# Patient Record
Sex: FEMALE | Race: WHITE | Employment: FULL TIME | URBAN - METROPOLITAN AREA
[De-identification: names, ages, dates, MRNs, and addresses within clinical notes are randomized per-mention and may not be internally consistent; named-entity substitution may affect disease eponyms.]

---

## 2017-04-18 ENCOUNTER — OFFICE VISIT (OUTPATIENT)
Dept: SURGERY | Facility: CLINIC | Age: 62
End: 2017-04-18

## 2017-04-18 VITALS — BODY MASS INDEX: 26.03 KG/M2 | WEIGHT: 162 LBS | HEIGHT: 66 IN

## 2017-04-18 DIAGNOSIS — K43.2 INCISIONAL HERNIA, WITHOUT OBSTRUCTION OR GANGRENE: Primary | ICD-10-CM

## 2017-04-18 PROCEDURE — 99214 OFFICE O/P EST MOD 30 MIN: CPT | Performed by: SURGERY

## 2017-04-18 PROCEDURE — 99212 OFFICE O/P EST SF 10 MIN: CPT | Performed by: SURGERY

## 2017-04-18 RX ORDER — BETAMETHASONE DIPROPIONATE 0.05 %
OINTMENT (GRAM) TOPICAL
Refills: 1 | COMMUNITY
Start: 2017-04-06

## 2017-04-18 RX ORDER — VALSARTAN 160 MG/1
TABLET ORAL
Refills: 2 | COMMUNITY
Start: 2017-04-06 | End: 2019-04-06

## 2017-04-18 NOTE — H&P
History and Physical      Lannis Krabbe is a 64year old female. HPI   Patient presents with: Follow - Up: Patient here for follow up hernia, last seen 10/24/2016 for incisional hernia at the umbilicus.  Patient states area has begun to bother her Prescriptions:  Betamethasone Dipropionate 0.05 % External Ointment APPLY AS DIRECTED Disp:  Rfl: 1   valsartan 160 MG Oral Tab TK 1 T PO QD Disp:  Rfl: 2   RESTASIS 0.05 % Ophthalmic Emulsion daily.  Disp:  Rfl: 4   ipratropium-albuterol 0.5-2.5 (3) MG/3ML completed. Pertinent positives and negatives noted in the the HPI. PHYSICAL EXAM   Body mass index is 26.16 kg/(m^2). No LMP recorded. Patient is not currently having periods (Reason: Menopause).   Constitutional: appears well hydrated alert and re

## 2017-06-15 ENCOUNTER — TELEPHONE (OUTPATIENT)
Dept: GASTROENTEROLOGY | Facility: CLINIC | Age: 62
End: 2017-06-15

## 2017-06-15 NOTE — TELEPHONE ENCOUNTER
Caresse Hodgkin requesting RN to fax office notes for CMN re: 10/6/2016 EGD with biopsy. Pls fax to [phone]. For add'l questions pls call. Thank you.

## 2017-06-19 NOTE — TELEPHONE ENCOUNTER
Samson Everett called me back. [facility] Is not paying for procedures because no authorization was on file. Pt was scheduled @ [address] [place] [address] and no approval was obtained. They needs records to establish medical necessity.  Faxed records today

## 2017-07-20 ENCOUNTER — TELEPHONE (OUTPATIENT)
Dept: GASTROENTEROLOGY | Facility: CLINIC | Age: 62
End: 2017-07-20

## 2017-07-20 RX ORDER — AMOXICILLIN AND CLAVULANATE POTASSIUM 875; 125 MG/1; MG/1
1 TABLET, FILM COATED ORAL 2 TIMES DAILY
Qty: 20 TABLET | Refills: 0 | Status: SHIPPED | OUTPATIENT
Start: 2017-07-20 | End: 2018-05-30 | Stop reason: ALTCHOICE

## 2017-07-20 NOTE — TELEPHONE ENCOUNTER
Pt is requesting an appt sooner than first available with PL due to IBS and diverticulitis.  Please Call Thank You

## 2017-07-20 NOTE — TELEPHONE ENCOUNTER
The patient was contacted and symptoms discussed. She has been experiencing 1 week of lower abdominal discomfort. Seems to be on both sides of her abdomen. She reports her appendix has been removed surgically.   She does have a history of diverticulitis

## 2017-07-20 NOTE — TELEPHONE ENCOUNTER
I spoke to pt and she accepted appt with Dr Samreen Cleaning tomorrow at 3pm and given detailed directions to wmob. He has cases before that at [facility].

## 2017-07-20 NOTE — TELEPHONE ENCOUNTER
Dr Anderson--pt contacted, this had not been sent as high priority from phone room. Pt states Tues 7/11 began having bilateral lower abdominal pain that has worsened, currently at level 7. Unsure if fever, but \"feels slightly warm\".  History diverticulitis,

## 2017-07-21 ENCOUNTER — LAB ENCOUNTER (OUTPATIENT)
Dept: LAB | Facility: HOSPITAL | Age: 62
End: 2017-07-21
Attending: INTERNAL MEDICINE
Payer: COMMERCIAL

## 2017-07-21 ENCOUNTER — TELEPHONE (OUTPATIENT)
Dept: GASTROENTEROLOGY | Facility: CLINIC | Age: 62
End: 2017-07-21

## 2017-07-21 ENCOUNTER — PRIOR ORIGINAL RECORDS (OUTPATIENT)
Dept: OTHER | Age: 62
End: 2017-07-21

## 2017-07-21 ENCOUNTER — OFFICE VISIT (OUTPATIENT)
Dept: GASTROENTEROLOGY | Facility: CLINIC | Age: 62
End: 2017-07-21

## 2017-07-21 VITALS — SYSTOLIC BLOOD PRESSURE: 113 MMHG | HEART RATE: 79 BPM | DIASTOLIC BLOOD PRESSURE: 71 MMHG

## 2017-07-21 DIAGNOSIS — R10.9 ABDOMINAL PAIN, UNSPECIFIED LOCATION: ICD-10-CM

## 2017-07-21 DIAGNOSIS — R10.9 ABDOMINAL PAIN, UNSPECIFIED LOCATION: Primary | ICD-10-CM

## 2017-07-21 LAB
ALBUMIN SERPL BCP-MCNC: 4.3 G/DL (ref 3.5–4.8)
ALBUMIN/GLOB SERPL: 1.8 {RATIO} (ref 1–2)
ALP SERPL-CCNC: 50 U/L (ref 32–100)
ALT SERPL-CCNC: 37 U/L (ref 14–54)
ANION GAP SERPL CALC-SCNC: 8 MMOL/L (ref 0–18)
AST SERPL-CCNC: 27 U/L (ref 15–41)
BASOPHILS # BLD: 0.1 K/UL (ref 0–0.2)
BASOPHILS NFR BLD: 1 %
BILIRUB SERPL-MCNC: 0.7 MG/DL (ref 0.3–1.2)
BILIRUB UR QL: NEGATIVE
BUN SERPL-MCNC: 18 MG/DL (ref 8–20)
BUN/CREAT SERPL: 25 (ref 10–20)
CALCIUM SERPL-MCNC: 9.6 MG/DL (ref 8.5–10.5)
CHLORIDE SERPL-SCNC: 105 MMOL/L (ref 95–110)
CO2 SERPL-SCNC: 26 MMOL/L (ref 22–32)
COLOR UR: YELLOW
CREAT SERPL-MCNC: 0.72 MG/DL (ref 0.5–1.5)
EOSINOPHIL # BLD: 0.1 K/UL (ref 0–0.7)
EOSINOPHIL NFR BLD: 1 %
ERYTHROCYTE [DISTWIDTH] IN BLOOD BY AUTOMATED COUNT: 12.6 % (ref 11–15)
GLOBULIN PLAS-MCNC: 2.4 G/DL (ref 2.5–3.7)
GLUCOSE SERPL-MCNC: 102 MG/DL (ref 70–99)
GLUCOSE UR-MCNC: NEGATIVE MG/DL
HCT VFR BLD AUTO: 40.9 % (ref 35–48)
HGB BLD-MCNC: 14.1 G/DL (ref 12–16)
KETONES UR-MCNC: NEGATIVE MG/DL
LIPASE SERPL-CCNC: 30 U/L (ref 22–51)
LYMPHOCYTES # BLD: 2.4 K/UL (ref 1–4)
LYMPHOCYTES NFR BLD: 34 %
MCH RBC QN AUTO: 30.4 PG (ref 27–32)
MCHC RBC AUTO-ENTMCNC: 34.4 G/DL (ref 32–37)
MCV RBC AUTO: 88.4 FL (ref 80–100)
MONOCYTES # BLD: 0.5 K/UL (ref 0–1)
MONOCYTES NFR BLD: 7 %
NEUTROPHILS # BLD AUTO: 4 K/UL (ref 1.8–7.7)
NEUTROPHILS NFR BLD: 57 %
NITRITE UR QL STRIP.AUTO: NEGATIVE
OSMOLALITY UR CALC.SUM OF ELEC: 290 MOSM/KG (ref 275–295)
PH UR: 5 [PH] (ref 5–8)
PLATELET # BLD AUTO: 231 K/UL (ref 140–400)
PMV BLD AUTO: 8.4 FL (ref 7.4–10.3)
POTASSIUM SERPL-SCNC: 3.8 MMOL/L (ref 3.3–5.1)
PROT SERPL-MCNC: 6.7 G/DL (ref 5.9–8.4)
PROT UR-MCNC: 30 MG/DL
RBC # BLD AUTO: 4.63 M/UL (ref 3.7–5.4)
RBC #/AREA URNS AUTO: 3 /HPF
SODIUM SERPL-SCNC: 139 MMOL/L (ref 136–144)
SP GR UR STRIP: 1.03 (ref 1–1.03)
UROBILINOGEN UR STRIP-ACNC: 2
VIT C UR-MCNC: NEGATIVE MG/DL
WBC # BLD AUTO: 7 K/UL (ref 4–11)
WBC #/AREA URNS AUTO: 1 /HPF

## 2017-07-21 PROCEDURE — 83690 ASSAY OF LIPASE: CPT

## 2017-07-21 PROCEDURE — 99212 OFFICE O/P EST SF 10 MIN: CPT | Performed by: INTERNAL MEDICINE

## 2017-07-21 PROCEDURE — 80053 COMPREHEN METABOLIC PANEL: CPT

## 2017-07-21 PROCEDURE — 85025 COMPLETE CBC W/AUTO DIFF WBC: CPT

## 2017-07-21 PROCEDURE — 99213 OFFICE O/P EST LOW 20 MIN: CPT | Performed by: INTERNAL MEDICINE

## 2017-07-21 PROCEDURE — 36415 COLL VENOUS BLD VENIPUNCTURE: CPT

## 2017-07-21 PROCEDURE — 81001 URINALYSIS AUTO W/SCOPE: CPT

## 2017-07-21 NOTE — PATIENT INSTRUCTIONS
Abdominal pain- possible diverticulitis  - CT scan abdomen/pelvis  - Augmentin   - low fiber diet  - lab work   - Florastor twice a day ( probiotic)

## 2017-07-21 NOTE — TELEPHONE ENCOUNTER
THIS PATIENT WILL CALL BACK TO SCHEDULE    Scheduled for:  Colonoscopy 14280  Provider Name: Dr. Monie Dawson  Date:    Location:  [facility]  Sedation:  MAC  Time:    Prep:  Suprep, given to patient at the time of office visit  Meds/Allergies Reconciled?:  Physician re

## 2017-07-21 NOTE — PROGRESS NOTES
Claudell Cedars is a 64year old female.     HPI:   Patient presents with:  Abdominal Pain: Pt goes by \"Rhea\"      The patient is a 66-year-old female with history of irritable bowel syndrome/diarrhea predominance, prior history of diverticulitis who la Comment: adverse reaction  2013: REPAIR ROTATOR CUFF,CHRONIC Left  1965: TONSILLECTOMY   Family History   Problem Relation Age of Onset   • Hypertension Mother    • Cancer Father      Tongue   • Breast Cancer Other      family h/o   • Heart Disorder Brot with no masses or lesions  Chest- Clear bilaterally, no wheezing,  Heart- regular rate, no murmur or gallop  Abdomen- Soft, no rebound or guarding, bowel sounds normal, she does report tenderness in both left and right lower quadrants  Ext- no clubbing or

## 2017-07-26 ENCOUNTER — TELEPHONE (OUTPATIENT)
Dept: GASTROENTEROLOGY | Facility: CLINIC | Age: 62
End: 2017-07-26

## 2017-07-26 NOTE — TELEPHONE ENCOUNTER
----- Message from Yeny Morel MD sent at 7/25/2017  7:14 PM CDT -----  RN to contact pt - lab work looks ok, urine may suggest UTI ( but should be covered with abx she is on)   Please see how she is doing and if ct set up

## 2017-07-27 NOTE — TELEPHONE ENCOUNTER
Dr Anderson--Pt contacted and given below results. She is feeling better, abdominal pain improving, currently at level 4. She is waiting for auth for CT from [facility]. I contacted [employer] and they will call pt as soon as they have auth.   Scheduled her colon

## 2017-07-27 NOTE — TELEPHONE ENCOUNTER
[facility] eugenia SKAGGS wishes to schedule below. She was at her listed home phone when I spoke to her, Thanks.

## 2017-08-09 ENCOUNTER — TELEPHONE (OUTPATIENT)
Dept: GASTROENTEROLOGY | Facility: CLINIC | Age: 62
End: 2017-08-09

## 2017-08-09 NOTE — TELEPHONE ENCOUNTER
Pt called to inform PL that she needs PA for CT SCAN of abdomen. Pt states insurance company is requesting a call on the provider line at [employer]). Pt is aware PL not in office this week. Pt states abdominal pain is not better. For ad

## 2017-08-09 NOTE — TELEPHONE ENCOUNTER
I contacted Joaquín Quiroz in [facility]. This was authorized with #[id number]. I instructed pt to call insurance back and give them this number.

## 2017-08-21 ENCOUNTER — HOSPITAL ENCOUNTER (OUTPATIENT)
Dept: CT IMAGING | Facility: HOSPITAL | Age: 62
Discharge: HOME | End: 2017-08-21
Attending: INTERNAL MEDICINE
Payer: COMMERCIAL

## 2017-08-21 DIAGNOSIS — R10.9 ABDOMINAL PAIN, UNSPECIFIED LOCATION: ICD-10-CM

## 2017-08-21 LAB — CREAT BLD-MCNC: 0.9 MG/DL (ref 0.5–1.5)

## 2017-08-21 PROCEDURE — 74177 CT ABD & PELVIS W/CONTRAST: CPT | Performed by: INTERNAL MEDICINE

## 2017-08-21 PROCEDURE — 82565 ASSAY OF CREATININE: CPT

## 2017-08-23 ENCOUNTER — TELEPHONE (OUTPATIENT)
Dept: GASTROENTEROLOGY | Facility: CLINIC | Age: 62
End: 2017-08-23

## 2017-08-23 NOTE — TELEPHONE ENCOUNTER
----- Message from Adi Norton MD sent at 8/21/2017  6:01 PM CDT -----  RN to contact pt with the CT scan - negative for diverticulitis/inflammation.    There is some thickening of the rectum     Advise- colonosocopy with MAC sedation and Colyte prep (

## 2017-08-23 NOTE — TELEPHONE ENCOUNTER
Dr. Russel Mishra - Pt (goes by name Michelle Hough) was given info below and vrb understanding. Pt currently has colon set for 9.26.17. States she is still having that lower pelvic pain and wanted it moved up. She has [facility] and therefore would need done at [facility].  Is th

## 2017-08-24 NOTE — TELEPHONE ENCOUNTER
Dr. Delon Durán--    I called Javad Peoples at the [facility] and she stated that you can schedule on 8/30/17 at 1230. Please advise if this is good for you and I will reschedule. Thank you.

## 2017-08-25 NOTE — TELEPHONE ENCOUNTER
This patient was scheduled by Cam Bee.  Closing this TE please see TE 7/26/17 for scheduling information

## 2017-08-28 ENCOUNTER — TELEPHONE (OUTPATIENT)
Dept: GASTROENTEROLOGY | Facility: CLINIC | Age: 62
End: 2017-08-28

## 2017-08-31 NOTE — TELEPHONE ENCOUNTER
Dr. Lovenia Cogan. Cordell Kumar I have been trying to contact this patient but I am unable to contact her. Since I can not contact her I am assuming that she will going to keep her original date of 9/26.   I will continue to contact her but I am unsure if I can adder

## 2017-12-25 ENCOUNTER — APPOINTMENT (OUTPATIENT)
Dept: GENERAL RADIOLOGY | Facility: HOSPITAL | Age: 62
End: 2017-12-25
Payer: COMMERCIAL

## 2017-12-25 ENCOUNTER — HOSPITAL ENCOUNTER (EMERGENCY)
Facility: HOSPITAL | Age: 62
Discharge: HOME | End: 2017-12-25
Attending: EMERGENCY MEDICINE
Payer: COMMERCIAL

## 2017-12-25 VITALS
OXYGEN SATURATION: 95 % | RESPIRATION RATE: 20 BRPM | HEIGHT: 66 IN | HEART RATE: 98 BPM | BODY MASS INDEX: 26.52 KG/M2 | DIASTOLIC BLOOD PRESSURE: 96 MMHG | TEMPERATURE: 98 F | WEIGHT: 165 LBS | SYSTOLIC BLOOD PRESSURE: 150 MMHG

## 2017-12-25 DIAGNOSIS — J45.31 MILD PERSISTENT ASTHMA WITH EXACERBATION: Primary | ICD-10-CM

## 2017-12-25 PROCEDURE — 71020 XR CHEST PA + LAT CHEST (CPT=71020): CPT

## 2017-12-25 PROCEDURE — 93010 ELECTROCARDIOGRAM REPORT: CPT | Performed by: EMERGENCY MEDICINE

## 2017-12-25 PROCEDURE — 93005 ELECTROCARDIOGRAM TRACING: CPT

## 2017-12-25 PROCEDURE — 99284 EMERGENCY DEPT VISIT MOD MDM: CPT

## 2017-12-25 PROCEDURE — 94640 AIRWAY INHALATION TREATMENT: CPT

## 2017-12-25 RX ORDER — ALBUTEROL SULFATE 2.5 MG/3ML
2.5 SOLUTION RESPIRATORY (INHALATION) EVERY 4 HOURS PRN
Qty: 30 AMPULE | Refills: 0 | Status: SHIPPED | OUTPATIENT
Start: 2017-12-25 | End: 2018-01-24

## 2017-12-25 RX ORDER — IPRATROPIUM BROMIDE AND ALBUTEROL SULFATE 2.5; .5 MG/3ML; MG/3ML
3 SOLUTION RESPIRATORY (INHALATION) ONCE
Status: COMPLETED | OUTPATIENT
Start: 2017-12-25 | End: 2017-12-25

## 2017-12-25 RX ORDER — PREDNISONE 20 MG/1
TABLET ORAL
Qty: 11 TABLET | Refills: 0 | Status: SHIPPED | OUTPATIENT
Start: 2017-12-25 | End: 2018-05-30 | Stop reason: ALTCHOICE

## 2017-12-26 NOTE — ED PROVIDER NOTES
Patient Seen in: [facility] AND [facility] Emergency Department    History   Patient presents with:  Cough/URI    Stated Complaint: URI    HPI    Patient is a 79-year-old female who presents to the emergency department with a chief complaint of cough.   Patient s ago      Review of Systems    Positive for stated complaint: URI  Other systems are as noted in HPI. Constitutional and vital signs reviewed. All other systems reviewed and negative except as noted above.     Physical Exam   ED Triage Vitals [12/25/17 every 4 (four) hours as needed for Wheezing or Shortness of Breath.   Qty: 30 ampule Refills: 0    predniSONE 20 MG Oral Tab  1 tablet twice daily for 3 days then daily for 5 days  Qty: 11 tablet Refills: 0

## 2018-02-05 LAB
ALBUMIN: 4.3 G/DL
BILIRUBIN TOTAL: 0.7 MG/DL
BUN: 18 MG/DL
CALCIUM: 9.6 MG/DL
CHLORIDE: 105 MEQ/L
CREATININE, SERUM: 0.72 MG/DL
GLOBULIN: 2.4 G/DL
GLUCOSE: 102 MG/DL
HEMATOCRIT: 40.9 %
HEMOGLOBIN: 14.1 G/DL
PLATELETS: 231 K/UL
POTASSIUM, SERUM: 3.8 MEQ/L
PROTEIN, TOTAL: 6.7 G/DL
RED BLOOD COUNT: 4.63 X 10-6/U
SGOT (AST): 27 IU/L
SGPT (ALT): 37 IU/L
SODIUM: 139 MEQ/L
WHITE BLOOD COUNT: 7 X 10-3/U

## 2018-02-06 ENCOUNTER — PRIOR ORIGINAL RECORDS (OUTPATIENT)
Dept: OTHER | Age: 63
End: 2018-02-06

## 2018-02-13 ENCOUNTER — MYAURORA ACCOUNT LINK (OUTPATIENT)
Dept: OTHER | Age: 63
End: 2018-02-13

## 2018-02-13 ENCOUNTER — PRIOR ORIGINAL RECORDS (OUTPATIENT)
Dept: OTHER | Age: 63
End: 2018-02-13

## 2018-02-16 ENCOUNTER — PRIOR ORIGINAL RECORDS (OUTPATIENT)
Dept: OTHER | Age: 63
End: 2018-02-16

## 2018-02-20 ENCOUNTER — PRIOR ORIGINAL RECORDS (OUTPATIENT)
Dept: OTHER | Age: 63
End: 2018-02-20

## 2018-04-02 ENCOUNTER — LAB ENCOUNTER (OUTPATIENT)
Dept: LAB | Facility: HOSPITAL | Age: 63
End: 2018-04-02
Attending: ALLERGY & IMMUNOLOGY
Payer: COMMERCIAL

## 2018-04-02 ENCOUNTER — HOSPITAL ENCOUNTER (OUTPATIENT)
Dept: GENERAL RADIOLOGY | Facility: HOSPITAL | Age: 63
Discharge: HOME | End: 2018-04-02
Attending: ALLERGY & IMMUNOLOGY
Payer: COMMERCIAL

## 2018-04-02 DIAGNOSIS — J20.9 ACUTE BRONCHITIS: Primary | ICD-10-CM

## 2018-04-02 DIAGNOSIS — J18.9 PNEUMONIA: ICD-10-CM

## 2018-04-02 DIAGNOSIS — J45.909 ASTHMATIC BRONCHITIS: ICD-10-CM

## 2018-04-02 PROCEDURE — 36415 COLL VENOUS BLD VENIPUNCTURE: CPT

## 2018-04-02 PROCEDURE — 86738 MYCOPLASMA ANTIBODY: CPT

## 2018-04-02 PROCEDURE — 85652 RBC SED RATE AUTOMATED: CPT

## 2018-04-02 PROCEDURE — 85025 COMPLETE CBC W/AUTO DIFF WBC: CPT

## 2018-04-02 PROCEDURE — 71046 X-RAY EXAM CHEST 2 VIEWS: CPT | Performed by: ALLERGY & IMMUNOLOGY

## 2018-04-25 ENCOUNTER — LAB SERVICES (OUTPATIENT)
Dept: OTHER | Age: 63
End: 2018-04-25

## 2018-04-25 ENCOUNTER — CHARTING TRANS (OUTPATIENT)
Dept: OTHER | Age: 63
End: 2018-04-25

## 2018-04-25 LAB
APPEARANCE: CLEAR
BILIRUBIN: NORMAL
COLOR: YELLOW
GLUCOSE U: NORMAL
KETONES: NORMAL
LEUKOCYTES: NORMAL
NITRITE: NORMAL
OCCULT BLOOD: NORMAL
PH: 6
PROTEIN: NORMAL
URINE SPEC GRAVITY: 1.02
UROBILINOGEN: NORMAL

## 2018-05-11 ENCOUNTER — HOSPITAL ENCOUNTER (OUTPATIENT)
Dept: CT IMAGING | Facility: HOSPITAL | Age: 63
Discharge: HOME | End: 2018-05-11
Attending: ALLERGY & IMMUNOLOGY
Payer: COMMERCIAL

## 2018-05-11 DIAGNOSIS — R05.9 COUGH: ICD-10-CM

## 2018-05-11 DIAGNOSIS — R06.00 DYSPNEA, PAROXYSMAL NOCTURNAL: ICD-10-CM

## 2018-05-11 DIAGNOSIS — J44.9 OBSTRUCTIVE CHRONIC BRONCHITIS WITHOUT EXACERBATION (HCC): ICD-10-CM

## 2018-05-11 PROCEDURE — 71250 CT THORAX DX C-: CPT | Performed by: ALLERGY & IMMUNOLOGY

## 2018-05-24 ENCOUNTER — TELEPHONE (OUTPATIENT)
Dept: GASTROENTEROLOGY | Facility: CLINIC | Age: 63
End: 2018-05-24

## 2018-05-24 NOTE — TELEPHONE ENCOUNTER
GEOVANNY to Dr Fly Linder. Pt transferred from phone room. She had a UTI 2 weeks ago and took antibiotic. Continues to have urinary frequency, pelvic /lower abd pain both sides, back pain. Urine is dark bella, but clear, and denies burning upon urination.  Sta

## 2018-05-24 NOTE — TELEPHONE ENCOUNTER
Pt states she is having stomach pain/IBS symptoms and would like to be seen by Dr. Priscilla Verduzco either Weds or Thurs in morning before noon or Friday anytime. No appts available. Please call. OK to leave detailed message.

## 2018-05-25 NOTE — TELEPHONE ENCOUNTER
Please have the pt get UTI recurrence ruled out and then see me or Roxanna Gomez in office next week

## 2018-05-25 NOTE — TELEPHONE ENCOUNTER
Patient called back and message from Dr. Ernestina Waddell given. Patient states she will follow up with PCP for UTI and  appointment made with Delores Arthur on 06/06/18 per Dr. Ernestina Waddell. Patient advised to go to ER if symptoms worsen. Patient agreed.

## 2018-05-29 ENCOUNTER — PRIOR ORIGINAL RECORDS (OUTPATIENT)
Dept: OTHER | Age: 63
End: 2018-05-29

## 2018-05-30 ENCOUNTER — PRIOR ORIGINAL RECORDS (OUTPATIENT)
Dept: OTHER | Age: 63
End: 2018-05-30

## 2018-05-30 ENCOUNTER — HOSPITAL ENCOUNTER (OUTPATIENT)
Age: 63
Discharge: HOME | End: 2018-05-30
Payer: COMMERCIAL

## 2018-05-30 VITALS
RESPIRATION RATE: 18 BRPM | WEIGHT: 160 LBS | TEMPERATURE: 99 F | OXYGEN SATURATION: 98 % | HEART RATE: 85 BPM | DIASTOLIC BLOOD PRESSURE: 76 MMHG | BODY MASS INDEX: 25.71 KG/M2 | SYSTOLIC BLOOD PRESSURE: 151 MMHG | HEIGHT: 66 IN

## 2018-05-30 DIAGNOSIS — N30.00 ACUTE CYSTITIS WITHOUT HEMATURIA: Primary | ICD-10-CM

## 2018-05-30 PROCEDURE — 80047 BASIC METABLC PNL IONIZED CA: CPT

## 2018-05-30 PROCEDURE — 36415 COLL VENOUS BLD VENIPUNCTURE: CPT

## 2018-05-30 PROCEDURE — 99214 OFFICE O/P EST MOD 30 MIN: CPT

## 2018-05-30 PROCEDURE — 81002 URINALYSIS NONAUTO W/O SCOPE: CPT

## 2018-05-30 PROCEDURE — 87086 URINE CULTURE/COLONY COUNT: CPT | Performed by: PHYSICIAN ASSISTANT

## 2018-05-30 PROCEDURE — 85025 COMPLETE CBC W/AUTO DIFF WBC: CPT | Performed by: PHYSICIAN ASSISTANT

## 2018-05-30 RX ORDER — PHENAZOPYRIDINE HYDROCHLORIDE 200 MG/1
200 TABLET, FILM COATED ORAL 3 TIMES DAILY PRN
Qty: 9 TABLET | Refills: 0 | Status: SHIPPED | OUTPATIENT
Start: 2018-05-30 | End: 2018-06-02

## 2018-05-30 RX ORDER — CIPROFLOXACIN 500 MG/1
500 TABLET, FILM COATED ORAL 2 TIMES DAILY
Qty: 14 TABLET | Refills: 0 | Status: SHIPPED | OUTPATIENT
Start: 2018-05-30 | End: 2018-06-06
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
## 2018-06-01 ENCOUNTER — APPOINTMENT (OUTPATIENT)
Dept: LAB | Facility: HOSPITAL | Age: 63
End: 2018-06-01
Attending: INTERNAL MEDICINE
Payer: COMMERCIAL

## 2018-06-01 ENCOUNTER — PRIOR ORIGINAL RECORDS (OUTPATIENT)
Dept: OTHER | Age: 63
End: 2018-06-01

## 2018-06-01 ENCOUNTER — MYAURORA ACCOUNT LINK (OUTPATIENT)
Dept: OTHER | Age: 63
End: 2018-06-01

## 2018-06-01 DIAGNOSIS — E78.00 HYPERCHOLESTEREMIA: ICD-10-CM

## 2018-06-01 LAB
ALT (SGPT): 39 U/L
AST (SGOT): 28 U/L
CHOLESTEROL, TOTAL: 236 MG/DL
HDL CHOLESTEROL: 58 MG/DL
LDL CHOLESTEROL: 155 MG/DL
TRIGLYCERIDES: 116 MG/DL

## 2018-06-01 PROCEDURE — 80061 LIPID PANEL: CPT

## 2018-06-01 PROCEDURE — 80076 HEPATIC FUNCTION PANEL: CPT

## 2018-06-01 PROCEDURE — 36415 COLL VENOUS BLD VENIPUNCTURE: CPT

## 2018-06-04 LAB
ALBUMIN: 4.4 G/DL
ALKALINE PHOSPHATATE(ALK PHOS): 46 IU/L
BILIRUBIN TOTAL: 0.9 MG/DL
PROTEIN, TOTAL: 6.9 G/DL
SGOT (AST): 28 IU/L
SGPT (ALT): 39 IU/L

## 2018-06-05 ENCOUNTER — PRIOR ORIGINAL RECORDS (OUTPATIENT)
Dept: OTHER | Age: 63
End: 2018-06-05

## 2018-06-27 ENCOUNTER — HOSPITAL ENCOUNTER (OUTPATIENT)
Dept: RESPIRATORY THERAPY | Facility: HOSPITAL | Age: 63
Discharge: HOME | End: 2018-06-27
Attending: ALLERGY & IMMUNOLOGY
Payer: COMMERCIAL

## 2018-06-27 DIAGNOSIS — R05.9 COUGH: ICD-10-CM

## 2018-06-27 PROCEDURE — 94060 EVALUATION OF WHEEZING: CPT

## 2018-06-27 PROCEDURE — 94726 PLETHYSMOGRAPHY LUNG VOLUMES: CPT

## 2018-06-27 PROCEDURE — 94729 DIFFUSING CAPACITY: CPT

## 2018-06-27 NOTE — PROCEDURES
Pulmonary Function Test Results     Impression: Possible mild obstructive ventilatory defect based on flow volume loop and evidence of air trapping and hyperinflation (% of predicted, % of predicted).  Spirometry and DLCO are normal.

## 2018-08-28 ENCOUNTER — RT VISIT (OUTPATIENT)
Dept: RESPIRATORY THERAPY | Facility: HOSPITAL | Age: 63
End: 2018-08-28
Attending: INTERNAL MEDICINE
Payer: COMMERCIAL

## 2018-08-28 ENCOUNTER — HOSPITAL ENCOUNTER (OUTPATIENT)
Dept: CT IMAGING | Facility: HOSPITAL | Age: 63
Discharge: HOME | End: 2018-08-28
Attending: INTERNAL MEDICINE
Payer: COMMERCIAL

## 2018-08-28 DIAGNOSIS — R05.9 COUGH: ICD-10-CM

## 2018-08-28 DIAGNOSIS — J44.9 COPD (CHRONIC OBSTRUCTIVE PULMONARY DISEASE) (HCC): ICD-10-CM

## 2018-08-28 DIAGNOSIS — J47.9 BRONCHIECTASIS WITHOUT ACUTE EXACERBATION (HCC): ICD-10-CM

## 2018-08-28 PROCEDURE — 71250 CT THORAX DX C-: CPT | Performed by: INTERNAL MEDICINE

## 2018-08-28 PROCEDURE — 94070 EVALUATION OF WHEEZING: CPT

## 2018-09-02 NOTE — PROCEDURES
Lady Christian  is a 70-year-old  female who stands 5 feet 6 inches tall and weighs 166 pounds. She underwent methacholine challenge testing on 8/28/18. She carries a diagnosis of cough.   She has a 4-pack-year smoking history but stopped smoking

## 2018-09-10 RX ORDER — BETAMETHASONE DIPROPIONATE 0.05 %
OINTMENT (GRAM) TOPICAL
Qty: 45 G | Refills: 0 | OUTPATIENT
Start: 2018-09-10

## 2018-09-27 ENCOUNTER — LAB ENCOUNTER (OUTPATIENT)
Dept: LAB | Age: 63
End: 2018-09-27
Attending: INTERNAL MEDICINE
Payer: COMMERCIAL

## 2018-09-27 DIAGNOSIS — R05.9 COUGH: Primary | ICD-10-CM

## 2018-09-27 LAB
BASOPHILS # BLD AUTO: 0.04 X10(3) UL (ref 0–0.1)
BASOPHILS NFR BLD AUTO: 0.8 %
EOSINOPHIL # BLD AUTO: 0.19 X10(3) UL (ref 0–0.3)
EOSINOPHIL NFR BLD AUTO: 4 %
ERYTHROCYTE [DISTWIDTH] IN BLOOD BY AUTOMATED COUNT: 12.2 % (ref 11.5–16)
HCT VFR BLD AUTO: 41 % (ref 34–50)
HGB BLD-MCNC: 14 G/DL (ref 12–16)
IMMATURE GRANULOCYTE COUNT: 0.02 X10(3) UL (ref 0–1)
IMMATURE GRANULOCYTE RATIO %: 0.4 %
IMMUNOGLOBULIN E: 28.1 IU/ML (ref 3.6–114)
LYMPHOCYTES # BLD AUTO: 1.08 X10(3) UL (ref 0.9–4)
LYMPHOCYTES NFR BLD AUTO: 22.6 %
MCH RBC QN AUTO: 30 PG (ref 27–33.2)
MCHC RBC AUTO-ENTMCNC: 34.1 G/DL (ref 31–37)
MCV RBC AUTO: 87.8 FL (ref 81–100)
MONOCYTES # BLD AUTO: 0.48 X10(3) UL (ref 0.1–1)
MONOCYTES NFR BLD AUTO: 10 %
NEUTROPHIL ABS PRELIM: 2.97 X10 (3) UL (ref 1.3–6.7)
NEUTROPHILS # BLD AUTO: 2.97 X10(3) UL (ref 1.3–6.7)
NEUTROPHILS NFR BLD AUTO: 62.2 %
PLATELET # BLD AUTO: 180 10(3)UL (ref 150–450)
RBC # BLD AUTO: 4.67 X10(6)UL (ref 3.8–5.1)
RED CELL DISTRIBUTION WIDTH-SD: 39.2 FL (ref 35.1–46.3)
SED RATE-ML: 9 MM/HR (ref 0–25)
WBC # BLD AUTO: 4.8 X10(3) UL (ref 4–13)

## 2018-09-27 PROCEDURE — 85652 RBC SED RATE AUTOMATED: CPT

## 2018-09-27 PROCEDURE — 82785 ASSAY OF IGE: CPT

## 2018-09-27 PROCEDURE — 85025 COMPLETE CBC W/AUTO DIFF WBC: CPT

## 2018-09-27 PROCEDURE — 36415 COLL VENOUS BLD VENIPUNCTURE: CPT

## 2018-10-04 ENCOUNTER — APPOINTMENT (OUTPATIENT)
Dept: LAB | Facility: HOSPITAL | Age: 63
End: 2018-10-04
Attending: INTERNAL MEDICINE
Payer: COMMERCIAL

## 2018-10-04 ENCOUNTER — PRIOR ORIGINAL RECORDS (OUTPATIENT)
Dept: OTHER | Age: 63
End: 2018-10-04

## 2018-10-04 ENCOUNTER — HOSPITAL ENCOUNTER (OUTPATIENT)
Dept: CT IMAGING | Facility: HOSPITAL | Age: 63
Discharge: HOME | End: 2018-10-04
Attending: INTERNAL MEDICINE
Payer: COMMERCIAL

## 2018-10-04 DIAGNOSIS — R05.9 COUGH: ICD-10-CM

## 2018-10-04 DIAGNOSIS — E78.00 HYPERCHOLESTEREMIA: ICD-10-CM

## 2018-10-04 PROCEDURE — 36415 COLL VENOUS BLD VENIPUNCTURE: CPT

## 2018-10-04 PROCEDURE — 70486 CT MAXILLOFACIAL W/O DYE: CPT | Performed by: INTERNAL MEDICINE

## 2018-10-04 PROCEDURE — 84460 ALANINE AMINO (ALT) (SGPT): CPT

## 2018-10-04 PROCEDURE — 84450 TRANSFERASE (AST) (SGOT): CPT

## 2018-10-04 PROCEDURE — 80061 LIPID PANEL: CPT

## 2018-10-05 ENCOUNTER — PRIOR ORIGINAL RECORDS (OUTPATIENT)
Dept: OTHER | Age: 63
End: 2018-10-05

## 2018-10-05 LAB
ALT (SGPT): 36 U/L
AST (SGOT): 20 U/L
CHOLESTEROL, TOTAL: 159 MG/DL
HDL CHOLESTEROL: 47 MG/DL
LDL CHOLESTEROL: 92 MG/DL
NON-HDL CHOLESTEROL: 112 MG/DL
TRIGLYCERIDES: 101 MG/DL

## 2018-11-01 VITALS
BODY MASS INDEX: 26.03 KG/M2 | HEIGHT: 66 IN | WEIGHT: 162 LBS | HEART RATE: 60 BPM | TEMPERATURE: 98.8 F | RESPIRATION RATE: 16 BRPM

## 2019-01-01 ENCOUNTER — EXTERNAL RECORD (OUTPATIENT)
Dept: HEALTH INFORMATION MANAGEMENT | Facility: OTHER | Age: 64
End: 2019-01-01

## 2019-02-27 RX ORDER — NITROFURANTOIN MACROCRYSTALS 100 MG/1
CAPSULE ORAL
COMMUNITY
Start: 2018-04-25 | End: 2019-04-25

## 2019-02-28 VITALS
WEIGHT: 179 LBS | SYSTOLIC BLOOD PRESSURE: 120 MMHG | HEART RATE: 80 BPM | OXYGEN SATURATION: 97 % | DIASTOLIC BLOOD PRESSURE: 80 MMHG

## 2019-02-28 RX ORDER — ATORVASTATIN CALCIUM 10 MG/1
1 TABLET, FILM COATED ORAL AT BEDTIME
COMMUNITY
Start: 2018-06-01 | End: 2019-06-13 | Stop reason: SDUPTHER

## 2019-02-28 RX ORDER — IRBESARTAN 150 MG/1
1 TABLET ORAL 2 TIMES DAILY
COMMUNITY
Start: 2018-07-31 | End: 2021-03-31 | Stop reason: SDUPTHER

## 2019-02-28 RX ORDER — ALBUTEROL SULFATE 2.5 MG/3ML
SOLUTION RESPIRATORY (INHALATION)
COMMUNITY
Start: 2018-02-06 | End: 2020-03-06

## 2019-02-28 RX ORDER — ESCITALOPRAM OXALATE 10 MG/1
1 TABLET ORAL DAILY
COMMUNITY
Start: 2018-02-06 | End: 2020-03-06

## 2019-03-05 ENCOUNTER — OFFICE VISIT (OUTPATIENT)
Dept: CARDIOLOGY | Age: 64
End: 2019-03-05

## 2019-03-05 VITALS
OXYGEN SATURATION: 95 % | HEART RATE: 102 BPM | BODY MASS INDEX: 27.64 KG/M2 | DIASTOLIC BLOOD PRESSURE: 88 MMHG | SYSTOLIC BLOOD PRESSURE: 140 MMHG | HEIGHT: 66 IN | WEIGHT: 172 LBS

## 2019-03-05 DIAGNOSIS — E78.00 HIGH CHOLESTEROL: ICD-10-CM

## 2019-03-05 DIAGNOSIS — I10 ESSENTIAL HYPERTENSION: ICD-10-CM

## 2019-03-05 DIAGNOSIS — I49.3 PVCS (PREMATURE VENTRICULAR CONTRACTIONS): Primary | ICD-10-CM

## 2019-03-05 PROCEDURE — 3077F SYST BP >= 140 MM HG: CPT | Performed by: INTERNAL MEDICINE

## 2019-03-05 PROCEDURE — 3079F DIAST BP 80-89 MM HG: CPT | Performed by: INTERNAL MEDICINE

## 2019-03-05 PROCEDURE — 99214 OFFICE O/P EST MOD 30 MIN: CPT | Performed by: INTERNAL MEDICINE

## 2019-03-05 PROCEDURE — 93000 ELECTROCARDIOGRAM COMPLETE: CPT | Performed by: INTERNAL MEDICINE

## 2019-03-05 RX ORDER — KETOCONAZOLE 20 MG/G
CREAM TOPICAL
COMMUNITY
Start: 2014-03-21 | End: 2020-03-06

## 2019-03-05 RX ORDER — ALBUTEROL SULFATE 90 UG/1
2 AEROSOL, METERED RESPIRATORY (INHALATION)
COMMUNITY

## 2019-03-05 RX ORDER — NAPROXEN SODIUM 220 MG
TABLET ORAL
COMMUNITY
End: 2020-03-06

## 2019-03-05 RX ORDER — IPRATROPIUM BROMIDE AND ALBUTEROL SULFATE 2.5; .5 MG/3ML; MG/3ML
3 SOLUTION RESPIRATORY (INHALATION)
COMMUNITY
End: 2020-03-06

## 2019-03-05 RX ORDER — IBUPROFEN 400 MG/1
400 TABLET ORAL
COMMUNITY
End: 2020-03-06

## 2019-03-05 RX ORDER — LORATADINE 10 MG/1
10 TABLET ORAL DAILY
COMMUNITY
End: 2020-03-06

## 2019-03-05 RX ORDER — PREDNISONE 10 MG/1
TABLET ORAL
COMMUNITY
Start: 2014-06-14 | End: 2020-03-06

## 2019-03-05 RX ORDER — BETAMETHASONE DIPROPIONATE 0.05 %
OINTMENT (GRAM) TOPICAL
COMMUNITY
Start: 2017-04-06 | End: 2020-03-06

## 2019-03-05 RX ORDER — OMEGA-3 FATTY ACIDS/FISH OIL 300-1000MG
CAPSULE ORAL
COMMUNITY
End: 2020-03-06

## 2019-03-05 RX ORDER — FLUCONAZOLE 150 MG/1
TABLET ORAL
COMMUNITY
Start: 2014-03-21 | End: 2020-03-06

## 2019-03-05 RX ORDER — DICYCLOMINE HYDROCHLORIDE 10 MG/1
10 CAPSULE ORAL
COMMUNITY
Start: 2016-08-03 | End: 2020-03-06

## 2019-03-05 RX ORDER — VALSARTAN 80 MG/1
TABLET ORAL
COMMUNITY
End: 2020-03-06

## 2019-03-05 RX ORDER — IRBESARTAN 150 MG/1
150 TABLET ORAL NIGHTLY
COMMUNITY
End: 2020-03-05 | Stop reason: SDUPTHER

## 2019-03-05 RX ORDER — BENZONATATE 200 MG/1
200 CAPSULE ORAL 2 TIMES DAILY PRN
COMMUNITY

## 2019-03-05 RX ORDER — HYDROCODONE BITARTRATE AND ACETAMINOPHEN 5; 325 MG/1; MG/1
1-2 TABLET ORAL
COMMUNITY
Start: 2017-06-27 | End: 2020-03-06

## 2019-03-05 RX ORDER — ACETAMINOPHEN 325 MG/1
TABLET ORAL
COMMUNITY
End: 2020-03-06

## 2019-03-05 RX ORDER — SODIUM, POTASSIUM,MAG SULFATES 17.5-3.13G
SOLUTION, RECONSTITUTED, ORAL ORAL
COMMUNITY
Start: 2017-07-21 | End: 2020-03-06

## 2019-03-05 RX ORDER — CYCLOSPORINE 0.5 MG/ML
EMULSION OPHTHALMIC
COMMUNITY
Start: 2016-08-23 | End: 2021-03-02

## 2019-04-06 ENCOUNTER — HOSPITAL ENCOUNTER (OUTPATIENT)
Age: 64
Discharge: ANOTHER HOSPITAL | End: 2019-04-06
Payer: COMMERCIAL

## 2019-04-06 ENCOUNTER — APPOINTMENT (OUTPATIENT)
Dept: GENERAL RADIOLOGY | Facility: HOSPITAL | Age: 64
End: 2019-04-06
Payer: COMMERCIAL

## 2019-04-06 ENCOUNTER — HOSPITAL ENCOUNTER (EMERGENCY)
Facility: HOSPITAL | Age: 64
Discharge: HOME | End: 2019-04-06
Attending: EMERGENCY MEDICINE
Payer: COMMERCIAL

## 2019-04-06 VITALS
DIASTOLIC BLOOD PRESSURE: 84 MMHG | SYSTOLIC BLOOD PRESSURE: 130 MMHG | WEIGHT: 162 LBS | HEIGHT: 66 IN | OXYGEN SATURATION: 95 % | RESPIRATION RATE: 17 BRPM | BODY MASS INDEX: 26.03 KG/M2 | HEART RATE: 97 BPM | TEMPERATURE: 100 F

## 2019-04-06 VITALS
TEMPERATURE: 99 F | RESPIRATION RATE: 20 BRPM | OXYGEN SATURATION: 96 % | DIASTOLIC BLOOD PRESSURE: 74 MMHG | SYSTOLIC BLOOD PRESSURE: 160 MMHG | HEART RATE: 94 BPM | WEIGHT: 164 LBS | HEIGHT: 66 IN | BODY MASS INDEX: 26.36 KG/M2

## 2019-04-06 DIAGNOSIS — J20.9 ACUTE BRONCHITIS, UNSPECIFIED ORGANISM: Primary | ICD-10-CM

## 2019-04-06 DIAGNOSIS — R06.02 SHORTNESS OF BREATH: Primary | ICD-10-CM

## 2019-04-06 PROCEDURE — 94640 AIRWAY INHALATION TREATMENT: CPT

## 2019-04-06 PROCEDURE — 99214 OFFICE O/P EST MOD 30 MIN: CPT

## 2019-04-06 PROCEDURE — 71046 X-RAY EXAM CHEST 2 VIEWS: CPT | Performed by: EMERGENCY MEDICINE

## 2019-04-06 PROCEDURE — 85025 COMPLETE CBC W/AUTO DIFF WBC: CPT | Performed by: EMERGENCY MEDICINE

## 2019-04-06 PROCEDURE — 80048 BASIC METABOLIC PNL TOTAL CA: CPT | Performed by: EMERGENCY MEDICINE

## 2019-04-06 PROCEDURE — 85379 FIBRIN DEGRADATION QUANT: CPT | Performed by: EMERGENCY MEDICINE

## 2019-04-06 PROCEDURE — 99284 EMERGENCY DEPT VISIT MOD MDM: CPT | Performed by: EMERGENCY MEDICINE

## 2019-04-06 PROCEDURE — 96374 THER/PROPH/DIAG INJ IV PUSH: CPT | Performed by: EMERGENCY MEDICINE

## 2019-04-06 RX ORDER — AZITHROMYCIN 250 MG/1
TABLET, FILM COATED ORAL
Qty: 1 PACKAGE | Refills: 0 | Status: SHIPPED | OUTPATIENT
Start: 2019-04-06 | End: 2019-04-11

## 2019-04-06 RX ORDER — BENZONATATE 200 MG/1
200 CAPSULE ORAL DAILY
COMMUNITY

## 2019-04-06 RX ORDER — MOMETASONE FUROATE 220 UG/1
INHALANT RESPIRATORY (INHALATION)
Refills: 10 | Status: ON HOLD | COMMUNITY
Start: 2019-02-04 | End: 2021-06-23

## 2019-04-06 RX ORDER — IPRATROPIUM BROMIDE AND ALBUTEROL SULFATE 2.5; .5 MG/3ML; MG/3ML
3 SOLUTION RESPIRATORY (INHALATION) ONCE
Status: COMPLETED | OUTPATIENT
Start: 2019-04-06 | End: 2019-04-06

## 2019-04-06 RX ORDER — METHYLPREDNISOLONE SODIUM SUCCINATE 125 MG/2ML
125 INJECTION, POWDER, LYOPHILIZED, FOR SOLUTION INTRAMUSCULAR; INTRAVENOUS ONCE
Status: COMPLETED | OUTPATIENT
Start: 2019-04-06 | End: 2019-04-06

## 2019-04-06 RX ORDER — PREDNISONE 20 MG/1
40 TABLET ORAL DAILY
Qty: 10 TABLET | Refills: 0 | Status: SHIPPED | OUTPATIENT
Start: 2019-04-06 | End: 2019-04-11

## 2019-04-06 RX ORDER — ALBUTEROL SULFATE 2.5 MG/3ML
2.5 SOLUTION RESPIRATORY (INHALATION) EVERY 4 HOURS PRN
Qty: 30 AMPULE | Refills: 0 | Status: SHIPPED | OUTPATIENT
Start: 2019-04-06 | End: 2019-05-06

## 2019-04-06 RX ORDER — IRBESARTAN 150 MG/1
150 TABLET ORAL
Status: ON HOLD | COMMUNITY
Start: 2018-07-31 | End: 2021-06-24

## 2019-04-06 NOTE — ED INITIAL ASSESSMENT (HPI)
Pt sick x few weeks with cough/URI symptoms. Pt treated two weeks ago with steroids and z-pack. Pt continues with cough, aches in her legs. Pt sent from  to r/o PE, recent travel to [address] [facility]

## 2019-04-06 NOTE — ED PROVIDER NOTES
Patient Seen in: [facility] AND [facility] Emergency Department    History   Patient presents with:  Cough/URI    Stated Complaint: Sent from [address] [place] [address] to r/o PE/PNA    HPI    Patient is a 26-year-old female with history of asthma who presents with cough and wheezing t Types: Cigarettes        Quit date: 1987        Years since quittin.8      Smokeless tobacco: Never Used    Alcohol use:  Yes      Alcohol/week: 0.0 oz      Comment: daily but not having since 3 weeks ago    Drug use: No      Review of Systems DRAW LAVENDER   RAINBOW DRAW LIGHT GREEN   RAINBOW DRAW GOLD   CBC W/ DIFFERENTIAL       MDM   Pulse Ox: 98%, Normal, RA    Cardiac Monitor: Pulse Readings from Last 1 Encounters:  04/06/19 : 108  , sinus     Radiology findings: Xr Chest Pa + Lat Chest (cp Shortness of Breath.   Qty: 30 ampule Refills: 0    azithromycin (ZITHROMAX Z-AZUL) 250 MG Oral Tab  500 mg once followed by 250 mg daily x 4 days  Qty: 1 Package Refills: 0

## 2019-04-06 NOTE — ED PROVIDER NOTES
Patient presents with:  Cough/URI  Dyspnea PAUL SOB (respiratory)      HPI:     This 61year old female with a history of GERD, HTN, asthma, presents with a chief complaint of cough, wheezing, shortness of breath and chest pressure.  Pt reports she has a hx inspiratory and expiratory effort, wheezing bilaterally and cough noted:  dry and persistent  CARDIO:  regular rate and rhythm without murmur  MDM/Assessment/Plan:   Orders for this encounter:  SPO2 96% RA which is normal.    Pt is wheezing throughout lung

## 2019-04-06 NOTE — ED INITIAL ASSESSMENT (HPI)
Hx of bronchitis. Completed zpak and prednisone last week for her asthma. Traveled on airplane last week. Continues to have wheezing and cough/congestion. Using current meds without relief.

## 2019-04-06 NOTE — ED NOTES
Pt reports cough and wheezing treated with zpack and steroids two weeks ago. Now having pain to lower extremities and continued cough and wheezing. Recent flight to [place]. Denies blood thinners sent from .

## 2019-06-14 RX ORDER — ATORVASTATIN CALCIUM 10 MG/1
TABLET, FILM COATED ORAL
Qty: 30 TABLET | Refills: 6 | Status: SHIPPED | OUTPATIENT
Start: 2019-06-14 | End: 2019-12-31 | Stop reason: SDUPTHER

## 2020-01-02 RX ORDER — ATORVASTATIN CALCIUM 10 MG/1
TABLET, FILM COATED ORAL
Qty: 90 TABLET | Refills: 0 | Status: SHIPPED | OUTPATIENT
Start: 2020-01-02 | End: 2020-03-16

## 2020-01-18 ENCOUNTER — TELEPHONE (OUTPATIENT)
Dept: GASTROENTEROLOGY | Facility: CLINIC | Age: 65
End: 2020-01-18

## 2020-01-18 RX ORDER — CIPROFLOXACIN 500 MG/1
500 TABLET, FILM COATED ORAL 2 TIMES DAILY
Qty: 20 TABLET | Refills: 0 | Status: SHIPPED | OUTPATIENT
Start: 2020-01-18 | End: 2020-01-28

## 2020-01-18 RX ORDER — METRONIDAZOLE 500 MG/1
500 TABLET ORAL EVERY 8 HOURS
Qty: 30 TABLET | Refills: 0 | Status: SHIPPED | OUTPATIENT
Start: 2020-01-18 | End: 2020-01-28

## 2020-01-18 NOTE — TELEPHONE ENCOUNTER
Turner Rocha and GI RNs–  Please call MsJenise Anirudh on Monday to check on her. Please arrange follow-up visit with Dr. Carly Francis or Angelia Morrissey Monday Tuesday Wednesday if possible.

## 2020-01-18 NOTE — TELEPHONE ENCOUNTER
Walt Aguayo. ...    Ms. Gina Flowers had me paged today Saturday as the physician on call regarding suspected diverticulitis. She is complaining of about 24 hours of \"intense\" LLQ pain; using heating pad; very uncomfortable; subjective chills/sweats.   Pain/sy

## 2020-01-20 NOTE — TELEPHONE ENCOUNTER
Case discussed this morning with Neal Powell NP. Significant ongoing pain this morning and as per telephone call this weekend. Agree with the ED referral for immediate evaluation possible CT scan.

## 2020-01-20 NOTE — TELEPHONE ENCOUNTER
Nursing: Unfortunately if the patient is still having significant pain despite starting on antibiotics as per Dr. Young Wilson this weekend, I would very strongly recommend an ER evaluation to rule out significant diverticulitis with complication or other possib

## 2020-01-20 NOTE — TELEPHONE ENCOUNTER
Nursing: I have an opening on my schedule this Thursday morning at 7:30 AM.  Please see if the patient can come in at that time.

## 2020-01-20 NOTE — TELEPHONE ENCOUNTER
I spoke to the pt again and she is upset because she is an established pt and she would really like to be seen this week. She is refusing an expensive ER f/u.     Is there any place on your schedule where we can add her, or Dr Javi Bales would you be willing to

## 2020-01-20 NOTE — TELEPHONE ENCOUNTER
I spoke with the patient earlier this morning on the phone at length. She describes worsening left lower quadrant pain despite antibiotics this weekend, diarrheal stools without overt bleeding, and feeling clammy/cold without fever.   No nausea or vomiting

## 2020-01-21 NOTE — TELEPHONE ENCOUNTER
Patient returned nurse call, please return call, patient will have phone in hand, please call [phone].

## 2020-01-21 NOTE — TELEPHONE ENCOUNTER
I spoke to the pt    Her pain has subsided a lot and her diarrhea is getting better.     Pt asking to f/u w/Dr Lisette Pak    I scheduled a f/u appt for her Date, time and location verified with the pt    Future Appointments   Date Time Provider Yefri Velez

## 2020-01-22 NOTE — TELEPHONE ENCOUNTER
Spoke to the pt. She accepted appt tomorrow.  Date, time and location verified    Future Appointments   Date Time Provider Yefri Velez   1/23/2020  1:00 PM Haley Santos MD [facility] Ramirez VELAZQUEZ

## 2020-01-22 NOTE — TELEPHONE ENCOUNTER
Patient contacted, symptoms reviewed. Overall she is feeling better, the pain is improved but still there. She reports that she did have some diarrhea but this is also been improving.       Nursing staff to see if we can add the patient to Thursday aftern

## 2020-01-23 ENCOUNTER — LAB ENCOUNTER (OUTPATIENT)
Dept: LAB | Facility: HOSPITAL | Age: 65
End: 2020-01-23
Attending: INTERNAL MEDICINE
Payer: COMMERCIAL

## 2020-01-23 ENCOUNTER — OFFICE VISIT (OUTPATIENT)
Dept: GASTROENTEROLOGY | Facility: CLINIC | Age: 65
End: 2020-01-23
Payer: COMMERCIAL

## 2020-01-23 ENCOUNTER — TELEPHONE (OUTPATIENT)
Dept: GASTROENTEROLOGY | Facility: CLINIC | Age: 65
End: 2020-01-23

## 2020-01-23 VITALS
HEIGHT: 64.5 IN | BODY MASS INDEX: 27.83 KG/M2 | HEART RATE: 89 BPM | DIASTOLIC BLOOD PRESSURE: 69 MMHG | SYSTOLIC BLOOD PRESSURE: 123 MMHG | WEIGHT: 165 LBS

## 2020-01-23 DIAGNOSIS — R10.30 LOWER ABDOMINAL PAIN: Primary | ICD-10-CM

## 2020-01-23 DIAGNOSIS — R10.30 LOWER ABDOMINAL PAIN: ICD-10-CM

## 2020-01-23 LAB
ABSOLUTE IMMATURE GRANULOCYTES (OFFPRE24): NORMAL
ALBUMIN SERPL-MCNC: 3.9 G/DL
ALBUMIN SERPL-MCNC: 3.9 G/DL (ref 3.4–5)
ALBUMIN/GLOB SERPL: 1.4 {RATIO}
ALBUMIN/GLOB SERPL: 1.4 {RATIO} (ref 1–2)
ALP LIVER SERPL-CCNC: 76 U/L (ref 50–130)
ALP SERPL-CCNC: 76 U/L
ALT SERPL-CCNC: 68 U/L
ALT SERPL-CCNC: 68 U/L (ref 13–56)
ANION GAP SERPL CALC-SCNC: 6 MMOL/L
ANION GAP SERPL CALC-SCNC: 6 MMOL/L (ref 0–18)
AST SERPL-CCNC: 59 U/L
AST SERPL-CCNC: 59 U/L (ref 15–37)
BASO+EOS+MONOS # BLD: NORMAL 10*3/UL
BASO+EOS+MONOS NFR BLD: NORMAL %
BASOPHILS # BLD AUTO: 0.04 X10(3) UL (ref 0–0.2)
BASOPHILS # BLD: NORMAL 10*3/UL
BASOPHILS NFR BLD AUTO: 0.7 %
BASOPHILS NFR BLD: NORMAL %
BILIRUB SERPL-MCNC: 0.6 MG/DL
BILIRUB SERPL-MCNC: 0.6 MG/DL (ref 0.1–2)
BUN BLD-MCNC: 12 MG/DL (ref 7–18)
BUN SERPL-MCNC: 12 MG/DL
BUN/CREAT SERPL: 12.6
BUN/CREAT SERPL: 12.6 (ref 10–20)
CALCIUM BLD-MCNC: 9.4 MG/DL (ref 8.5–10.1)
CALCIUM SERPL-MCNC: 9.4 MG/DL
CHLORIDE SERPL-SCNC: 104 MMOL/L
CHLORIDE SERPL-SCNC: 104 MMOL/L (ref 98–112)
CO2 SERPL-SCNC: 28 MMOL/L
CO2 SERPL-SCNC: 28 MMOL/L (ref 21–32)
CREAT BLD-MCNC: 0.95 MG/DL (ref 0.55–1.02)
CREAT SERPL-MCNC: 0.95 MG/DL
DEPRECATED RDW RBC AUTO: 39.6 FL (ref 35.1–46.3)
DIFFERENTIAL METHOD BLD: NORMAL
EOSINOPHIL # BLD AUTO: 0.06 X10(3) UL (ref 0–0.7)
EOSINOPHIL # BLD: NORMAL 10*3/UL
EOSINOPHIL NFR BLD AUTO: 1 %
EOSINOPHIL NFR BLD: NORMAL %
ERYTHROCYTE [DISTWIDTH] IN BLOOD BY AUTOMATED COUNT: 12.2 % (ref 11–15)
ERYTHROCYTE [DISTWIDTH] IN BLOOD: NORMAL %
GLOBULIN PLAS-MCNC: 2.8 G/DL (ref 2.8–4.4)
GLOBULIN SER-MCNC: 2.8 G/DL
GLUCOSE BLD-MCNC: 97 MG/DL (ref 70–99)
GLUCOSE SERPL-MCNC: 97 MG/DL
HCT VFR BLD AUTO: 36.1 % (ref 35–48)
HCT VFR BLD CALC: 36.1 %
HGB BLD-MCNC: 12.1 G/DL
HGB BLD-MCNC: 12.1 G/DL (ref 12–16)
IMM GRANULOCYTES # BLD AUTO: 0.02 X10(3) UL (ref 0–1)
IMM GRANULOCYTES NFR BLD: 0.3 %
IMMATURE GRANULOCYTES (OFFPRE25): NORMAL
LENGTH OF FAST TIME PATIENT: NORMAL H
LYMPHOCYTES # BLD AUTO: 1.85 X10(3) UL (ref 1–4)
LYMPHOCYTES # BLD: NORMAL 10*3/UL
LYMPHOCYTES NFR BLD AUTO: 31.7 %
LYMPHOCYTES NFR BLD: NORMAL %
M PROTEIN MFR SERPL ELPH: 6.7 G/DL (ref 6.4–8.2)
MCH RBC QN AUTO: 29.4 PG (ref 26–34)
MCH RBC QN AUTO: NORMAL PG
MCHC RBC AUTO-ENTMCNC: 33.5 G/DL (ref 31–37)
MCHC RBC AUTO-ENTMCNC: NORMAL G/DL
MCV RBC AUTO: 87.8 FL (ref 80–100)
MCV RBC AUTO: NORMAL FL
MONOCYTES # BLD AUTO: 0.43 X10(3) UL (ref 0.1–1)
MONOCYTES # BLD: NORMAL 10*3/UL
MONOCYTES NFR BLD AUTO: 7.4 %
MONOCYTES NFR BLD: NORMAL %
MPV (OFFPRE2): NORMAL
NEUTROPHILS # BLD AUTO: 3.44 X10 (3) UL (ref 1.5–7.7)
NEUTROPHILS # BLD AUTO: 3.44 X10(3) UL (ref 1.5–7.7)
NEUTROPHILS # BLD: NORMAL 10*3/UL
NEUTROPHILS NFR BLD AUTO: 58.9 %
NEUTROPHILS NFR BLD: NORMAL %
NRBC BLD MANUAL-RTO: NORMAL %
OSMOLALITY SERPL CALC.SUM OF ELEC: 286 MOSM/KG (ref 275–295)
PATIENT FASTING Y/N/NP: NO
PLAT MORPH BLD: NORMAL
PLATELET # BLD AUTO: 282 10(3)UL (ref 150–450)
PLATELET # BLD: NORMAL 10*3/UL
POTASSIUM SERPL-SCNC: 3.4 MMOL/L
POTASSIUM SERPL-SCNC: 3.4 MMOL/L (ref 3.5–5.1)
PROT SERPL-MCNC: 6.7 G/DL
RBC # BLD AUTO: 4.11 X10(6)UL (ref 3.8–5.3)
RBC # BLD: 4.11 10*6/UL
RBC MORPH BLD: NORMAL
SODIUM SERPL-SCNC: 138 MMOL/L
SODIUM SERPL-SCNC: 138 MMOL/L (ref 136–145)
WBC # BLD AUTO: 5.8 X10(3) UL (ref 4–11)
WBC # BLD: 5.8 10*3/UL
WBC MORPH BLD: NORMAL

## 2020-01-23 PROCEDURE — 36415 COLL VENOUS BLD VENIPUNCTURE: CPT

## 2020-01-23 PROCEDURE — 85025 COMPLETE CBC W/AUTO DIFF WBC: CPT

## 2020-01-23 PROCEDURE — 80053 COMPREHEN METABOLIC PANEL: CPT

## 2020-01-23 PROCEDURE — 99213 OFFICE O/P EST LOW 20 MIN: CPT | Performed by: INTERNAL MEDICINE

## 2020-01-23 RX ORDER — ATORVASTATIN CALCIUM 10 MG/1
TABLET, FILM COATED ORAL
Status: ON HOLD | COMMUNITY
Start: 2020-01-02 | End: 2021-06-23

## 2020-01-23 RX ORDER — LORATADINE/PSEUDOEPHEDRINE 10MG-240MG
TABLET, EXTENDED RELEASE 24 HR ORAL
Status: ON HOLD | COMMUNITY
Start: 2020-01-15 | End: 2021-06-23

## 2020-01-23 RX ORDER — PANTOPRAZOLE SODIUM 40 MG/1
TABLET, DELAYED RELEASE ORAL
Status: ON HOLD | COMMUNITY
Start: 2019-06-13 | End: 2021-06-23

## 2020-01-23 NOTE — TELEPHONE ENCOUNTER
The pt was contacted, lab work reviewed. CT scheduled but could not get done until next week, RN to see if we can order in the next 48 hours. Ok to change to stat if needed as pt still having pain on abx for presumed diverticulitis.

## 2020-01-23 NOTE — PROGRESS NOTES
Paul Donovan is a 59year old female. HPI:   Patient presents with:   Follow - Up    The patient is a 59year old female with a history of anemia, asthma, IBS, diverticular disease recent surgery for bladder prolapse at [facility] in August wh ZANDER,CHR Left 12/06/2016    adverse reaction   • REPAIR ROTATOR CUFF,CHRONIC Left 2013   • TONSILLECTOMY  1965      Family History   Problem Relation Age of Onset   • Hypertension Mother    • Cancer Father         Tongue   • Breast Cancer Other         f Take as directed (Patient not taking: Reported on 1/23/2020 ) 1 Bottle 0   • Betamethasone Dipropionate 0.05 % External Ointment APPLY AS DIRECTED  1   • Dicyclomine HCl 10 MG Oral Cap Take 1 capsule (10 mg total) by mouth 3 (three) times daily.  (Patient n and CT scan of the abdomen pelvis. Check c dif testing if diarrhea persists. Orders This Visit:  No orders of the defined types were placed in this encounter.       Meds This Visit:  Requested Prescriptions      No prescriptions requested or or

## 2020-01-23 NOTE — PATIENT INSTRUCTIONS
Left lower quadrant abdominal pain  - ER if symptoms worsen  - lab work today  - call to set up CT scan abdomen/pelvis  - low residue/fiber diet  - continue on antibiotics

## 2020-01-24 NOTE — TELEPHONE ENCOUNTER
Left detailed message on patient's voicemail that patient will be  contacted by central scheduling to reschedule CT of the abdomen in the next 48 hours.

## 2020-01-24 NOTE — TELEPHONE ENCOUNTER
Per central scheduling, order will need to be changed to STAT if you want it done in the next 48 hours. Otherwise, it may take up to a week to get a prior authorization done. Order pended. Please sign off . Thank you.

## 2020-01-24 NOTE — TELEPHONE ENCOUNTER
Type Date User Summary Attachment   Prior Authorization Confirmed/Denied/NA 01/24/2020  1:01 PM Sue Search - -   Note    Status: Prior Auth Not Required  Status Check Method: availity   Contact Name, Number: online  Call Reference #:  CPT Codes that

## 2020-01-25 ENCOUNTER — TELEPHONE (OUTPATIENT)
Dept: GASTROENTEROLOGY | Facility: CLINIC | Age: 65
End: 2020-01-25

## 2020-01-25 ENCOUNTER — HOSPITAL ENCOUNTER (OUTPATIENT)
Dept: CT IMAGING | Facility: HOSPITAL | Age: 65
Discharge: HOME | End: 2020-01-25
Attending: INTERNAL MEDICINE
Payer: COMMERCIAL

## 2020-01-25 DIAGNOSIS — R10.30 LOWER ABDOMINAL PAIN: ICD-10-CM

## 2020-01-25 PROCEDURE — 74176 CT ABD & PELVIS W/O CONTRAST: CPT | Performed by: INTERNAL MEDICINE

## 2020-01-27 ENCOUNTER — TELEPHONE (OUTPATIENT)
Dept: GASTROENTEROLOGY | Facility: CLINIC | Age: 65
End: 2020-01-27

## 2020-01-27 DIAGNOSIS — Z87.19 HISTORY OF DIVERTICULITIS: Primary | ICD-10-CM

## 2020-01-27 NOTE — TELEPHONE ENCOUNTER
----- Message from Ami Araujo MD sent at 1/25/2020  9:05 AM CST -----  Mild diverticulitis - no abscess noted.    Continue antibiotics and low residue diet ( low fiber)   msg left on voice mail    Colonoscopy in 8 weeks, RN to set up with the patient

## 2020-01-27 NOTE — TELEPHONE ENCOUNTER
Pt contacted, reviewed below, and informed she would be getting a call from the GI  to set up below colonoscopy for 8 weeks out. Thanks.

## 2020-01-27 NOTE — TELEPHONE ENCOUNTER
Dr. Evelin Lennon - please advise on sedation & dx for pt's CLN procedure. I will call to schedule. Thank you!

## 2020-01-28 NOTE — TELEPHONE ENCOUNTER
Left message to call back and schedule.  If patient calls back please forward call to [address], [address] [facility]

## 2020-01-28 NOTE — TELEPHONE ENCOUNTER
Colonoscopy for diverticulitis history  - colyte prep   - MAC sedation  - anesthesia protocol for [facility]

## 2020-01-30 NOTE — TELEPHONE ENCOUNTER
Rescheduled for:  COLON 87211  Provider Name: Dr Panchito Medrano  Date: From 02/04/2020 To: 03/24/2020  Location:  [facility]  Sedation: MAC  Time:  from 10:15am to 11:00 (pt is aware to arrive at 10:00)  Prep: Colyte  Meds/Allergies Reconciled?:  Physician reviewed

## 2020-01-30 NOTE — TELEPHONE ENCOUNTER
Was unsure if below was FYI. I spoke to Maryann Abdi in office. She had just sent FYI to Dr Hardik Echols. No action needed by GREGORY.

## 2020-01-30 NOTE — TELEPHONE ENCOUNTER
The pt has been diagnosed with diverticulitis last week.    Should push back colonoscopy 6- 8 weeks  Please reschedule

## 2020-01-30 NOTE — TELEPHONE ENCOUNTER
Scheduled for:  COLON 61325  Provider Name: Dr Jimmie Garcia  Date:  02/04/2020  Location:  [facility]  Sedation: MAC  Time:  10:15am (pt is aware to arrive at 9:15am)  Prep:   Meds/Allergies Reconciled?:  Physician reviewed  Diagnosis with codes:  Hx of Diverticul

## 2020-03-05 RX ORDER — CLOBETASOL PROPIONATE 0.46 MG/ML
SOLUTION TOPICAL
COMMUNITY
Start: 2019-12-31 | End: 2020-03-06

## 2020-03-05 RX ORDER — PANTOPRAZOLE SODIUM 40 MG/1
TABLET, DELAYED RELEASE ORAL
COMMUNITY
Start: 2019-06-13 | End: 2020-03-06

## 2020-03-06 ENCOUNTER — OFFICE VISIT (OUTPATIENT)
Dept: CARDIOLOGY | Age: 65
End: 2020-03-06

## 2020-03-06 VITALS
BODY MASS INDEX: 25.83 KG/M2 | SYSTOLIC BLOOD PRESSURE: 138 MMHG | OXYGEN SATURATION: 98 % | HEIGHT: 65 IN | DIASTOLIC BLOOD PRESSURE: 88 MMHG | HEART RATE: 88 BPM | WEIGHT: 155 LBS

## 2020-03-06 DIAGNOSIS — I10 ESSENTIAL HYPERTENSION: Primary | ICD-10-CM

## 2020-03-06 DIAGNOSIS — I49.3 PVCS (PREMATURE VENTRICULAR CONTRACTIONS): ICD-10-CM

## 2020-03-06 DIAGNOSIS — E78.00 HIGH CHOLESTEROL: ICD-10-CM

## 2020-03-06 PROCEDURE — 3075F SYST BP GE 130 - 139MM HG: CPT | Performed by: INTERNAL MEDICINE

## 2020-03-06 PROCEDURE — 93000 ELECTROCARDIOGRAM COMPLETE: CPT | Performed by: INTERNAL MEDICINE

## 2020-03-06 PROCEDURE — 3079F DIAST BP 80-89 MM HG: CPT | Performed by: INTERNAL MEDICINE

## 2020-03-06 PROCEDURE — 99214 OFFICE O/P EST MOD 30 MIN: CPT | Performed by: INTERNAL MEDICINE

## 2020-03-06 RX ORDER — TRIAMCINOLONE ACETONIDE 55 UG/1
2 SPRAY, METERED NASAL DAILY
COMMUNITY

## 2020-03-06 ASSESSMENT — PATIENT HEALTH QUESTIONNAIRE - PHQ9
SUM OF ALL RESPONSES TO PHQ9 QUESTIONS 1 AND 2: 0
SUM OF ALL RESPONSES TO PHQ9 QUESTIONS 1 AND 2: 0
1. LITTLE INTEREST OR PLEASURE IN DOING THINGS: NOT AT ALL
2. FEELING DOWN, DEPRESSED OR HOPELESS: NOT AT ALL

## 2020-03-16 ENCOUNTER — TELEPHONE (OUTPATIENT)
Dept: GASTROENTEROLOGY | Facility: CLINIC | Age: 65
End: 2020-03-16

## 2020-03-16 DIAGNOSIS — Z87.19 HISTORY OF DIVERTICULITIS: Primary | ICD-10-CM

## 2020-03-16 RX ORDER — ATORVASTATIN CALCIUM 10 MG/1
TABLET, FILM COATED ORAL
Qty: 90 TABLET | OUTPATIENT
Start: 2020-03-16

## 2020-03-16 RX ORDER — ATORVASTATIN CALCIUM 10 MG/1
TABLET, FILM COATED ORAL
Qty: 30 TABLET | Refills: 0 | Status: SHIPPED | OUTPATIENT
Start: 2020-03-16 | End: 2020-04-14

## 2020-03-17 NOTE — TELEPHONE ENCOUNTER
Rescheduled for:  Colonoscopy 58121  Provider Name: Dr. Aline Baron  Date:    From-3/24/20  To-7/7/20  Location:  [facility]  Sedation:  MAC  Time:    From-1100  To-0730 (pt is aware to arrive at 0630)   Prep:  Colyte, sent via [employer]/Allergies Reconciled?:  Ph

## 2020-04-14 RX ORDER — ATORVASTATIN CALCIUM 10 MG/1
TABLET, FILM COATED ORAL
Qty: 30 TABLET | Refills: 11 | Status: SHIPPED | OUTPATIENT
Start: 2020-04-14 | End: 2021-03-11 | Stop reason: ALTCHOICE

## 2020-07-01 ENCOUNTER — TELEPHONE (OUTPATIENT)
Dept: GASTROENTEROLOGY | Facility: CLINIC | Age: 65
End: 2020-07-01

## 2020-07-01 DIAGNOSIS — Z87.19 HISTORY OF DIVERTICULITIS: Primary | ICD-10-CM

## 2020-07-01 NOTE — TELEPHONE ENCOUNTER
Rescheduled for:  Colonoscopy 32567  Provider Name: Dr. Amber Decker  Date:                From-7/7/20               To-9/21/20  [facility]  Sedation:  MAC  Time:               From-0730                   To-0830 (pt is aware to arrive at 0730)

## 2020-09-18 ENCOUNTER — APPOINTMENT (OUTPATIENT)
Dept: LAB | Facility: HOSPITAL | Age: 65
End: 2020-09-18
Attending: INTERNAL MEDICINE
Payer: COMMERCIAL

## 2020-09-18 DIAGNOSIS — Z01.818 PRE-OP TESTING: ICD-10-CM

## 2020-09-19 LAB — SARS-COV-2 RNA RESP QL NAA+PROBE: NOT DETECTED

## 2020-09-21 ENCOUNTER — ANESTHESIA EVENT (OUTPATIENT)
Dept: ENDOSCOPY | Facility: HOSPITAL | Age: 65
End: 2020-09-21
Payer: COMMERCIAL

## 2020-09-21 ENCOUNTER — HOSPITAL ENCOUNTER (OUTPATIENT)
Facility: HOSPITAL | Age: 65
Setting detail: HOSPITAL OUTPATIENT SURGERY
Discharge: HOME | End: 2020-09-21
Attending: INTERNAL MEDICINE | Admitting: INTERNAL MEDICINE
Payer: COMMERCIAL

## 2020-09-21 ENCOUNTER — TELEPHONE (OUTPATIENT)
Dept: GASTROENTEROLOGY | Facility: CLINIC | Age: 65
End: 2020-09-21

## 2020-09-21 ENCOUNTER — ANESTHESIA (OUTPATIENT)
Dept: ENDOSCOPY | Facility: HOSPITAL | Age: 65
End: 2020-09-21
Payer: COMMERCIAL

## 2020-09-21 VITALS
SYSTOLIC BLOOD PRESSURE: 143 MMHG | HEART RATE: 91 BPM | DIASTOLIC BLOOD PRESSURE: 97 MMHG | HEIGHT: 66 IN | OXYGEN SATURATION: 96 % | RESPIRATION RATE: 16 BRPM | BODY MASS INDEX: 24.75 KG/M2 | WEIGHT: 154 LBS | TEMPERATURE: 98 F

## 2020-09-21 DIAGNOSIS — Z01.818 PRE-OP TESTING: Primary | ICD-10-CM

## 2020-09-21 DIAGNOSIS — Z87.19 HISTORY OF DIVERTICULITIS: ICD-10-CM

## 2020-09-21 PROCEDURE — 45380 COLONOSCOPY AND BIOPSY: CPT | Performed by: INTERNAL MEDICINE

## 2020-09-21 PROCEDURE — 0DBL8ZX EXCISION OF TRANSVERSE COLON, VIA NATURAL OR ARTIFICIAL OPENING ENDOSCOPIC, DIAGNOSTIC: ICD-10-PCS | Performed by: INTERNAL MEDICINE

## 2020-09-21 RX ORDER — NALOXONE HYDROCHLORIDE 0.4 MG/ML
80 INJECTION, SOLUTION INTRAMUSCULAR; INTRAVENOUS; SUBCUTANEOUS AS NEEDED
Status: CANCELLED | OUTPATIENT
Start: 2020-09-21 | End: 2020-09-21

## 2020-09-21 RX ORDER — SODIUM CHLORIDE, SODIUM LACTATE, POTASSIUM CHLORIDE, CALCIUM CHLORIDE 600; 310; 30; 20 MG/100ML; MG/100ML; MG/100ML; MG/100ML
INJECTION, SOLUTION INTRAVENOUS CONTINUOUS
Status: DISCONTINUED | OUTPATIENT
Start: 2020-09-21 | End: 2020-09-21

## 2020-09-21 RX ORDER — SODIUM CHLORIDE, SODIUM LACTATE, POTASSIUM CHLORIDE, CALCIUM CHLORIDE 600; 310; 30; 20 MG/100ML; MG/100ML; MG/100ML; MG/100ML
INJECTION, SOLUTION INTRAVENOUS CONTINUOUS
Status: CANCELLED | OUTPATIENT
Start: 2020-09-21

## 2020-09-21 NOTE — ANESTHESIA PREPROCEDURE EVALUATION
Anesthesia PreOp Note    HPI:     Chao Padilla is a 59year old female who presents for preoperative consultation requested by: Jackalyn Duane, MD    Date of Surgery: 9/21/2020    Procedure(s):  COLONOSCOPY  Indication: History of diverticulit 1 T PO HS, Disp: , Rfl: , 9/20/2020    •  LORATADINE-D 24HR  MG Oral Tablet 24 Hr, TK 1 T PO QD, Disp: , Rfl: , 9/21/2020 at 0640    •  ALBUTEROL SULFATE HFA IN, Inhale 2 puffs into the lungs. , Disp: , Rfl: , 9/21/2020 at 0800    •  benzonatate 200 M family h/o   • Heart Disorder Brother         Heart attack   • Cancer Paternal Grandmother         breast cancer   • Cancer Paternal Cousin         breast cancer     Social History    Socioeconomic History      Marital status:       Spouse n coffee, 1 cup daily        Occupational Exposure: Not Asked        Hobby Hazards: Not Asked        Sleep Concern: Not Asked        Stress Concern: Not Asked        Weight Concern: Not Asked        Special Diet: Not Asked        Back Care: Not Asked

## 2020-09-21 NOTE — TELEPHONE ENCOUNTER
7 year colonoscopy recall entered into Patient Outreach. Next colonoscopy due 09/21/2027. Health Maintenance Updated. Left message for patient to call back to review below result note.

## 2020-09-21 NOTE — ANESTHESIA POSTPROCEDURE EVALUATION
Patient: Sadie Vogel Ruecking    Procedure Summary     Date: 09/21/20 Room / Location: [facility] ENDOSCOPY 05 / [facility] ENDOSCOPY    Anesthesia Start: 3691 Anesthesia Stop:     Procedure: COLONOSCOPY (N/A ) Diagnosis:       History of diverticulitis      (Polyp,

## 2020-09-21 NOTE — H&P
History & Physical Examination    Patient Name: Mey Muñiz  MRN: [id number]  CSN: [id number]  YOB: 1955    Diagnosis: history of diverticulitis        •  atorvastatin 10 MG Oral Tab, TK 1 T PO HS, Disp: , Rfl:     •  LORATADINE- [facility].    • Cancer [facility])    • COPD (chronic obstructive pulmonary disease) ([facility] [address]    • Diverticulitis    • GERD (gastroesophageal reflux disease)    • Hemorrhoids 2006   • Hyperlipidemia    • Hypertension    • IBS (irritable bowel syndrome)    • Stew Baker They understand and agree to proceed with plan of care. [ x ] I have reviewed the History and Physical done within the last 30 days. Any changes noted above. Harrison Pinto.  Monica  9/21/2020  8:13 AM

## 2020-09-21 NOTE — TELEPHONE ENCOUNTER
----- Message from Barbie Jordan MD sent at 9/21/2020  2:29 PM CDT -----  I wanted to get back to you with your colonoscopy results. You had one colon polyp removed which was benign.   I would advise a repeat colonoscopy in 7 years to make sure no new p

## 2020-09-21 NOTE — OPERATIVE REPORT
[facility] HOSP - [facility] Endoscopy Report      Preoperative Diagnosis:  - history of diverticulitis      Postoperative Diagnosis:  - colon polyp x 1  - diverticular disease in sigmoid colon   - internal hemorrhoids      Procedure:    Colonoscopy      Veronique

## 2020-09-22 NOTE — TELEPHONE ENCOUNTER
Patient returned the call and I reviewed the below result note with the patient and she voiced understanding and had no questions.

## 2020-10-29 ENCOUNTER — WALK IN (OUTPATIENT)
Dept: URGENT CARE | Age: 65
End: 2020-10-29
Attending: FAMILY MEDICINE

## 2020-10-29 VITALS
SYSTOLIC BLOOD PRESSURE: 141 MMHG | RESPIRATION RATE: 16 BRPM | BODY MASS INDEX: 25.13 KG/M2 | WEIGHT: 151 LBS | DIASTOLIC BLOOD PRESSURE: 83 MMHG | HEART RATE: 87 BPM | TEMPERATURE: 97.3 F | OXYGEN SATURATION: 98 %

## 2020-10-29 DIAGNOSIS — J02.9 SORE THROAT: ICD-10-CM

## 2020-10-29 DIAGNOSIS — Z20.822 EXPOSURE TO COVID-19 VIRUS: ICD-10-CM

## 2020-10-29 DIAGNOSIS — R52 BODY ACHES: ICD-10-CM

## 2020-10-29 DIAGNOSIS — R05.9 COUGH: Primary | ICD-10-CM

## 2020-10-29 LAB — SARS-COV-2 AG RESP QL IA.RAPID: NOT DETECTED

## 2020-10-29 PROCEDURE — C9803 HOPD COVID-19 SPEC COLLECT: HCPCS

## 2020-10-29 PROCEDURE — 99202 OFFICE O/P NEW SF 15 MIN: CPT

## 2020-10-29 PROCEDURE — 87426 SARSCOV CORONAVIRUS AG IA: CPT

## 2020-11-01 ASSESSMENT — ENCOUNTER SYMPTOMS
WEAKNESS: 0
AGITATION: 0
FEVER: 0
VOMITING: 0
ABDOMINAL PAIN: 0
SINUS PRESSURE: 0
DIARRHEA: 0
NAUSEA: 1
ADENOPATHY: 0
COUGH: 1
EYE REDNESS: 0
DIZZINESS: 0
SORE THROAT: 1

## 2021-02-17 ENCOUNTER — APPOINTMENT (OUTPATIENT)
Dept: WOUND CARE | Facility: HOSPITAL | Age: 66
End: 2021-02-17
Attending: NURSE PRACTITIONER
Payer: COMMERCIAL

## 2021-02-19 ENCOUNTER — OFFICE VISIT (OUTPATIENT)
Dept: WOUND CARE | Facility: HOSPITAL | Age: 66
End: 2021-02-19
Attending: CLINICAL NURSE SPECIALIST
Payer: COMMERCIAL

## 2021-02-19 DIAGNOSIS — S81.801A LEG WOUND, RIGHT, INITIAL ENCOUNTER: Primary | ICD-10-CM

## 2021-02-19 DIAGNOSIS — T81.33XA DISRUPTION OF TRAUMATIC INJURY WOUND REPAIR, INITIAL ENCOUNTER: ICD-10-CM

## 2021-02-19 DIAGNOSIS — L97.919 NONHEALING ULCER OF RIGHT LOWER LEG, UNSPECIFIED ULCER STAGE (HCC): ICD-10-CM

## 2021-02-19 PROCEDURE — 97597 DBRDMT OPN WND 1ST 20 CM/<: CPT

## 2021-02-19 PROCEDURE — 99214 OFFICE O/P EST MOD 30 MIN: CPT

## 2021-02-19 NOTE — PROGRESS NOTES
Subjective    Chief Complaint  This information was obtained from the patient  The patient is new to the [address] [facility],[address] here for an initial visit for the evaluation and management of non-healing wound(s).  right leg wound    Allergies  erythromycin (Reaction: obtained from the patient  Patient has a surgical history of:  Repair compl rotator cuff left shoulder (2016)  Hemorrhoidectomy (2014)  Lumpectomy left breast (1999)  Knee surgery(Left) (1990)  Appendectomy (1970)  Tonsillectomy (1965)  Hand/finger surgery capsule  ipratropium-albuterol - inhalation 0.5 mg mg base)/3 mL-3 mg(2.5 mg base)/3 mL solution for nebulization  Asmanex Twisthaler - inhalation 220 mcg/ actuation (14) aerosol powdr breath activated  ibuprofen - oral 400 mg tablet every 6 hours as neede Extremity:  Calf Measurement 28 cm from heel  Ankle Measurement 10 cm from heel  Foot Measurement 10 cm from great to  Right Extremity: Edema is present  Compression Device In Use: Yes  Device Used Correctly: Yes  Compression Device Used: Compression [employer] loosen devitalized tissue from reforming. Zinc paste to periwound area to repel moisture off of skin and prevent further skin breakdown. Hydrofera Ready dressing to decrease bioburden to wound and manage drainage. Secured with secondary dressing.   Instr applied during debridement. Plan    Wound Orders:  Wound #1 Right, Lateral Lower Leg    Follow-Up Appointments  Return Appointment in 1 week. - 30 minutes x 4 visits    Wound Cleansing & Dressings  Clean wound with Normal Saline or Wound Cleanser.  - well    Notes  Dermoplast pain relieving spray applied prior to debridement. Topical lidocaine 4% applied during debridement.     Entered By: Bianca Thornton NP on 02/19/2021 4:31:18 PM

## 2021-02-25 ENCOUNTER — OFFICE VISIT (OUTPATIENT)
Dept: WOUND CARE | Facility: HOSPITAL | Age: 66
End: 2021-02-25
Attending: CLINICAL NURSE SPECIALIST
Payer: COMMERCIAL

## 2021-02-25 DIAGNOSIS — T81.33XD DISRUPTION OF TRAUMATIC INJURY WOUND REPAIR, SUBSEQUENT ENCOUNTER: ICD-10-CM

## 2021-02-25 DIAGNOSIS — L97.912 CHRONIC ULCER OF RIGHT LOWER EXTREMITY WITH FAT LAYER EXPOSED (HCC): ICD-10-CM

## 2021-02-25 DIAGNOSIS — S81.801D LEG WOUND, RIGHT, SUBSEQUENT ENCOUNTER: Primary | ICD-10-CM

## 2021-02-25 PROCEDURE — 87205 SMEAR GRAM STAIN: CPT | Performed by: CLINICAL NURSE SPECIALIST

## 2021-02-25 PROCEDURE — 97597 DBRDMT OPN WND 1ST 20 CM/<: CPT

## 2021-02-25 PROCEDURE — 87070 CULTURE OTHR SPECIMN AEROBIC: CPT | Performed by: CLINICAL NURSE SPECIALIST

## 2021-02-25 NOTE — PROGRESS NOTES
Subjective    Chief Complaint  This information was obtained from the patient  The patient is new to the [address] [facility],[address] here for an initial visit for the evaluation and management of non-healing wound(s).   2/25/21 - no additional concerns    Allergies  eryth Integumentary (Hair/Skin/Nails): Dryness  Neurological: Dizziness (denies), Headaches (denies), Abnormal Gait  Psychiatric: Anxiety (denies), Depression (denies)        Objective    Wound Assessment(s)  Wound #1 Right, Lateral Lower Leg is an Eschar cover wound, right lower leg, subsequent encounter  T81. 33XD: Disruption of traumatic injury wound repair, subsequent encounter        Patient's right lateral leg wound with slough/fibrin tissue impairing wound healing.   Performed selective debridement to remove The procedure was tolerated well with a pain level of 0 throughout and a pain level of 0 following the procedure.  Post Debridement Measurements: 0.8cm length x 0.7cm width x 0.2cm depth; with an area of 0.56 sq cm and a volume of 0.112 cubic cm;  General N (%): 97  Wound Assessments  Wound Number: 1    Photos:     Wound Location: Right, Lateral Lower Leg    Wound Type: Dermatologic/Rash    Date Acquired: 1/19/2021    Wound Status: Not Healed    Measurements L x W x D (cm) 0.7x0.5x0    Hypergranulated: No Facility: Outpatient  Debridement Performed for Assessment: Wound #1 Right, Lateral Lower Leg  Performed By: Physician Kaley Palomares,  Debridement: Selective  Time-Out Taken: Yes  Pain Control: Other  Post Debridement Measurements  Length: (cm) 0.8  Width:

## 2021-03-02 ENCOUNTER — OFFICE VISIT (OUTPATIENT)
Dept: CARDIOLOGY | Age: 66
End: 2021-03-02

## 2021-03-02 VITALS
OXYGEN SATURATION: 97 % | BODY MASS INDEX: 27.16 KG/M2 | WEIGHT: 163 LBS | SYSTOLIC BLOOD PRESSURE: 160 MMHG | DIASTOLIC BLOOD PRESSURE: 80 MMHG | HEIGHT: 65 IN | HEART RATE: 80 BPM

## 2021-03-02 DIAGNOSIS — I10 ESSENTIAL HYPERTENSION: Primary | ICD-10-CM

## 2021-03-02 PROCEDURE — 3077F SYST BP >= 140 MM HG: CPT | Performed by: NURSE PRACTITIONER

## 2021-03-02 PROCEDURE — 3079F DIAST BP 80-89 MM HG: CPT | Performed by: NURSE PRACTITIONER

## 2021-03-02 PROCEDURE — 99214 OFFICE O/P EST MOD 30 MIN: CPT | Performed by: NURSE PRACTITIONER

## 2021-03-02 RX ORDER — CLOBETASOL PROPIONATE 0.5 MG/G
OINTMENT TOPICAL
COMMUNITY
Start: 2021-01-22

## 2021-03-02 RX ORDER — FLUTICASONE FUROATE, UMECLIDINIUM BROMIDE AND VILANTEROL TRIFENATATE 200; 62.5; 25 UG/1; UG/1; UG/1
POWDER RESPIRATORY (INHALATION)
COMMUNITY
Start: 2021-01-18

## 2021-03-02 RX ORDER — DOXYCYCLINE HYCLATE 100 MG
100 TABLET ORAL DAILY
COMMUNITY
Start: 2020-12-23 | End: 2021-03-02

## 2021-03-02 ASSESSMENT — PATIENT HEALTH QUESTIONNAIRE - PHQ9
2. FEELING DOWN, DEPRESSED OR HOPELESS: NOT AT ALL
CLINICAL INTERPRETATION OF PHQ9 SCORE: NO FURTHER SCREENING NEEDED
1. LITTLE INTEREST OR PLEASURE IN DOING THINGS: NOT AT ALL
CLINICAL INTERPRETATION OF PHQ2 SCORE: NO FURTHER SCREENING NEEDED
SUM OF ALL RESPONSES TO PHQ9 QUESTIONS 1 AND 2: 0
SUM OF ALL RESPONSES TO PHQ9 QUESTIONS 1 AND 2: 0

## 2021-03-04 ENCOUNTER — HOSPITAL ENCOUNTER (EMERGENCY)
Facility: HOSPITAL | Age: 66
Discharge: HOME | End: 2021-03-04
Attending: EMERGENCY MEDICINE
Payer: COMMERCIAL

## 2021-03-04 ENCOUNTER — OFFICE VISIT (OUTPATIENT)
Dept: WOUND CARE | Facility: HOSPITAL | Age: 66
End: 2021-03-04
Attending: CLINICAL NURSE SPECIALIST
Payer: COMMERCIAL

## 2021-03-04 ENCOUNTER — APPOINTMENT (OUTPATIENT)
Dept: GENERAL RADIOLOGY | Facility: HOSPITAL | Age: 66
End: 2021-03-04
Attending: EMERGENCY MEDICINE
Payer: COMMERCIAL

## 2021-03-04 VITALS
BODY MASS INDEX: 25 KG/M2 | DIASTOLIC BLOOD PRESSURE: 100 MMHG | OXYGEN SATURATION: 94 % | TEMPERATURE: 98 F | SYSTOLIC BLOOD PRESSURE: 176 MMHG | WEIGHT: 154 LBS | HEART RATE: 78 BPM | RESPIRATION RATE: 18 BRPM

## 2021-03-04 DIAGNOSIS — S81.801D LEG WOUND, RIGHT, SUBSEQUENT ENCOUNTER: Primary | ICD-10-CM

## 2021-03-04 DIAGNOSIS — I10 ESSENTIAL HYPERTENSION: Primary | ICD-10-CM

## 2021-03-04 DIAGNOSIS — T81.33XD DISRUPTION OF TRAUMATIC INJURY WOUND REPAIR, SUBSEQUENT ENCOUNTER: ICD-10-CM

## 2021-03-04 DIAGNOSIS — L97.912 CHRONIC ULCER OF RIGHT LOWER EXTREMITY WITH FAT LAYER EXPOSED (HCC): ICD-10-CM

## 2021-03-04 LAB
ANION GAP SERPL CALC-SCNC: 6 MMOL/L (ref 0–18)
BASOPHILS # BLD AUTO: 0.04 X10(3) UL (ref 0–0.2)
BASOPHILS NFR BLD AUTO: 0.6 %
BUN BLD-MCNC: 21 MG/DL (ref 7–18)
BUN/CREAT SERPL: 27.3 (ref 10–20)
CALCIUM BLD-MCNC: 9.4 MG/DL (ref 8.5–10.1)
CHLORIDE SERPL-SCNC: 106 MMOL/L (ref 98–112)
CO2 SERPL-SCNC: 28 MMOL/L (ref 21–32)
CREAT BLD-MCNC: 0.77 MG/DL
DEPRECATED RDW RBC AUTO: 41.2 FL (ref 35.1–46.3)
EOSINOPHIL # BLD AUTO: 0.03 X10(3) UL (ref 0–0.7)
EOSINOPHIL NFR BLD AUTO: 0.4 %
ERYTHROCYTE [DISTWIDTH] IN BLOOD BY AUTOMATED COUNT: 12.6 % (ref 11–15)
GLUCOSE BLD-MCNC: 85 MG/DL (ref 70–99)
HCT VFR BLD AUTO: 38.7 %
HGB BLD-MCNC: 13.1 G/DL
IMM GRANULOCYTES # BLD AUTO: 0.02 X10(3) UL (ref 0–1)
IMM GRANULOCYTES NFR BLD: 0.3 %
LYMPHOCYTES # BLD AUTO: 1.99 X10(3) UL (ref 1–4)
LYMPHOCYTES NFR BLD AUTO: 29.1 %
MCH RBC QN AUTO: 30.4 PG (ref 26–34)
MCHC RBC AUTO-ENTMCNC: 33.9 G/DL (ref 31–37)
MCV RBC AUTO: 89.8 FL
MONOCYTES # BLD AUTO: 0.51 X10(3) UL (ref 0.1–1)
MONOCYTES NFR BLD AUTO: 7.4 %
NEUTROPHILS # BLD AUTO: 4.26 X10 (3) UL (ref 1.5–7.7)
NEUTROPHILS # BLD AUTO: 4.26 X10(3) UL (ref 1.5–7.7)
NEUTROPHILS NFR BLD AUTO: 62.2 %
OSMOLALITY SERPL CALC.SUM OF ELEC: 292 MOSM/KG (ref 275–295)
PLATELET # BLD AUTO: 252 10(3)UL (ref 150–450)
POTASSIUM SERPL-SCNC: 3.7 MMOL/L (ref 3.5–5.1)
RBC # BLD AUTO: 4.31 X10(6)UL
SODIUM SERPL-SCNC: 140 MMOL/L (ref 136–145)
TROPONIN I SERPL-MCNC: <0.045 NG/ML (ref ?–0.04)
WBC # BLD AUTO: 6.9 X10(3) UL (ref 4–11)

## 2021-03-04 PROCEDURE — 80048 BASIC METABOLIC PNL TOTAL CA: CPT | Performed by: EMERGENCY MEDICINE

## 2021-03-04 PROCEDURE — 93010 ELECTROCARDIOGRAM REPORT: CPT | Performed by: EMERGENCY MEDICINE

## 2021-03-04 PROCEDURE — 29581 APPL MULTLAYER CMPRN SYS LEG: CPT

## 2021-03-04 PROCEDURE — 84484 ASSAY OF TROPONIN QUANT: CPT | Performed by: EMERGENCY MEDICINE

## 2021-03-04 PROCEDURE — 96374 THER/PROPH/DIAG INJ IV PUSH: CPT

## 2021-03-04 PROCEDURE — 99285 EMERGENCY DEPT VISIT HI MDM: CPT

## 2021-03-04 PROCEDURE — 85025 COMPLETE CBC W/AUTO DIFF WBC: CPT | Performed by: EMERGENCY MEDICINE

## 2021-03-04 PROCEDURE — 93005 ELECTROCARDIOGRAM TRACING: CPT

## 2021-03-04 PROCEDURE — 71045 X-RAY EXAM CHEST 1 VIEW: CPT | Performed by: EMERGENCY MEDICINE

## 2021-03-04 RX ORDER — SULFAMETHOXAZOLE AND TRIMETHOPRIM 800; 160 MG/1; MG/1
1 TABLET ORAL 2 TIMES DAILY
Qty: 14 TABLET | Refills: 0 | Status: SHIPPED | OUTPATIENT
Start: 2021-03-04 | End: 2021-03-11

## 2021-03-04 RX ORDER — HYDROCODONE BITARTRATE AND ACETAMINOPHEN 5; 325 MG/1; MG/1
1 TABLET ORAL ONCE
Status: COMPLETED | OUTPATIENT
Start: 2021-03-04 | End: 2021-03-04

## 2021-03-04 RX ORDER — HYDRALAZINE HYDROCHLORIDE 100 MG/1
100 TABLET, FILM COATED ORAL ONCE
Status: COMPLETED | OUTPATIENT
Start: 2021-03-04 | End: 2021-03-04

## 2021-03-04 RX ORDER — HYDRALAZINE HYDROCHLORIDE 100 MG/1
100 TABLET, FILM COATED ORAL 2 TIMES DAILY
Qty: 60 TABLET | Refills: 0 | Status: ON HOLD | OUTPATIENT
Start: 2021-03-04 | End: 2021-09-08

## 2021-03-04 RX ORDER — HYDROCODONE BITARTRATE AND ACETAMINOPHEN 5; 325 MG/1; MG/1
1-2 TABLET ORAL EVERY 6 HOURS PRN
Qty: 10 TABLET | Refills: 0 | Status: SHIPPED | OUTPATIENT
Start: 2021-03-04 | End: 2021-03-11

## 2021-03-04 RX ORDER — MORPHINE SULFATE 4 MG/ML
4 INJECTION, SOLUTION INTRAMUSCULAR; INTRAVENOUS ONCE
Status: COMPLETED | OUTPATIENT
Start: 2021-03-04 | End: 2021-03-04

## 2021-03-04 NOTE — PROGRESS NOTES
Subjective    Chief Complaint  This information was obtained from the patient  The patient is new to the [address] [facility],[address] here for an initial visit for the evaluation and management of non-healing right lateral lower leg wound.  'I'm still in a lot of pain\" as Incontinence (denies)  Musculoskeletal: Assistive Devices (denies), Backache (denies), Joint Pain (denies), Muscle Pain (denies), Muscle Weakness (denies)  Integumentary (Hair/Skin/Nails): Dryness  Neurological: Dizziness (denies), Headaches (denies), Abno right lower leg with unspecified severity  (Encounter Diagnosis) S81.801D - Unspecified open wound, right lower leg, subsequent encounter  (Encounter Diagnosis) T81.33XD - Disruption of traumatic injury wound repair, subsequent encounter    Diagnoses    IC Return Appointment in 1 week. - 30 minutes x 4 visits    Wound Cleansing & Dressings  Clean wound with Normal Saline or Wound Cleanser. - apply zinc paste to periwound area  May shower with protection.  - cast cover  Collagen - fibracol  Acticoat  Cover hector

## 2021-03-04 NOTE — ED INITIAL ASSESSMENT (HPI)
Patient presents after visit with wound clinic and had high blood pressure x 1 week. Saw Dr Tati Estes nurse and was told to double up on irbesartan. Denies any shortness of breath or chest pain.     Patient does note intense pain in right leg due to just rece

## 2021-03-05 ENCOUNTER — TELEPHONE (OUTPATIENT)
Dept: CARDIOLOGY | Age: 66
End: 2021-03-05
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
## 2021-03-05 NOTE — ED NOTES
Patient presents with:  Hypertension    BP (!) 174/119   Pulse 94   Temp 97.9 °F (36.6 °C) (Temporal)   Resp 12   Wt 69.9 kg   SpO2 97%   BMI 24.86 kg/m²     Patient aox3 to ed via private vehilce patient co of htn x 2 weeks, patient reported has been ongo

## 2021-03-06 ENCOUNTER — LAB ENCOUNTER (OUTPATIENT)
Dept: LAB | Age: 66
End: 2021-03-06
Attending: INTERNAL MEDICINE
Payer: COMMERCIAL

## 2021-03-06 ENCOUNTER — LAB ENCOUNTER (OUTPATIENT)
Dept: LAB | Facility: HOSPITAL | Age: 66
End: 2021-03-06
Attending: INTERNAL MEDICINE
Payer: COMMERCIAL

## 2021-03-06 DIAGNOSIS — Z00.01 ENCOUNTER FOR GENERAL ADULT MEDICAL EXAMINATION WITH ABNORMAL FINDINGS: Primary | ICD-10-CM

## 2021-03-06 LAB
ALBUMIN SERPL-MCNC: 4.3 G/DL (ref 3.4–5)
ALBUMIN/GLOB SERPL: 1.5 {RATIO} (ref 1–2)
ALP LIVER SERPL-CCNC: 62 U/L
ALT SERPL-CCNC: 30 U/L
ANION GAP SERPL CALC-SCNC: 7 MMOL/L (ref 0–18)
AST SERPL-CCNC: 12 U/L (ref 15–37)
BASOPHILS # BLD AUTO: 0.05 X10(3) UL (ref 0–0.2)
BASOPHILS NFR BLD AUTO: 1.1 %
BILIRUB SERPL-MCNC: 1 MG/DL (ref 0.1–2)
BILIRUB UR QL: NEGATIVE
BUN BLD-MCNC: 15 MG/DL (ref 7–18)
BUN/CREAT SERPL: 18.1 (ref 10–20)
CALCIUM BLD-MCNC: 9.5 MG/DL (ref 8.5–10.1)
CHLORIDE SERPL-SCNC: 106 MMOL/L (ref 98–112)
CHOLEST SMN-MCNC: 178 MG/DL (ref ?–200)
CLARITY UR: CLEAR
CO2 SERPL-SCNC: 27 MMOL/L (ref 21–32)
COLOR UR: YELLOW
CREAT BLD-MCNC: 0.83 MG/DL
DEPRECATED RDW RBC AUTO: 41.4 FL (ref 35.1–46.3)
EOSINOPHIL # BLD AUTO: 0.06 X10(3) UL (ref 0–0.7)
EOSINOPHIL NFR BLD AUTO: 1.3 %
ERYTHROCYTE [DISTWIDTH] IN BLOOD BY AUTOMATED COUNT: 12.4 % (ref 11–15)
GLOBULIN PLAS-MCNC: 2.9 G/DL (ref 2.8–4.4)
GLUCOSE BLD-MCNC: 99 MG/DL (ref 70–99)
GLUCOSE UR-MCNC: NEGATIVE MG/DL
HCT VFR BLD AUTO: 41.4 %
HDLC SERPL-MCNC: 92 MG/DL (ref 40–59)
HGB BLD-MCNC: 13.6 G/DL
IMM GRANULOCYTES # BLD AUTO: 0.01 X10(3) UL (ref 0–1)
IMM GRANULOCYTES NFR BLD: 0.2 %
KETONES UR-MCNC: NEGATIVE MG/DL
LDLC SERPL CALC-MCNC: 63 MG/DL (ref ?–100)
LYMPHOCYTES # BLD AUTO: 1.54 X10(3) UL (ref 1–4)
LYMPHOCYTES NFR BLD AUTO: 32.5 %
M PROTEIN MFR SERPL ELPH: 7.2 G/DL (ref 6.4–8.2)
MCH RBC QN AUTO: 30 PG (ref 26–34)
MCHC RBC AUTO-ENTMCNC: 32.9 G/DL (ref 31–37)
MCV RBC AUTO: 91.2 FL
MONOCYTES # BLD AUTO: 0.33 X10(3) UL (ref 0.1–1)
MONOCYTES NFR BLD AUTO: 7 %
NEUTROPHILS # BLD AUTO: 2.75 X10 (3) UL (ref 1.5–7.7)
NEUTROPHILS # BLD AUTO: 2.75 X10(3) UL (ref 1.5–7.7)
NEUTROPHILS NFR BLD AUTO: 57.9 %
NITRITE UR QL STRIP.AUTO: NEGATIVE
NONHDLC SERPL-MCNC: 86 MG/DL (ref ?–130)
OSMOLALITY SERPL CALC.SUM OF ELEC: 291 MOSM/KG (ref 275–295)
PATIENT FASTING Y/N/NP: YES
PATIENT FASTING Y/N/NP: YES
PH UR: 5 [PH] (ref 5–8)
PLATELET # BLD AUTO: 248 10(3)UL (ref 150–450)
POTASSIUM SERPL-SCNC: 4.2 MMOL/L (ref 3.5–5.1)
PROT UR-MCNC: NEGATIVE MG/DL
RBC # BLD AUTO: 4.54 X10(6)UL
SODIUM SERPL-SCNC: 140 MMOL/L (ref 136–145)
SP GR UR STRIP: 1.03 (ref 1–1.03)
TRIGL SERPL-MCNC: 113 MG/DL (ref 30–149)
TSI SER-ACNC: 4.6 MIU/ML (ref 0.36–3.74)
UROBILINOGEN UR STRIP-ACNC: <2
VLDLC SERPL CALC-MCNC: 23 MG/DL (ref 0–30)
WBC # BLD AUTO: 4.7 X10(3) UL (ref 4–11)

## 2021-03-06 PROCEDURE — 84443 ASSAY THYROID STIM HORMONE: CPT

## 2021-03-06 PROCEDURE — 80053 COMPREHEN METABOLIC PANEL: CPT

## 2021-03-06 PROCEDURE — 36415 COLL VENOUS BLD VENIPUNCTURE: CPT

## 2021-03-06 PROCEDURE — 85025 COMPLETE CBC W/AUTO DIFF WBC: CPT

## 2021-03-06 PROCEDURE — 81001 URINALYSIS AUTO W/SCOPE: CPT

## 2021-03-06 PROCEDURE — 80061 LIPID PANEL: CPT

## 2021-03-11 ENCOUNTER — LAB ENCOUNTER (OUTPATIENT)
Dept: LAB | Facility: HOSPITAL | Age: 66
End: 2021-03-11
Attending: CLINICAL NURSE SPECIALIST
Payer: COMMERCIAL

## 2021-03-11 ENCOUNTER — OFFICE VISIT (OUTPATIENT)
Dept: CARDIOLOGY | Age: 66
End: 2021-03-11

## 2021-03-11 ENCOUNTER — OFFICE VISIT (OUTPATIENT)
Dept: WOUND CARE | Facility: HOSPITAL | Age: 66
End: 2021-03-11
Attending: CLINICAL NURSE SPECIALIST
Payer: COMMERCIAL

## 2021-03-11 ENCOUNTER — HOSPITAL ENCOUNTER (OUTPATIENT)
Dept: GENERAL RADIOLOGY | Facility: HOSPITAL | Age: 66
Discharge: HOME | End: 2021-03-11
Attending: CLINICAL NURSE SPECIALIST
Payer: COMMERCIAL

## 2021-03-11 VITALS
HEIGHT: 66 IN | OXYGEN SATURATION: 97 % | WEIGHT: 161 LBS | DIASTOLIC BLOOD PRESSURE: 72 MMHG | HEART RATE: 103 BPM | BODY MASS INDEX: 25.88 KG/M2 | SYSTOLIC BLOOD PRESSURE: 124 MMHG

## 2021-03-11 DIAGNOSIS — S81.801D LEG WOUND, RIGHT, SUBSEQUENT ENCOUNTER: ICD-10-CM

## 2021-03-11 DIAGNOSIS — T81.33XD DISRUPTION OF TRAUMATIC INJURY WOUND REPAIR, SUBSEQUENT ENCOUNTER: ICD-10-CM

## 2021-03-11 DIAGNOSIS — S81.801D LEG WOUND, RIGHT, SUBSEQUENT ENCOUNTER: Primary | ICD-10-CM

## 2021-03-11 DIAGNOSIS — L97.912 ULCER OF RIGHT LOWER EXTREMITY WITH FAT LAYER EXPOSED (HCC): ICD-10-CM

## 2021-03-11 DIAGNOSIS — I49.3 PVCS (PREMATURE VENTRICULAR CONTRACTIONS): ICD-10-CM

## 2021-03-11 DIAGNOSIS — I10 ESSENTIAL HYPERTENSION: Primary | ICD-10-CM

## 2021-03-11 DIAGNOSIS — E78.00 HIGH CHOLESTEROL: ICD-10-CM

## 2021-03-11 LAB
CRP SERPL-MCNC: <0.29 MG/DL (ref ?–0.3)
ERYTHROCYTE [SEDIMENTATION RATE] IN BLOOD: 6 MM/HR

## 2021-03-11 PROCEDURE — 3078F DIAST BP <80 MM HG: CPT | Performed by: INTERNAL MEDICINE

## 2021-03-11 PROCEDURE — 85652 RBC SED RATE AUTOMATED: CPT

## 2021-03-11 PROCEDURE — 99213 OFFICE O/P EST LOW 20 MIN: CPT

## 2021-03-11 PROCEDURE — 99214 OFFICE O/P EST MOD 30 MIN: CPT | Performed by: INTERNAL MEDICINE

## 2021-03-11 PROCEDURE — 93000 ELECTROCARDIOGRAM COMPLETE: CPT | Performed by: INTERNAL MEDICINE

## 2021-03-11 PROCEDURE — 73590 X-RAY EXAM OF LOWER LEG: CPT | Performed by: CLINICAL NURSE SPECIALIST

## 2021-03-11 PROCEDURE — 3074F SYST BP LT 130 MM HG: CPT | Performed by: INTERNAL MEDICINE

## 2021-03-11 PROCEDURE — 86140 C-REACTIVE PROTEIN: CPT

## 2021-03-11 PROCEDURE — 36415 COLL VENOUS BLD VENIPUNCTURE: CPT

## 2021-03-11 RX ORDER — SULFAMETHOXAZOLE AND TRIMETHOPRIM 800; 160 MG/1; MG/1
1 TABLET ORAL SEE ADMIN INSTRUCTIONS
COMMUNITY
Start: 2021-03-04 | End: 2021-03-11

## 2021-03-11 RX ORDER — HYDRALAZINE HYDROCHLORIDE 100 MG/1
50 TABLET, FILM COATED ORAL 2 TIMES DAILY
COMMUNITY
Start: 2021-03-04 | End: 2021-05-28

## 2021-03-11 RX ORDER — HYDROCODONE BITARTRATE AND ACETAMINOPHEN 5; 325 MG/1; MG/1
1-2 TABLET ORAL PRN
COMMUNITY
Start: 2021-03-04 | End: 2021-03-11

## 2021-03-11 ASSESSMENT — PATIENT HEALTH QUESTIONNAIRE - PHQ9
CLINICAL INTERPRETATION OF PHQ9 SCORE: NO FURTHER SCREENING NEEDED
SUM OF ALL RESPONSES TO PHQ9 QUESTIONS 1 AND 2: 0
SUM OF ALL RESPONSES TO PHQ9 QUESTIONS 1 AND 2: 0
2. FEELING DOWN, DEPRESSED OR HOPELESS: NOT AT ALL
1. LITTLE INTEREST OR PLEASURE IN DOING THINGS: NOT AT ALL
CLINICAL INTERPRETATION OF PHQ2 SCORE: NO FURTHER SCREENING NEEDED

## 2021-03-11 NOTE — PROGRESS NOTES
Subjective    Chief Complaint  This information was obtained from the patient  The patient was seen today for follow up and management of difficult to heal right lower leg wound. Patient states that her pain level is an 8 out of 10.     Allergies  erythromy Devices (denies), Backache (denies), Joint Pain (denies), Muscle Pain (denies), Muscle Weakness (denies)  Integumentary (Hair/Skin/Nails): Dryness  Neurological: Dizziness (denies), Headaches (denies), Abnormal Gait  Psychiatric: Anxiety (denies), Depressi repair, subsequent encounter    Diagnoses    ICD-10  L97.919: Non-pressure chronic ulcer of unspecified part of right lower leg with unspecified severity  S81.801D: Unspecified open wound, right lower leg, subsequent encounter  T81. 33XD: Disruption of trau

## 2021-03-16 ENCOUNTER — HOSPITAL ENCOUNTER (OUTPATIENT)
Dept: MRI IMAGING | Facility: HOSPITAL | Age: 66
Discharge: HOME | End: 2021-03-16
Attending: NURSE PRACTITIONER
Payer: COMMERCIAL

## 2021-03-16 DIAGNOSIS — M79.604 RIGHT LEG PAIN: ICD-10-CM

## 2021-03-16 DIAGNOSIS — S81.801A OPEN WOUND OF RIGHT LOWER LEG, INITIAL ENCOUNTER: ICD-10-CM

## 2021-03-16 PROCEDURE — 73718 MRI LOWER EXTREMITY W/O DYE: CPT | Performed by: NURSE PRACTITIONER

## 2021-03-18 ENCOUNTER — OFFICE VISIT (OUTPATIENT)
Dept: WOUND CARE | Facility: HOSPITAL | Age: 66
End: 2021-03-18
Attending: CLINICAL NURSE SPECIALIST
Payer: COMMERCIAL

## 2021-03-18 DIAGNOSIS — S81.801D LEG WOUND, RIGHT, SUBSEQUENT ENCOUNTER: Primary | ICD-10-CM

## 2021-03-18 DIAGNOSIS — T81.33XD DISRUPTION OF TRAUMATIC INJURY WOUND REPAIR, SUBSEQUENT ENCOUNTER: ICD-10-CM

## 2021-03-18 DIAGNOSIS — L97.919 NON-PRESSURE CHRONIC ULCER OF RIGHT LOWER LEG, UNSPECIFIED ULCER STAGE (HCC): ICD-10-CM

## 2021-03-18 PROCEDURE — 99213 OFFICE O/P EST LOW 20 MIN: CPT

## 2021-03-18 NOTE — PROGRESS NOTES
Subjective    Chief Complaint  This information was obtained from the patient  The patient was seen today for follow up and management of difficult to heal right lower leg wound.     Allergies  erythromycin (Reaction: wheezing, rash), fish oil (Reaction: hi Shortness of Breath (denies), Wheezing (denies)  Cardiovascular (Central/Peripheral): Chest Pain (denies), Edema  Gastrointestinal (GI): Change in Bowel Habits (denies)  Genitourinary (): Urgency (denies), Urinary Incontinence (denies)  Musculoskeletal: L97.919 - Non-pressure chronic ulcer of unspecified part of right lower leg with unspecified severity  (Encounter Diagnosis) S81.801D - Unspecified open wound, right lower leg, subsequent encounter  (Encounter Diagnosis) T81.33XD - Disruption of traumatic Decrease salt intake. S/S of infection - Monitor for s/s of infection: Erythema, pain, odor, purulent drainage, edema, fever. Other: - Keep dressing clean and dry    Follow-Up Appointments:  A follow-up appointment should be scheduled.             Entered

## 2021-03-25 ENCOUNTER — OFFICE VISIT (OUTPATIENT)
Dept: WOUND CARE | Facility: HOSPITAL | Age: 66
End: 2021-03-25
Attending: CLINICAL NURSE SPECIALIST
Payer: COMMERCIAL

## 2021-03-25 DIAGNOSIS — T81.33XD DISRUPTION OF TRAUMATIC INJURY WOUND REPAIR, SUBSEQUENT ENCOUNTER: ICD-10-CM

## 2021-03-25 DIAGNOSIS — L97.912 CHRONIC ULCER OF RIGHT LOWER EXTREMITY WITH FAT LAYER EXPOSED (HCC): Primary | ICD-10-CM

## 2021-03-25 DIAGNOSIS — S81.801D WOUND OF RIGHT LEG, SUBSEQUENT ENCOUNTER: ICD-10-CM

## 2021-03-25 PROCEDURE — 99213 OFFICE O/P EST LOW 20 MIN: CPT

## 2021-03-25 NOTE — PROGRESS NOTES
Subjective    Chief Complaint  This information was obtained from the patient  The patient was seen today for follow up and management of difficult to heal right lower leg wound.   3/25 - no additional concerns for todays visit    Allergies  erythromycin (R related to:  Constitutional Symptoms (General Health):  Fatigue (denies), Fever (denies), Weakness (denies)  Respiratory: Cough (denies), Shortness of Breath (denies), Wheezing (denies)  Cardiovascular (Central/Peripheral): Chest Pain (denies), Edema  Jadene Colonel Non-pressure chronic ulcer of unspecified part of right lower leg with unspecified severity  (Encounter Diagnosis) S81.801D - Unspecified open wound, right lower leg, subsequent encounter  (Encounter Diagnosis) T81.33XD - Disruption of traumatic injury wou should be scheduled. Entered By: Ric Uriostegui NP on 03/25/2021 5:17:31 PM  Signature(s): Date(s):         Header Image    Multi Wound Chart Details  Patient Name: Virgil Javed   Patient Number: [id number]  Patient YOB: 1955  D

## 2021-03-31 ENCOUNTER — TELEPHONE (OUTPATIENT)
Dept: CARDIOLOGY | Age: 66
End: 2021-03-31

## 2021-03-31 ENCOUNTER — OFFICE VISIT (OUTPATIENT)
Dept: WOUND CARE | Facility: HOSPITAL | Age: 66
End: 2021-03-31
Attending: CLINICAL NURSE SPECIALIST
Payer: COMMERCIAL

## 2021-03-31 DIAGNOSIS — L97.912 ULCER OF RIGHT LOWER EXTREMITY WITH FAT LAYER EXPOSED (HCC): Primary | ICD-10-CM

## 2021-03-31 DIAGNOSIS — T81.33XD DISRUPTION OF TRAUMATIC INJURY WOUND REPAIR, SUBSEQUENT ENCOUNTER: ICD-10-CM

## 2021-03-31 PROCEDURE — 99213 OFFICE O/P EST LOW 20 MIN: CPT

## 2021-03-31 RX ORDER — IRBESARTAN 150 MG/1
150 TABLET ORAL 2 TIMES DAILY
Qty: 180 TABLET | Refills: 1 | Status: SHIPPED | OUTPATIENT
Start: 2021-03-31

## 2021-03-31 NOTE — PROGRESS NOTES
Subjective    Chief Complaint  This information was obtained from the patient  The patient was seen today for follow up and management of difficult to heal right lower leg wound. Patient has no additional concerns for today's visit.     Allergies  erythromy related to:  Constitutional Symptoms (General Health):  Fatigue (denies), Fever (denies), Weakness (denies)  Respiratory: Cough (denies), Shortness of Breath (denies), Wheezing (denies)  Cardiovascular (Central/Peripheral): Chest Pain (denies), Edema  Lloyd Britt Psychiatric:  Appropriate judgement and insight. Oriented to time, place and person. Appropriate mood and affect.         Assessment    Active Problems    ICD-10  (Encounter Diagnosis) L97.919 - Non-pressure chronic ulcer of unspecified part of right lower Erythema, pain, odor, purulent drainage, edema, fever. Other: - Keep dressing clean and dry    Follow-Up Appointments:  A follow-up appointment should be scheduled.             Entered By: Sam Lindo NP on 03/31/2021 4:58:23 PM  Signature(s): Date(s):

## 2021-04-07 ENCOUNTER — OFFICE VISIT (OUTPATIENT)
Dept: WOUND CARE | Facility: HOSPITAL | Age: 66
End: 2021-04-07
Attending: CLINICAL NURSE SPECIALIST
Payer: COMMERCIAL

## 2021-04-07 DIAGNOSIS — L97.919 CHRONIC ULCER OF LOWER EXTREMITY, RIGHT, WITH UNSPECIFIED SEVERITY (HCC): Primary | ICD-10-CM

## 2021-04-07 DIAGNOSIS — S81.801D OPEN WOUND OF RIGHT LOWER LEG, SUBSEQUENT ENCOUNTER: ICD-10-CM

## 2021-04-07 PROCEDURE — 99213 OFFICE O/P EST LOW 20 MIN: CPT

## 2021-04-07 NOTE — PROGRESS NOTES
Subjective    Chief Complaint  This information was obtained from the patient  The patient was seen today for follow up and management of difficult to heal right lower leg wound. Patient has no additional concerns for today's visit.     Allergies  erythromy related to:  Constitutional Symptoms (General Health):  Fatigue (denies), Fever (denies), Weakness (denies)  Respiratory: Cough (denies), Shortness of Breath (denies), Wheezing (denies)  Cardiovascular (Central/Peripheral): Chest Pain (denies), Edema  Gonzalez Paz Psychiatric:  Appropriate judgement and insight. Oriented to time, place and person. Appropriate mood and affect.         Assessment    Active Problems    ICD-10  (Encounter Diagnosis) L97.919 - Non-pressure chronic ulcer of unspecified part of right lower 2 x per week    Misc / Additional orders  Supplement with a daily multivitamin. Increase dietary protein intake. Decrease salt intake. S/S of infection - Monitor for s/s of infection: Erythema, pain, odor, purulent drainage, edema, fever.   Other: - Keep

## 2021-04-14 ENCOUNTER — APPOINTMENT (OUTPATIENT)
Dept: WOUND CARE | Facility: HOSPITAL | Age: 66
End: 2021-04-14
Attending: CLINICAL NURSE SPECIALIST
Payer: COMMERCIAL

## 2021-04-15 ENCOUNTER — APPOINTMENT (OUTPATIENT)
Dept: WOUND CARE | Facility: HOSPITAL | Age: 66
End: 2021-04-15
Attending: CLINICAL NURSE SPECIALIST
Payer: COMMERCIAL

## 2021-04-20 ENCOUNTER — OFFICE VISIT (OUTPATIENT)
Dept: WOUND CARE | Facility: HOSPITAL | Age: 66
End: 2021-04-20
Attending: CLINICAL NURSE SPECIALIST
Payer: COMMERCIAL

## 2021-04-20 DIAGNOSIS — S81.801D LEG WOUND, RIGHT, SUBSEQUENT ENCOUNTER: Primary | ICD-10-CM

## 2021-04-20 PROCEDURE — 99212 OFFICE O/P EST SF 10 MIN: CPT

## 2021-04-20 NOTE — PROGRESS NOTES
Subjective    Chief Complaint  This information was obtained from the patient  The patient was seen today for follow up and management of difficult to heal right lower leg wound. Patient has no additional concerns for today's visit.     Allergies  erythromy (denies S/S of DM)  Hematologic/Lymphatic: Other (Denies HX of anemia)    Patient denies complaints or symptoms related to:  Constitutional Symptoms (General Health):  Fatigue (denies), Fever (denies), Weakness (denies)  Respiratory: Cough (denies), Shortne ICD-10  (Encounter Diagnosis) L97.919 - Non-pressure chronic ulcer of unspecified part of right lower leg with unspecified severity  (Encounter Diagnosis) S81.801D - Unspecified open wound, right lower leg, subsequent encounter  (Encounter Diagnosis) T81.3

## 2021-04-22 ENCOUNTER — APPOINTMENT (OUTPATIENT)
Dept: WOUND CARE | Facility: HOSPITAL | Age: 66
End: 2021-04-22
Attending: CLINICAL NURSE SPECIALIST
Payer: COMMERCIAL

## 2021-04-28 ENCOUNTER — APPOINTMENT (OUTPATIENT)
Dept: WOUND CARE | Facility: HOSPITAL | Age: 66
End: 2021-04-28
Attending: CLINICAL NURSE SPECIALIST
Payer: COMMERCIAL

## 2021-04-29 ENCOUNTER — APPOINTMENT (OUTPATIENT)
Dept: WOUND CARE | Facility: HOSPITAL | Age: 66
End: 2021-04-29
Attending: CLINICAL NURSE SPECIALIST
Payer: COMMERCIAL

## 2021-05-28 RX ORDER — HYDRALAZINE HYDROCHLORIDE 100 MG/1
TABLET, FILM COATED ORAL
Qty: 180 TABLET | Refills: 1 | Status: SHIPPED | OUTPATIENT
Start: 2021-05-28

## 2021-06-23 ENCOUNTER — APPOINTMENT (OUTPATIENT)
Dept: GENERAL RADIOLOGY | Facility: HOSPITAL | Age: 66
End: 2021-06-23
Attending: EMERGENCY MEDICINE
Payer: COMMERCIAL

## 2021-06-23 ENCOUNTER — HOSPITAL ENCOUNTER (OUTPATIENT)
Facility: HOSPITAL | Age: 66
Setting detail: OBSERVATION
Discharge: HOME | End: 2021-06-24
Attending: EMERGENCY MEDICINE | Admitting: HOSPITALIST
Payer: COMMERCIAL

## 2021-06-23 ENCOUNTER — TELEPHONE (OUTPATIENT)
Dept: CARDIOLOGY | Age: 66
End: 2021-06-23

## 2021-06-23 DIAGNOSIS — R00.2 PALPITATIONS: Primary | ICD-10-CM

## 2021-06-23 PROCEDURE — 99204 OFFICE O/P NEW MOD 45 MIN: CPT | Performed by: INTERNAL MEDICINE

## 2021-06-23 PROCEDURE — 99219 INITIAL OBSERVATION CARE,LEVL II: CPT | Performed by: HOSPITALIST

## 2021-06-23 PROCEDURE — 71045 X-RAY EXAM CHEST 1 VIEW: CPT | Performed by: EMERGENCY MEDICINE

## 2021-06-23 RX ORDER — AZELASTINE 1 MG/ML
2 SPRAY, METERED NASAL DAILY
COMMUNITY

## 2021-06-23 RX ORDER — LOSARTAN POTASSIUM 50 MG/1
50 TABLET ORAL DAILY
Status: DISCONTINUED | OUTPATIENT
Start: 2021-06-24 | End: 2021-06-24

## 2021-06-23 RX ORDER — ALBUTEROL SULFATE 90 UG/1
1 AEROSOL, METERED RESPIRATORY (INHALATION) EVERY 4 HOURS PRN
Status: DISCONTINUED | OUTPATIENT
Start: 2021-06-23 | End: 2021-06-24

## 2021-06-23 RX ORDER — IPRATROPIUM BROMIDE AND ALBUTEROL SULFATE 2.5; .5 MG/3ML; MG/3ML
3 SOLUTION RESPIRATORY (INHALATION) EVERY 6 HOURS PRN
Status: DISCONTINUED | OUTPATIENT
Start: 2021-06-23 | End: 2021-06-24

## 2021-06-23 RX ORDER — METOCLOPRAMIDE HYDROCHLORIDE 5 MG/ML
10 INJECTION INTRAMUSCULAR; INTRAVENOUS EVERY 8 HOURS PRN
Status: DISCONTINUED | OUTPATIENT
Start: 2021-06-23 | End: 2021-06-24

## 2021-06-23 RX ORDER — PANTOPRAZOLE SODIUM 20 MG/1
20 TABLET, DELAYED RELEASE ORAL
Status: DISCONTINUED | OUTPATIENT
Start: 2021-06-24 | End: 2021-06-24

## 2021-06-23 RX ORDER — HYDRALAZINE HYDROCHLORIDE 100 MG/1
100 TABLET, FILM COATED ORAL 2 TIMES DAILY
Status: DISCONTINUED | OUTPATIENT
Start: 2021-06-23 | End: 2021-06-24

## 2021-06-23 RX ORDER — FLUTICASONE FUROATE, UMECLIDINIUM BROMIDE AND VILANTEROL TRIFENATATE 200; 62.5; 25 UG/1; UG/1; UG/1
1 POWDER RESPIRATORY (INHALATION) DAILY
COMMUNITY

## 2021-06-23 RX ORDER — ACETAMINOPHEN 325 MG/1
650 TABLET ORAL EVERY 6 HOURS PRN
Status: DISCONTINUED | OUTPATIENT
Start: 2021-06-23 | End: 2021-06-24

## 2021-06-23 RX ORDER — BENZONATATE 100 MG/1
200 CAPSULE ORAL EVERY 4 HOURS PRN
Status: DISCONTINUED | OUTPATIENT
Start: 2021-06-23 | End: 2021-06-24

## 2021-06-23 RX ORDER — ONDANSETRON 2 MG/ML
4 INJECTION INTRAMUSCULAR; INTRAVENOUS EVERY 6 HOURS PRN
Status: DISCONTINUED | OUTPATIENT
Start: 2021-06-23 | End: 2021-06-24

## 2021-06-23 RX ORDER — HEPARIN SODIUM 5000 [USP'U]/ML
5000 INJECTION, SOLUTION INTRAVENOUS; SUBCUTANEOUS EVERY 12 HOURS SCHEDULED
Status: DISCONTINUED | OUTPATIENT
Start: 2021-06-23 | End: 2021-06-24

## 2021-06-23 RX ORDER — AZELASTINE 1 MG/ML
2 SPRAY, METERED NASAL 2 TIMES DAILY
Status: DISCONTINUED | OUTPATIENT
Start: 2021-06-23 | End: 2021-06-24

## 2021-06-23 RX ORDER — OMEPRAZOLE 20 MG/1
20 CAPSULE, DELAYED RELEASE ORAL EVERY MORNING
COMMUNITY

## 2021-06-23 RX ORDER — LORAZEPAM 2 MG/ML
0.5 INJECTION INTRAMUSCULAR ONCE
Status: COMPLETED | OUTPATIENT
Start: 2021-06-23 | End: 2021-06-23

## 2021-06-23 RX ORDER — MAGNESIUM OXIDE 400 MG (241.3 MG MAGNESIUM) TABLET
400 TABLET ONCE
Status: COMPLETED | OUTPATIENT
Start: 2021-06-23 | End: 2021-06-23

## 2021-06-23 RX ORDER — TEMAZEPAM 15 MG/1
15 CAPSULE ORAL NIGHTLY PRN
Status: DISCONTINUED | OUTPATIENT
Start: 2021-06-23 | End: 2021-06-24

## 2021-06-23 RX ORDER — FEXOFENADINE HYDROCHLORIDE 60 MG/1
60 TABLET, FILM COATED ORAL DAILY
COMMUNITY

## 2021-06-23 NOTE — SPIRITUAL CARE NOTE
Pt request a visit during registration. During the visit she expressed the desire for the doctors to find the reasons for the rapid heart beat, that was causing her lots of anxious. I provided non-anxious presence and emphatic  listening.  In addition I pra

## 2021-06-23 NOTE — ED QUICK NOTES
Orders for admission, patient is aware of plan and ready to go upstairs. Any questions, please call ED RN Zander Johnson  at extension [phone].    Type of COVID test sent: rapid  COVID Suspicion level: Low    Titratable drug(s) infusing: none   Rate:    LOC at time o

## 2021-06-23 NOTE — PLAN OF CARE
Problem: Patient Centered Care  Goal: Patient preferences are identified and integrated in the patient's plan of care  Description: Interventions:  - What would you like us to know as we care for you?  \"I see Dr. Fredy Cornelius for a history of frequent PVC's aft EKG if indicated  - Evaluate effectiveness of antiarrhythmic and heart rate control medications as ordered  - Initiate emergency measures for life threatening arrhythmias  - Monitor electrolytes and administer replacement therapy as ordered  Outcome: Progr
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
## 2021-06-24 ENCOUNTER — APPOINTMENT (OUTPATIENT)
Dept: NUCLEAR MEDICINE | Facility: HOSPITAL | Age: 66
End: 2021-06-24
Attending: INTERNAL MEDICINE
Payer: COMMERCIAL

## 2021-06-24 ENCOUNTER — APPOINTMENT (OUTPATIENT)
Dept: INTERVENTIONAL RADIOLOGY/VASCULAR | Facility: HOSPITAL | Age: 66
End: 2021-06-24
Attending: NURSE PRACTITIONER
Payer: COMMERCIAL

## 2021-06-24 ENCOUNTER — APPOINTMENT (OUTPATIENT)
Dept: CV DIAGNOSTICS | Facility: HOSPITAL | Age: 66
End: 2021-06-24
Attending: INTERNAL MEDICINE
Payer: COMMERCIAL

## 2021-06-24 VITALS
WEIGHT: 140 LBS | SYSTOLIC BLOOD PRESSURE: 136 MMHG | OXYGEN SATURATION: 97 % | DIASTOLIC BLOOD PRESSURE: 87 MMHG | HEIGHT: 65 IN | TEMPERATURE: 98 F | HEART RATE: 80 BPM | RESPIRATION RATE: 18 BRPM | BODY MASS INDEX: 23.32 KG/M2

## 2021-06-24 PROCEDURE — 93306 TTE W/DOPPLER COMPLETE: CPT | Performed by: INTERNAL MEDICINE

## 2021-06-24 PROCEDURE — 93017 CV STRESS TEST TRACING ONLY: CPT | Performed by: INTERNAL MEDICINE

## 2021-06-24 PROCEDURE — 78452 HT MUSCLE IMAGE SPECT MULT: CPT | Performed by: INTERNAL MEDICINE

## 2021-06-24 PROCEDURE — B2151ZZ FLUOROSCOPY OF LEFT HEART USING LOW OSMOLAR CONTRAST: ICD-10-PCS | Performed by: INTERNAL MEDICINE

## 2021-06-24 PROCEDURE — 99152 MOD SED SAME PHYS/QHP 5/>YRS: CPT | Performed by: INTERNAL MEDICINE

## 2021-06-24 PROCEDURE — 4A023N7 MEASUREMENT OF CARDIAC SAMPLING AND PRESSURE, LEFT HEART, PERCUTANEOUS APPROACH: ICD-10-PCS | Performed by: INTERNAL MEDICINE

## 2021-06-24 PROCEDURE — 99217 OBSERVATION CARE DISCHARGE: CPT | Performed by: HOSPITALIST

## 2021-06-24 PROCEDURE — B2111ZZ FLUOROSCOPY OF MULTIPLE CORONARY ARTERIES USING LOW OSMOLAR CONTRAST: ICD-10-PCS | Performed by: INTERNAL MEDICINE

## 2021-06-24 PROCEDURE — 93458 L HRT ARTERY/VENTRICLE ANGIO: CPT | Performed by: INTERNAL MEDICINE

## 2021-06-24 RX ORDER — MIDAZOLAM HYDROCHLORIDE 1 MG/ML
INJECTION INTRAMUSCULAR; INTRAVENOUS
Status: COMPLETED
Start: 2021-06-24 | End: 2021-06-24

## 2021-06-24 RX ORDER — ASPIRIN 81 MG/1
81 TABLET ORAL DAILY
Qty: 30 TABLET | Refills: 2 | Status: SHIPPED | OUTPATIENT
Start: 2021-06-24

## 2021-06-24 RX ORDER — CHLORHEXIDINE GLUCONATE 4 G/100ML
30 SOLUTION TOPICAL
Status: DISCONTINUED | OUTPATIENT
Start: 2021-06-25 | End: 2021-06-24

## 2021-06-24 RX ORDER — ASPIRIN 81 MG/1
81 TABLET ORAL DAILY
Status: DISCONTINUED | OUTPATIENT
Start: 2021-06-24 | End: 2021-06-24

## 2021-06-24 RX ORDER — SODIUM CHLORIDE 9 MG/ML
INJECTION, SOLUTION INTRAVENOUS
Status: DISCONTINUED | OUTPATIENT
Start: 2021-06-25 | End: 2021-06-24

## 2021-06-24 RX ORDER — LOSARTAN POTASSIUM 50 MG/1
50 TABLET ORAL DAILY
Qty: 30 TABLET | Refills: 2 | Status: SHIPPED | OUTPATIENT
Start: 2021-06-25

## 2021-06-24 RX ORDER — DIPHENHYDRAMINE HYDROCHLORIDE 50 MG/ML
INJECTION INTRAMUSCULAR; INTRAVENOUS
Status: COMPLETED
Start: 2021-06-24 | End: 2021-06-24

## 2021-06-24 RX ORDER — LIDOCAINE HYDROCHLORIDE 20 MG/ML
INJECTION, SOLUTION EPIDURAL; INFILTRATION; INTRACAUDAL; PERINEURAL
Status: COMPLETED
Start: 2021-06-24 | End: 2021-06-24

## 2021-06-24 NOTE — PLAN OF CARE
- [facility] today - Mild to moderate coronary artery disease with normal ejection fraction  - Rec: PVC ablation  - Plan:  Discharge to home today with 48 hour Holter Monitor and follow up with [facility] in 1 week for site check.   - Follow-up with Dr. Rhoda Lira in

## 2021-06-24 NOTE — PROGRESS NOTES
[facility] HOSP - [facility]    Cardiology Progress Note    Zac Long Ruecking Patient Status:  Observation    1955 MRN [id number]   Location [facility] 3W/SW Attending Pennie Pugh, [facility] Day # 0 PCP Whit Pope MD     20 Systolic function was mildly reduced. The estimated      ejection fraction was 40-45%, by biplane method of disks.      Possible hypokinesis of the inferolateral and inferior      myocardium.  Doppler parameters are consistent with abnormal left      ventri nasal spray 2 spray, 2 spray, Nasal, BID  benzonatate (TESSALON) cap 200 mg, 200 mg, Oral, Q4H PRN  fluticasone-Umeclidin-Vilant (TRELEGY ELLIPTA) 200-62.5-25 MCG/INH inhaler 1 puff, 1 puff, Inhalation, Daily  hydrALAZINE HCl (APRESOLINE) tab 100 mg, 100 m

## 2021-06-24 NOTE — PLAN OF CARE
Patient alert and oriented. Right radial cath site with no hematoma or bleeding. Site care instructions given and follow up instructions discussed.      Problem: Patient Centered Care  Goal: Patient preferences are identified and integrated in the patient's Absence of cardiac arrhythmias or at baseline  Description: INTERVENTIONS:  - Continuous cardiac monitoring, monitor vital signs, obtain 12 lead EKG if indicated  - Evaluate effectiveness of antiarrhythmic and heart rate control medications as ordered  - I

## 2021-06-24 NOTE — PROCEDURES
[facility] Cardiac Cath Procedure Note  Hilda Pavanusman Ruecking Patient Status:  Observation    1955 MRN [id number]   Location [facility] 3W/SW Attending Rodolfo Kahn MD   Hosp Day # 0 PCP Jennifer Sullivan MD       Cardiologist: David Rust minutes.       Asihsh Crockett MD  06/24/21

## 2021-06-24 NOTE — DISCHARGE SUMMARY
[facility]    Discharge Summary    Latanya Goodman Anirudh Patient Status:  Observation    1955 MRN [id number]   Location [facility] 3W/SW Attending Chiki Bermudez MD   Hosp Day # 0 PCP Myles Salmon MD     Date of Admi contractions. Chest x-ray was unremarkable. The patient will be admitted to the hospital for further management and observation.     Hospital Course:     Palpitations  Symptomatic PVCs  Frequent PVCs  Mild cardiomyopathy  Abnormal Echocardiogram  Mild-mod DIPROSONE      APPLY AS DIRECTED   Refills: 1     Fexofenadine HCl 60 MG Tabs  Commonly known as: ALLEGRA      Take 60 mg by mouth daily.    Refills: 0     hydrALAZINE HCl 100 MG Tabs  Commonly known as: APRESOLINE      Take 1 tablet (100 mg total) by mouth Recommendation:  LACE 29-58: Moderate Risk of readmission after discharge from the hospital.      >35 minutes spent preparing this discharge.     Conor Barker  6/24/2021  3:56 PM

## 2021-06-24 NOTE — H&P
[facility]    PATIENT'S NAME: Britney Andrey   ATTENDING PHYSICIAN: Calin Mensah MD   PATIENT ACCOUNT#:   [id number]    LOCATION:  [address] [place] [address] RECORD #:   [id number]       YOB: 1955  ADMISSION DATE:       06 12-point review of systems negative. The patient is physically active and she does not get chest pain or dyspnea on exertion with activity. PHYSICAL EXAMINATION:    GENERAL:  Alert, oriented to time, place, and person. No acute distress, anxious.   VI

## 2021-06-24 NOTE — PLAN OF CARE
Pt alert, no complains of palpitations or chest discomfort. Scheduled for stress test in the AM, consent signed and in chart.   Problem: Patient Centered Care  Goal: Patient preferences are identified and integrated in the patient's plan of care  Descriptio INTERVENTIONS:  - Continuous cardiac monitoring, monitor vital signs, obtain 12 lead EKG if indicated  - Evaluate effectiveness of antiarrhythmic and heart rate control medications as ordered  - Initiate emergency measures for life threatening arrhythmias

## 2021-06-24 NOTE — SPIRITUAL CARE NOTE
Pt was visited the second time . During this visit  prayed and sang with Pt just before going up for surgery.  The surgery was unexpected, however after a stress test it was determined by the physician that she needed a stint

## 2021-06-25 ENCOUNTER — PATIENT OUTREACH (OUTPATIENT)
Dept: CASE MANAGEMENT | Age: 66
End: 2021-06-25

## 2021-06-25 NOTE — PROGRESS NOTES
1st attempt at [address] [facility]  [address] [facility] [place]  [phone]  Patient has existing apt scheduled with Yvonne Block for July 13th @2:00pm

## 2021-06-28 ENCOUNTER — HOSPITAL ENCOUNTER (OUTPATIENT)
Dept: CV DIAGNOSTICS | Facility: HOSPITAL | Age: 66
Discharge: HOME | End: 2021-06-28
Attending: INTERNAL MEDICINE
Payer: COMMERCIAL

## 2021-06-28 DIAGNOSIS — R00.2 PALPITATIONS: ICD-10-CM

## 2021-06-28 PROCEDURE — 93227 XTRNL ECG REC<48 HR R&I: CPT | Performed by: INTERNAL MEDICINE

## 2021-06-28 PROCEDURE — 93225 XTRNL ECG REC<48 HRS REC: CPT | Performed by: INTERNAL MEDICINE

## 2021-07-21 ENCOUNTER — TELEPHONE (OUTPATIENT)
Dept: ORTHOPEDICS CLINIC | Facility: CLINIC | Age: 66
End: 2021-07-21

## 2021-07-21 DIAGNOSIS — M79.672 FOOT PAIN, BILATERAL: Primary | ICD-10-CM

## 2021-07-21 DIAGNOSIS — M79.671 FOOT PAIN, BILATERAL: Primary | ICD-10-CM

## 2021-07-21 NOTE — TELEPHONE ENCOUNTER
Patient coming in for bilateral feet pain. Patient states that she had an x-ray of her Rt lower leg, and they found calcaneal spurs, x-ray can be viewed in Epic. If further imaging needed please place Rx.     Future Appointments   Date Time Provider Melba

## 2021-07-22 ENCOUNTER — TELEPHONE (OUTPATIENT)
Dept: CARDIOLOGY | Age: 66
End: 2021-07-22

## 2021-07-23 NOTE — TELEPHONE ENCOUNTER
Nursing: This is not a typical GI med. The refill request may have been sent to us by mistake. His follow-up with the patient regarding this.
Rx request for Betamethasone Dipropionate 0.05% please review. Thank you. LOV: 7/21/17  Last Refill: 4/6/17 By other outside Provider.
This medication was last prescribed by Dr. Luna Denver. Sending to Gen Surg to review and refill.
Refer to the Assessment tab to view/cancel completed assessment.

## 2021-08-12 ENCOUNTER — OFFICE VISIT (OUTPATIENT)
Dept: ORTHOPEDICS CLINIC | Facility: CLINIC | Age: 66
End: 2021-08-12
Payer: COMMERCIAL

## 2021-08-12 ENCOUNTER — HOSPITAL ENCOUNTER (OUTPATIENT)
Dept: GENERAL RADIOLOGY | Age: 66
Discharge: HOME | End: 2021-08-12
Attending: PODIATRIST
Payer: COMMERCIAL

## 2021-08-12 VITALS — BODY MASS INDEX: 25.99 KG/M2 | HEIGHT: 65 IN | WEIGHT: 156 LBS

## 2021-08-12 DIAGNOSIS — M79.671 FOOT PAIN, BILATERAL: ICD-10-CM

## 2021-08-12 DIAGNOSIS — M79.672 FOOT PAIN, BILATERAL: ICD-10-CM

## 2021-08-12 DIAGNOSIS — Q66.70 HIGH ARCHES: ICD-10-CM

## 2021-08-12 DIAGNOSIS — M77.50 CAPSULITIS OF METATARSOPHALANGEAL (MTP) JOINT: ICD-10-CM

## 2021-08-12 DIAGNOSIS — M21.619 BUNION: ICD-10-CM

## 2021-08-12 DIAGNOSIS — M20.41 HAMMER TOE OF RIGHT FOOT: ICD-10-CM

## 2021-08-12 DIAGNOSIS — M77.41 METATARSALGIA OF BOTH FEET: Primary | ICD-10-CM

## 2021-08-12 DIAGNOSIS — M77.42 METATARSALGIA OF BOTH FEET: Primary | ICD-10-CM

## 2021-08-12 DIAGNOSIS — M92.71 FREIBERG'S INFRACTION, RIGHT: ICD-10-CM

## 2021-08-12 PROCEDURE — 99203 OFFICE O/P NEW LOW 30 MIN: CPT | Performed by: PODIATRIST

## 2021-08-12 PROCEDURE — 73630 X-RAY EXAM OF FOOT: CPT | Performed by: PODIATRIST

## 2021-08-12 PROCEDURE — 3008F BODY MASS INDEX DOCD: CPT | Performed by: PODIATRIST
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
## 2021-09-04 ENCOUNTER — NURSE ONLY (OUTPATIENT)
Dept: LAB | Facility: HOSPITAL | Age: 66
End: 2021-09-04
Attending: INTERNAL MEDICINE
Payer: COMMERCIAL

## 2021-09-04 ENCOUNTER — HOSPITAL ENCOUNTER (OUTPATIENT)
Dept: GENERAL RADIOLOGY | Facility: HOSPITAL | Age: 66
Discharge: HOME | End: 2021-09-04
Attending: INTERNAL MEDICINE
Payer: COMMERCIAL

## 2021-09-04 DIAGNOSIS — Z01.818 PRE-OP TESTING: ICD-10-CM

## 2021-09-04 LAB
ANION GAP SERPL CALC-SCNC: 6 MMOL/L (ref 0–18)
BUN BLD-MCNC: 18 MG/DL (ref 7–18)
BUN/CREAT SERPL: 20 (ref 10–20)
CALCIUM BLD-MCNC: 9.6 MG/DL (ref 8.5–10.1)
CHLORIDE SERPL-SCNC: 106 MMOL/L (ref 98–112)
CO2 SERPL-SCNC: 27 MMOL/L (ref 21–32)
CREAT BLD-MCNC: 0.9 MG/DL
DEPRECATED RDW RBC AUTO: 41 FL (ref 35.1–46.3)
ERYTHROCYTE [DISTWIDTH] IN BLOOD BY AUTOMATED COUNT: 12.3 % (ref 11–15)
GLUCOSE BLD-MCNC: 86 MG/DL (ref 70–99)
HCT VFR BLD AUTO: 41 %
HGB BLD-MCNC: 13.6 G/DL
MCH RBC QN AUTO: 29.9 PG (ref 26–34)
MCHC RBC AUTO-ENTMCNC: 33.2 G/DL (ref 31–37)
MCV RBC AUTO: 90.1 FL
OSMOLALITY SERPL CALC.SUM OF ELEC: 289 MOSM/KG (ref 275–295)
PATIENT FASTING Y/N/NP: NO
PLATELET # BLD AUTO: 284 10(3)UL (ref 150–450)
POTASSIUM SERPL-SCNC: 4.1 MMOL/L (ref 3.5–5.1)
RBC # BLD AUTO: 4.55 X10(6)UL
SODIUM SERPL-SCNC: 139 MMOL/L (ref 136–145)
WBC # BLD AUTO: 7.1 X10(3) UL (ref 4–11)

## 2021-09-04 PROCEDURE — 85027 COMPLETE CBC AUTOMATED: CPT

## 2021-09-04 PROCEDURE — 36415 COLL VENOUS BLD VENIPUNCTURE: CPT

## 2021-09-04 PROCEDURE — 80048 BASIC METABOLIC PNL TOTAL CA: CPT

## 2021-09-04 PROCEDURE — 71046 X-RAY EXAM CHEST 2 VIEWS: CPT | Performed by: INTERNAL MEDICINE

## 2021-09-05 LAB — SARS-COV-2 RNA RESP QL NAA+PROBE: NOT DETECTED

## 2021-09-07 ENCOUNTER — HOSPITAL ENCOUNTER (OUTPATIENT)
Dept: INTERVENTIONAL RADIOLOGY/VASCULAR | Facility: HOSPITAL | Age: 66
Discharge: HOME | End: 2021-09-08
Attending: INTERNAL MEDICINE | Admitting: INTERNAL MEDICINE
Payer: COMMERCIAL

## 2021-09-07 ENCOUNTER — APPOINTMENT (OUTPATIENT)
Dept: ULTRASOUND IMAGING | Facility: HOSPITAL | Age: 66
End: 2021-09-07
Attending: INTERNAL MEDICINE
Payer: COMMERCIAL

## 2021-09-07 DIAGNOSIS — Z01.818 PRE-OP TESTING: Primary | ICD-10-CM

## 2021-09-07 DIAGNOSIS — I49.3 PVC (PREMATURE VENTRICULAR CONTRACTION): ICD-10-CM

## 2021-09-07 PROBLEM — I47.2 VENTRICULAR TACHYARRHYTHMIA: Status: ACTIVE | Noted: 2021-09-07

## 2021-09-07 PROBLEM — I47.2 VENTRICULAR TACHYARRHYTHMIA (HCC): Status: ACTIVE | Noted: 2021-09-07

## 2021-09-07 PROBLEM — I47.20 VENTRICULAR TACHYARRHYTHMIA (HCC): Status: ACTIVE | Noted: 2021-09-07

## 2021-09-07 PROBLEM — I47.20 VENTRICULAR TACHYARRHYTHMIA: Status: ACTIVE | Noted: 2021-09-07

## 2021-09-07 LAB
ISTAT ACTIVATED CLOTTING TIME: 224 SECONDS (ref 125–137)
ISTAT ACTIVATED CLOTTING TIME: 235 SECONDS (ref 125–137)
ISTAT ACTIVATED CLOTTING TIME: 268 SECONDS (ref 125–137)

## 2021-09-07 PROCEDURE — 85347 COAGULATION TIME ACTIVATED: CPT

## 2021-09-07 PROCEDURE — 93654 COMPRE EP EVAL TX VT: CPT

## 2021-09-07 PROCEDURE — 36415 COLL VENOUS BLD VENIPUNCTURE: CPT

## 2021-09-07 PROCEDURE — 93621 COMP EP EVL L PAC&REC C SINS: CPT

## 2021-09-07 PROCEDURE — 93926 LOWER EXTREMITY STUDY: CPT | Performed by: INTERNAL MEDICINE

## 2021-09-07 PROCEDURE — 93005 ELECTROCARDIOGRAM TRACING: CPT

## 2021-09-07 PROCEDURE — 02K83ZZ MAP CONDUCTION MECHANISM, PERCUTANEOUS APPROACH: ICD-10-PCS | Performed by: INTERNAL MEDICINE

## 2021-09-07 PROCEDURE — 02583ZZ DESTRUCTION OF CONDUCTION MECHANISM, PERCUTANEOUS APPROACH: ICD-10-PCS | Performed by: INTERNAL MEDICINE

## 2021-09-07 PROCEDURE — 93623 PRGRMD STIMJ&PACG IV RX NFS: CPT

## 2021-09-07 PROCEDURE — 99153 MOD SED SAME PHYS/QHP EA: CPT

## 2021-09-07 PROCEDURE — 99152 MOD SED SAME PHYS/QHP 5/>YRS: CPT

## 2021-09-07 PROCEDURE — 93010 ELECTROCARDIOGRAM REPORT: CPT | Performed by: INTERNAL MEDICINE

## 2021-09-07 RX ORDER — HEPARIN SODIUM 1000 [USP'U]/ML
INJECTION, SOLUTION INTRAVENOUS; SUBCUTANEOUS
Status: COMPLETED
Start: 2021-09-07 | End: 2021-09-07

## 2021-09-07 RX ORDER — TRAMADOL HYDROCHLORIDE 50 MG/1
50 TABLET ORAL EVERY 6 HOURS PRN
Status: DISCONTINUED | OUTPATIENT
Start: 2021-09-07 | End: 2021-09-08

## 2021-09-07 RX ORDER — DIPHENHYDRAMINE HYDROCHLORIDE 50 MG/ML
INJECTION INTRAMUSCULAR; INTRAVENOUS
Status: COMPLETED
Start: 2021-09-07 | End: 2021-09-07

## 2021-09-07 RX ORDER — SODIUM CHLORIDE 9 MG/ML
INJECTION, SOLUTION INTRAVENOUS CONTINUOUS
Status: DISCONTINUED | OUTPATIENT
Start: 2021-09-07 | End: 2021-09-08

## 2021-09-07 RX ORDER — CETIRIZINE HYDROCHLORIDE 10 MG/1
10 TABLET ORAL DAILY
Status: DISCONTINUED | OUTPATIENT
Start: 2021-09-08 | End: 2021-09-08

## 2021-09-07 RX ORDER — BENZONATATE 200 MG/1
200 CAPSULE ORAL DAILY
Status: CANCELLED | OUTPATIENT
Start: 2021-09-07

## 2021-09-07 RX ORDER — AZELASTINE 1 MG/ML
2 SPRAY, METERED NASAL DAILY
Status: DISCONTINUED | OUTPATIENT
Start: 2021-09-08 | End: 2021-09-08

## 2021-09-07 RX ORDER — LOSARTAN POTASSIUM 50 MG/1
50 TABLET ORAL DAILY
Status: DISCONTINUED | OUTPATIENT
Start: 2021-09-08 | End: 2021-09-08

## 2021-09-07 RX ORDER — LIDOCAINE HYDROCHLORIDE 20 MG/ML
INJECTION, SOLUTION EPIDURAL; INFILTRATION; INTRACAUDAL; PERINEURAL
Status: COMPLETED
Start: 2021-09-07 | End: 2021-09-07

## 2021-09-07 RX ORDER — HYDRALAZINE HYDROCHLORIDE 100 MG/1
100 TABLET, FILM COATED ORAL DAILY
Status: DISCONTINUED | OUTPATIENT
Start: 2021-09-08 | End: 2021-09-08

## 2021-09-07 RX ORDER — CETIRIZINE HYDROCHLORIDE 5 MG/1
10 TABLET ORAL DAILY
Status: CANCELLED | OUTPATIENT
Start: 2021-09-07

## 2021-09-07 RX ORDER — TRAMADOL HYDROCHLORIDE 50 MG/1
100 TABLET ORAL EVERY 6 HOURS PRN
Status: DISCONTINUED | OUTPATIENT
Start: 2021-09-07 | End: 2021-09-08

## 2021-09-07 RX ORDER — PROTAMINE SULFATE 10 MG/ML
INJECTION, SOLUTION INTRAVENOUS
Status: COMPLETED
Start: 2021-09-07 | End: 2021-09-07

## 2021-09-07 RX ORDER — AZELASTINE 1 MG/ML
2 SPRAY, METERED NASAL DAILY
Status: CANCELLED | OUTPATIENT
Start: 2021-09-07

## 2021-09-07 RX ORDER — ALBUTEROL SULFATE 90 UG/1
2 AEROSOL, METERED RESPIRATORY (INHALATION) EVERY 4 HOURS PRN
Status: CANCELLED | OUTPATIENT
Start: 2021-09-07

## 2021-09-07 RX ORDER — ISOPROTERENOL HYDROCHLORIDE 0.2 MG/ML
INJECTION, SOLUTION INTRAMUSCULAR; INTRAVENOUS
Status: COMPLETED
Start: 2021-09-07 | End: 2021-09-07

## 2021-09-07 RX ORDER — PANTOPRAZOLE SODIUM 20 MG/1
20 TABLET, DELAYED RELEASE ORAL
Status: CANCELLED | OUTPATIENT
Start: 2021-09-07

## 2021-09-07 RX ORDER — ACETAMINOPHEN 325 MG/1
650 TABLET ORAL EVERY 4 HOURS PRN
Status: DISCONTINUED | OUTPATIENT
Start: 2021-09-07 | End: 2021-09-08

## 2021-09-07 RX ORDER — ASPIRIN 81 MG/1
81 TABLET ORAL DAILY
Status: DISCONTINUED | OUTPATIENT
Start: 2021-09-08 | End: 2021-09-08

## 2021-09-07 RX ORDER — MONTELUKAST SODIUM 10 MG/1
10 TABLET ORAL NIGHTLY
Status: DISCONTINUED | OUTPATIENT
Start: 2021-09-07 | End: 2021-09-08

## 2021-09-07 RX ORDER — PANTOPRAZOLE SODIUM 20 MG/1
20 TABLET, DELAYED RELEASE ORAL
Status: DISCONTINUED | OUTPATIENT
Start: 2021-09-08 | End: 2021-09-08

## 2021-09-07 RX ORDER — ASPIRIN 81 MG/1
81 TABLET ORAL DAILY
Status: CANCELLED | OUTPATIENT
Start: 2021-09-07

## 2021-09-07 RX ORDER — IPRATROPIUM BROMIDE AND ALBUTEROL SULFATE 2.5; .5 MG/3ML; MG/3ML
3 SOLUTION RESPIRATORY (INHALATION) EVERY 2 HOUR PRN
Status: CANCELLED | OUTPATIENT
Start: 2021-09-07

## 2021-09-07 RX ORDER — TRAMADOL HYDROCHLORIDE 50 MG/1
TABLET ORAL
Status: DISPENSED
Start: 2021-09-07 | End: 2021-09-07

## 2021-09-07 RX ORDER — MIDAZOLAM HYDROCHLORIDE 1 MG/ML
INJECTION INTRAMUSCULAR; INTRAVENOUS
Status: COMPLETED
Start: 2021-09-07 | End: 2021-09-07

## 2021-09-07 RX ORDER — MONTELUKAST SODIUM 10 MG/1
10 TABLET ORAL NIGHTLY
COMMUNITY

## 2021-09-07 RX ORDER — BENZONATATE 100 MG/1
200 CAPSULE ORAL DAILY
Status: DISCONTINUED | OUTPATIENT
Start: 2021-09-08 | End: 2021-09-08

## 2021-09-07 RX ORDER — LOSARTAN POTASSIUM 50 MG/1
50 TABLET ORAL DAILY
Status: CANCELLED | OUTPATIENT
Start: 2021-09-07

## 2021-09-07 RX ORDER — HYDRALAZINE HYDROCHLORIDE 25 MG/1
100 TABLET, FILM COATED ORAL DAILY
Status: CANCELLED | OUTPATIENT
Start: 2021-09-07

## 2021-09-07 RX ADMIN — ACETAMINOPHEN 650 MG: 325 TABLET ORAL at 18:01:00

## 2021-09-07 RX ADMIN — TRAMADOL HYDROCHLORIDE 100 MG: 50 TABLET ORAL at 11:21:00

## 2021-09-07 RX ADMIN — TRAMADOL HYDROCHLORIDE 100 MG: 50 TABLET ORAL at 20:01:00

## 2021-09-07 RX ADMIN — SODIUM CHLORIDE: 9 INJECTION, SOLUTION INTRAVENOUS at 07:00:00

## 2021-09-07 RX ADMIN — MONTELUKAST SODIUM 10 MG: 10 TABLET ORAL at 20:01:00

## 2021-09-07 NOTE — PROGRESS NOTES
POD # 1 s/p pvc rfa RV successful  C/o right leg tingling  Pulses and color good, groin c/d/i, minimal hematoma, soft bruit  Order groin ultrasound  Plan obs overnight

## 2021-09-07 NOTE — PROCEDURES
Procedures performed:  1. Comprehensive EP study, VT induction,  2. CS recording/pacing  3. Isuprel infusion with EPS and testing  4. 3-D mapping  3.  RFA of PVCs/VT    : Alex Vang MD    Indication: Symptomatic PVC's    Complication: none  Mod system. Clinical PVC's were recorded and localized to the middle/lower RV septum. A flex irrigated ablation SJM catheter was advanced to the sites of early activation.  Local activation timing was before local PVC activation time was -33 msec and scatter

## 2021-09-07 NOTE — PLAN OF CARE
Overnight observation post RV radiofrequency ablation. Post-ablation EKG, see results. Reports tingling in R leg following R groin procedure - plan R groin ultrasound. VS stable.     Problem: Patient Centered Care  Goal: Patient preferences are identified a pre-medicate as appropriate  Outcome: Progressing     Problem: DISCHARGE PLANNING  Goal: Discharge to home or other facility with appropriate resources  Description: INTERVENTIONS:  - Identify barriers to discharge w/pt and caregiver  - Include patient/fam therapy as ordered  Outcome: Progressing     Problem: SKIN/TISSUE INTEGRITY - ADULT  Goal: Incision(s), wounds(s) or drain site(s) healing without S/S of infection  Description: INTERVENTIONS:  - Assess and document risk factors for pressure ulcer developm

## 2021-09-07 NOTE — INTERVAL H&P NOTE
Pre-op Diagnosis: * No pre-op diagnosis entered *    The above referenced H&P was reviewed by Franky Calderón MD on 9/7/2021, the patient was examined and no significant changes have occurred in the patient's condition since the H&P was performed.   I discussed

## 2021-09-07 NOTE — IVS NOTE
Bedside handoff to [facility]  Pt does have burning chest pain and pain to the right groin and leg; Dr. Patricio Zarate saw the patient at the bedside with pain - tramadol given for pain.   Procedural site dry and intact, no bleeding or swelling noted; scant amount

## 2021-09-08 VITALS
WEIGHT: 165.88 LBS | OXYGEN SATURATION: 95 % | BODY MASS INDEX: 28 KG/M2 | TEMPERATURE: 98 F | RESPIRATION RATE: 16 BRPM | DIASTOLIC BLOOD PRESSURE: 84 MMHG | HEART RATE: 78 BPM | SYSTOLIC BLOOD PRESSURE: 148 MMHG

## 2021-09-08 RX ORDER — HYDRALAZINE HYDROCHLORIDE 100 MG/1
100 TABLET, FILM COATED ORAL 2 TIMES DAILY
Qty: 60 TABLET | Refills: 0 | Status: SHIPPED | OUTPATIENT
Start: 2021-09-08

## 2021-09-08 NOTE — PLAN OF CARE
Right groin CDI, no hematoma, soft. Tramadol given for right groin pain. Plan for discharge home today.      Problem: Patient Centered Care  Goal: Patient preferences are identified and integrated in the patient's plan of care  Description: Interventions: PLANNING  Goal: Discharge to home or other facility with appropriate resources  Description: INTERVENTIONS:  - Identify barriers to discharge w/pt and caregiver  - Include patient/family/discharge partner in discharge planning  - Arrange for needed dischar ADULT  Goal: Incision(s), wounds(s) or drain site(s) healing without S/S of infection  Description: INTERVENTIONS:  - Assess and document risk factors for pressure ulcer development  - Assess and document skin integrity  - Assess and document dressing/inci

## 2021-09-08 NOTE — PROGRESS NOTES
[facility] [address]                                                            PROGRESS NOTE      Patient Name: Howard Le MRN: [id number]   : 1955 C 9/7/2021  CONCLUSION:  1. Unremarkable limited right inguinal arterial duplex Doppler without evidence of AV fistula or pseudoaneurysm.     Dictated by (CST): Shamar Rivera MD on 9/07/2021 at 6:20 PM     Finalized by (CST): Shamar Rivera MD on 9/07/

## 2021-09-08 NOTE — DISCHARGE PLANNING
Patient was provided with discharge instructions, education, and follow up information. Prescriptions were already sent electronically to patient's pharmacy.  Patient verbalizes understanding of follow up information, specifically medication dose change, fo

## 2021-09-16 ENCOUNTER — HOSPITAL ENCOUNTER (OUTPATIENT)
Dept: ULTRASOUND IMAGING | Facility: HOSPITAL | Age: 66
Discharge: HOME | End: 2021-09-16
Attending: PODIATRIST
Payer: COMMERCIAL

## 2021-09-16 DIAGNOSIS — M77.41 METATARSALGIA OF BOTH FEET: ICD-10-CM

## 2021-09-16 DIAGNOSIS — M77.50 CAPSULITIS OF METATARSOPHALANGEAL (MTP) JOINT: ICD-10-CM

## 2021-09-16 DIAGNOSIS — M77.42 METATARSALGIA OF BOTH FEET: ICD-10-CM

## 2021-09-16 DIAGNOSIS — M79.672 FOOT PAIN, BILATERAL: ICD-10-CM

## 2021-09-16 DIAGNOSIS — M20.41 HAMMER TOE OF RIGHT FOOT: ICD-10-CM

## 2021-09-16 DIAGNOSIS — M79.671 FOOT PAIN, BILATERAL: ICD-10-CM

## 2021-09-16 DIAGNOSIS — M92.71 FREIBERG'S INFRACTION, RIGHT: ICD-10-CM

## 2021-09-16 PROCEDURE — 76881 US COMPL JOINT R-T W/IMG: CPT | Performed by: PODIATRIST

## 2021-09-29 ENCOUNTER — TELEPHONE (OUTPATIENT)
Dept: GASTROENTEROLOGY | Facility: CLINIC | Age: 66
End: 2021-09-29

## 2021-09-29 RX ORDER — METRONIDAZOLE 500 MG/1
500 TABLET ORAL EVERY 8 HOURS
Qty: 30 TABLET | Refills: 0 | Status: SHIPPED | OUTPATIENT
Start: 2021-09-29 | End: 2022-01-25

## 2021-09-29 RX ORDER — CIPROFLOXACIN 500 MG/1
500 TABLET, FILM COATED ORAL 2 TIMES DAILY
Qty: 20 TABLET | Refills: 0 | Status: SHIPPED | OUTPATIENT
Start: 2021-09-29 | End: 2022-01-25

## 2021-09-29 NOTE — TELEPHONE ENCOUNTER
Dr. Amber Decker    Patient called because she is having pain in both sides of her abdomen for the past 2 days. She reports having diverticulitis in the past and the pain is very similar so says she is pretty confident that this is what is going on.   Pain is an

## 2021-09-29 NOTE — TELEPHONE ENCOUNTER
The patient was contacted, she has been experiencing pain to the lower abdomen region both the right and left sides. This is similar to prior pains in the past and she reports this is always responded to antibiotics.   Her last episode was about a year and

## 2021-09-30 NOTE — TELEPHONE ENCOUNTER
Dr. Bouchra Harrison    Is it ok for patient to come to office for first available which isn't until November for both yourself and Denilson Pearce, or should be be seen sooner?     Thank you

## 2021-10-04 NOTE — TELEPHONE ENCOUNTER
If the pt is doing better then can follow up in office in Nov  If ongoing issues then needs to be seen this week.

## 2021-10-04 NOTE — TELEPHONE ENCOUNTER
Left message for patient to call back to see how she is doing and arrange a follow up appointment based on below message from Dr. Seda Patton. CSS:  Please transfer to RN if patient calls back. Thank you.

## 2021-10-06 NOTE — TELEPHONE ENCOUNTER
Dr. Clarke Gamma    I spoke to Farzana [place]. She told me that her symptoms are getting better little by little and feels like she is improving. However, she still has moderate abdominal pain.   Let her know that you would like her to be seen in office within a week if

## 2021-10-07 NOTE — TELEPHONE ENCOUNTER
Patient called back and accepted appointment at 9:45am on Wednesday. Given detailed directions to [facility] office location. She reports she is feeling a little better, but still having some pain so aware needs to be seen per Dr. Escalera An message below.   Yessy

## 2021-10-07 NOTE — TELEPHONE ENCOUNTER
Left message for patient to call back. CSS:  Please transfer to RN if patient calls back. Thank you.

## 2021-10-13 ENCOUNTER — OFFICE VISIT (OUTPATIENT)
Dept: GASTROENTEROLOGY | Facility: CLINIC | Age: 66
End: 2021-10-13
Payer: COMMERCIAL

## 2021-10-13 VITALS
WEIGHT: 162 LBS | HEIGHT: 65 IN | SYSTOLIC BLOOD PRESSURE: 163 MMHG | BODY MASS INDEX: 26.99 KG/M2 | HEART RATE: 69 BPM | DIASTOLIC BLOOD PRESSURE: 87 MMHG

## 2021-10-13 DIAGNOSIS — Z87.19 HISTORY OF DIVERTICULITIS: Primary | ICD-10-CM

## 2021-10-13 PROCEDURE — 3008F BODY MASS INDEX DOCD: CPT | Performed by: INTERNAL MEDICINE

## 2021-10-13 PROCEDURE — 3079F DIAST BP 80-89 MM HG: CPT | Performed by: INTERNAL MEDICINE

## 2021-10-13 PROCEDURE — 3077F SYST BP >= 140 MM HG: CPT | Performed by: INTERNAL MEDICINE

## 2021-10-13 PROCEDURE — 99213 OFFICE O/P EST LOW 20 MIN: CPT | Performed by: INTERNAL MEDICINE

## 2021-10-13 NOTE — PATIENT INSTRUCTIONS
Diverticulitis  - increase fiber in diet (20 grams per day ) + start on metamucil or similar  - fluids /water  - avoid seeds, nuts and popcorn  - see surgeon, [phone] (general surgery )  - call if flare up in symptoms
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
## 2021-11-11 ENCOUNTER — OFFICE VISIT (OUTPATIENT)
Dept: ORTHOPEDICS CLINIC | Facility: CLINIC | Age: 66
End: 2021-11-11
Payer: COMMERCIAL

## 2021-11-11 VITALS — WEIGHT: 152 LBS | HEIGHT: 65 IN | BODY MASS INDEX: 25.33 KG/M2

## 2021-11-11 DIAGNOSIS — M79.672 LEFT FOOT PAIN: ICD-10-CM

## 2021-11-11 DIAGNOSIS — D36.10 NEUROMA: Primary | ICD-10-CM

## 2021-11-11 DIAGNOSIS — M92.71 FREIBERG'S DISEASE, RIGHT: ICD-10-CM

## 2021-11-11 DIAGNOSIS — M77.9 CAPSULITIS: ICD-10-CM

## 2021-11-11 PROCEDURE — 3008F BODY MASS INDEX DOCD: CPT | Performed by: PODIATRIST

## 2021-11-11 PROCEDURE — 99214 OFFICE O/P EST MOD 30 MIN: CPT | Performed by: PODIATRIST

## 2021-11-11 NOTE — PROGRESS NOTES
[facility] Progress Note    Subjective:   Roxana Oliva is here for follow-up of her right foot and specifically to go over the ultrasound results  Is doing about the same and some days has quite a bit of pain involving the right foot.   She also has pain second  PIP joint and cleanup of the second metatarsal head  We will discuss this more in depth prior to surgery and after the MRI. Agustina Ku.  Fredis Landa DPM  [facility]    Shippter speech recognition software was used to prepare this

## 2021-12-10 ENCOUNTER — HOSPITAL ENCOUNTER (OUTPATIENT)
Dept: MRI IMAGING | Facility: HOSPITAL | Age: 66
Discharge: HOME | End: 2021-12-10
Attending: PODIATRIST
Payer: COMMERCIAL

## 2021-12-10 DIAGNOSIS — D36.10 NEUROMA: ICD-10-CM

## 2021-12-10 DIAGNOSIS — M77.9 CAPSULITIS: ICD-10-CM

## 2021-12-10 PROCEDURE — 73718 MRI LOWER EXTREMITY W/O DYE: CPT | Performed by: PODIATRIST

## 2022-01-25 ENCOUNTER — TELEPHONE (OUTPATIENT)
Dept: GASTROENTEROLOGY | Facility: CLINIC | Age: 67
End: 2022-01-25

## 2022-01-25 RX ORDER — METRONIDAZOLE 500 MG/1
500 TABLET ORAL EVERY 8 HOURS
Qty: 30 TABLET | Refills: 0 | Status: SHIPPED | OUTPATIENT
Start: 2022-01-25

## 2022-01-25 RX ORDER — CIPROFLOXACIN 500 MG/1
500 TABLET, FILM COATED ORAL 2 TIMES DAILY
Qty: 20 TABLET | Refills: 0 | Status: SHIPPED | OUTPATIENT
Start: 2022-01-25

## 2022-01-25 NOTE — TELEPHONE ENCOUNTER
I spoke with patient. She states she is experiencing left lower abdominal pain (pain level 6 to 7), nausea and constipation alternating with diarrhea since last Wednesday. Patient has a hx of diverticulitis.   Denies vomiting,blood in stool,weakness or fev

## 2022-01-25 NOTE — TELEPHONE ENCOUNTER
Pt called in to speak directly to Dr. Tesfaye Alvarado about her diverticulitis.  Please follow up it is urgent pt states

## 2022-02-02 RX ORDER — METRONIDAZOLE 500 MG/1
TABLET ORAL
Qty: 30 TABLET | Refills: 0 | OUTPATIENT
Start: 2022-02-02

## 2022-02-02 RX ORDER — CIPROFLOXACIN 500 MG/1
TABLET, FILM COATED ORAL
Qty: 20 TABLET | Refills: 0 | OUTPATIENT
Start: 2022-02-02

## 2022-02-22 ENCOUNTER — OFFICE VISIT (OUTPATIENT)
Dept: GASTROENTEROLOGY | Facility: CLINIC | Age: 67
End: 2022-02-22
Payer: COMMERCIAL

## 2022-02-22 VITALS
WEIGHT: 159 LBS | SYSTOLIC BLOOD PRESSURE: 138 MMHG | DIASTOLIC BLOOD PRESSURE: 90 MMHG | HEIGHT: 65 IN | BODY MASS INDEX: 26.49 KG/M2

## 2022-02-22 DIAGNOSIS — K58.9 IRRITABLE BOWEL SYNDROME, UNSPECIFIED TYPE: Primary | ICD-10-CM

## 2022-02-22 DIAGNOSIS — Z87.19 HISTORY OF DIVERTICULITIS: ICD-10-CM

## 2022-02-22 PROCEDURE — 99213 OFFICE O/P EST LOW 20 MIN: CPT | Performed by: INTERNAL MEDICINE

## 2022-02-22 PROCEDURE — 3080F DIAST BP >= 90 MM HG: CPT | Performed by: INTERNAL MEDICINE

## 2022-02-22 PROCEDURE — 3008F BODY MASS INDEX DOCD: CPT | Performed by: INTERNAL MEDICINE

## 2022-02-22 PROCEDURE — 3075F SYST BP GE 130 - 139MM HG: CPT | Performed by: INTERNAL MEDICINE

## 2022-02-22 NOTE — PATIENT INSTRUCTIONS
Recurrent abdominal pain/diverticulitis - role of IBS in symptoms   - continue on high fiber diet  - call with symptoms - would set up repeat CT scan /antibiotics  - surgical input [phone] ( general surgery)

## 2022-03-05 ENCOUNTER — LAB ENCOUNTER (OUTPATIENT)
Dept: LAB | Facility: HOSPITAL | Age: 67
End: 2022-03-05
Attending: INTERNAL MEDICINE
Payer: COMMERCIAL

## 2022-03-05 DIAGNOSIS — R06.09 PLATYPNEA-ORTHODEOXIA SYNDROME: Primary | ICD-10-CM

## 2022-03-05 DIAGNOSIS — I49.3 VENTRICULAR PREMATURE BEATS: ICD-10-CM

## 2022-03-05 DIAGNOSIS — I10 ESSENTIAL HYPERTENSION, MALIGNANT: ICD-10-CM

## 2022-03-05 LAB
ANION GAP SERPL CALC-SCNC: 6 MMOL/L (ref 0–18)
BUN BLD-MCNC: 17 MG/DL (ref 7–18)
BUN/CREAT SERPL: 18.1 (ref 10–20)
CALCIUM BLD-MCNC: 9.4 MG/DL (ref 8.5–10.1)
CHLORIDE SERPL-SCNC: 103 MMOL/L (ref 98–112)
CO2 SERPL-SCNC: 31 MMOL/L (ref 21–32)
CREAT BLD-MCNC: 0.94 MG/DL
FASTING STATUS PATIENT QL REPORTED: NO
GLUCOSE BLD-MCNC: 90 MG/DL (ref 70–99)
OSMOLALITY SERPL CALC.SUM OF ELEC: 291 MOSM/KG (ref 275–295)
POTASSIUM SERPL-SCNC: 3.8 MMOL/L (ref 3.5–5.1)
SODIUM SERPL-SCNC: 140 MMOL/L (ref 136–145)

## 2022-03-05 PROCEDURE — 36415 COLL VENOUS BLD VENIPUNCTURE: CPT

## 2022-03-05 PROCEDURE — 80048 BASIC METABOLIC PNL TOTAL CA: CPT

## 2022-03-26 ENCOUNTER — TELEPHONE (OUTPATIENT)
Dept: GASTROENTEROLOGY | Facility: CLINIC | Age: 67
End: 2022-03-26

## 2022-03-26 RX ORDER — METRONIDAZOLE 500 MG/1
500 TABLET ORAL EVERY 8 HOURS
Qty: 30 TABLET | Refills: 0 | Status: SHIPPED | OUTPATIENT
Start: 2022-03-26

## 2022-03-26 RX ORDER — CIPROFLOXACIN 500 MG/1
500 TABLET, FILM COATED ORAL 2 TIMES DAILY
Qty: 20 TABLET | Refills: 0 | Status: SHIPPED | OUTPATIENT
Start: 2022-03-26

## 2022-03-26 NOTE — TELEPHONE ENCOUNTER
OFFICE/CLINIC ON-CALL:    Patient of Dr. Martín Chatman with known hx of diverticulitis (L sided). She calls because last 2-3 days of nagging LLQ pain, similar to prior attacks. No melena or hematochezia. No fevers or chills. Tolerating diet okay. Plan:  1. Liquid diet for 24 hours  2. Cipro/flagyl as previously prescribed, risks/benefits explained  3. If fever or worsening sx, go to ER. 4. Has appt with Dr. Hoa Hinojosa next week to discuss elective partial colectomy given recurrent diverticulitis. GI Staff:   Can you please call patient Monday to see how she is doing, if not improving may need blood work and/or CT if Dr. Martín Chatman agrees.       GEOVANNY Chatman

## 2022-03-30 NOTE — TELEPHONE ENCOUNTER
Pt returned call.  She wants Dr Darryl Torres to know that she is seeing Dr Jenn De Oliveira on Friday

## 2022-03-31 NOTE — TELEPHONE ENCOUNTER
Left message for patient to call back and Ouner[employer] message sent. CSS:  Please transfer to RN if patient calls back. Thank you. I called [employer] at [phone]. I spoke to Tres and reviewed patient information. CPT 33664 for CT has been approved and valid from 3/31/2022-5/29/2022 Order ID # [id number].

## 2022-04-01 ENCOUNTER — OFFICE VISIT (OUTPATIENT)
Dept: SURGERY | Facility: CLINIC | Age: 67
End: 2022-04-01
Payer: COMMERCIAL

## 2022-04-01 DIAGNOSIS — R10.32 LLQ ABDOMINAL PAIN: Primary | ICD-10-CM

## 2022-04-01 PROCEDURE — 99244 OFF/OP CNSLTJ NEW/EST MOD 40: CPT | Performed by: SURGERY

## 2022-04-04 ENCOUNTER — HOSPITAL ENCOUNTER (OUTPATIENT)
Dept: CT IMAGING | Age: 67
Discharge: HOME | End: 2022-04-04
Attending: NURSE PRACTITIONER
Payer: COMMERCIAL

## 2022-04-04 DIAGNOSIS — R10.32 LLQ PAIN: ICD-10-CM

## 2022-04-04 PROCEDURE — 74177 CT ABD & PELVIS W/CONTRAST: CPT | Performed by: NURSE PRACTITIONER

## 2022-04-04 NOTE — TELEPHONE ENCOUNTER
Dr. Gumaro Del Toro    Patient saw Dr. Leeann Knight on Friday. Ct is scheduled for Monday. Thank you    Your Appointments    Monday April 11, 2022  3:00 PM  CT ABDOMEN PELVIS WITH CONTRAST with [facility] ([address] [facility]) [address]  [phone]   Do not eat solid food 2 hours before appointment time. You may drink clear fluids up to 2 hours before appointment time. At any time you may take your medications with a small amount of water. Your doctor has requested your test to include oral contrast.    PLEASE ARRIVE ONE HOUR PRIOR TO YOUR SCHEDULED EXAM TIME TO DRINK THE CONTRAST.

## 2022-04-05 ENCOUNTER — TELEPHONE (OUTPATIENT)
Dept: GASTROENTEROLOGY | Facility: CLINIC | Age: 67
End: 2022-04-05

## 2022-04-05 NOTE — TELEPHONE ENCOUNTER
Message left on the patient's personal voicemail. CT scan results are back and no evidence of diverticulitis. There is a small pericardial cyst which will have her follow-up with her primary physician looks like this has been there before her noted before on prior scans. Would like to see how the patient is doing. She has seen Dr. Hoa Hinojosa and apparently feels that this is a functional issue.

## 2022-04-06 ENCOUNTER — PATIENT MESSAGE (OUTPATIENT)
Dept: GASTROENTEROLOGY | Facility: CLINIC | Age: 67
End: 2022-04-06

## 2022-04-06 NOTE — TELEPHONE ENCOUNTER
From: Pam Oleary  To: Eric Davila. Martín Chatman MD  Sent: 4/6/2022 5:55 PM CDT  Subject: My CT Scan Results    Hi Dr. Martín Chatman,    Thanks for your message last night. Two days ago, when I had the CT scan was the last day of the antibiotics your partner put me on. Could this be the reason why there was no infection seen? Some   of the medical terms I had to look up. ..do you suspect something else going on? Or perhaps all that was seen fall under the \"normal\" spectrum? Should I follow up with DR. Jenna Goel or someone else? Thanks so much.      Love Luke Halls Paget) [address] [facility]

## 2022-04-06 NOTE — TELEPHONE ENCOUNTER
Noted.  Thank you. Dr. Darryl Torres left a message for patient per 4/5/2022. Await call back to see how she is doing.

## 2022-04-15 NOTE — TELEPHONE ENCOUNTER
See TE dated 3/26/2022. RN also tried to contact patient multiple times without success. I mailed a no response letter to home address in that encounter. CSS:  Please transfer to RN if patient calls back. Thank you.

## 2022-04-15 NOTE — TELEPHONE ENCOUNTER
I tried to call patient on 4/14, 3/31, and 3/30 with no call back. Dr. Ernesto Lujan also tried to call patient on 4/5 and 4/12. No response letter mailed to patient's home address per protocol and encounter closed since tried to call patient 3 times with no call back. CSS:  Please transfer to RN if patient calls back. Thank you.

## 2022-04-26 NOTE — TELEPHONE ENCOUNTER
Message left on patient's personal voicemail. Would like to talk to her about the results, there was no evidence of diverticulitis however wanted to see how she was doing and see about neck steps, options going forward.

## 2022-04-27 NOTE — TELEPHONE ENCOUNTER
Dr. Maty Serna    Patient returned your call. Call back number provided in below message.     Thank you

## 2022-04-27 NOTE — TELEPHONE ENCOUNTER
Patient was contacted, we reviewed her prior CT scan at length, no evidence of acute diverticulitis. Patient has been experiencing a different type of sensation now. She has pain across her lower abdomen and is been associate with irregular bowel habits/diarrhea. Plan-check stools for C. difficile, GI pathogens with GI stool panel and fecal calprotectin for colitis. -Next steps pending above results. If negative then would likely need to follow-up in the office to determine where to go from here. Consideration for colonoscopy.

## 2022-04-29 ENCOUNTER — TELEPHONE (OUTPATIENT)
Dept: GASTROENTEROLOGY | Facility: CLINIC | Age: 67
End: 2022-04-29

## 2022-04-29 ENCOUNTER — LAB ENCOUNTER (OUTPATIENT)
Dept: LAB | Facility: HOSPITAL | Age: 67
End: 2022-04-29
Attending: INTERNAL MEDICINE
Payer: COMMERCIAL

## 2022-04-29 DIAGNOSIS — R19.7 DIARRHEA, UNSPECIFIED TYPE: ICD-10-CM

## 2022-04-29 DIAGNOSIS — R10.9 ABDOMINAL PAIN, UNSPECIFIED ABDOMINAL LOCATION: ICD-10-CM

## 2022-04-29 LAB
ADENOVIRUS F 40/41 PCR: NEGATIVE
ASTROVIRUS PCR: NEGATIVE
C CAYETANENSIS DNA SPEC QL NAA+PROBE: NEGATIVE
C DIFF TOX B STL QL: POSITIVE
CAMPY SP DNA.DIARRHEA STL QL NAA+PROBE: NEGATIVE
CRYPTOSP DNA SPEC QL NAA+PROBE: NEGATIVE
EAEC PAA PLAS AGGR+AATA ST NAA+NON-PRB: NEGATIVE
EC STX1+STX2 + H7 FLIC SPEC NAA+PROBE: NEGATIVE
ENTAMOEBA HISTOLYTICA PCR: NEGATIVE
EPEC EAE GENE STL QL NAA+NON-PROBE: NEGATIVE
ETEC LTA+ST1A+ST1B TOX ST NAA+NON-PROBE: NEGATIVE
GIARDIA LAMBLIA PCR: NEGATIVE
NOROVIRUS GI/GII PCR: NEGATIVE
P SHIGELLOIDES DNA STL QL NAA+PROBE: NEGATIVE
ROTAVIRUS A PCR: NEGATIVE
SALMONELLA DNA SPEC QL NAA+PROBE: NEGATIVE
SAPOVIRUS PCR: NEGATIVE
SHIGELLA SP+EIEC IPAH ST NAA+NON-PROBE: NEGATIVE
V CHOLERAE DNA SPEC QL NAA+PROBE: NEGATIVE
VIBRIO DNA SPEC NAA+PROBE: NEGATIVE
YERSINIA DNA SPEC NAA+PROBE: NEGATIVE

## 2022-04-29 PROCEDURE — 83993 ASSAY FOR CALPROTECTIN FECAL: CPT

## 2022-04-29 PROCEDURE — 87507 IADNA-DNA/RNA PROBE TQ 12-25: CPT

## 2022-04-29 PROCEDURE — 87493 C DIFF AMPLIFIED PROBE: CPT

## 2022-04-29 RX ORDER — METRONIDAZOLE 500 MG/1
500 TABLET ORAL EVERY 8 HOURS
Qty: 42 TABLET | Refills: 0 | Status: SHIPPED | OUTPATIENT
Start: 2022-04-29 | End: 2022-05-09

## 2022-05-03 ENCOUNTER — TELEPHONE (OUTPATIENT)
Dept: GASTROENTEROLOGY | Facility: CLINIC | Age: 67
End: 2022-05-03

## 2022-05-03 LAB — CALPROTECTIN, FECAL: 111 UG/G

## 2022-05-03 NOTE — TELEPHONE ENCOUNTER
Patient contacted, verified and results from Dr. Sukumar Roberto given. Patient voiced understanding. No further questions at this time.

## 2022-05-03 NOTE — TELEPHONE ENCOUNTER
Santosh Ruth MD  [facility] Clinical Staff  Fecal calprotectin slightly elevated ( likely related to the C dif infection)

## 2022-05-09 RX ORDER — VANCOMYCIN HYDROCHLORIDE 125 MG/1
125 CAPSULE ORAL 4 TIMES DAILY
Qty: 56 CAPSULE | Refills: 0 | Status: SHIPPED | OUTPATIENT
Start: 2022-05-09

## 2022-05-10 NOTE — TELEPHONE ENCOUNTER
Dr. Shara Agee,    Call received from [facility] in lab, states patients c. Diff was positive. Please advise, thank you.
Dr. Terrell Panda treatment not helping. Pain is constant and now has a lot of cramping-more than when she spoke to you 10 days ago. She has 8-10 stools per day. She had a little bit of blood in stool before she started on the flagyl-unsure if that was hemorrhoids or from this. Urine output decreased a little bit, but normal color. She wants to be seen in office today-I let her know you do not have office hours on Monday. Advised ED if symptoms become severe. Reviewed you had mentioned trying Dificid in your below message since she is allergic to Vancomycin.     Thank you
Dr. Tricia Green    I reviewed your below complete message with the patient and she voiced understanding. She confirmed that she is allergic to vancomycin.     Thank you
HI Dr. Moses Mcdonnell, Thank you for your message this morning. I started taking the new medication at yesterday. The pharmacy didn't fill it immediately--they called me to ask about the myacin allergy. (Not sure I spelled that right!)  I was in a lot of pain last night, and took one codeine tab that I had left from my heart ablation. It relived some of the pain so I could rest. Still having doubling over cramping this morning, but it's calming down a bit now. Now that I know what this is I honestly think I have had this for 6-8 weeks. I am concerned about permanent damage. Are there any tests I need to take to find out? Also, will I need another stool specimen to see if this is resolved sometime in the near future? Thanks so everything.  Braydon Olivas
Left message to call back. CSS:  Please transfer to RN if patient calls back. Thank you.
Patient contacted, verified and message from Dr. Klaudia Lyle given. Patient voiced understanding.
Pt contacted, still with 8- 10 bm a day with some cramping and discomfort however she is taking p.o., no vomiting. Plan to discontinue the Flagyl and will start on vancomycin. I reviewed with pharmacy and ID does not seem to be an obvious linkage between erythromycin allergy and oral Vanco reaction. Discussed with the patient. Advised that she contact me immediately if she develops any signs of allergic reaction, she will have Benadryl available. If the patient develops severe symptoms or issues then I would advise emergency room work-up.
RN to contact her - as we discussed yesterday evening I was concerned about the c dif  - Flagyl orally x 2 weeks, sent to pharmacy (it appears she is allergic to vanco - please check on the allergy )   - bland diet  - keep hydrated
RN to contact pt, the vanco should resolve the colitis - there should be no permanent issues after treatment -C. difficile can be difficult to eradicate sometimes it takes a couple courses of treatment with the vancomycin. We typically go more by symptoms and rather retesting the stool as the PCR can be positive for weeks and weeks before complete clearance. - repeat lab work this week - order placed.    - typically we do not repeat stool test/ c dif ( symptom resolution of diarrhea is how we monitor)
The patient was contacted, she does have C. difficile and she reports allergic reaction to erythromycin in the past.  We discussed her options here, plan a trial of Flagyl and if this does not work we could try to an alternative agent such as Dificid. Side effects of flagyl reviewed. Patient will call with an update next week.
msg left on her vm to call to see how she is doing on the vanco for C dif treatment.
none

## 2022-05-11 ENCOUNTER — LAB ENCOUNTER (OUTPATIENT)
Dept: LAB | Facility: HOSPITAL | Age: 67
End: 2022-05-11
Attending: INTERNAL MEDICINE
Payer: COMMERCIAL

## 2022-05-11 DIAGNOSIS — A04.72 C. DIFFICILE COLITIS: ICD-10-CM

## 2022-05-11 LAB
ALBUMIN SERPL-MCNC: 3.9 G/DL (ref 3.4–5)
ALBUMIN/GLOB SERPL: 1.1 {RATIO} (ref 1–2)
ALP LIVER SERPL-CCNC: 56 U/L
ALT SERPL-CCNC: 19 U/L
ANION GAP SERPL CALC-SCNC: 6 MMOL/L (ref 0–18)
AST SERPL-CCNC: 20 U/L (ref 15–37)
BASOPHILS # BLD AUTO: 0.06 X10(3) UL (ref 0–0.2)
BASOPHILS NFR BLD AUTO: 0.7 %
BILIRUB SERPL-MCNC: 0.3 MG/DL (ref 0.1–2)
BUN BLD-MCNC: 16 MG/DL (ref 7–18)
BUN/CREAT SERPL: 16.5 (ref 10–20)
CALCIUM BLD-MCNC: 9.6 MG/DL (ref 8.5–10.1)
CHLORIDE SERPL-SCNC: 103 MMOL/L (ref 98–112)
CO2 SERPL-SCNC: 29 MMOL/L (ref 21–32)
CREAT BLD-MCNC: 0.97 MG/DL
DEPRECATED RDW RBC AUTO: 38.5 FL (ref 35.1–46.3)
EOSINOPHIL # BLD AUTO: 0.06 X10(3) UL (ref 0–0.7)
EOSINOPHIL NFR BLD AUTO: 0.7 %
ERYTHROCYTE [DISTWIDTH] IN BLOOD BY AUTOMATED COUNT: 11.9 % (ref 11–15)
FASTING STATUS PATIENT QL REPORTED: NO
GLOBULIN PLAS-MCNC: 3.7 G/DL (ref 2.8–4.4)
GLUCOSE BLD-MCNC: 104 MG/DL (ref 70–99)
HCT VFR BLD AUTO: 37.5 %
HGB BLD-MCNC: 12.5 G/DL
IMM GRANULOCYTES # BLD AUTO: 0.03 X10(3) UL (ref 0–1)
IMM GRANULOCYTES NFR BLD: 0.4 %
LYMPHOCYTES # BLD AUTO: 1.95 X10(3) UL (ref 1–4)
LYMPHOCYTES NFR BLD AUTO: 23.1 %
MCH RBC QN AUTO: 29.8 PG (ref 26–34)
MCHC RBC AUTO-ENTMCNC: 33.3 G/DL (ref 31–37)
MCV RBC AUTO: 89.3 FL
MONOCYTES # BLD AUTO: 0.78 X10(3) UL (ref 0.1–1)
MONOCYTES NFR BLD AUTO: 9.3 %
NEUTROPHILS # BLD AUTO: 5.55 X10 (3) UL (ref 1.5–7.7)
NEUTROPHILS # BLD AUTO: 5.55 X10(3) UL (ref 1.5–7.7)
NEUTROPHILS NFR BLD AUTO: 65.8 %
OSMOLALITY SERPL CALC.SUM OF ELEC: 287 MOSM/KG (ref 275–295)
PLATELET # BLD AUTO: 403 10(3)UL (ref 150–450)
POTASSIUM SERPL-SCNC: 3.7 MMOL/L (ref 3.5–5.1)
PROT SERPL-MCNC: 7.6 G/DL (ref 6.4–8.2)
RBC # BLD AUTO: 4.2 X10(6)UL
SODIUM SERPL-SCNC: 138 MMOL/L (ref 136–145)
WBC # BLD AUTO: 8.4 X10(3) UL (ref 4–11)

## 2022-05-11 PROCEDURE — 36415 COLL VENOUS BLD VENIPUNCTURE: CPT

## 2022-05-11 PROCEDURE — 85025 COMPLETE CBC W/AUTO DIFF WBC: CPT

## 2022-05-11 PROCEDURE — 80053 COMPREHEN METABOLIC PANEL: CPT

## 2022-05-27 ENCOUNTER — TELEPHONE (OUTPATIENT)
Dept: GASTROENTEROLOGY | Facility: CLINIC | Age: 67
End: 2022-05-27

## 2022-05-27 DIAGNOSIS — A04.71 RECURRENT CLOSTRIDIOIDES DIFFICILE DIARRHEA: Primary | ICD-10-CM

## 2022-05-27 NOTE — TELEPHONE ENCOUNTER
Patient aware C diff order placed and the plan for infusion and dificid if it comes back positive. I pended infusion order below to forward to Dr. Jenni Saul if positive since there isn't a therapy plan.

## 2022-05-27 NOTE — TELEPHONE ENCOUNTER
Dr. Jenni Saul    I left patient a message to call me back to review. I pended order for infusion below- I don't see order for dificid. Is the plan to wait for result and order these if positive?     Thank you

## 2022-05-27 NOTE — TELEPHONE ENCOUNTER
Stuart Austin MD 1 hour ago (1:02 PM)          Recurrent c dif after treatment with flagyl/vanco   - repeat c dif test  - dificid x 10 days + infusion of belzotumab 10mg/kg

## 2022-05-31 ENCOUNTER — LAB ENCOUNTER (OUTPATIENT)
Dept: LAB | Facility: HOSPITAL | Age: 67
End: 2022-05-31
Attending: INTERNAL MEDICINE
Payer: COMMERCIAL

## 2022-05-31 ENCOUNTER — TELEPHONE (OUTPATIENT)
Dept: GASTROENTEROLOGY | Facility: CLINIC | Age: 67
End: 2022-05-31

## 2022-05-31 DIAGNOSIS — R19.7 DIARRHEA, UNSPECIFIED TYPE: ICD-10-CM

## 2022-05-31 LAB — C DIFF TOX B STL QL: POSITIVE

## 2022-05-31 PROCEDURE — 87493 C DIFF AMPLIFIED PROBE: CPT

## 2022-05-31 RX ORDER — FIDAXOMICIN 200 MG/1
200 TABLET, FILM COATED ORAL 2 TIMES DAILY
Qty: 20 TABLET | Refills: 0 | Status: SHIPPED | OUTPATIENT
Start: 2022-05-31

## 2022-05-31 NOTE — TELEPHONE ENCOUNTER
msg left on her personal vm, asked if she could call with an update /if going symptoms then repeat C dif ( ordered and in the system)

## 2022-05-31 NOTE — TELEPHONE ENCOUNTER
dificid sent to pharmacy, detailed message left on her personal voicemail.      RN to notify and see if we can set up the infusion

## 2022-06-01 ENCOUNTER — TELEPHONE (OUTPATIENT)
Dept: GASTROENTEROLOGY | Facility: CLINIC | Age: 67
End: 2022-06-01

## 2022-06-01 NOTE — TELEPHONE ENCOUNTER
See TE from 5/31/2022. Patient aware of positive C Diff - waiting for PA for bezlotoxumab-pending review at this time.

## 2022-06-01 NOTE — TELEPHONE ENCOUNTER
Patient contacted and discussed treatment of recurrent C. difficile. She has been through Flagyl which did not seem to do much at all, the vancomycin did help but she recurred immediately after completing treatment course. Discussed Dificid plus infusion with bezlotoxumab-risks and benefits were reviewed and she is interested in proceeding. She is on the Dificid which she started last night. Discussed the risk of recurrence, this medication combination does decrease the risks of recurrence by over 42%. Discussed other options including fecal transplantation. I also reviewed that she would need to be careful in the future with any antibiotic regimens as this could really ignite her C. difficile issues.

## 2022-06-01 NOTE — TELEPHONE ENCOUNTER
----- Message from Delilah Jim MD sent at 5/31/2022  4:14 PM CDT -----  Recurrent C dif after vanco treatment  - Advise Dificid + infusion/

## 2022-06-01 NOTE — TELEPHONE ENCOUNTER
PPD Team    Patient has recurrent C Diff infection. Please assist with prior authorization for bezlotoxumab infusion x1 (see referral not available option for therapy plan.     Thank you

## 2022-06-01 NOTE — TELEPHONE ENCOUNTER
Dr. Torres Cargo Diff was positive. Please review and sign below pended infusion order if appropriate/correct so we can initiate prior authorization.     Thank you

## 2022-06-01 NOTE — TELEPHONE ENCOUNTER
Patient contacted. Aware of below result and plan. Asking about setting up infusion. I let her know Dr. Ashely Andino needs to sign off on the order so we could get prior authorization and will let her know as soon as that is done so she can be scheduled.

## 2022-06-02 NOTE — TELEPHONE ENCOUNTER
Infusion Center:    I faxed order for Bezlotoxumab infusion. Please contact patient to schedule. She is aware of plan.     Thank you

## 2022-06-02 NOTE — TELEPHONE ENCOUNTER
Noted. Thank you. See other telephone encounter from 5/31/2022-authorization for infusion is pending.

## 2022-06-03 ENCOUNTER — PATIENT MESSAGE (OUTPATIENT)
Dept: GASTROENTEROLOGY | Facility: CLINIC | Age: 67
End: 2022-06-03

## 2022-06-03 ENCOUNTER — TELEPHONE (OUTPATIENT)
Dept: HEMATOLOGY/ONCOLOGY | Facility: HOSPITAL | Age: 67
End: 2022-06-03

## 2022-06-03 PROBLEM — A04.71 RECURRENT CLOSTRIDIUM DIFFICILE DIARRHEA: Status: ACTIVE | Noted: 2022-06-03

## 2022-06-03 NOTE — TELEPHONE ENCOUNTER
I spoke to ELVI ONEILL, They are waiting for the drug to come in. She told me that she recently spoke to the patient about this.

## 2022-06-03 NOTE — TELEPHONE ENCOUNTER
Returned patient's call regarding schedule her Bezlotoxumab infusion, updated the patient the order was faxed over on 6/2 at 6:08 pm so it was processed this morning and the medication needs to be ordered.  Once it delivers will contact patient to schedule appointment

## 2022-06-03 NOTE — TELEPHONE ENCOUNTER
From: Eddie Oleary  To: Gael Roberto MD  Sent: 6/3/2022 12:29 PM CDT  [facility]    Dear Dr. Sukumar Roberto, I have left 2 messages for the infusion center to schedule the appointment and haven't heard back. Can someone schedule me for Monday June 6 please? ANYTIME! Thank you.  My cell is [address] 11/20/1955

## 2022-06-06 ENCOUNTER — PATIENT MESSAGE (OUTPATIENT)
Dept: GASTROENTEROLOGY | Facility: CLINIC | Age: 67
End: 2022-06-06

## 2022-06-06 DIAGNOSIS — A04.72 C. DIFFICILE COLITIS: ICD-10-CM

## 2022-06-06 DIAGNOSIS — R19.7 DIARRHEA, UNSPECIFIED TYPE: Primary | ICD-10-CM

## 2022-06-06 NOTE — TELEPHONE ENCOUNTER
Please contact pt tomorrow with and update on the availability of the infusion/    Pt contacted; She has been on the Dificid since last week, ongoing diarrhea and some bloating, abdominal pain. Eating ok but decreased appetite. Plan  Continue fidaxomicin  -Await infusion  -Brat diet  -Lab work-up placed  -Further work-up with flex sig or imaging CT scan if symptoms worsen.

## 2022-06-06 NOTE — TELEPHONE ENCOUNTER
From: John Oleary  To: Carito Wheeler. Maximo Gandhi MD  Sent: 6/6/2022 9:41 AM CDT  Subject: Infusion medication    Hi Dr. Maximo Gandhi,    Apparently, the infusion department is trying to locate the medicine. I am in more pain and my stomach is very bloated. My lower back and sides hurt as well. Is there another place that might have the medication, or is there anything that can help with the pain and bloating? Can you please call my [employer] [phone].     Thank you,    Kim Hernandez

## 2022-06-06 NOTE — TELEPHONE ENCOUNTER
Dr. Modesta Beniot    Please see below message-patient with complaints of bloating and pain in lower back/sides. Patient continues to have pain. Do you think this is related to the C Diff? She is asking if there is anything that can help her with the pain and bloating. I called Marilynn with the infusion center to get an estimate on how long it will take for the bezlotoxumab infusion to be delivered. She is going to call me back. If she goes somewhere else like [facility] would have to get a new authorization since it is another facility which may take some time so unsure if this would be any faster.     Thank you

## 2022-06-07 ENCOUNTER — TELEPHONE (OUTPATIENT)
Dept: HEMATOLOGY/ONCOLOGY | Facility: HOSPITAL | Age: 67
End: 2022-06-07

## 2022-06-07 DIAGNOSIS — Z86.19 HISTORY OF CLOSTRIDIOIDES DIFFICILE COLITIS: ICD-10-CM

## 2022-06-07 DIAGNOSIS — R19.7 DIARRHEA, UNSPECIFIED TYPE: Primary | ICD-10-CM

## 2022-06-07 NOTE — TELEPHONE ENCOUNTER
[facility] 6/7/22

## 2022-06-08 NOTE — TELEPHONE ENCOUNTER
Patient is scheduled for infusion this Friday.     Your Appointments    Friday Sejal 10, 2022  8:00 AM  Infusion Visit with [facility]) [address]  [phone]

## 2022-06-10 ENCOUNTER — OFFICE VISIT (OUTPATIENT)
Dept: HEMATOLOGY/ONCOLOGY | Facility: HOSPITAL | Age: 67
End: 2022-06-10
Attending: INTERNAL MEDICINE
Payer: COMMERCIAL

## 2022-06-10 VITALS
RESPIRATION RATE: 16 BRPM | WEIGHT: 155 LBS | OXYGEN SATURATION: 96 % | HEIGHT: 65 IN | BODY MASS INDEX: 25.83 KG/M2 | HEART RATE: 75 BPM | TEMPERATURE: 98 F | SYSTOLIC BLOOD PRESSURE: 121 MMHG | DIASTOLIC BLOOD PRESSURE: 63 MMHG

## 2022-06-10 DIAGNOSIS — A04.71 RECURRENT CLOSTRIDIUM DIFFICILE DIARRHEA: Primary | ICD-10-CM

## 2022-06-10 PROCEDURE — 96365 THER/PROPH/DIAG IV INF INIT: CPT

## 2022-06-10 NOTE — PROGRESS NOTES
Sandrine to infusion for Bezolotoxumab to treat recurring C Diff infection  She reports abdominal discomfort and moderate episodes of diarrhea and is on antibiotics   Discuss medication profile, possible SE  Chrissy information sheet provided     Medication infused and tolerated well, no adverse reaction  VSS     PIV removed, 2 x 2 and coban to site   Discharged stable

## 2022-06-13 NOTE — TELEPHONE ENCOUNTER
Patient contacted, she continues to have bloating, abdominal pain and between 6 and 10 bowel movements a day which are loose mushy. She did not notice much of a change on therapy with Dificid and received the infusion last week. Her last dose of the Dificid was on Friday. Plan to check the stools again for C. difficile, if positive then would send her to ID for options including oral fecal transplantation versus if C. difficile is negative then consideration for colonoscopy to check for colitis or other possible causes.

## 2022-06-13 NOTE — TELEPHONE ENCOUNTER
Dr. Gurvinder Campos    Patient reports that she had her infusion on Friday. She also took her last dose of dificid on Friday. She still has gut pain-same as before (unchanged). She still has diarrhea/loose stools about 6-10 episodes a day. Wants to know how long to feel effects from infusion/what she should do next.     Thank you

## 2022-06-15 ENCOUNTER — LAB ENCOUNTER (OUTPATIENT)
Dept: LAB | Facility: HOSPITAL | Age: 67
End: 2022-06-15
Attending: INTERNAL MEDICINE
Payer: COMMERCIAL

## 2022-06-15 ENCOUNTER — PATIENT MESSAGE (OUTPATIENT)
Dept: GASTROENTEROLOGY | Facility: CLINIC | Age: 67
End: 2022-06-15

## 2022-06-15 DIAGNOSIS — R19.7 DIARRHEA, UNSPECIFIED TYPE: ICD-10-CM

## 2022-06-15 DIAGNOSIS — Z86.19 HISTORY OF CLOSTRIDIOIDES DIFFICILE COLITIS: ICD-10-CM

## 2022-06-15 LAB — C DIFF TOX B STL QL: NEGATIVE

## 2022-06-15 PROCEDURE — 87493 C DIFF AMPLIFIED PROBE: CPT

## 2022-06-15 RX ORDER — DICYCLOMINE HYDROCHLORIDE 10 MG/1
10 CAPSULE ORAL 3 TIMES DAILY
Qty: 90 CAPSULE | Refills: 0 | Status: SHIPPED | OUTPATIENT
Start: 2022-06-15

## 2022-06-15 NOTE — TELEPHONE ENCOUNTER
Dr. Andrew Antonio Diff from today is negative. Please advise on next steps for patient's symptoms.     Thank you

## 2022-06-15 NOTE — TELEPHONE ENCOUNTER
From: Eddie Oleary  To: Gael Roberto MD  Sent: 6/15/2022 2:13 PM CDT  Subject: Butch Irizarry results    Hi Dr. Sukumar Robreto,  I just read the results and the Cdiff is gone. What is the plan for the pain I am still having? Do you think it will resolve itself in time? Thanks!     Wagner Marvin

## 2022-06-15 NOTE — TELEPHONE ENCOUNTER
Discussed the good news, the C. difficile seems to have cleared as her stool test is negative. She continues to have abdominal soreness but her diarrhea has been improving daily. She has completed the Dificid plus the infusion and seems to be doing better gradually. Plan-trial of dicyclomine for possible postinfectious IBS flareup.  -Follow-up in the office in the next few weeks  -Consider colonoscopy if ongoing symptoms or issues  -Call if symptoms flareup.

## 2022-06-30 ENCOUNTER — OFFICE VISIT (OUTPATIENT)
Dept: ORTHOPEDICS CLINIC | Facility: CLINIC | Age: 67
End: 2022-06-30
Payer: COMMERCIAL

## 2022-06-30 VITALS — WEIGHT: 155 LBS | BODY MASS INDEX: 24.91 KG/M2 | HEIGHT: 66 IN

## 2022-06-30 DIAGNOSIS — M20.41 HAMMER TOE OF RIGHT FOOT: ICD-10-CM

## 2022-06-30 DIAGNOSIS — M92.71 FREIBERG'S DISEASE, RIGHT: ICD-10-CM

## 2022-06-30 DIAGNOSIS — D36.10 NEUROMA: ICD-10-CM

## 2022-06-30 DIAGNOSIS — M77.50 CAPSULITIS OF METATARSOPHALANGEAL (MTP) JOINT: Primary | ICD-10-CM

## 2022-06-30 PROCEDURE — 20551 NJX 1 TENDON ORIGIN/INSJ: CPT | Performed by: PODIATRIST

## 2022-06-30 PROCEDURE — 3008F BODY MASS INDEX DOCD: CPT | Performed by: PODIATRIST

## 2022-06-30 PROCEDURE — 99214 OFFICE O/P EST MOD 30 MIN: CPT | Performed by: PODIATRIST

## 2022-06-30 RX ORDER — BETAMETHASONE SODIUM PHOSPHATE AND BETAMETHASONE ACETATE 3; 3 MG/ML; MG/ML
18 INJECTION, SUSPENSION INTRA-ARTICULAR; INTRALESIONAL; INTRAMUSCULAR; SOFT TISSUE ONCE
Status: COMPLETED | OUTPATIENT
Start: 2022-06-30 | End: 2022-06-30

## 2022-06-30 RX ADMIN — BETAMETHASONE SODIUM PHOSPHATE AND BETAMETHASONE ACETATE 18 MG: 3; 3 INJECTION, SUSPENSION INTRA-ARTICULAR; INTRALESIONAL; INTRAMUSCULAR; SOFT TISSUE at 15:57:00

## 2022-06-30 NOTE — PROGRESS NOTES
[facility] Progress Note    Subjective:     Shayy Duke is here for follow-up and to discuss surgery. She has had a pretty eventful several months with a history of heart disease and also a bout with C. difficile    Her foot continues to hurt, swell and she is not able to do exercise or anything she really wants to with increased activity because of the foot pain. She is ready to go ahead with surgery. Objective:     Exam right foot, splaying between the second and third digits with swelling and pain dorsally and the second and third interspace as well as plantar plate region of the second MP joint. Mild pain with range of motion of the second and third MP joint. Her sensation is intact, mild hammertoe deformity second and third digits at the PIP joint    Review of x-rays show Freiberg's infarction with damage to the second metatarsal head. Ultrasound and MRI showed plantar plate tear second MP joint as well as possible neuroma                  Assessment/Plan:     Diagnoses and all orders for this visit:    Capsulitis of metatarsophalangeal (MTP) joint  -     INJECTION; TENDON ORIGIN/INSERTION  -     betamethasone sodium phosphate & acetate (CELESTONE) 6 (3-3) MG/ML injection 18 mg    Hammer toe of right foot  -     INJECTION; TENDON ORIGIN/INSERTION  -     betamethasone sodium phosphate & acetate (CELESTONE) 6 (3-3) MG/ML injection 18 mg    Freiberg's disease, right  -     INJECTION; TENDON ORIGIN/INSERTION  -     betamethasone sodium phosphate & acetate (CELESTONE) 6 (3-3) MG/ML injection 18 mg    Neuroma    Discussion today about her right foot and proceeding with surgery. She has quite a few things going on and we need to regroup after doing an injection today, hopefully this gives her a little temporary relief. She did tolerate the injection well and will ice and rest the foot over the next 1 to 2 weeks. Injection 2nd innerspace right foot    Risks and benefits discussed.   Sterile prep of affected right forefoot. Injection of 1cc of betamethasone phosphate to painful area of 2nd innerspace. Tape 2,3rd toes together for several days. I will let her know the plans for surgery of her right foot -as well as post op course      Repair plantar plate 2nd mpj  Possible implant 2nd mpj  Possible weil osteotomy 2nd metatarsal  Fusion 2nd and 3rd digits all right foot  [facility] preferred by patient          Lisette Wagoner. Armen Falcon DPM  [facility]    [employer] speech recognition software was used to prepare this note. If a word or phrase is confusing, it is likely do to a failure of recognition. Please contact me with any questions or clarifications.

## 2022-07-01 ENCOUNTER — TELEPHONE (OUTPATIENT)
Dept: ORTHOPEDICS CLINIC | Facility: CLINIC | Age: 67
End: 2022-07-01

## 2022-07-01 NOTE — TELEPHONE ENCOUNTER
Surgeon: Dr. Graham Perez    Date of Surgery: 8/15         Post Op Appt: 8/16 @ 8AM      Prior Authorization:   Inpatient or Outpatient: Outpatient  Facility: [address] [place] [address] Comp: NO  CPT:   DX:M77.50,M20.41,M92.71,D36.10     Surgical Assistant: NONE     Preadmission Testing: PER ANESTHESIA GUIDELINES     Pre-Op Clearance Requested: HISTORY AND PHYSICAL or MEDICAL CLEARANCE    DME:  NONE NEEDED    Rehab Services Ordered: NONE     Disability Paperwork: NONE    On [place] Calendar: YES    Notes:    EOSC   2 hours   MAC with local   Right Foot Fusion 2nd and 3rd toes   Possible implant 2nd mpj   Possible weil osteotomy 2nd metatarsal   [facility]

## 2022-07-07 ENCOUNTER — TELEPHONE (OUTPATIENT)
Dept: GASTROENTEROLOGY | Facility: CLINIC | Age: 67
End: 2022-07-07

## 2022-07-07 DIAGNOSIS — R19.7 DIARRHEA, UNSPECIFIED TYPE: Primary | ICD-10-CM

## 2022-07-07 NOTE — TELEPHONE ENCOUNTER
Colonoscopy orders in place by Dr. Henry Varner - refer to patient message from 7/6/2022. Scheduled for:  Colonoscopy [address]    Provider Name:  Dr. Henry Varner  Date:  9/17/2022 (Per. Supervisor no pre approval needed to scheduled none screening pt. On this day)  Location:  [facility] r/t medical hx (recent heart ablation/ ventricular tachyarrhythmia)   Sedation:  MAC  Time:  7:30 AM (pt is aware to arrive at 6:30 AM)  Prep:  Split dose Miralax prep  Meds/Allergies Reconciled?: Physician reviewed    Diagnosis with codes:  Diarrhea R19.7  Was patient informed to call insurance with codes (Y/N):   I confirmed [email or web address] with this patient. Referral sent?:  Referral was sent at the time of electronic surgical scheduling. [facility] or [address] notified?:  I sent an electronic request to Endo Scheduling and received a confirmation today. Medication Orders:  LOSARTAN   Misc Orders:  Patient was informed that they will need a COVID 19 test prior to their procedure. Patient verbally understood & will await a phone call from [facility] to schedule. Further instructions given by staff:  I discussed the prep instructions with the patient which she verbally understood and is aware that I will send the instructions today via [address] APPT.  ADDED TO WAIT LIST per her requested

## 2022-07-08 NOTE — TELEPHONE ENCOUNTER
Pt wants surgery sooner than 8/15/22. Offered 7/18/22 but pt refused date saying she has plans. Advised pt that we will let her know if anything else opens up.

## 2022-07-18 ENCOUNTER — HOSPITAL ENCOUNTER (OUTPATIENT)
Dept: CT IMAGING | Facility: HOSPITAL | Age: 67
Discharge: HOME | End: 2022-07-18
Attending: INTERNAL MEDICINE
Payer: COMMERCIAL

## 2022-07-18 DIAGNOSIS — R10.9 ABDOMINAL PAIN, UNSPECIFIED ABDOMINAL LOCATION: ICD-10-CM

## 2022-07-18 LAB — CREAT BLD-MCNC: 0.8 MG/DL

## 2022-07-18 PROCEDURE — 74177 CT ABD & PELVIS W/CONTRAST: CPT | Performed by: INTERNAL MEDICINE

## 2022-07-18 PROCEDURE — 82565 ASSAY OF CREATININE: CPT

## 2022-07-19 ENCOUNTER — TELEPHONE (OUTPATIENT)
Dept: GASTROENTEROLOGY | Facility: CLINIC | Age: 67
End: 2022-07-19

## 2022-07-19 RX ORDER — VANCOMYCIN HYDROCHLORIDE 125 MG/1
125 CAPSULE ORAL 2 TIMES DAILY
Qty: 28 CAPSULE | Refills: 0 | Status: SHIPPED | OUTPATIENT
Start: 2022-07-19

## 2022-07-19 RX ORDER — AMOXICILLIN AND CLAVULANATE POTASSIUM 875; 125 MG/1; MG/1
1 TABLET, FILM COATED ORAL 2 TIMES DAILY
Qty: 20 TABLET | Refills: 0 | Status: SHIPPED | OUTPATIENT
Start: 2022-07-19

## 2022-07-19 NOTE — TELEPHONE ENCOUNTER
Patient was contacted and results of her recent CT scan were discussed. Focus of diverticulitis was noted in the sigmoid colon, discussed her options here I would like to retreat with antibiotics but because of her history of C. difficile will need to cross cover with vancomycin during the antibiotics to hopefully help prevent recurrent C. difficile infection. Like to have the patient follow-up with Dr. Sheri Fernando in the office for possible input regarding surgery, patient has a difficult history including irregular bowel habits/IBS, recurrent diverticulitis, history of C. difficile. Possibility of surgical resection of the diseased colon to help prevent recurrent diverticulitis and the need for antibiotics moving forward. She has colonoscopy scheduled for September.

## 2022-07-19 NOTE — TELEPHONE ENCOUNTER
msg left on personal vm, pt to call back to discuss CT scan results.    Area of recurrent diverticulitis - plan re treatment with ABX but will need vanco adm at the same time to prevent recurrent C dfi

## 2022-08-01 ENCOUNTER — MED REC SCAN ONLY (OUTPATIENT)
Dept: ORTHOPEDICS CLINIC | Facility: CLINIC | Age: 67
End: 2022-08-01

## 2022-08-05 ENCOUNTER — LAB ENCOUNTER (OUTPATIENT)
Dept: LAB | Facility: HOSPITAL | Age: 67
End: 2022-08-05
Attending: INTERNAL MEDICINE
Payer: COMMERCIAL

## 2022-08-05 DIAGNOSIS — Z01.818 PRE-OP EXAM: Primary | ICD-10-CM

## 2022-08-05 DIAGNOSIS — R53.83 FATIGUE: Primary | ICD-10-CM

## 2022-08-05 DIAGNOSIS — K52.9 ENTERITIS: ICD-10-CM

## 2022-08-05 LAB
ANION GAP SERPL CALC-SCNC: 11 MMOL/L (ref 0–18)
BASOPHILS # BLD AUTO: 0.08 X10(3) UL (ref 0–0.2)
BASOPHILS NFR BLD AUTO: 1.2 %
BUN BLD-MCNC: 22 MG/DL (ref 7–18)
BUN/CREAT SERPL: 30.6 (ref 10–20)
CALCIUM BLD-MCNC: 9.5 MG/DL (ref 8.5–10.1)
CHLORIDE SERPL-SCNC: 105 MMOL/L (ref 98–112)
CO2 SERPL-SCNC: 24 MMOL/L (ref 21–32)
CREAT BLD-MCNC: 0.72 MG/DL
DEPRECATED RDW RBC AUTO: 41.6 FL (ref 35.1–46.3)
EOSINOPHIL # BLD AUTO: 0.1 X10(3) UL (ref 0–0.7)
EOSINOPHIL NFR BLD AUTO: 1.5 %
ERYTHROCYTE [DISTWIDTH] IN BLOOD BY AUTOMATED COUNT: 12.7 % (ref 11–15)
FASTING STATUS PATIENT QL REPORTED: YES
GFR SERPLBLD BASED ON 1.73 SQ M-ARVRAT: 92 ML/MIN/1.73M2 (ref 60–?)
GLUCOSE BLD-MCNC: 89 MG/DL (ref 70–99)
HCT VFR BLD AUTO: 36.7 %
HGB BLD-MCNC: 12.3 G/DL
IMM GRANULOCYTES # BLD AUTO: 0.04 X10(3) UL (ref 0–1)
IMM GRANULOCYTES NFR BLD: 0.6 %
LYMPHOCYTES # BLD AUTO: 2.32 X10(3) UL (ref 1–4)
LYMPHOCYTES NFR BLD AUTO: 35.3 %
MCH RBC QN AUTO: 29.8 PG (ref 26–34)
MCHC RBC AUTO-ENTMCNC: 33.5 G/DL (ref 31–37)
MCV RBC AUTO: 88.9 FL
MONOCYTES # BLD AUTO: 0.49 X10(3) UL (ref 0.1–1)
MONOCYTES NFR BLD AUTO: 7.4 %
NEUTROPHILS # BLD AUTO: 3.55 X10 (3) UL (ref 1.5–7.7)
NEUTROPHILS # BLD AUTO: 3.55 X10(3) UL (ref 1.5–7.7)
NEUTROPHILS NFR BLD AUTO: 54 %
OSMOLALITY SERPL CALC.SUM OF ELEC: 293 MOSM/KG (ref 275–295)
PLATELET # BLD AUTO: 266 10(3)UL (ref 150–450)
POTASSIUM SERPL-SCNC: 3.6 MMOL/L (ref 3.5–5.1)
RBC # BLD AUTO: 4.13 X10(6)UL
SODIUM SERPL-SCNC: 140 MMOL/L (ref 136–145)
WBC # BLD AUTO: 6.6 X10(3) UL (ref 4–11)

## 2022-08-05 PROCEDURE — 93005 ELECTROCARDIOGRAM TRACING: CPT

## 2022-08-05 PROCEDURE — 80048 BASIC METABOLIC PNL TOTAL CA: CPT

## 2022-08-05 PROCEDURE — 93010 ELECTROCARDIOGRAM REPORT: CPT | Performed by: INTERNAL MEDICINE

## 2022-08-05 PROCEDURE — 85025 COMPLETE CBC W/AUTO DIFF WBC: CPT

## 2022-08-05 PROCEDURE — 36415 COLL VENOUS BLD VENIPUNCTURE: CPT

## 2022-08-09 ENCOUNTER — TELEPHONE (OUTPATIENT)
Dept: ORTHOPEDICS CLINIC | Facility: CLINIC | Age: 67
End: 2022-08-09

## 2022-08-09 NOTE — TELEPHONE ENCOUNTER
Patient is requesting a call back from Laverne Bartlett re: a couple of concerns. She adamantly did not want to speak with an MA or with the surgery scheduler. She requested a Nurse or the doctor herself. One of the issues she stated is that she did not think that the message with the surgery scheduler was clear; and that is that Dr. Zenaida Mcgregor wants to make sure that if an antibiotic is needed  For her upcoming [address], that vancomyacin be that antibiotic of choice. Please call her Wednesday.

## 2022-08-10 NOTE — TELEPHONE ENCOUNTER
Advance Care Planning   Healthcare Decision Maker:      Click here to complete Healthcare Decision Makers including selection of the Healthcare Decision Maker Relationship (ie \"Primary\"). Today we documented Decision Maker(s) consistent with Legal Next of Kin hierarchy. Per Availity, review pending.

## 2022-08-12 ENCOUNTER — LAB REQUISITION (OUTPATIENT)
Dept: SURGERY | Age: 67
End: 2022-08-12
Payer: COMMERCIAL

## 2022-08-12 DIAGNOSIS — Z01.818 PREOP EXAMINATION: ICD-10-CM

## 2022-08-13 LAB — SARS-COV-2 RNA RESP QL NAA+PROBE: NOT DETECTED

## 2022-08-16 ENCOUNTER — HOSPITAL ENCOUNTER (OUTPATIENT)
Dept: GENERAL RADIOLOGY | Age: 67
Discharge: HOME | End: 2022-08-16
Attending: PODIATRIST
Payer: COMMERCIAL

## 2022-08-16 ENCOUNTER — OFFICE VISIT (OUTPATIENT)
Dept: ORTHOPEDICS CLINIC | Facility: CLINIC | Age: 67
End: 2022-08-16
Payer: COMMERCIAL

## 2022-08-16 VITALS — HEIGHT: 66 IN | BODY MASS INDEX: 24.91 KG/M2 | WEIGHT: 155 LBS

## 2022-08-16 DIAGNOSIS — Z87.39 STATUS POST HAMMERTOE CORRECTION: ICD-10-CM

## 2022-08-16 DIAGNOSIS — Z87.39 STATUS POST HAMMERTOE CORRECTION: Primary | ICD-10-CM

## 2022-08-16 DIAGNOSIS — M77.41 METATARSALGIA OF RIGHT FOOT: ICD-10-CM

## 2022-08-16 DIAGNOSIS — Z98.890 STATUS POST HAMMERTOE CORRECTION: ICD-10-CM

## 2022-08-16 DIAGNOSIS — Z98.890 STATUS POST HAMMERTOE CORRECTION: Primary | ICD-10-CM

## 2022-08-16 PROCEDURE — 99024 POSTOP FOLLOW-UP VISIT: CPT | Performed by: PODIATRIST

## 2022-08-16 PROCEDURE — 73630 X-RAY EXAM OF FOOT: CPT | Performed by: PODIATRIST

## 2022-08-16 PROCEDURE — 3008F BODY MASS INDEX DOCD: CPT | Performed by: PODIATRIST

## 2022-08-16 RX ORDER — ACETAMINOPHEN AND CODEINE PHOSPHATE 300; 30 MG/1; MG/1
1 TABLET ORAL EVERY 4 HOURS PRN
Qty: 15 TABLET | Refills: 0 | Status: SHIPPED | OUTPATIENT
Start: 2022-08-16

## 2022-08-16 RX ORDER — HYDROCODONE BITARTRATE AND ACETAMINOPHEN 5; 325 MG/1; MG/1
TABLET ORAL
COMMUNITY
Start: 2022-08-15

## 2022-08-16 NOTE — PROGRESS NOTES
[facility] Progress Note    Subjective:     Rodney Rodriguez is here for follow-up now 1 day postop right foot surgery. She has had a lot of pain last night and feels that the Romulo Graham has not been helping. Objective:     Exam bandage has abundant blood on it and was removed. Incisions are well coapted and second and third toes have less splaying noted  Moderate swelling and ecchymosis      X-rays today show intramedullary pins in the second and third digits as well as hardware second metatarsal head            Assessment/Plan:     Diagnoses and all orders for this visit:    Status post hammertoe correction  -     XR FOOT, COMPLETE (MIN 3 VIEWS), RIGHT (CPT=73630); Future    Metatarsalgia of right foot    Other orders  -     Acetaminophen-Codeine (TYLENOL WITH CODEINE #3) 300-30 MG Oral Tab; Take 1 tablet by mouth every 4 (four) hours as needed for Pain. Switch to Tylenol with codeine to see if this is more helpful for her. Changing the bandage was helpful with pain as well. Sterile bandage reapplied weightbearing as tolerated in the boot and follow-up is in 2 weeks no x-rays needed at that time            Jaclyn Addison. Frances Sosa DPM  [facility]    [employer] speech recognition software was used to prepare this note. If a word or phrase is confusing, it is likely do to a failure of recognition. Please contact me with any questions or clarifications.

## 2022-08-22 ENCOUNTER — TELEPHONE (OUTPATIENT)
Dept: ORTHOPEDICS CLINIC | Facility: CLINIC | Age: 67
End: 2022-08-22

## 2022-08-22 NOTE — TELEPHONE ENCOUNTER
Patient would like to know if she can rewrap her foot as her ace bandage has become lose. Patient denies any discharge or pain. Advised pt she can re-wrap using the ace bandage.    Future Appointments   Date Time Provider Yefri Velez   9/1/2022  8:15 AM Nohemi Robledo DPM [facility]   9/17/2022  7:30 AM JOSE, PROCEDURE [id number] None

## 2022-08-22 NOTE — TELEPHONE ENCOUNTER
Patient called stating her dressing on her foot is unraveling. She had surgery with Dr. Bertin Mendez on 8/15/22 RT FOOT. Patient would like to know if she can come in to get her foot rewrapped since her appointment is not till 9/1.

## 2022-08-25 ENCOUNTER — TELEPHONE (OUTPATIENT)
Dept: GASTROENTEROLOGY | Facility: CLINIC | Age: 67
End: 2022-08-25

## 2022-08-25 NOTE — TELEPHONE ENCOUNTER
1st reminder letter sent out via mail and [employer] for the following:   COMP METABOLIC PANEL (14) (Order #[id number]) on 6/6/22  CBC WITH DIFFERENTIAL WITH PLATELET (Order #[id number]) on 6/6/22  C-REACTIVE PROTEIN (Order #[id number]) on 6/6/22

## 2022-09-01 ENCOUNTER — OFFICE VISIT (OUTPATIENT)
Dept: ORTHOPEDICS CLINIC | Facility: CLINIC | Age: 67
End: 2022-09-01
Payer: COMMERCIAL

## 2022-09-01 ENCOUNTER — TELEPHONE (OUTPATIENT)
Dept: ORTHOPEDICS CLINIC | Facility: CLINIC | Age: 67
End: 2022-09-01

## 2022-09-01 VITALS — HEIGHT: 66 IN | WEIGHT: 155 LBS | BODY MASS INDEX: 24.91 KG/M2

## 2022-09-01 DIAGNOSIS — Z98.890 STATUS POST HAMMERTOE CORRECTION: Primary | ICD-10-CM

## 2022-09-01 DIAGNOSIS — M77.50 CAPSULITIS OF METATARSOPHALANGEAL (MTP) JOINT: ICD-10-CM

## 2022-09-01 DIAGNOSIS — Z87.39 STATUS POST HAMMERTOE CORRECTION: Primary | ICD-10-CM

## 2022-09-01 PROCEDURE — 99024 POSTOP FOLLOW-UP VISIT: CPT | Performed by: PODIATRIST

## 2022-09-01 PROCEDURE — 3008F BODY MASS INDEX DOCD: CPT | Performed by: PODIATRIST

## 2022-09-01 RX ORDER — TRIAMTERENE AND HYDROCHLOROTHIAZIDE 37.5; 25 MG/1; MG/1
1 TABLET ORAL DAILY
COMMUNITY
Start: 2022-08-23

## 2022-09-01 NOTE — PROGRESS NOTES
[facility] Progress Note    Subjective:   Shazia Henley  Is here for follow-up now 2 weeks postop osteotomy of the second metatarsal and correction of the hammertoes second and third all right foot. She is doing pretty well  Using the low-profile boot and getting around comfortably      Objective: Moderate swelling of the second and third digits  No signs of infection  Toes are in good alignment right foot                  Assessment/Plan:     Diagnoses and all orders for this visit:    Status post hammertoe correction    Capsulitis of metatarsophalangeal (MTP) joint        Sutures removed without incident today. Taping the second and third toes together as recommended. Continued use of the boot for the next 1 week and then she can transition into her Hoka shoe  Follow-up with me is in about 3 weeks            Arpit Martínez. Asher Stanton DPM  [facility]    [employer] speech recognition software was used to prepare this note. If a word or phrase is confusing, it is likely do to a failure of recognition. Please contact me with any questions or clarifications.

## 2022-09-14 ENCOUNTER — TELEPHONE (OUTPATIENT)
Dept: GASTROENTEROLOGY | Facility: CLINIC | Age: 67
End: 2022-09-14

## 2022-09-14 NOTE — TELEPHONE ENCOUNTER
Patient contacted. All questions about prep answered. Reviewed how to drink split dose prep. I sent prep instructions again to her MyChart and I let her know that this was done and to check her MyChart. Reminded her to hold losartan per anesthesia protocol.

## 2022-09-17 ENCOUNTER — HOSPITAL ENCOUNTER (OUTPATIENT)
Facility: HOSPITAL | Age: 67
Setting detail: HOSPITAL OUTPATIENT SURGERY
Discharge: HOME | End: 2022-09-17
Attending: INTERNAL MEDICINE | Admitting: INTERNAL MEDICINE

## 2022-09-17 ENCOUNTER — ANESTHESIA (OUTPATIENT)
Dept: ENDOSCOPY | Facility: HOSPITAL | Age: 67
End: 2022-09-17
Payer: COMMERCIAL

## 2022-09-17 ENCOUNTER — ANESTHESIA EVENT (OUTPATIENT)
Dept: ENDOSCOPY | Facility: HOSPITAL | Age: 67
End: 2022-09-17
Payer: COMMERCIAL

## 2022-09-17 VITALS
HEIGHT: 66 IN | OXYGEN SATURATION: 98 % | BODY MASS INDEX: 24.75 KG/M2 | TEMPERATURE: 98 F | HEART RATE: 72 BPM | SYSTOLIC BLOOD PRESSURE: 111 MMHG | DIASTOLIC BLOOD PRESSURE: 82 MMHG | RESPIRATION RATE: 16 BRPM | WEIGHT: 154 LBS

## 2022-09-17 DIAGNOSIS — R19.7 DIARRHEA, UNSPECIFIED TYPE: ICD-10-CM

## 2022-09-17 PROCEDURE — 45380 COLONOSCOPY AND BIOPSY: CPT | Performed by: INTERNAL MEDICINE

## 2022-09-17 PROCEDURE — 0DBK8ZX EXCISION OF ASCENDING COLON, VIA NATURAL OR ARTIFICIAL OPENING ENDOSCOPIC, DIAGNOSTIC: ICD-10-PCS | Performed by: INTERNAL MEDICINE

## 2022-09-17 RX ORDER — SODIUM CHLORIDE, SODIUM LACTATE, POTASSIUM CHLORIDE, CALCIUM CHLORIDE 600; 310; 30; 20 MG/100ML; MG/100ML; MG/100ML; MG/100ML
INJECTION, SOLUTION INTRAVENOUS CONTINUOUS
OUTPATIENT
Start: 2022-09-17

## 2022-09-17 RX ORDER — NALOXONE HYDROCHLORIDE 0.4 MG/ML
80 INJECTION, SOLUTION INTRAMUSCULAR; INTRAVENOUS; SUBCUTANEOUS AS NEEDED
OUTPATIENT
Start: 2022-09-17 | End: 2022-09-17

## 2022-09-17 RX ORDER — LIDOCAINE HYDROCHLORIDE 10 MG/ML
INJECTION, SOLUTION EPIDURAL; INFILTRATION; INTRACAUDAL; PERINEURAL AS NEEDED
Status: DISCONTINUED | OUTPATIENT
Start: 2022-09-17 | End: 2022-09-17 | Stop reason: SURG

## 2022-09-17 RX ORDER — SODIUM CHLORIDE, SODIUM LACTATE, POTASSIUM CHLORIDE, CALCIUM CHLORIDE 600; 310; 30; 20 MG/100ML; MG/100ML; MG/100ML; MG/100ML
INJECTION, SOLUTION INTRAVENOUS CONTINUOUS
Status: DISCONTINUED | OUTPATIENT
Start: 2022-09-17 | End: 2022-09-17

## 2022-09-17 RX ADMIN — SODIUM CHLORIDE, SODIUM LACTATE, POTASSIUM CHLORIDE, CALCIUM CHLORIDE: 600; 310; 30; 20 INJECTION, SOLUTION INTRAVENOUS at 07:46:00

## 2022-09-17 RX ADMIN — LIDOCAINE HYDROCHLORIDE 50 MG: 10 INJECTION, SOLUTION EPIDURAL; INFILTRATION; INTRACAUDAL; PERINEURAL at 07:46:00

## 2022-09-17 NOTE — OPERATIVE REPORT
[facility] Endoscopy Report      Preoperative Diagnosis:  - irregular bowel habits      Postoperative Diagnosis:  - colon polyp x 1  - pan diverticular disease   - internal hemorrhoids      Procedure:    Colonoscopy       Surgeon:  Arnol Caceres M.D. Anesthesia:  MAC     Technique:  After informed consent, the patient was placed in the left lateral recumbent position. Digital rectal examination revealed no palpable intraluminal abnormalities. An Olympus variable stiffness 190 series HD colonoscope was inserted into the rectum and advanced under direct vision by following the lumen to the cecum. The colon was examined upon withdrawal in the left lateral position. The procedure was well tolerated without immediate complication. Findings:  The preparation of the colon was good. The terminal ileum was examined for 4 cm and visually normal.  The ileocecal valve was well preserved. The visualized colonic mucosa from the cecum to the anal verge was normal with an intact vascular pattern. Random biopsies. Colon polyp x1 removed from the distal ascending colon, this was diminutive removed by cold forceps technique. No bleeding was noted the polypectomy site and specimens retrieved and sent for analysis. Pandiverticular disease noted, no evidence of diverticulitis. Small internal hemorrhoids noted on retroflexed view. Estimated blood loss-insignificant  Specimens-colon polyp, random colon bx      Impression:  - colon polyp x 1  - pan diverticular disease   - internal hemorrhoids    Recommendations:  - Post polypectomy instructions given  - Repeat colonoscopy in 5- 10 years  - High fiber diet for diverticular disease  - Symptomatic treatment of hemorrhoids          Greer Varner MD  9/17/2022  8:08 AM

## 2022-09-22 ENCOUNTER — HOSPITAL ENCOUNTER (OUTPATIENT)
Dept: GENERAL RADIOLOGY | Facility: HOSPITAL | Age: 67
Discharge: HOME | End: 2022-09-22
Attending: PODIATRIST

## 2022-09-22 DIAGNOSIS — M77.50 CAPSULITIS OF METATARSOPHALANGEAL (MTP) JOINT: ICD-10-CM

## 2022-09-22 DIAGNOSIS — Z87.39 STATUS POST HAMMERTOE CORRECTION: ICD-10-CM

## 2022-09-22 DIAGNOSIS — Z98.890 STATUS POST HAMMERTOE CORRECTION: ICD-10-CM

## 2022-09-22 PROCEDURE — 73630 X-RAY EXAM OF FOOT: CPT | Performed by: PODIATRIST

## 2022-09-27 ENCOUNTER — OFFICE VISIT (OUTPATIENT)
Dept: ORTHOPEDICS CLINIC | Facility: CLINIC | Age: 67
End: 2022-09-27

## 2022-09-27 DIAGNOSIS — M77.50 CAPSULITIS OF METATARSOPHALANGEAL (MTP) JOINT: ICD-10-CM

## 2022-09-27 DIAGNOSIS — Z98.890 STATUS POST HAMMERTOE CORRECTION: Primary | ICD-10-CM

## 2022-09-27 DIAGNOSIS — Z87.39 STATUS POST HAMMERTOE CORRECTION: Primary | ICD-10-CM

## 2022-09-27 PROCEDURE — 99024 POSTOP FOLLOW-UP VISIT: CPT | Performed by: PODIATRIST

## 2022-09-27 RX ORDER — DOXYCYCLINE HYCLATE 100 MG/1
100 CAPSULE ORAL DAILY
COMMUNITY
Start: 2022-09-22

## 2022-09-27 RX ORDER — ASPIRIN 81 MG/1
TABLET, CHEWABLE ORAL
COMMUNITY
Start: 2022-09-25

## 2022-09-27 NOTE — PROGRESS NOTES
[facility] Progress Note    Subjective:     Kerry Burkitt is here for postop check she is now almost 6 weeks postop right foot second and third toe fusion and second metatarsal osteotomy, Weil osteotomy. She is doing well but is concerned about the swelling of the toes  She is concerned about the drifting is of the toes as well  Minimal to no pain and she is back in her Hoka shoes      Objective:     Exam right foot well-healed incisions over the second and third digits toes are rectus, mild amount of swelling present and full sensation. She is able to activate the flexor and extensors tendons and there does not seem to be any contracture at the second MP joint dorsally. Mild swelling. Incisions are well-healed. X-rays on 9/22 were reviewed and show hardware to be intact. Assessment/Plan:     Diagnoses and all orders for this visit:    Status post hammertoe correction  -     PHYSICAL THERAPY - INTERNAL    Capsulitis of metatarsophalangeal (MTP) joint  -     PHYSICAL THERAPY - INTERNAL        Discussed her swelling and the fact that she can have swelling of those toes up to 3 months postop so for 6 weeks she is actually looking and feeling pretty well. Continue with the Hoka shoes and start some physical therapy for swelling and to encourage plantar flexion at the second MP joint. She was also given some toe sleeves to help with swelling. Follow-up in 6 weeks            Sina Rodriguez. Mireya Kang DPM  [facility]    [email or web address] speech recognition software was used to prepare this note. If a word or phrase is confusing, it is likely do to a failure of recognition. Please contact me with any questions or clarifications.

## 2022-10-13 ENCOUNTER — TELEPHONE (OUTPATIENT)
Dept: PHYSICAL THERAPY | Facility: HOSPITAL | Age: 67
End: 2022-10-13

## 2022-10-17 DIAGNOSIS — M77.50 CAPSULITIS OF METATARSOPHALANGEAL (MTP) JOINT: Primary | ICD-10-CM

## 2022-10-17 DIAGNOSIS — Z98.890 STATUS POST HAMMERTOE CORRECTION: ICD-10-CM

## 2022-10-17 DIAGNOSIS — M92.71 FREIBERG'S DISEASE, RIGHT: ICD-10-CM

## 2022-10-17 DIAGNOSIS — Z87.39 STATUS POST HAMMERTOE CORRECTION: ICD-10-CM

## 2022-10-17 DIAGNOSIS — M77.41 METATARSALGIA OF RIGHT FOOT: ICD-10-CM

## 2022-10-25 ENCOUNTER — TELEPHONE (OUTPATIENT)
Dept: PHYSICAL THERAPY | Facility: HOSPITAL | Age: 67
End: 2022-10-25

## 2022-10-26 ENCOUNTER — OFFICE VISIT (OUTPATIENT)
Dept: PHYSICAL THERAPY | Age: 67
End: 2022-10-26
Attending: PODIATRIST
Payer: COMMERCIAL

## 2022-10-26 ENCOUNTER — APPOINTMENT (OUTPATIENT)
Dept: PHYSICAL THERAPY | Age: 67
End: 2022-10-26
Attending: PODIATRIST

## 2022-10-26 DIAGNOSIS — Z87.39 STATUS POST HAMMERTOE CORRECTION: ICD-10-CM

## 2022-10-26 DIAGNOSIS — M92.71 FREIBERG'S DISEASE, RIGHT: ICD-10-CM

## 2022-10-26 DIAGNOSIS — Z98.890 STATUS POST HAMMERTOE CORRECTION: ICD-10-CM

## 2022-10-26 DIAGNOSIS — M77.50 CAPSULITIS OF METATARSOPHALANGEAL (MTP) JOINT: ICD-10-CM

## 2022-10-26 DIAGNOSIS — M77.41 METATARSALGIA OF RIGHT FOOT: ICD-10-CM

## 2022-10-26 PROCEDURE — 97110 THERAPEUTIC EXERCISES: CPT | Performed by: PHYSICAL THERAPIST

## 2022-10-26 PROCEDURE — 97161 PT EVAL LOW COMPLEX 20 MIN: CPT | Performed by: PHYSICAL THERAPIST

## 2022-11-01 ENCOUNTER — OFFICE VISIT (OUTPATIENT)
Dept: PHYSICAL THERAPY | Age: 67
End: 2022-11-01
Attending: PODIATRIST
Payer: COMMERCIAL

## 2022-11-01 ENCOUNTER — APPOINTMENT (OUTPATIENT)
Dept: PHYSICAL THERAPY | Age: 67
End: 2022-11-01
Attending: PODIATRIST

## 2022-11-01 PROCEDURE — 97140 MANUAL THERAPY 1/> REGIONS: CPT | Performed by: PHYSICAL THERAPIST

## 2022-11-01 PROCEDURE — 97110 THERAPEUTIC EXERCISES: CPT | Performed by: PHYSICAL THERAPIST

## 2022-11-01 NOTE — PROGRESS NOTES
Diagnosis:    Capsulitis of metatarsophalangeal (MTP) joint (M77.50)  Metatarsalgia of right foot (M77.41)  Status post hammertoe correction (Z98.890,Z87.39)  Freiberg's disease, right (M92.71)   Referring Provider: Juliann Do  Date of Evaluation:    10/26/2022    Precautions:  C diff infection Next MD visit:   11/2022  Date of Surgery: ~late 8/2022   Insurance Primary/Secondary: [employer] [place] P[place] / N/A     # Auth Visits: no auth, no limit          Subjective: My toes are moving more, but they are sore. Completing HEP. My ankle is sore: DPM said may be related to ankle position during surgery. Pain: 6/10      Objective:   R 2nd and 3rd MTP/IP flex/ext each ~ 10-20 deg      Assessment: Patient with improved tolerance for HEP following review and form correction; reviewed gentle ROM, seated heel raises (not standing), avoid any pain. Reviewed R toe edema management; see below. Patient with reduction in c/o following manual therapy, c/o increased to baseline 6/10 at close of PT session. Patient with improved R 2nd and 3rd MTP/IP AROM as compared to PT Eval. Pt will benefit from continued care to address residual deficits. Goals:    (to be met in 8-12 visits)  1. Patient to report independence with PT HEP to facilitate self management. 2. Patient to have at least 15 deg active R 2nd/3rd MTP extension for min pain/c/o with toe off of gait, for improved quality of gait. 3. Patient to have at least 30 deg active R 2nd IP flex for improve balance during gait, WB ADL. 4. Patient to have at least 3/5 R/L ankle PF to facilitate going onto tip toes to retrieve object from overhead cabinet. 5. Patient to complete L/R SLS each at least 5 sec in order to reduce fall risk. 6. Patient to walk 10 min unlimited by R foot pain. 7. Patient to reciprocally negotiate stairs with min - no c/o related to R foot. Plan: Continue PT 2 x weekly for 8-12 visits. F/u on pain, HEP tolerance.    Date: 11/1/2022  TX#: 2/8-12 Date: TX#: 3/ Date:                 TX#: 4/ Date:                 TX#: 5/ Date: Tx#: 6/   There Ex:   HEP review/practice; see below.    Ankle/toe PF/DF AROM 10 x 5 sec each  Reformer: B heel raises x 10 reps R,Y,G  AirEx at bar: marching 10 x 3 sec each  Tandem balance at bar x 20 sec each - L/R foot anterior  Seated  R foot toe abd/add x 10 reps  Towel  R toes - pt reports completed at home, not completed during PT session           Manual:   R foot digits 2 and 3 edema mobilization   PROM R foot MTP/IP 2/3 flex, ext  R foot surgical scar mobilization, massage       Provided pt with cold pack for home use x 10 min, elevation       There activities:   Edema management: elevation, cold pack x 10-15 min, may consider coban/light compression wrap for toes pending clearance from DPM.       HEP:  R toe/foot edema: may consider elevation, possible compression/Coban wrap, pending consultation with DPM; MTP/IP flex/ext PROM, seated heel raises, towel  with toes    Charges: 2 TE, 1 man      Total Timed Treatment: 45 min  Total Treatment Time: 45 min

## 2022-11-02 ENCOUNTER — APPOINTMENT (OUTPATIENT)
Dept: PHYSICAL THERAPY | Age: 67
End: 2022-11-02
Attending: PODIATRIST
Payer: COMMERCIAL

## 2022-11-03 ENCOUNTER — OFFICE VISIT (OUTPATIENT)
Dept: PHYSICAL THERAPY | Age: 67
End: 2022-11-03
Attending: PODIATRIST
Payer: COMMERCIAL

## 2022-11-03 PROCEDURE — 97140 MANUAL THERAPY 1/> REGIONS: CPT | Performed by: PHYSICAL THERAPIST

## 2022-11-03 PROCEDURE — 97112 NEUROMUSCULAR REEDUCATION: CPT | Performed by: PHYSICAL THERAPIST

## 2022-11-03 PROCEDURE — 97110 THERAPEUTIC EXERCISES: CPT | Performed by: PHYSICAL THERAPIST

## 2022-11-03 NOTE — PROGRESS NOTES
Diagnosis:    Capsulitis of metatarsophalangeal (MTP) joint (M77.50)  Metatarsalgia of right foot (M77.41)  Status post hammertoe correction (Z98.890,Z87.39)  Freiberg's disease, right (M92.71)   Referring Provider: Juliann Do  Date of Evaluation:    10/26/2022    Precautions:  C diff infection Next MD visit:   11/2022  Date of Surgery: ~late 8/2022   Insurance Primary/Secondary: [employer] / N/A     # Auth Visits: no auth, no limit          Subjective: Increased soreness following last session, better the next day. My toes are moving more, but the pain is the same. My R ankle is still sore; DPM told me that it may be related to ankle position during surgery. Pain: 5/10      Objective:   R 2nd and 3rd MTP/IP flex AROM ~ 15-20 deg  Gait: Pt with lateral weight transfer R to L LE at R toe off of gait. Assessment:  Patient with reduction in c/o following manual therapy. Patient with improved R 2nd and 3rd MTP/IP AROM but deficits persist, contributing to gait/weight transfer deficits. Included weight transfers/shifting in session/added to HEP in effort for improved gait pattern/weight transfer. Will monitor pt's c/o R foot and ankle pain. R ankle pain may partially related to altered gait mechanics. Goals: (to be met in 8-12 visits)  1. Patient to report independence with PT HEP to facilitate self management. 2. Patient to have at least 15 deg active R 2nd/3rd MTP extension for min pain/c/o with toe off of gait, for improved quality of gait. 3. Patient to have at least 30 deg active R 2nd IP flex for improve balance during gait, WB ADL. 4. Patient to have at least 3/5 R/L ankle PF to facilitate going onto tip toes to retrieve object from overhead cabinet. 5. Patient to complete L/R SLS each at least 5 sec in order to reduce fall risk. 6. Patient to walk 10 min unlimited by R foot pain. 7. Patient to reciprocally negotiate stairs with min - no c/o related to R foot.      Plan: Continue PT 2 x weekly for 8-12 visits. F/u on pain, HEP tolerance. Date: 11/1/2022  TX#: 2/8-12 Date: 11/3/2022                TX#: 3/8-12 Date:                 TX#: 4/ Date:                 TX#: 5/ Date: Tx#: 6/   There Ex:   HEP review/practice; see below. Ankle/toe PF/DF AROM 10 x 5 sec each  Reformer: B heel raises x 10 reps R,Y,G  AirEx at bar: marching 10 x 3 sec each  Tandem balance at bar x 20 sec each - L/R foot anterior  Seated  R foot toe abd/add x 10 reps  Towel  R toes - pt reports completed at home, not completed during PT session     There Ex:   Seated R ankle circles using round board x 10 reps each clockwise/counterclockwise  Reformer: B heel/toe raises x 10 reps each, green spring  Ankle/toe PF/DF AROM 10 x 5 sec each - stopped, pt with mild c/o  HEP progression; see  Below.       Manual:   R foot digits 2 and 3 edema mobilization   PROM R foot MTP/IP 2/3 flex, ext  R foot surgical scar mobilization, massage Manual:   R foot digits 2 and 3 edema mobilization   PROM R foot MTP/IP 2 and 3 flex, ext  R foot surgical scar mobilization, massage  STM ventral foot: plantar fascia, metatarsal heads  STM dorsal foot      Provided pt with cold pack for home use x 10 min, elevation       There activities:   Edema management: elevation, cold pack x 10-15 min, may consider coban/light compression wrap for toes pending clearance from DPM. NM Re-ed:   Weight shifting at bar: ant/post and med/lat x 10 reps ea  AirEx: med/lat weight shifts x 10 reps each L/R; L/R marching 5 x 5 sec each      HEP:  R toe/foot edema: may consider elevation, possible compression/Coban wrap, pending consultation with DPM; MTP/IP flex/ext PROM, seated heel raises, towel  with toes, standing weight shifts at countertop - R foot posterior    Charges: 1 TE, 1 man, 1 NM Re-ed      Total Timed Treatment: 45 min  Total Treatment Time: 45 min Rhomboid Transposition Flap Text: The defect edges were debeveled with a #15 scalpel blade.  Given the location of the defect and the proximity to free margins a rhomboid transposition flap was deemed most appropriate.  Using a sterile surgical marker, an appropriate rhomboid flap was drawn incorporating the defect.    The area thus outlined was incised deep to adipose tissue with a #15 scalpel blade.  The skin margins were undermined to an appropriate distance in all directions utilizing iris scissors.

## 2022-11-07 ENCOUNTER — APPOINTMENT (OUTPATIENT)
Dept: PHYSICAL THERAPY | Age: 67
End: 2022-11-07
Attending: PODIATRIST
Payer: COMMERCIAL

## 2022-11-07 ENCOUNTER — ORDER TRANSCRIPTION (OUTPATIENT)
Dept: ADMINISTRATIVE | Facility: HOSPITAL | Age: 67
End: 2022-11-07

## 2022-11-07 DIAGNOSIS — R05.1 ACUTE COUGH: Primary | ICD-10-CM

## 2022-11-08 ENCOUNTER — LAB ENCOUNTER (OUTPATIENT)
Dept: LAB | Age: 67
End: 2022-11-08
Attending: ALLERGY & IMMUNOLOGY
Payer: COMMERCIAL

## 2022-11-08 ENCOUNTER — HOSPITAL ENCOUNTER (OUTPATIENT)
Dept: GENERAL RADIOLOGY | Facility: HOSPITAL | Age: 67
Discharge: HOME | End: 2022-11-08
Attending: ALLERGY & IMMUNOLOGY
Payer: COMMERCIAL

## 2022-11-08 DIAGNOSIS — R05.1 ACUTE COUGH: ICD-10-CM

## 2022-11-08 LAB
ADENOVIRUS PCR:: NOT DETECTED
B PARAPERT DNA SPEC QL NAA+PROBE: NOT DETECTED
B PERT DNA SPEC QL NAA+PROBE: NOT DETECTED
C PNEUM DNA SPEC QL NAA+PROBE: NOT DETECTED
CORONAVIRUS 229E PCR:: NOT DETECTED
CORONAVIRUS HKU1 PCR:: NOT DETECTED
CORONAVIRUS NL63 PCR:: NOT DETECTED
CORONAVIRUS OC43 PCR:: NOT DETECTED
FLUAV RNA SPEC QL NAA+PROBE: NOT DETECTED
FLUBV RNA SPEC QL NAA+PROBE: NOT DETECTED
METAPNEUMOVIRUS PCR:: NOT DETECTED
MYCOPLASMA PNEUMONIA PCR:: NOT DETECTED
PARAINFLUENZA 1 PCR:: NOT DETECTED
PARAINFLUENZA 2 PCR:: NOT DETECTED
PARAINFLUENZA 3 PCR:: NOT DETECTED
PARAINFLUENZA 4 PCR:: NOT DETECTED
RHINOVIRUS/ENTERO PCR:: NOT DETECTED
RSV RNA SPEC QL NAA+PROBE: NOT DETECTED
SARS-COV-2 RNA NPH QL NAA+NON-PROBE: NOT DETECTED

## 2022-11-08 PROCEDURE — 71046 X-RAY EXAM CHEST 2 VIEWS: CPT | Performed by: ALLERGY & IMMUNOLOGY

## 2022-11-08 PROCEDURE — 0202U NFCT DS 22 TRGT SARS-COV-2: CPT

## 2022-11-09 ENCOUNTER — OFFICE VISIT (OUTPATIENT)
Dept: PHYSICAL THERAPY | Age: 67
End: 2022-11-09
Attending: PODIATRIST
Payer: COMMERCIAL

## 2022-11-09 ENCOUNTER — APPOINTMENT (OUTPATIENT)
Dept: PHYSICAL THERAPY | Age: 67
End: 2022-11-09
Attending: PODIATRIST

## 2022-11-09 PROCEDURE — 97140 MANUAL THERAPY 1/> REGIONS: CPT | Performed by: PHYSICAL THERAPIST

## 2022-11-09 PROCEDURE — 97110 THERAPEUTIC EXERCISES: CPT | Performed by: PHYSICAL THERAPIST

## 2022-11-10 ENCOUNTER — OFFICE VISIT (OUTPATIENT)
Dept: ORTHOPEDICS CLINIC | Facility: CLINIC | Age: 67
End: 2022-11-10
Payer: COMMERCIAL

## 2022-11-10 VITALS — WEIGHT: 154 LBS | HEIGHT: 66 IN | BODY MASS INDEX: 24.75 KG/M2

## 2022-11-10 DIAGNOSIS — Z98.890 STATUS POST HAMMERTOE CORRECTION: Primary | ICD-10-CM

## 2022-11-10 DIAGNOSIS — Z87.39 STATUS POST HAMMERTOE CORRECTION: Primary | ICD-10-CM

## 2022-11-10 PROCEDURE — 99024 POSTOP FOLLOW-UP VISIT: CPT | Performed by: PODIATRIST

## 2022-11-10 PROCEDURE — 3008F BODY MASS INDEX DOCD: CPT | Performed by: PODIATRIST

## 2022-11-10 NOTE — PROGRESS NOTES
Diagnosis:    Capsulitis of metatarsophalangeal (MTP) joint (M77.50)  Metatarsalgia of right foot (M77.41)  Status post hammertoe correction (Z98.890,Z87.39)  Freiberg's disease, right (M92.71)   Referring Provider: Asher Stanton  Date of Evaluation:    10/26/2022    Precautions:  C diff infection Next MD visit:   11/2022  Date of Surgery: ~late 8/2022   Insurance Primary/Secondary: [employer] [place] PPO / N/A     # Auth Visits: no auth, no limit          Subjective:  My toes are better. I was able to ride my bike for 2 miles without c/o. Pain is getting better. Walking better. Foot felt looser after last treatment session. Compliant with HEP. Had to cancel Monday's session due to a work commitment. Pain: 3/10    Objective:   R 2nd MTP/IP flex AROM: 15-20 deg; 3rd MTP/IP flex AROM ~ 10-15 deg  R 2nd and 3rd MTP/IP AROM extension ~5-10 deg    Assessment: Patient with improved quality of gait, able to transfer weight anteriorly from the R foot to L foot, less lateral weight transfer. Patient reported tolerated last session well, toes felt looser. Pt reporting progressing with PT/ADL. Patient continues to have limited tolerance for PT, as R foot MTP/IP c/o easily provoked. Limited session content to that well tolerated by pt. Goals: (to be met in 8-12 visits)  1. Patient to report independence with PT HEP to facilitate self management. 2. Patient to have at least 15 deg active R 2nd/3rd MTP extension for min pain/c/o with toe off of gait, for improved quality of gait. 3. Patient to have at least 30 deg active R 2nd IP flex for improve balance during gait, WB ADL. 4. Patient to have at least 3/5 R/L ankle PF to facilitate going onto tip toes to retrieve object from overhead cabinet. 5. Patient to complete L/R SLS each at least 5 sec in order to reduce fall risk. 6. Patient to walk 10 min unlimited by R foot pain. 7. Patient to reciprocally negotiate stairs with min - no c/o related to R foot.      Plan: Continue PT 2 x weekly for 8-12 visits. F/u on response to today's PT session. Progress R foot functional and active MTP/IP ROM, balance, gait. Date: 11/1/2022  TX#: 2/8-12 Date: 11/3/2022                TX#: 3/8-12 Date: 11/9/2022                TX#: 4/8-12 Date:                 TX#: 5/ Date: Tx#: 6/   There Ex:   HEP review/practice; see below. Ankle/toe PF/DF AROM 10 x 5 sec each  Reformer: B heel raises x 10 reps R,Y,G  AirEx at bar: marching 10 x 3 sec each  Tandem balance at bar x 20 sec each - L/R foot anterior  Seated  R foot toe abd/add x 10 reps  Towel  R toes - pt reports completed at home, not completed during PT session     There Ex:   Seated R ankle circles using round board x 10 reps each clockwise/counterclockwise  Reformer: B heel/toe raises x 10 reps each, green spring  Ankle/toe PF/DF AROM 10 x 5 sec each - stopped, pt with mild c/o  HEP progression; see  Below. There Ex:   Seated R ankle circles using round board x 10 reps each clockwise/counterclockwise  Seated  R foot toe abd/add x 10 reps   R towel scrunches x 20 reps  Reformer: B heel/toe raises x 10 reps each, green spring; R unilateral heel/toe raises x 10 reps each   AirEx: L/R marching 5 x 5 sec each - stopped due to pt c/o discomfort  HEP review; see below.      Manual:   R foot digits 2 and 3 edema mobilization   PROM R foot MTP/IP 2/3 flex, ext  R foot surgical scar mobilization, massage Manual:   R foot digits 2 and 3 edema mobilization   PROM R foot MTP/IP 2 and 3 flex, ext  R foot surgical scar mobilization, massage  STM ventral foot: plantar fascia, metatarsal heads  STM dorsal foot Manual:   R foot digits 2 and 3 edema mobilization   PROM R foot 2nd and 3rd MTP/IP flex, ext  R foot surgical scar mobilization, massage  STM ventral foot: plantar fascia, metatarsal heads  STM dorsal foot  Gentle R foot 2nd/3rd MTP/IP joint distraction     Provided pt with cold pack for home use x 10 min, elevation  Cold pack provided for home use x 10 min There activities:   Edema management: elevation, cold pack x 10-15 min, may consider coban/light compression wrap for toes pending clearance from DPM. NM Re-ed:   Weight shifting at bar: ant/post and med/lat x 10 reps ea  AirEx: med/lat weight shifts x 10 reps each L/R; L/R marching 5 x 5 sec each      HEP:  R toe/foot edema: may consider elevation, possible compression/Coban wrap, pending consultation with DPM; MTP/IP flex/ext PROM, seated heel raises, towel  with toes, standing weight shifts at countertop - R foot posterior    Charges: 2 TE, 1 man    Total Timed Treatment: 45 min  Total Treatment Time: 45 min

## 2022-11-10 NOTE — PROGRESS NOTES
[facility] Progress Note    Subjective:     Opal Blackman is here for follow-up of her surgery right foot, date of surgery was 8/15 so now almost 3 months postop. She is doing better. Her mobility is improving and she is in physical therapy. She is in regular shoes now getting around pretty well        Objective: Toes are in good alignment right foot  Mild swelling but there is no more splaying between the second and third digits. No palpable tenderness. Assessment/Plan:     Diagnoses and all orders for this visit:    Status post hammertoe correction        She is doing well and is progressing. Finished her therapy and really no restrictions at this point. Follow-up in 2 to 3 months. No x-ray needed            Lisette Wagoner. Armen Falcon DPM  [facility]    [employer] speech recognition software was used to prepare this note. If a word or phrase is confusing, it is likely do to a failure of recognition. Please contact me with any questions or clarifications.

## 2022-11-16 ENCOUNTER — OFFICE VISIT (OUTPATIENT)
Dept: PHYSICAL THERAPY | Age: 67
End: 2022-11-16
Attending: PODIATRIST
Payer: COMMERCIAL

## 2022-11-16 ENCOUNTER — APPOINTMENT (OUTPATIENT)
Dept: PHYSICAL THERAPY | Age: 67
End: 2022-11-16
Attending: PODIATRIST

## 2022-11-16 PROCEDURE — 97110 THERAPEUTIC EXERCISES: CPT | Performed by: PHYSICAL THERAPIST

## 2022-11-16 PROCEDURE — 97140 MANUAL THERAPY 1/> REGIONS: CPT | Performed by: PHYSICAL THERAPIST

## 2022-11-16 NOTE — PROGRESS NOTES
Diagnosis:    Capsulitis of metatarsophalangeal (MTP) joint (M77.50)  Metatarsalgia of right foot (M77.41)  Status post hammertoe correction (Z98.890,Z87.39)  Freiberg's disease, right (M92.71)   Referring Provider: Jennifer Grey  Date of Evaluation:    10/26/2022    Precautions:  C diff infection Next MD visit:   11/2022  Date of Surgery: ~late 8/2022   Insurance Primary/Secondary: [employer] [place] PPO / N/A     # Auth Visits: no auth, no limit          Subjective:  Had f/u with DPM: nerve damage contributing to sensory c/o at times, toes will always be stiff due to implants. Still have pain but less pain than a few weeks ago/since started PT. Walking better. Have to go up/down stairs slowly. Deny c/o following last session. Pain: 3-4/10    Objective:   R 2nd MTP/IP flex AROM: 15-20 deg; 3rd MTP/IP flex AROM ~ 10-15 deg  R 2nd and 3rd MTP/IP AROM extension ~10-15 deg    R ankle ROM  DF, PF, inversion, eversion: WNL    Reproduction of ankle symptoms with posterior glide of R talus     Assessment: Patient reporting increased ease with walking. She continues to have R foot 2nd/3rd MTP/IP A/PROM deficits contributing to difficulty with gait, stair negotiation. Pt with soft tissue c/o R longitudinal arch of foot. Reviewed considerations to address; see below. Pt with continued c/o ankle pain; c/o reproduced with posterior talus glide. Added DF mobilization/post talus glide to HEP; pt tolerated well. Patient tolerated session well without c/o. Goals: (to be met in 8-12 visits)  1. Patient to report independence with PT HEP to facilitate self management. 2. Patient to have at least 15 deg active R 2nd/3rd MTP extension for min pain/c/o with toe off of gait, for improved quality of gait. 3. Patient to have at least 30 deg active R 2nd IP flex for improve balance during gait, WB ADL. 4. Patient to have at least 3/5 R/L ankle PF to facilitate going onto tip toes to retrieve object from overhead cabinet.    5. Patient to complete L/R SLS each at least 5 sec in order to reduce fall risk. 6. Patient to walk 10 min unlimited by R foot pain. 7. Patient to reciprocally negotiate stairs with min - no c/o related to R foot. Plan: Continue PT 2 x weekly for 8-12 visits. F/u on plantar fascia stretches,  DF mobilization/posterior talus glides. Date: 11/1/2022  TX#: 2/8-12 Date: 11/3/2022                TX#: 3/8-12 Date: 11/9/2022                TX#: 4/8-12 Date:11/16/2022                 TX#: 5/8-12    There Ex:   HEP review/practice; see below. Ankle/toe PF/DF AROM 10 x 5 sec each  Reformer: B heel raises x 10 reps R,Y,G  AirEx at bar: marching 10 x 3 sec each  Tandem balance at bar x 20 sec each - L/R foot anterior  Seated  R foot toe abd/add x 10 reps   Towel  R toes - pt reports completed at home, not completed during PT session     There Ex:   Seated R ankle circles using round board x 10 reps each clockwise/counterclockwise  Reformer: B heel/toe raises x 10 reps each, green spring  Ankle/toe PF/DF AROM 10 x 5 sec each - stopped, pt with mild c/o  HEP progression; see  Below. There Ex:   Seated R ankle circles using round board x 10 reps each clockwise/counterclockwise  Seated  R foot toe abd/add x 10 reps   R towel scrunches x 20 reps  Reformer: B heel/toe raises x 10 reps each, green spring; R unilateral heel/toe raises x 10 reps each   AirEx: L/R marching 5 x 5 sec each - stopped due to pt c/o discomfort  HEP review; see below.  There Ex:   Reformer: B heel/toe raises x 10 reps each, green spring; R unilateral heel/toe raises x 10 reps each   Plantar fascia stretch bottom step 2 x 20 sec each L/R  Seated R ankle DF mobilization 4 x 10   Standing R ankle DF mobilization using staircase 4 x 10 sec each  Tandem balance at bar x 20 sec each  Pt edu re: plantar fascia c/o: STM, stretches, MHP seated 10 min          Manual:   R foot digits 2 and 3 edema mobilization   PROM R foot MTP/IP 2/3 flex, ext  R foot surgical scar mobilization, massage Manual:   R foot digits 2 and 3 edema mobilization   PROM R foot MTP/IP 2 and 3 flex, ext  R foot surgical scar mobilization, massage  STM ventral foot: plantar fascia, metatarsal heads  STM dorsal foot Manual:   R foot digits 2 and 3 edema mobilization   PROM R foot 2nd and 3rd MTP/IP flex, ext  R foot surgical scar mobilization, massage  STM ventral foot: plantar fascia, metatarsal heads  STM dorsal foot  Gentle R foot 2nd/3rd MTP/IP joint distraction Manual:   R foot digits 2 and 3 edema mobilization   PROM R foot 2nd and 3rd MTP/IP flex, ext  R foot surgical scar mobilization, massage  STM ventral foot: plantar fascia, metatarsal heads  STM dorsal foot, longitudinal arch  Gentle R foot 2nd/3rd MTP/IP joint distraction    Provided pt with cold pack for home use x 10 min, elevation  Cold pack provided for home use x 10 min     There activities:   Edema management: elevation, cold pack x 10-15 min, may consider coban/light compression wrap for toes pending clearance from DPM. NM Re-ed:   Weight shifting at bar: ant/post and med/lat x 10 reps ea  AirEx: med/lat weight shifts x 10 reps each L/R; L/R marching 5 x 5 sec each      HEP:  R toe/foot edema: may consider elevation, possible compression/Coban wrap, pending consultation with DPM; MTP/IP flex/ext PROM, seated heel raises, towel  with toes, standing weight shifts at countertop - R foot posterior; plantar fascia stretches, STM longitudinal arch as needed    Charges: 2 TE, 1 man    Total Timed Treatment: 45 min  Total Treatment Time: 45 min

## 2022-11-17 ENCOUNTER — OFFICE VISIT (OUTPATIENT)
Dept: PHYSICAL THERAPY | Age: 67
End: 2022-11-17
Attending: PODIATRIST
Payer: COMMERCIAL

## 2022-11-17 PROCEDURE — 97140 MANUAL THERAPY 1/> REGIONS: CPT | Performed by: PHYSICAL THERAPIST

## 2022-11-17 PROCEDURE — 97110 THERAPEUTIC EXERCISES: CPT | Performed by: PHYSICAL THERAPIST

## 2022-11-17 NOTE — PROGRESS NOTES
Diagnosis:    Capsulitis of metatarsophalangeal (MTP) joint (M77.50)  Metatarsalgia of right foot (M77.41)  Status post hammertoe correction (Z98.890,Z87.39)  Freiberg's disease, right (M92.71)   Referring Provider: Brandi Flores  Date of Evaluation:    10/26/2022    Precautions:  C diff infection Next MD visit:   11/2022  Date of Surgery: ~late 8/2022   Insurance Primary/Secondary: [employer] [place] PPO / N/A     # Auth Visits: no auth, no limit          Subjective: Sore after last session; better this morning. Doing better than when I started PT. Pain: 3/10    Objective:   R ankle ROM grossly WNL,  stiffness end range R ankle eversion and DF    R 2nd MTP/IP flex AROM: 15-20 deg; 3rd MTP/IP flex AROM ~ 10-15 deg  R 2nd and 3rd MTP/IP AROM extension ~10-15 deg    Assessment:   Patient maintaining R foot MTP/IP ROM gains. Patient responding to ankle ROM, plantar fascia stretches with less ankle and foot c/o. Pt tolerated session well, pain no worse at close of session, increased pace and fluidity of gait following session. Patient with improved form with weight shifts following review. Goals: (to be met in 8-12 visits)  1. Patient to report independence with PT HEP to facilitate self management. 2. Patient to have at least 15 deg active R 2nd/3rd MTP extension for min pain/c/o with toe off of gait, for improved quality of gait. 3. Patient to have at least 30 deg active R 2nd IP flex for improve balance during gait, WB ADL. 4. Patient to have at least 3/5 R/L ankle PF to facilitate going onto tip toes to retrieve object from overhead cabinet. 5. Patient to complete L/R SLS each at least 5 sec in order to reduce fall risk. 6. Patient to walk 10 min unlimited by R foot pain. 7. Patient to reciprocally negotiate stairs with min - no c/o related to R foot. Plan: Continue PT 2 x weekly for 8-12 visits. Consider discharge in 2 sessions. F/u on plantar fascia stretches,  DF mobilization/posterior talus glides.  Ankle ROM as needed. Date: 11/3/2022                TX#: 3/8-12 Date: 11/9/2022                TX#: 4/8-12 Date:11/16/2022                 TX#: 5/8-12 Date: 11/17/2022  Tx #: 6/8-12   There Ex:   Seated R ankle circles using round board x 10 reps each clockwise/counterclockwise  Reformer: B heel/toe raises x 10 reps each, green spring  Ankle/toe PF/DF AROM 10 x 5 sec each - stopped, pt with mild c/o  HEP progression; see  Below. There Ex:   Seated R ankle circles using round board x 10 reps each clockwise/counterclockwise  Seated  R foot toe abd/add x 10 reps   R towel scrunches x 20 reps  Reformer: B heel/toe raises x 10 reps each, green spring; R unilateral heel/toe raises x 10 reps each   AirEx: L/R marching 5 x 5 sec each - stopped due to pt c/o discomfort  HEP review; see below.  There Ex:   Reformer: B heel/toe raises x 10 reps each, green spring; R unilateral heel/toe raises x 10 reps each   Plantar fascia stretch bottom step 2 x 20 sec each L/R  Seated R ankle DF mobilization 4 x 10   Standing R ankle DF mobilization using staircase 4 x 10 sec each  Tandem balance at bar x 20 sec each  Pt edu re: plantar fascia c/o: STM, stretches, MHP seated 10 min   There Ex:   Seated R ankle DF mobilization 4 x 10   Standing R ankle DF mobilization using staircase 4 x 10 sec each  Plantar fascia stretch bottom step of step 2 x 20 sec each L/R  Standing weight shifts: ant/post and med/lat x 10 reps each at bar - cuing for form  R ankle BAPs board, level 3,  x 10 reps each clockwise/counterclockwise   Reformer: B heel/toe raises x 10 reps each, green spring; R unilateral heel/toe raises x 10 reps each        Manual:   R foot digits 2 and 3 edema mobilization   PROM R foot MTP/IP 2 and 3 flex, ext  R foot surgical scar mobilization, massage  STM ventral foot: plantar fascia, metatarsal heads  STM dorsal foot Manual:   R foot digits 2 and 3 edema mobilization   PROM R foot 2nd and 3rd MTP/IP flex, ext  R foot surgical scar mobilization, massage  STM ventral foot: plantar fascia, metatarsal heads  STM dorsal foot  Gentle R foot 2nd/3rd MTP/IP joint distraction Manual:   R foot digits 2 and 3 edema mobilization   PROM R foot 2nd and 3rd MTP/IP flex, ext  R foot surgical scar mobilization, massage  STM ventral foot: plantar fascia, metatarsal heads  STM dorsal foot, longitudinal arch  Gentle R foot 2nd/3rd MTP/IP joint distraction Manual:   R foot digits 2 and 3 edema mobilization   PROM R foot 2nd and 3rd MTP/IP flex, ext  R foot surgical scar mobilization, massage  STM ventral foot: plantar fascia, metatarsal heads, longitudinal arch  Gentle R foot 2nd/3rd MTP/IP joint distraction  R grade III posterior talucrural glides  R ankle DF/gastroc stretch with posterior talocrural glide  PF ROM with anterior talus glide  PROM R ankle eversion    Cold pack provided for home use x 10 min     NM Re-ed:   Weight shifting at bar: ant/post and med/lat x 10 reps ea  AirEx: med/lat weight shifts x 10 reps each L/R; L/R marching 5 x 5 sec each      HEP:  R toe/foot edema: may consider elevation, possible compression/Coban wrap, pending consultation with DPM; MTP/IP flex/ext PROM, seated heel raises, towel  with toes, standing weight shifts at countertop - R foot posterior; plantar fascia stretches, STM longitudinal arch as needed, seated/standing DF mobilization/posterior talus glide    Charges: 1 TE, 2 man    Total Timed Treatment: 45 min  Total Treatment Time: 45 min

## 2022-11-28 ENCOUNTER — OFFICE VISIT (OUTPATIENT)
Dept: PHYSICAL THERAPY | Age: 67
End: 2022-11-28
Attending: PODIATRIST
Payer: COMMERCIAL

## 2022-11-28 PROCEDURE — 97110 THERAPEUTIC EXERCISES: CPT | Performed by: PHYSICAL THERAPIST

## 2022-11-28 PROCEDURE — 97140 MANUAL THERAPY 1/> REGIONS: CPT | Performed by: PHYSICAL THERAPIST

## 2022-11-28 NOTE — PROGRESS NOTES
Diagnosis:    Capsulitis of metatarsophalangeal (MTP) joint (M77.50)  Metatarsalgia of right foot (M77.41)  Status post hammertoe correction (Z98.890,Z87.39)  Freiberg's disease, right (M92.71)   Referring Provider: Lenoa Rossi  Date of Evaluation:    10/26/2022    Precautions:  C diff infection Next MD visit:   11/2022  Date of Surgery: ~late 8/2022   Insurance Primary/Secondary: [employer] [place] PPO / N/A     # Auth Visits: no auth, no limit          Subjective: Patient was able to walk about a mile and half, walk on the beach, and negotiate stairs this past weekend with some foot c/o. She has not been able to do this since prior to surgery. Has some mild soreness today. Completing HEP. Biggest concern: want to be pain free; MD said it could take a year. Patient feels like she is making progress in PT - less ankle an foot pain, improved ADL tolerance. Pain: 2/10    Objective:   R ankle ROM grossly WNL,  Restriction B ankle DF which may be suggestive of gastroc tightness    R 2nd MTP/IP flex AROM: 15-20 deg; 3rd MTP/IP flex AROM ~ 10-15 deg  R 2nd and 3rd MTP/IP AROM extension ~10-15 deg  Gait: slight lateral shift when transitioning weight from R to L LE  Pt able to reciprocally negotiate 6 inch steps up/down without c/o. Assessment: Patient progressing - improved R foot MTP/IP AROM, improved ADL tolerance, less pain, improved weight transfer during gait. However, pt continues to have restrictions in R foot 2nd/3rd MTP/IP ROM, c/o pain, and limited walking/standing tolerance. R foot MTP/IP ROM slowing. Pt may wish to continue with PT HEP after 1 more session. Goals: (to be met in 8-12 visits)  1. Patient to report independence with PT HEP to facilitate self management. 2. Patient to have at least 15 deg active R 2nd/3rd MTP extension for min pain/c/o with toe off of gait, for improved quality of gait. 3. Patient to have at least 30 deg active R 2nd IP flex for improve balance during gait, WB ADL.   4. Patient to have at least 3/5 R/L ankle PF to facilitate going onto tip toes to retrieve object from overhead cabinet. 5. Patient to complete L/R SLS each at least 5 sec in order to reduce fall risk. 6. Patient to walk 10 min unlimited by R foot pain. 7. Patient to reciprocally negotiate stairs with min - no c/o related to R foot. Plan: Continue PT 2 x weekly for 8-12 visits. Consider discharge in next session due to patient progress and preference. F/u on plantar fascia stretches,  DF mobilization/posterior talus glides. Date: 11/3/2022                TX#: 3/8-12 Date: 11/9/2022                TX#: 4/8-12 Date:11/16/2022                 TX#: 5/8-12 Date: 11/17/2022  Tx #: 6/8-12 Date: 11/28/2022  Tx # 7/8-12   There Ex:   Seated R ankle circles using round board x 10 reps each clockwise/counterclockwise  Reformer: B heel/toe raises x 10 reps each, green spring  Ankle/toe PF/DF AROM 10 x 5 sec each - stopped, pt with mild c/o  HEP progression; see  Below. There Ex:   Seated R ankle circles using round board x 10 reps each clockwise/counterclockwise  Seated  R foot toe abd/add x 10 reps   R towel scrunches x 20 reps  Reformer: B heel/toe raises x 10 reps each, green spring; R unilateral heel/toe raises x 10 reps each   AirEx: L/R marching 5 x 5 sec each - stopped due to pt c/o discomfort  HEP review; see below.  There Ex:   Reformer: B heel/toe raises x 10 reps each, green spring; R unilateral heel/toe raises x 10 reps each   Plantar fascia stretch bottom step 2 x 20 sec each L/R  Seated R ankle DF mobilization 4 x 10   Standing R ankle DF mobilization using staircase 4 x 10 sec each  Tandem balance at bar x 20 sec each  Pt edu re: plantar fascia c/o: STM, stretches, MHP seated 10 min   There Ex:   Seated R ankle DF mobilization 4 x 10   Standing R ankle DF mobilization using staircase 4 x 10 sec each  Plantar fascia stretch bottom step of step 2 x 20 sec each L/R  Standing weight shifts: ant/post and med/lat x 10 reps each at bar - [facility] for form  R ankle BAPs board, level 3,  x 10 reps each clockwise/counterclockwise   Reformer: B heel/toe raises x 10 reps each, green spring; R unilateral heel/toe raises x 10 reps each      There Ex:   B gastroc stretch on slant board 2 x 30 sec each  Standing R ankle DF mobilization using staircase 4 x 10 sec each  Mini staircase (4 , 6 inch steps) up/down reciprocally x 2  Plantar fascia stretch bottom step of step 2 x 20 sec each L/R  Standing weight shifts: ant/post and med/lat x 10 reps each at bar - [facility] for form  Reformer: B heel/toe raises x 10 reps, green spring; R unilateral heel/toe raises x 10 reps each  Tandem balance at bar x 20 sec each   Manual:   R foot digits 2 and 3 edema mobilization   PROM R foot MTP/IP 2 and 3 flex, ext  R foot surgical scar mobilization, massage  STM ventral foot: plantar fascia, metatarsal heads  STM dorsal foot Manual:   R foot digits 2 and 3 edema mobilization   PROM R foot 2nd and 3rd MTP/IP flex, ext  R foot surgical scar mobilization, massage  STM ventral foot: plantar fascia, metatarsal heads  STM dorsal foot  Gentle R foot 2nd/3rd MTP/IP joint distraction Manual:   R foot digits 2 and 3 edema mobilization   PROM R foot 2nd and 3rd MTP/IP flex, ext  R foot surgical scar mobilization, massage  STM ventral foot: plantar fascia, metatarsal heads  STM dorsal foot, longitudinal arch  Gentle R foot 2nd/3rd MTP/IP joint distraction Manual:   R foot digits 2 and 3 edema mobilization   PROM R foot 2nd and 3rd MTP/IP flex, ext  R foot surgical scar mobilization, massage  STM ventral foot: plantar fascia, metatarsal heads, longitudinal arch  Gentle R foot 2nd/3rd MTP/IP joint distraction  R grade III posterior talucrural glides  R ankle DF/gastroc stretch with posterior talocrural glide  PF ROM with anterior talus glide  PROM R ankle eversion Manual:   R foot digits 2 and 3 edema mobilization   PROM R foot 2nd and 3rd MTP/IP flex, ext  R foot surgical scar mobilization, massage  STM ventral foot: plantar fascia, metatarsal heads, longitudinal arch  Gentle R foot 2nd/3rd MTP/IP joint distraction  R grade III posterior talucrural glides  R ankle DF/gastroc stretch with posterior talocrural glide      Cold pack provided for home use x 10 min      NM Re-ed:   Weight shifting at bar: ant/post and med/lat x 10 reps ea  AirEx: med/lat weight shifts x 10 reps each L/R; L/R marching 5 x 5 sec each       HEP:  R toe/foot edema: may consider elevation, possible compression/Coban wrap, pending consultation with DPM; MTP/IP flex/ext PROM, seated heel raises, towel  with toes, standing weight shifts at countertop - R foot posterior; plantar fascia stretches, STM longitudinal arch as needed, seated/standing DF mobilization/posterior talus glide    Charges: 1 TE, 2 man    Total Timed Treatment: 45 min  Total Treatment Time: 45 min  Documentation co-written with MISTY Walls.

## 2022-11-30 ENCOUNTER — APPOINTMENT (OUTPATIENT)
Dept: PHYSICAL THERAPY | Age: 67
End: 2022-11-30
Attending: PODIATRIST
Payer: COMMERCIAL

## 2022-12-01 ENCOUNTER — OFFICE VISIT (OUTPATIENT)
Dept: PHYSICAL THERAPY | Age: 67
End: 2022-12-01
Attending: PODIATRIST
Payer: COMMERCIAL

## 2022-12-01 PROCEDURE — 97110 THERAPEUTIC EXERCISES: CPT | Performed by: PHYSICAL THERAPIST

## 2022-12-01 PROCEDURE — 97140 MANUAL THERAPY 1/> REGIONS: CPT | Performed by: PHYSICAL THERAPIST

## 2022-12-01 NOTE — PROGRESS NOTES
Keturahjennifermarbrian  Pt has attended 8 visits in Physical Therapy. Diagnosis:    Capsulitis of metatarsophalangeal (MTP) joint (M77.50)  Metatarsalgia of right foot (M77.41)  Status post hammertoe correction (Z98.890,Z87.39)  Freiberg's disease, right (M92.71)   Referring Provider: Alen Sequeira  Date of Evaluation:    10/26/2022    Precautions:  C diff infection Next MD visit:   11/2022  Date of Surgery: ~late 8/2022   Insurance Primary/Secondary: [employer] [place] / N/A     # Auth Visits: no auth, no limit          Subjective: Toes were sore after last session 5/10. 60% better since surgery/PT. Walking limited to 1 mile (limp afterward), only able to wear athletic shoes, able to negotiate stairs but with pain with repeated stair negotiation. Pain: 2/10    Objective:   Observation: R foot/toe surgical scars appear WNL  Palpation: min adherence R foot/toe surgical scars      AROM (PROM slightly greater than AROM):   R 2nd MTP/IP flex: ~15 deg; 3rd MTP/IP flex AROM ~ 20 deg  R 2nd and 3rd MTP extension: ~ 10 deg, IP 0 deg      AROM: (* denotes performed with pain)  Foot/Ankle   DF: R/L - mild restriction, may be related to gastroc length  PF: R WNL; L WNL  INV: R WNL; L WNL  EV: R WNL; L WNL        Accessory motion: Hypomobile R posterior talus glide      Strength/MMT: (* denotes performed with pain)  Foot/Ankle   DF: R 5/5; L 5/5  PF: R/L pain prohibits WB assessment   INV: R 5/5; L 5/5  EV: R 5/5; L 5/5     Gait: slight lateral shift when transitioning weight from R to L LE  Pt able to reciprocally negotiate 6 inch steps up/down without c/o. Balance: SLS: R ~3 sec + but c/o pain, L ~ 3 sec +    Assessment: Patient has progressed well overall in physical therapy with improved MTP/IP AROM/PROM, increased speed and improved quality of gait, improved stair negotiation and ADL tolerance, however, pt continues to have deficits in each. She has been educated on HEP. She wishes to continue with PT HEP after today's session. Goals: (to be met in 8-12 visits)  1. Patient to report independence with PT HEP to facilitate self management. - met  2. Patient to have at least 15 deg active R 2nd/3rd MTP extension for min pain/c/o with toe off of gait, for improved quality of gait. - progress  3. Patient to have at least 30 deg active R 2nd IP flex for improve balance during gait, WB ADL. - progress  4. Patient to have at least 3/5 R/L ankle PF to facilitate going onto tip toes to retrieve object from overhead cabinet. - not met  5. Patient to complete L/R SLS each at least 5 sec in order to reduce fall risk. - progress  6. Patient to walk 10 min unlimited by R foot pain. - met  7. Patient to reciprocally negotiate stairs with min - no c/o related to R foot. - met    Plan: Discharge patient to HEP due to patient progress and preference. She is to f/u with DPM for any c/o/concerns. Date: 11/9/2022                TX#: 4/8-12 Date:11/16/2022                 TX#: 5/8-12 Date: 11/17/2022  Tx #: 6/8-12 Date: 11/28/2022  Tx # 7/8-12 Date 12/1/2022  Tx # 8/8-10   There Ex:   Seated R ankle circles using round board x 10 reps each clockwise/counterclockwise  Seated  R foot toe abd/add x 10 reps   R towel scrunches x 20 reps  Reformer: B heel/toe raises x 10 reps each, green spring; R unilateral heel/toe raises x 10 reps each   AirEx: L/R marching 5 x 5 sec each - stopped due to pt c/o discomfort  HEP review; see below.  There Ex:   Reformer: B heel/toe raises x 10 reps each, green spring; R unilateral heel/toe raises x 10 reps each   Plantar fascia stretch bottom step 2 x 20 sec each L/R  Seated R ankle DF mobilization 4 x 10   Standing R ankle DF mobilization using staircase 4 x 10 sec each  Tandem balance at bar x 20 sec each  Pt edu re: plantar fascia c/o: STM, stretches, MHP seated 10 min   There Ex:   Seated R ankle DF mobilization 4 x 10   Standing R ankle DF mobilization using staircase 4 x 10 sec each  Plantar fascia stretch bottom step of step 2 x 20 sec each L/R  Standing weight shifts: ant/post and med/lat x 10 reps each at bar - cuing for form  R ankle BAPs board, level 3,  x 10 reps each clockwise/counterclockwise   Reformer: B heel/toe raises x 10 reps each, green spring; R unilateral heel/toe raises x 10 reps each      There Ex:   B gastroc stretch on slant board 2 x 30 sec each  Standing R ankle DF mobilization using staircase 4 x 10 sec each  Mini staircase (4 , 6 inch steps) up/down reciprocally x 2  Plantar fascia stretch bottom step of step 2 x 20 sec each L/R  Standing weight shifts: ant/post and med/lat x 10 reps each at bar - cuing for form  Reformer: B heel/toe raises x 10 reps, green spring; R unilateral heel/toe raises x 10 reps each  Tandem balance at bar x 20 sec each There Ex:   HEP reviewed/practiced, including 4 most important exercises per pt request: passive MTP/IP flexion, gastroc/calf stretch, tandem balance, ant/post weight shifts; may discontinue once progress plateaus or no needed needed, stop if poorly tolerated  Standing gastroc stretch L/R 2 x 30 seconds each  Tandem balance 2 x1 0 sec each  Seated passive MTP/IP flexion x 10 secs (HEP)  Standing weight shifts: ant/post at bar 5 x 5 sec ( HEP     Manual:   R foot digits 2 and 3 edema mobilization   PROM R foot 2nd and 3rd MTP/IP flex, ext  R foot surgical scar mobilization, massage  STM ventral foot: plantar fascia, metatarsal heads  STM dorsal foot  Gentle R foot 2nd/3rd MTP/IP joint distraction Manual:   R foot digits 2 and 3 edema mobilization   PROM R foot 2nd and 3rd MTP/IP flex, ext  R foot surgical scar mobilization, massage  STM ventral foot: plantar fascia, metatarsal heads  STM dorsal foot, longitudinal arch  Gentle R foot 2nd/3rd MTP/IP joint distraction Manual:   R foot digits 2 and 3 edema mobilization   PROM R foot 2nd and 3rd MTP/IP flex, ext  R foot surgical scar mobilization, massage  STM ventral foot: plantar fascia, metatarsal heads, longitudinal arch  Gentle R foot 2nd/3rd MTP/IP joint distraction  R grade III posterior talucrural glides  R ankle DF/gastroc stretch with posterior talocrural glide  PF ROM with anterior talus glide  PROM R ankle eversion Manual:   R foot digits 2 and 3 edema mobilization   PROM R foot 2nd and 3rd MTP/IP flex, ext  R foot surgical scar mobilization, massage  STM ventral foot: plantar fascia, metatarsal heads, longitudinal arch  Gentle R foot 2nd/3rd MTP/IP joint distraction  R grade III posterior talucrural glides  R ankle DF/gastroc stretch with posterior talocrural glide   Manual:   PROM R foot 2nd and 3rd MTP/IP flex, ext  R foot surgical scar mobilization, massage  STM ventral foot: plantar fascia, metatarsal heads, longitudinal arch  R grade III posterior talucrural glides  R ankle DF/gastroc stretch with posterior talocrural glide     Cold pack provided for home use x 10 min              HEP:   MTP/IP flex/ext *, seated heel raises, towel  with toes, standing weight shifts at countertop - R foot posterior*; gastroc stretches*, STM longitudinal arch as needed, seated/standing DF mobilization/posterior talus glide, tandem balance *, * = may focus on these, per pt request    Charges: 1 TE, 2 man    Total Timed Treatment: 45 min  Total Treatment Time: 45 min  FOTO: emailed to pt    Plan: Discharge patient to HEP due to patient progress and preference. She is to f/u with DPM for any c/o/concerns. Patient/Family/Caregiver was advised of these findings, precautions, and treatment options and has agreed to actively participate in planning and for this course of care. Thank you for your referral. If you have any questions, please contact me at Dept: [phone]. Sincerely,  Electronically signed by therapist: Citlalli Bills, PT     Physician's certification required:  No  Please co-sign or sign and return this letter via fax as soon as possible to [phone].    I certify the need for these services furnished under this plan of treatment and while under my care. X___________________________________________________ Date____________________    Certification From: 53/8/9209  To:3/1/2023     Documentation co-written with MISTY Gallegos.

## 2023-01-26 ENCOUNTER — OFFICE VISIT (OUTPATIENT)
Dept: ORTHOPEDICS CLINIC | Facility: CLINIC | Age: 68
End: 2023-01-26
Payer: COMMERCIAL

## 2023-01-26 ENCOUNTER — HOSPITAL ENCOUNTER (OUTPATIENT)
Dept: GENERAL RADIOLOGY | Age: 68
Discharge: HOME | End: 2023-01-26
Attending: PODIATRIST
Payer: COMMERCIAL

## 2023-01-26 VITALS — HEIGHT: 66 IN | BODY MASS INDEX: 24.75 KG/M2 | WEIGHT: 154 LBS

## 2023-01-26 DIAGNOSIS — Z48.89 AFTERCARE FOLLOWING SURGERY: Primary | ICD-10-CM

## 2023-01-26 DIAGNOSIS — Z48.89 AFTERCARE FOLLOWING SURGERY: ICD-10-CM

## 2023-01-26 DIAGNOSIS — Z96.9 PRESENCE OF RETAINED HARDWARE: ICD-10-CM

## 2023-01-26 PROCEDURE — 3008F BODY MASS INDEX DOCD: CPT | Performed by: PODIATRIST

## 2023-01-26 PROCEDURE — 99213 OFFICE O/P EST LOW 20 MIN: CPT | Performed by: PODIATRIST

## 2023-01-26 PROCEDURE — 73660 X-RAY EXAM OF TOE(S): CPT | Performed by: PODIATRIST

## 2023-01-26 NOTE — PROGRESS NOTES
[facility] Progress Note    Subjective:     Aris Cochran is here for follow-up now about 5 months post surgery for hammertoes and plantar plate tear and inflammation. She is doing well and physical therapy has been very helpful. She feels much better. Still some tightness and a little pain over the top of the foot. Objective: Toes are well aligned they are in good position. Her original pain plantar second MP joint is gone. Good range of motion of the MP joints. Second PIP joints are well-perfused and there is no motion and no pain with the toes. She has strength with plantarflexion. Mild pain across the third fourth metatarsal neck region        X-rays show fusion at the PIP joint second and third toes. Hardware intact metatarsal osteotomy and the osteotomy has healed          Assessment/Plan:     Diagnoses and all orders for this visit:    Aftercare following surgery  -     XR TOE(S) (MIN 2 VIEWS), RIGHT 2ND (CPT=73660); Future    Presence of retained hardware        She is doing well. I would like to see her at the 1 year post op point, no x-ray needed            Jacques Harmon. Jennifer Grey DPM  [facility]    FileThis speech recognition software was used to prepare this note. If a word or phrase is confusing, it is likely do to a failure of recognition. Please contact me with any questions or clarifications.

## 2023-03-30 NOTE — CONSULTS
[facility] Consult Note    Divya Oleary Patient Status:  Emergency    1955 MRN [id number]   Location [address] Attending Oskar Alas MD   Hosp Day # 0 PCP Kaci Adams MD     61-year-old female Surgical History:   Procedure Laterality Date   • APPENDECTOMY  1970   • COLONOSCOPY  9/16/2013   • COLONOSCOPY N/A 9/21/2020    Procedure: COLONOSCOPY;  Surgeon: Sha Monae MD;  Location: [address] [place] [address] ENDOSCOPY   • EGD N/A 10/6/2016    Procedure: [facility] Yolanda Harman MD  6/23/2021  3:03 PM no

## 2023-05-05 ENCOUNTER — LAB REQUISITION (OUTPATIENT)
Dept: LAB | Facility: HOSPITAL | Age: 68
End: 2023-05-05
Payer: COMMERCIAL

## 2023-05-05 DIAGNOSIS — R23.9 UNSPECIFIED SKIN CHANGES: ICD-10-CM

## 2023-05-05 DIAGNOSIS — D22.5 MELANOCYTIC NEVI OF TRUNK: ICD-10-CM

## 2023-05-05 DIAGNOSIS — D48.5 NEOPLASM OF UNCERTAIN BEHAVIOR OF SKIN: ICD-10-CM

## 2023-05-05 PROCEDURE — 88305 TISSUE EXAM BY PATHOLOGIST: CPT | Performed by: PLASTIC SURGERY

## 2023-05-17 ENCOUNTER — TELEPHONE (OUTPATIENT)
Facility: CLINIC | Age: 68
End: 2023-05-17

## 2023-05-17 DIAGNOSIS — R10.9 ABDOMINAL PAIN, UNSPECIFIED ABDOMINAL LOCATION: Primary | ICD-10-CM

## 2023-05-17 DIAGNOSIS — R19.7 DIARRHEA, UNSPECIFIED TYPE: ICD-10-CM

## 2023-05-17 NOTE — TELEPHONE ENCOUNTER
Patient contacted, she has had abdominal pain which has been ongoing for the last month or so, it has gotten worse. She did have some diarrhea about 3 days ago which was significant. She does have a prior history of C. difficile. She has not been on antibiotics. Pain is manageable now but she does not feel she is able to emergency room. She does have a prior history of diverticulitis. Plan-check blood counts and stool test for C. difficile. -See me in the office tomorrow, we discussed her options, what is this recurrent diverticulitis or possible C. difficile. -ER if severe symptoms. Nursing staff to place the patient on my office calendar for 2:30 overbook.

## 2023-05-17 NOTE — TELEPHONE ENCOUNTER
msg left on vm, will try back later to get update on symptoms. RN to add to my schedule tomorrow afternoon, ok to overbook 2:30 or later in afternoon.

## 2023-05-17 NOTE — TELEPHONE ENCOUNTER
Pt complains of Abdominal pain, she claims that it is all over. Pt states that she has intermittent diarrhea.   Pt was scheduled for first available and place on wait list.   Please call

## 2023-05-17 NOTE — TELEPHONE ENCOUNTER
C/o abdominal pain throughout the day x 1 month. Describes pain as a sharp pain  Sates has worsened in the last couple of days. Had an episode of diarrhea about 3 days ago, and all her other bowel movements have been soft stools and usually pain is felt when having a bowel movement. Rates patient a 6-7/10 at this time. Patient is  avoiding any trigger foods. Denies any fevers/chills, bloody/dark/tarry stool. Patient requesting to be seen today or tomorrow informed no appts. Offered with NP but patient declined and only wants to be seen by Dr. Modesta Benoit. Please advise. Thank you.

## 2023-05-18 ENCOUNTER — LAB ENCOUNTER (OUTPATIENT)
Dept: LAB | Facility: HOSPITAL | Age: 68
End: 2023-05-18
Attending: INTERNAL MEDICINE
Payer: COMMERCIAL

## 2023-05-18 ENCOUNTER — OFFICE VISIT (OUTPATIENT)
Facility: CLINIC | Age: 68
End: 2023-05-18

## 2023-05-18 VITALS — HEART RATE: 85 BPM | SYSTOLIC BLOOD PRESSURE: 135 MMHG | DIASTOLIC BLOOD PRESSURE: 80 MMHG

## 2023-05-18 DIAGNOSIS — R10.9 ABDOMINAL PAIN, UNSPECIFIED ABDOMINAL LOCATION: ICD-10-CM

## 2023-05-18 DIAGNOSIS — Z87.19 HISTORY OF DIVERTICULITIS: ICD-10-CM

## 2023-05-18 DIAGNOSIS — R10.9 ABDOMINAL PAIN, UNSPECIFIED ABDOMINAL LOCATION: Primary | ICD-10-CM

## 2023-05-18 DIAGNOSIS — K58.9 IRRITABLE BOWEL SYNDROME, UNSPECIFIED TYPE: ICD-10-CM

## 2023-05-18 DIAGNOSIS — R19.7 DIARRHEA, UNSPECIFIED TYPE: ICD-10-CM

## 2023-05-18 LAB
ALBUMIN SERPL-MCNC: 3.7 G/DL (ref 3.4–5)
ALBUMIN/GLOB SERPL: 1.1 {RATIO} (ref 1–2)
ALP LIVER SERPL-CCNC: 60 U/L
ALT SERPL-CCNC: 22 U/L
ANION GAP SERPL CALC-SCNC: 8 MMOL/L (ref 0–18)
AST SERPL-CCNC: 9 U/L (ref 15–37)
BASOPHILS # BLD AUTO: 0.09 X10(3) UL (ref 0–0.2)
BASOPHILS NFR BLD AUTO: 1.1 %
BILIRUB SERPL-MCNC: 0.5 MG/DL (ref 0.1–2)
BUN BLD-MCNC: 16 MG/DL (ref 7–18)
BUN/CREAT SERPL: 20 (ref 10–20)
CALCIUM BLD-MCNC: 9.5 MG/DL (ref 8.5–10.1)
CHLORIDE SERPL-SCNC: 105 MMOL/L (ref 98–112)
CO2 SERPL-SCNC: 27 MMOL/L (ref 21–32)
CREAT BLD-MCNC: 0.8 MG/DL
DEPRECATED RDW RBC AUTO: 40.3 FL (ref 35.1–46.3)
EOSINOPHIL # BLD AUTO: 0.07 X10(3) UL (ref 0–0.7)
EOSINOPHIL NFR BLD AUTO: 0.9 %
ERYTHROCYTE [DISTWIDTH] IN BLOOD BY AUTOMATED COUNT: 12.3 % (ref 11–15)
FASTING STATUS PATIENT QL REPORTED: NO
GFR SERPLBLD BASED ON 1.73 SQ M-ARVRAT: 81 ML/MIN/1.73M2 (ref 60–?)
GLOBULIN PLAS-MCNC: 3.3 G/DL (ref 2.8–4.4)
GLUCOSE BLD-MCNC: 110 MG/DL (ref 70–99)
HCT VFR BLD AUTO: 41.3 %
HGB BLD-MCNC: 13.5 G/DL
IMM GRANULOCYTES # BLD AUTO: 0.03 X10(3) UL (ref 0–1)
IMM GRANULOCYTES NFR BLD: 0.4 %
LIPASE SERPL-CCNC: 42 U/L (ref 13–75)
LYMPHOCYTES # BLD AUTO: 2.34 X10(3) UL (ref 1–4)
LYMPHOCYTES NFR BLD AUTO: 29.1 %
MCH RBC QN AUTO: 29.2 PG (ref 26–34)
MCHC RBC AUTO-ENTMCNC: 32.7 G/DL (ref 31–37)
MCV RBC AUTO: 89.2 FL
MONOCYTES # BLD AUTO: 0.5 X10(3) UL (ref 0.1–1)
MONOCYTES NFR BLD AUTO: 6.2 %
NEUTROPHILS # BLD AUTO: 5 X10 (3) UL (ref 1.5–7.7)
NEUTROPHILS # BLD AUTO: 5 X10(3) UL (ref 1.5–7.7)
NEUTROPHILS NFR BLD AUTO: 62.3 %
OSMOLALITY SERPL CALC.SUM OF ELEC: 292 MOSM/KG (ref 275–295)
PLATELET # BLD AUTO: 316 10(3)UL (ref 150–450)
POTASSIUM SERPL-SCNC: 3.7 MMOL/L (ref 3.5–5.1)
PROT SERPL-MCNC: 7 G/DL (ref 6.4–8.2)
RBC # BLD AUTO: 4.63 X10(6)UL
SODIUM SERPL-SCNC: 140 MMOL/L (ref 136–145)
WBC # BLD AUTO: 8 X10(3) UL (ref 4–11)

## 2023-05-18 PROCEDURE — 99214 OFFICE O/P EST MOD 30 MIN: CPT | Performed by: INTERNAL MEDICINE

## 2023-05-18 PROCEDURE — 3079F DIAST BP 80-89 MM HG: CPT | Performed by: INTERNAL MEDICINE

## 2023-05-18 PROCEDURE — 85025 COMPLETE CBC W/AUTO DIFF WBC: CPT

## 2023-05-18 PROCEDURE — 3075F SYST BP GE 130 - 139MM HG: CPT | Performed by: INTERNAL MEDICINE

## 2023-05-18 PROCEDURE — 83690 ASSAY OF LIPASE: CPT

## 2023-05-18 PROCEDURE — 36415 COLL VENOUS BLD VENIPUNCTURE: CPT

## 2023-05-18 PROCEDURE — 80053 COMPREHEN METABOLIC PANEL: CPT

## 2023-05-18 NOTE — PATIENT INSTRUCTIONS
History of diverticulitis and C dif  Abdominal pain  -Diet bland with low residue, no uncooked fruits or veggies.  -Follow-up blood tests and C. difficile  -Consideration for antibiotics plus vancomycin if C. difficile negative  -ER if symptoms severe

## 2023-05-18 NOTE — TELEPHONE ENCOUNTER
Your Appointments    Thursday May 18, 2023  2:30 PM  Follow Up Visit with Dean Self MD  [address], [address], [facility] ([facility]) [address] [facility] [id number] [phone]      Pt added to Dr Cee Syed schedule tomorrow

## 2023-05-19 ENCOUNTER — LAB ENCOUNTER (OUTPATIENT)
Dept: LAB | Facility: HOSPITAL | Age: 68
End: 2023-05-19
Attending: INTERNAL MEDICINE
Payer: COMMERCIAL

## 2023-05-19 ENCOUNTER — TELEPHONE (OUTPATIENT)
Facility: CLINIC | Age: 68
End: 2023-05-19

## 2023-05-19 DIAGNOSIS — R10.9 ABDOMINAL PAIN, UNSPECIFIED ABDOMINAL LOCATION: ICD-10-CM

## 2023-05-19 DIAGNOSIS — R10.9 ABDOMINAL PAIN, UNSPECIFIED ABDOMINAL LOCATION: Primary | ICD-10-CM

## 2023-05-19 DIAGNOSIS — R19.7 DIARRHEA, UNSPECIFIED TYPE: ICD-10-CM

## 2023-05-19 LAB — C DIFF TOX B STL QL: NEGATIVE

## 2023-05-19 PROCEDURE — 87493 C DIFF AMPLIFIED PROBE: CPT

## 2023-05-19 RX ORDER — VANCOMYCIN HYDROCHLORIDE 125 MG/1
125 CAPSULE ORAL 4 TIMES DAILY
Qty: 56 CAPSULE | Refills: 0 | Status: SHIPPED
Start: 2023-05-19 | End: 2023-06-02

## 2023-05-19 RX ORDER — AMOXICILLIN AND CLAVULANATE POTASSIUM 875; 125 MG/1; MG/1
1 TABLET, FILM COATED ORAL 2 TIMES DAILY
Qty: 20 TABLET | Refills: 0 | Status: SHIPPED | OUTPATIENT
Start: 2023-05-19

## 2023-05-19 NOTE — TELEPHONE ENCOUNTER
Patient returned call - is asking assistance in scheduling CT Scan tomorrow. Please call. Thank you.

## 2023-05-19 NOTE — TELEPHONE ENCOUNTER
Order for CT scan placed, experiencing abdominal pain, diarrhea, negative C. difficile testing. I have placed this stat so hopefully she can get that done this weekend/hopefully by tomorrow. I will send in antibiotics just in case this does come back positive for diverticulitis and we discussed using vancomycin to prevent C. difficile recurrence. She is currently C. difficile negative.

## 2023-05-19 NOTE — TELEPHONE ENCOUNTER
I spoke with Roxie Baron in [place] scheduling and she states no appointments available till Monday. She will have to call the department directly to check on availability to do it STAT and then she will call the patient to schedule. Patient notified.

## 2023-05-19 NOTE — TELEPHONE ENCOUNTER
Message left on patient's personal voicemail. C. difficile testing is negative, we had discussed yesterday either empiric trial of antibiotics plus vancomycin for presumed diverticular disease versus CT scan. Left message for her to call us back to see which way she wants to go with this.

## 2023-05-19 NOTE — TELEPHONE ENCOUNTER
Patient contacted and states she would like to have a CT scan done first as soon as possible. Patient is requesting a call back from the doctor to discuss treatment options. Please advise. Thank you.

## 2023-05-20 ENCOUNTER — HOSPITAL ENCOUNTER (OUTPATIENT)
Dept: CT IMAGING | Facility: HOSPITAL | Age: 68
Discharge: HOME | End: 2023-05-20
Attending: INTERNAL MEDICINE
Payer: COMMERCIAL

## 2023-05-20 DIAGNOSIS — R10.9 ABDOMINAL PAIN, UNSPECIFIED ABDOMINAL LOCATION: ICD-10-CM

## 2023-05-20 PROCEDURE — 74177 CT ABD & PELVIS W/CONTRAST: CPT | Performed by: INTERNAL MEDICINE

## 2023-05-22 ENCOUNTER — TELEPHONE (OUTPATIENT)
Facility: CLINIC | Age: 68
End: 2023-05-22

## 2023-05-22 NOTE — TELEPHONE ENCOUNTER
Recall colon in 5 years per Dr Jenni Orr.  Colon done 09/17/2023    Health maintenance updated and message sent to pt outreach to repeat colonoscopy in 5 years

## 2023-05-22 NOTE — TELEPHONE ENCOUNTER
----- Message from Marleni Hylton MD sent at 5/17/2023  4:49 PM CDT -----  Repeat colonoscopy in 5 years

## 2023-06-12 ENCOUNTER — TELEPHONE (OUTPATIENT)
Facility: CLINIC | Age: 68
End: 2023-06-12

## 2023-06-12 RX ORDER — AMOXICILLIN AND CLAVULANATE POTASSIUM 875; 125 MG/1; MG/1
1 TABLET, FILM COATED ORAL 2 TIMES DAILY
Qty: 20 TABLET | Refills: 0 | Status: SHIPPED | OUTPATIENT
Start: 2023-06-12

## 2023-06-12 RX ORDER — VANCOMYCIN HYDROCHLORIDE 125 MG/1
125 CAPSULE ORAL 4 TIMES DAILY
Qty: 56 CAPSULE | Refills: 0 | Status: SHIPPED
Start: 2023-06-12 | End: 2023-06-26

## 2023-06-12 NOTE — TELEPHONE ENCOUNTER
Patient contacted, she felt better on the antibiotics, she is taking Augmentin plus vancomycin. She has now been off this for few days and the symptoms are returning. Plan repeat trial of Augmentin plus Vanco.  Sent to pharmacy. Discussed with patient. To call if not improving and or follow-up in the office ASAP.

## 2023-06-12 NOTE — TELEPHONE ENCOUNTER
Dr. Aj Livingston    Patient took antibiotics for diverticulitis. She felt better for 3-4 days but now the pain is back. She rates it a 5/10. She has no fevers and just a little bit of diarrhea. Per below message needed second round of antibiotics last time. I advised low residue/low fiber diet.     Please advise    Thank you

## 2023-06-12 NOTE — TELEPHONE ENCOUNTER
From: Annabelle Monroy Ruecking  To: Alonso Cabrerar. Modesta Benoit MD  Sent: 6/12/2023 11:22 AM CDT  Subject: Diverticulosis    Hi Dr. Modesta Benoit,     I don 't think the last round of antibiotics cleared up my diverticulosis. I felt better for a few days after competing the medication but am back to having abdominal pain. I think this happened last year and we had to do another round of antibiotics. Can you please call Sofia on [address] in [address] with refills? Thanks very much!     Emmy Zelaya

## 2023-07-23 ENCOUNTER — PATIENT MESSAGE (OUTPATIENT)
Facility: CLINIC | Age: 68
End: 2023-07-23

## 2023-07-25 NOTE — TELEPHONE ENCOUNTER
From: Gregory Oleary  To: Miranda Campos MD  Sent: 7/23/2023 5:43 PM CDT  Subject: Diverticulistis    Hi Dr. Gurvinder Campos,    The meds from June 14 helped for a couple days. Since July 2 I have had now three episodes. The last two included diarrhea and vomiting. This last flare up started Friday in the middle of the night and is still ongoing. The pain on my left side and to a lesser degree on the right is really bad. You had mentioned an IV to help. Can this be scheduled either Tuesday after 2 pm or anytime on Wednesday or Thursday? It's very bad this time. .. [address]

## 2023-07-26 ENCOUNTER — APPOINTMENT (OUTPATIENT)
Dept: CT IMAGING | Facility: HOSPITAL | Age: 68
End: 2023-07-26
Attending: EMERGENCY MEDICINE
Payer: COMMERCIAL

## 2023-07-26 ENCOUNTER — HOSPITAL ENCOUNTER (INPATIENT)
Facility: HOSPITAL | Age: 68
LOS: 2 days | Discharge: HOME | End: 2023-07-28
Attending: EMERGENCY MEDICINE | Admitting: HOSPITALIST
Payer: COMMERCIAL

## 2023-07-26 DIAGNOSIS — K57.92 ACUTE DIVERTICULITIS: Primary | ICD-10-CM

## 2023-07-26 LAB
ANION GAP SERPL CALC-SCNC: 6 MMOL/L (ref 0–18)
BASOPHILS # BLD AUTO: 0.03 X10(3) UL (ref 0–0.2)
BASOPHILS NFR BLD AUTO: 0.4 %
BILIRUB UR QL: NEGATIVE
BUN BLD-MCNC: 16 MG/DL (ref 7–18)
BUN/CREAT SERPL: 25 (ref 10–20)
CALCIUM BLD-MCNC: 9.3 MG/DL (ref 8.5–10.1)
CHLORIDE SERPL-SCNC: 106 MMOL/L (ref 98–112)
CLARITY UR: CLEAR
CO2 SERPL-SCNC: 26 MMOL/L (ref 21–32)
CREAT BLD-MCNC: 0.64 MG/DL
DEPRECATED RDW RBC AUTO: 37 FL (ref 35.1–46.3)
EGFRCR SERPLBLD CKD-EPI 2021: 97 ML/MIN/1.73M2 (ref 60–?)
EOSINOPHIL # BLD AUTO: 0.07 X10(3) UL (ref 0–0.7)
EOSINOPHIL NFR BLD AUTO: 1 %
ERYTHROCYTE [DISTWIDTH] IN BLOOD BY AUTOMATED COUNT: 12 % (ref 11–15)
GLUCOSE BLD-MCNC: 112 MG/DL (ref 70–99)
GLUCOSE UR-MCNC: NORMAL MG/DL
HCT VFR BLD AUTO: 36 %
HGB BLD-MCNC: 12.5 G/DL
HGB UR QL STRIP.AUTO: NEGATIVE
IMM GRANULOCYTES # BLD AUTO: 0.04 X10(3) UL (ref 0–1)
IMM GRANULOCYTES NFR BLD: 0.6 %
KETONES UR-MCNC: NEGATIVE MG/DL
LEUKOCYTE ESTERASE UR QL STRIP.AUTO: NEGATIVE
LYMPHOCYTES # BLD AUTO: 1.29 X10(3) UL (ref 1–4)
LYMPHOCYTES NFR BLD AUTO: 17.7 %
MCH RBC QN AUTO: 29.6 PG (ref 26–34)
MCHC RBC AUTO-ENTMCNC: 34.7 G/DL (ref 31–37)
MCV RBC AUTO: 85.1 FL
MONOCYTES # BLD AUTO: 0.62 X10(3) UL (ref 0.1–1)
MONOCYTES NFR BLD AUTO: 8.5 %
NEUTROPHILS # BLD AUTO: 5.22 X10 (3) UL (ref 1.5–7.7)
NEUTROPHILS # BLD AUTO: 5.22 X10(3) UL (ref 1.5–7.7)
NEUTROPHILS NFR BLD AUTO: 71.8 %
NITRITE UR QL STRIP.AUTO: NEGATIVE
OSMOLALITY SERPL CALC.SUM OF ELEC: 288 MOSM/KG (ref 275–295)
PH UR: 6 [PH] (ref 5–8)
PLATELET # BLD AUTO: 255 10(3)UL (ref 150–450)
POTASSIUM SERPL-SCNC: 3.2 MMOL/L (ref 3.5–5.1)
PROT UR-MCNC: NEGATIVE MG/DL
RBC # BLD AUTO: 4.23 X10(6)UL
SODIUM SERPL-SCNC: 138 MMOL/L (ref 136–145)
SP GR UR STRIP: 1.01 (ref 1–1.03)
UROBILINOGEN UR STRIP-ACNC: NORMAL
WBC # BLD AUTO: 7.3 X10(3) UL (ref 4–11)

## 2023-07-26 PROCEDURE — 99254 IP/OBS CNSLTJ NEW/EST MOD 60: CPT | Performed by: INTERNAL MEDICINE

## 2023-07-26 PROCEDURE — 74177 CT ABD & PELVIS W/CONTRAST: CPT | Performed by: EMERGENCY MEDICINE

## 2023-07-26 PROCEDURE — 99223 1ST HOSP IP/OBS HIGH 75: CPT | Performed by: HOSPITALIST

## 2023-07-26 RX ORDER — MORPHINE SULFATE 2 MG/ML
2 INJECTION, SOLUTION INTRAMUSCULAR; INTRAVENOUS ONCE
Status: COMPLETED | OUTPATIENT
Start: 2023-07-26 | End: 2023-07-26

## 2023-07-26 RX ORDER — SODIUM CHLORIDE 9 MG/ML
125 INJECTION, SOLUTION INTRAVENOUS CONTINUOUS
Status: DISCONTINUED | OUTPATIENT
Start: 2023-07-26 | End: 2023-07-27

## 2023-07-26 RX ORDER — MORPHINE SULFATE 4 MG/ML
4 INJECTION, SOLUTION INTRAMUSCULAR; INTRAVENOUS ONCE
Status: COMPLETED | OUTPATIENT
Start: 2023-07-26 | End: 2023-07-26

## 2023-07-26 RX ORDER — VANCOMYCIN HYDROCHLORIDE 125 MG/1
125 CAPSULE ORAL DAILY
Status: DISCONTINUED | OUTPATIENT
Start: 2023-07-26 | End: 2023-07-28

## 2023-07-26 RX ORDER — MORPHINE SULFATE 2 MG/ML
1 INJECTION, SOLUTION INTRAMUSCULAR; INTRAVENOUS EVERY 2 HOUR PRN
Status: DISCONTINUED | OUTPATIENT
Start: 2023-07-26 | End: 2023-07-28

## 2023-07-26 RX ORDER — CIPROFLOXACIN 2 MG/ML
400 INJECTION, SOLUTION INTRAVENOUS ONCE
Status: DISCONTINUED | OUTPATIENT
Start: 2023-07-26 | End: 2023-07-26

## 2023-07-26 RX ORDER — ONDANSETRON 2 MG/ML
4 INJECTION INTRAMUSCULAR; INTRAVENOUS EVERY 6 HOURS PRN
Status: DISCONTINUED | OUTPATIENT
Start: 2023-07-26 | End: 2023-07-28

## 2023-07-26 RX ORDER — POTASSIUM CHLORIDE 1.5 G/1.58G
40 POWDER, FOR SOLUTION ORAL ONCE
Status: COMPLETED | OUTPATIENT
Start: 2023-07-26 | End: 2023-07-26

## 2023-07-26 RX ORDER — MORPHINE SULFATE 2 MG/ML
2 INJECTION, SOLUTION INTRAMUSCULAR; INTRAVENOUS EVERY 2 HOUR PRN
Status: DISCONTINUED | OUTPATIENT
Start: 2023-07-26 | End: 2023-07-28

## 2023-07-26 RX ORDER — METRONIDAZOLE 500 MG/100ML
500 INJECTION, SOLUTION INTRAVENOUS ONCE
Status: DISCONTINUED | OUTPATIENT
Start: 2023-07-26 | End: 2023-07-26

## 2023-07-26 RX ORDER — MORPHINE SULFATE 4 MG/ML
4 INJECTION, SOLUTION INTRAMUSCULAR; INTRAVENOUS EVERY 2 HOUR PRN
Status: DISCONTINUED | OUTPATIENT
Start: 2023-07-26 | End: 2023-07-28

## 2023-07-26 RX ORDER — TEMAZEPAM 15 MG/1
15 CAPSULE ORAL NIGHTLY PRN
Status: DISCONTINUED | OUTPATIENT
Start: 2023-07-26 | End: 2023-07-28

## 2023-07-26 RX ORDER — METOCLOPRAMIDE HYDROCHLORIDE 5 MG/ML
10 INJECTION INTRAMUSCULAR; INTRAVENOUS EVERY 8 HOURS PRN
Status: DISCONTINUED | OUTPATIENT
Start: 2023-07-26 | End: 2023-07-28

## 2023-07-26 NOTE — ED INITIAL ASSESSMENT (HPI)
Pt presents to the ER sent by Dr. Modesta Benoit for severe LLQ pain since Friday.     H/o Diverticulosis and Cdiff

## 2023-07-26 NOTE — ED QUICK NOTES
Orders for admission, patient is aware of plan and ready to go upstairs. Any questions, please call ED GREGORY Galvez at extension [phone]. Patient Covid vaccination status: Fully vaccinated     COVID Test Ordered in ED: None    COVID Suspicion at Admission: N/A    Running Infusions:    sodium chloride 125 mL/hr (07/26/23 0948)        Mental Status/LOC at time of transport: a/o x 4    Other pertinent information: Pt is from home. Pt is independent with ambulation with steady gait.   CIWA score: N/A   NIH score:  N/A

## 2023-07-26 NOTE — H&P
[facility]    PATIENT'S NAME: Mine Bacon   ATTENDING PHYSICIAN: Era Ta MD   PATIENT ACCOUNT#:   [de-identified]    LOCATION:  [address]  MEDICAL RECORD #:   [id number]       YOB: 1955  ADMISSION DATE:       07/26/2023    HISTORY AND PHYSICAL EXAMINATION    CHIEF COMPLAINT:  Failed outpatient treatment for diverticulitis. HISTORY OF PRESENT ILLNESS:  The patient is a 70-year-old  female who presented today to the emergency department for evaluation of recurrent abdominal pain for the last 3 days. She has been treated for diverticulitis on and off since last month with rapid recurrence of symptoms after finishing her antibiotic course. In May 2023, she was noted to have mild diverticulitis of the descending colon. Today CT scan of the abdomen done by her gastroenterologist showed intermediate length segment colonic diverticulitis involving junction of the descending and sigmoid colon. The patient was sent to the emergency department for evaluation. CBC and chemistry were unremarkable. Potassium 3.3. Urinalysis unremarkable. Started on IV Zosyn and IV fluids. She will be admitted to the hospital for further management. PAST MEDICAL HISTORY:  Diverticulosis and recurrent diverticulitis, asthma, gastroesophageal reflux disease, hypertension, hyperlipidemia, irritable bowel syndrome. 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 PAST SURGICAL HISTORY:  Tonsillectomy, left rotator cuff repair, bilateral knee arthroscopic procedure, bilateral breast lumpectomy, hemorrhoidectomy, left thumb trigger release, appendectomy, hysterectomy. MEDICATIONS:  Please see medication reconciliation list.    ALLERGIES:  Erythromycin, fish oil, tobramycin, vancomycin, and azithromycin; mainly side effects. FAMILY HISTORY:  Mother had hypertension. Father had tongue cancer. SOCIAL HISTORY:  Ex-tobacco user.   Social alcohol, no drug use. Lives with her family. Independent in her basic activities of daily living. REVIEW OF SYSTEMS:  The patient said since the end of May she has been having pain in her lower and left lower quadrant area and has been on multiple doses of antibiotic treatments. Symptoms recur quickly after she finishes her antibiotic course. She has been having intense lower abdominal crampy pain associated with intermittent constipation. No fever or chills. Other 12-point review of systems negative. Last colonoscopy on record was in September 2022 showing pandiverticulosis. PHYSICAL EXAMINATION:    GENERAL:  Alert. Oriented to time, place, and person. Moderate distress. Anxious. VITAL SIGNS:  Temperature 98.7, pulse 68, respiratory rate 18, blood pressure 116/66, pulse ox 96% on room air. HEENT:  Atraumatic. Oropharynx clear, moist mucous membranes. Ears, nose normal.  Eyes:  Anicteric sclerae. NECK:  Supple. No lymphadenopathy. Trachea midline. Full range of motion. LUNGS:  Clear to auscultation bilaterally. Normal respiratory effort. HEART:  Regular rate and rhythm. S1, S2 auscultated. No murmur. ABDOMEN:  Soft, nondistended. Tenderness to superficial palpation, lower and left lower quadrant area. No guarding or rebound tenderness. Positive bowel sounds. EXTREMITIES:  No peripheral edema, clubbing, or cyanosis. NEUROLOGIC:  Motor and sensory intact. ASSESSMENT:    1. Recurrent distal descending and proximal sigmoid diverticulitis with multiple rapid sequence episodes in the last 6 to 8 weeks. 2.   Hypertension. PLAN:  The patient will be admitted to general medical floor. IV Zosyn, IV fluids, oral vancomycin for C difficile prophylaxis. The patient does have history of C difficile infection. Pain control, clear liquid diet, gastroenterology consult, monitor her clinical status, serial abdominal exams and possible repeat imaging studies in the next few days.   I informed the patient that ultimately she might need left hemicolectomy as a definitive measure to prevent recurrence of diverticulitis. Further recommendations to follow.     Dictated By Emmanuel Greer MD  d: 07/26/2023 11:45:12  t: 07/26/2023 12:01:37  [facility] [id number]  FB/

## 2023-07-26 NOTE — ED QUICK NOTES
Pt  has been having LUQ abd pain.  has h/o diverticulitis.  was on antibiotics in June.  has been trying clear liquids or bowel rest but does not seem to be helping. Spoke with Dr. Apple Allred and was advised to go to ER for admission and IV antibiotics.

## 2023-07-27 LAB
ANION GAP SERPL CALC-SCNC: 5 MMOL/L (ref 0–18)
BASOPHILS # BLD AUTO: 0.04 X10(3) UL (ref 0–0.2)
BASOPHILS NFR BLD AUTO: 0.8 %
BUN BLD-MCNC: 6 MG/DL (ref 7–18)
BUN/CREAT SERPL: 9.7 (ref 10–20)
CALCIUM BLD-MCNC: 8.8 MG/DL (ref 8.5–10.1)
CHLORIDE SERPL-SCNC: 110 MMOL/L (ref 98–112)
CO2 SERPL-SCNC: 27 MMOL/L (ref 21–32)
CREAT BLD-MCNC: 0.62 MG/DL
DEPRECATED RDW RBC AUTO: 38.1 FL (ref 35.1–46.3)
EGFRCR SERPLBLD CKD-EPI 2021: 98 ML/MIN/1.73M2 (ref 60–?)
EOSINOPHIL # BLD AUTO: 0.14 X10(3) UL (ref 0–0.7)
EOSINOPHIL NFR BLD AUTO: 2.6 %
ERYTHROCYTE [DISTWIDTH] IN BLOOD BY AUTOMATED COUNT: 11.9 % (ref 11–15)
GLUCOSE BLD-MCNC: 89 MG/DL (ref 70–99)
HCT VFR BLD AUTO: 35.1 %
HGB BLD-MCNC: 11.6 G/DL
IMM GRANULOCYTES # BLD AUTO: 0.02 X10(3) UL (ref 0–1)
IMM GRANULOCYTES NFR BLD: 0.4 %
LYMPHOCYTES # BLD AUTO: 1.65 X10(3) UL (ref 1–4)
LYMPHOCYTES NFR BLD AUTO: 31 %
MCH RBC QN AUTO: 28.6 PG (ref 26–34)
MCHC RBC AUTO-ENTMCNC: 33 G/DL (ref 31–37)
MCV RBC AUTO: 86.7 FL
MONOCYTES # BLD AUTO: 0.47 X10(3) UL (ref 0.1–1)
MONOCYTES NFR BLD AUTO: 8.8 %
NEUTROPHILS # BLD AUTO: 3.01 X10 (3) UL (ref 1.5–7.7)
NEUTROPHILS # BLD AUTO: 3.01 X10(3) UL (ref 1.5–7.7)
NEUTROPHILS NFR BLD AUTO: 56.4 %
OSMOLALITY SERPL CALC.SUM OF ELEC: 291 MOSM/KG (ref 275–295)
PLATELET # BLD AUTO: 273 10(3)UL (ref 150–450)
POTASSIUM SERPL-SCNC: 3.9 MMOL/L (ref 3.5–5.1)
POTASSIUM SERPL-SCNC: 3.9 MMOL/L (ref 3.5–5.1)
RBC # BLD AUTO: 4.05 X10(6)UL
SODIUM SERPL-SCNC: 142 MMOL/L (ref 136–145)
WBC # BLD AUTO: 5.3 X10(3) UL (ref 4–11)

## 2023-07-27 PROCEDURE — 99233 SBSQ HOSP IP/OBS HIGH 50: CPT | Performed by: HOSPITALIST

## 2023-07-27 PROCEDURE — 99233 SBSQ HOSP IP/OBS HIGH 50: CPT | Performed by: INTERNAL MEDICINE

## 2023-07-27 RX ORDER — ASPIRIN 81 MG/1
81 TABLET ORAL DAILY
Status: DISCONTINUED | OUTPATIENT
Start: 2023-07-27 | End: 2023-07-28

## 2023-07-27 RX ORDER — ALBUTEROL SULFATE 90 UG/1
2 AEROSOL, METERED RESPIRATORY (INHALATION) EVERY 6 HOURS PRN
Status: DISCONTINUED | OUTPATIENT
Start: 2023-07-27 | End: 2023-07-28

## 2023-07-27 RX ORDER — PANTOPRAZOLE SODIUM 40 MG/1
40 TABLET, DELAYED RELEASE ORAL
Status: DISCONTINUED | OUTPATIENT
Start: 2023-07-27 | End: 2023-07-28

## 2023-07-27 RX ORDER — LOSARTAN POTASSIUM 50 MG/1
50 TABLET ORAL DAILY
Status: DISCONTINUED | OUTPATIENT
Start: 2023-07-27 | End: 2023-07-28

## 2023-07-27 RX ORDER — HYDRALAZINE HYDROCHLORIDE 100 MG/1
100 TABLET, FILM COATED ORAL 2 TIMES DAILY
Status: DISCONTINUED | OUTPATIENT
Start: 2023-07-27 | End: 2023-07-28

## 2023-07-27 RX ORDER — MONTELUKAST SODIUM 10 MG/1
10 TABLET ORAL NIGHTLY
Status: DISCONTINUED | OUTPATIENT
Start: 2023-07-27 | End: 2023-07-28

## 2023-07-27 RX ORDER — BENZONATATE 100 MG/1
200 CAPSULE ORAL DAILY
Status: DISCONTINUED | OUTPATIENT
Start: 2023-07-27 | End: 2023-07-28

## 2023-07-27 NOTE — PROGRESS NOTES
At the start of shift, this RN went in to complete med rec since it was not done on previous shift or admission. Patient requested I come back later because she was too tired this morning. Upon second set of RN rounds, this RN attempted to complete med rec again. It was at this time that patient told RN she had already taken her meds from the ones she brought from home. Patient stated, \"I told my  to bring my meds from home since you don't know what I take. \" RN discussed education about not taking home meds and the process of completing med rec. Med rec completed at the bedside with patient and instructed patient to please have  take home meds back home. Dr. Sharmaine Bradford made aware of this event and RL6 entered. Patient monitored for any adverse side effects.

## 2023-07-28 VITALS
HEIGHT: 66 IN | TEMPERATURE: 98 F | WEIGHT: 171.38 LBS | DIASTOLIC BLOOD PRESSURE: 90 MMHG | OXYGEN SATURATION: 96 % | RESPIRATION RATE: 16 BRPM | SYSTOLIC BLOOD PRESSURE: 118 MMHG | BODY MASS INDEX: 27.54 KG/M2 | HEART RATE: 74 BPM

## 2023-07-28 LAB
ANION GAP SERPL CALC-SCNC: 7 MMOL/L (ref 0–18)
BASOPHILS # BLD AUTO: 0.05 X10(3) UL (ref 0–0.2)
BASOPHILS NFR BLD AUTO: 1 %
BUN BLD-MCNC: 5 MG/DL (ref 7–18)
BUN/CREAT SERPL: 9.3 (ref 10–20)
CALCIUM BLD-MCNC: 8.7 MG/DL (ref 8.5–10.1)
CHLORIDE SERPL-SCNC: 108 MMOL/L (ref 98–112)
CO2 SERPL-SCNC: 27 MMOL/L (ref 21–32)
CREAT BLD-MCNC: 0.54 MG/DL
DEPRECATED RDW RBC AUTO: 37.2 FL (ref 35.1–46.3)
EGFRCR SERPLBLD CKD-EPI 2021: 101 ML/MIN/1.73M2 (ref 60–?)
EOSINOPHIL # BLD AUTO: 0.18 X10(3) UL (ref 0–0.7)
EOSINOPHIL NFR BLD AUTO: 3.7 %
ERYTHROCYTE [DISTWIDTH] IN BLOOD BY AUTOMATED COUNT: 11.9 % (ref 11–15)
GLUCOSE BLD-MCNC: 91 MG/DL (ref 70–99)
HCT VFR BLD AUTO: 32.4 %
HGB BLD-MCNC: 11.3 G/DL
IMM GRANULOCYTES # BLD AUTO: 0.03 X10(3) UL (ref 0–1)
IMM GRANULOCYTES NFR BLD: 0.6 %
LYMPHOCYTES # BLD AUTO: 1.87 X10(3) UL (ref 1–4)
LYMPHOCYTES NFR BLD AUTO: 38.5 %
MAGNESIUM SERPL-MCNC: 1.7 MG/DL (ref 1.6–2.6)
MCH RBC QN AUTO: 29.7 PG (ref 26–34)
MCHC RBC AUTO-ENTMCNC: 34.9 G/DL (ref 31–37)
MCV RBC AUTO: 85.3 FL
MONOCYTES # BLD AUTO: 0.36 X10(3) UL (ref 0.1–1)
MONOCYTES NFR BLD AUTO: 7.4 %
NEUTROPHILS # BLD AUTO: 2.37 X10 (3) UL (ref 1.5–7.7)
NEUTROPHILS # BLD AUTO: 2.37 X10(3) UL (ref 1.5–7.7)
NEUTROPHILS NFR BLD AUTO: 48.8 %
OSMOLALITY SERPL CALC.SUM OF ELEC: 291 MOSM/KG (ref 275–295)
PHOSPHATE SERPL-MCNC: 3.7 MG/DL (ref 2.5–4.9)
PLATELET # BLD AUTO: 264 10(3)UL (ref 150–450)
POTASSIUM SERPL-SCNC: 3 MMOL/L (ref 3.5–5.1)
RBC # BLD AUTO: 3.8 X10(6)UL
SODIUM SERPL-SCNC: 142 MMOL/L (ref 136–145)
WBC # BLD AUTO: 4.9 X10(3) UL (ref 4–11)

## 2023-07-28 PROCEDURE — 99239 HOSP IP/OBS DSCHRG MGMT >30: CPT | Performed by: HOSPITALIST

## 2023-07-28 PROCEDURE — 99233 SBSQ HOSP IP/OBS HIGH 50: CPT | Performed by: NURSE PRACTITIONER

## 2023-07-28 RX ORDER — HYDROCODONE BITARTRATE AND ACETAMINOPHEN 5; 325 MG/1; MG/1
1-2 TABLET ORAL EVERY 4 HOURS PRN
Qty: 20 TABLET | Refills: 0 | Status: SHIPPED | OUTPATIENT
Start: 2023-07-28

## 2023-07-28 RX ORDER — AMOXICILLIN AND CLAVULANATE POTASSIUM 875; 125 MG/1; MG/1
1 TABLET, FILM COATED ORAL 2 TIMES DAILY
Qty: 20 TABLET | Refills: 0 | Status: SHIPPED | OUTPATIENT
Start: 2023-07-28

## 2023-07-28 RX ORDER — VANCOMYCIN HYDROCHLORIDE 125 MG/1
125 CAPSULE ORAL DAILY
Qty: 10 CAPSULE | Refills: 0 | Status: SHIPPED | OUTPATIENT
Start: 2023-07-29

## 2023-07-28 NOTE — DISCHARGE SUMMARY
[facility]     Discharge Summary    Mi Oleary Patient Status:  Inpatient    1955 MRN [id number]   Location [facility] 3W/SW Attending Anyi Martinez MD   Hosp Day # 2 PCP Cathie Vasquez MD     Date of Admission: 2023    Date of Discharge: 2023    Admitting Diagnosis: Acute diverticulitis [K57.92]    Discharge Diagnosis: Patient Active Problem List:     Acute pain of left shoulder     Status post hammertoe correction     Cough     Disruption of traumatic injury wound repair     Nonhealing ulcer of right lower leg (HCC)     Leg wound, right, initial encounter     Chronic ulcer of right lower extremity with fat layer exposed ([address]     Leg wound, right, subsequent encounter     Ulcer of right lower extremity with fat layer exposed ([address]     Non-pressure chronic ulcer of right lower leg (HCC)     Wound of right leg, subsequent encounter     Palpitations     Ventricular tachyarrhythmia ([address]     Recurrent Clostridium difficile diarrhea     Capsulitis of metatarsophalangeal (MTP) joint     Acute diverticulitis      Reason for Admission:   Recurrent diverticulitis    Physical Exam:     GENERAL:  Alert. Oriented to time, place, and person. Moderate distress. Anxious. HEENT:  Atraumatic. Oropharynx clear, moist mucous membranes. Ears, nose normal.  Eyes:  Anicteric sclerae. NECK:  Supple. No lymphadenopathy. Trachea midline. Full range of motion. LUNGS:  Clear to auscultation bilaterally. Normal respiratory effort. HEART:  Regular rate and rhythm. S1, S2 auscultated. No murmur. ABDOMEN:  Soft, nondistended. Tenderness to superficial palpation, lower and left lower quadrant area. No guarding or rebound tenderness. Positive bowel sounds. EXTREMITIES:  No peripheral edema, clubbing, or cyanosis. NEUROLOGIC:  Motor and sensory intact. Hospital Course:     Acute diverticulitis.   -recurrent distal descending and proximal sigmoid diverticulitis  -imaging reviewed  -continue IVF  -continue IV abx, IV analgesics, IV antiemetics, PO vancomycin  -appreciate GI consult. COPD  -continue to monitor oxygen sats  -continue inhalers   -will continue to monitor     HTN  -continue to monitor BP  -titrate meds as needed     Arturo Alfaro  -continue home meds     VTE ppx: heparin     DISPO: PLAN FOR OUTPATIENT ABX FOR 10 DAYS WITH AUGMENTIN AND OVP      History of Present Illness:   Per admitting attending: The patient is a 71-year-old  female who presented today to the emergency department for evaluation of recurrent abdominal pain for the last 3 days. She has been treated for diverticulitis on and off since last month with rapid recurrence of symptoms after finishing her antibiotic course. In May 2023, she was noted to have mild diverticulitis of the descending colon. Today CT scan of the abdomen done by her gastroenterologist showed intermediate length segment colonic diverticulitis involving junction of the descending and sigmoid colon. The patient was sent to the emergency department for evaluation. CBC and chemistry were unremarkable. Potassium 3.3. Urinalysis unremarkable. Started on IV Zosyn and IV fluids. She will be admitted to the hospital for further management. Disposition: home    Discharge Condition: Stable    Discharge Medications: Current Discharge Medication List    START taking these medications    HYDROcodone-acetaminophen 5-325 MG Oral Tab  Take 1-2 tablets by mouth every 4 (four) hours as needed for Pain. Qty: 20 tablet Refills: 0      CONTINUE these medications which have CHANGED    amoxicillin clavulanate 875-125 MG Oral Tab  Take 1 tablet by mouth 2 (two) times daily. Take every 12 hours  Qty: 20 tablet Refills: 0    vancomycin 125 MG Oral Cap  Take 1 capsule (125 mg total) by mouth daily.   Qty: 10 capsule Refills: 0      CONTINUE these medications which have NOT CHANGED    hydrALAZINE 100 MG Oral Tab  Take 1 tablet (100 mg total) by mouth 2 (two) times daily. Qty: 60 tablet Refills: 0    montelukast 10 MG Oral Tab  Take 1 tablet (10 mg total) by mouth nightly. Esomeprazole Magnesium 20 MG Oral Capsule Delayed Release  Take 1 capsule (20 mg total) by mouth every morning before breakfast.    aspirin 81 MG Oral Tab EC  Take 1 tablet (81 mg total) by mouth daily. Qty: 30 tablet Refills: 2    losartan Potassium 50 MG Oral Tab  Take 1 tablet (50 mg total) by mouth daily. Qty: 30 tablet Refills: 2    Fexofenadine HCl 60 MG Oral Tab  Take 1 tablet (60 mg total) by mouth daily. fluticasone-Umeclidin-Vilant (TRELEGY ELLIPTA) 159-82.3-89 MCG/INH Inhalation Aerosol Powder, Breath Activated  Inhale 1 puff into the lungs daily. ALBUTEROL SULFATE HFA IN  Inhale 2 puffs into the lungs as needed. benzonatate 200 MG Oral Cap  Take 1 capsule (200 mg total) by mouth daily. Triamterene-HCTZ 37.5-25 MG Oral Tab  Take 1 tablet by mouth daily. Acetaminophen-Codeine (TYLENOL WITH CODEINE #3) 300-30 MG Oral Tab  Take 1 tablet by mouth every 4 (four) hours as needed for Pain. Qty: 15 tablet Refills: 0    omeprazole 20 MG Oral Capsule Delayed Release  Take 1 capsule (20 mg total) by mouth every morning. RESTASIS 0.05 % Ophthalmic Emulsion  Place 1 drop into both eyes daily. Refills: 4    ipratropium-albuterol 0.5-2.5 (3) MG/3ML Inhalation Solution  Take 3 mL by nebulization as needed. STOP taking these medications    doxycycline 100 MG Oral Cap    dicyclomine 10 MG Oral Cap    Azelastine HCl 0.1 % Nasal Solution    Betamethasone Dipropionate 0.05 % External Ointment    ibuprofen (MOTRIN) 400 MG Oral Tab          Total dc time > 30 min    Jennifer Kingston MD  7/28/2023  9:24 AM     Hospital Discharge Diagnoses:  acute diverticulitis    Lace+ Score: 37  59-90 High Risk  29-58 Medium Risk  0-28   Low Risk. TCM Follow-Up Recommendation:  LACE > 58:  High Risk of readmission after discharge from the hospital.

## 2023-08-09 ENCOUNTER — TELEPHONE (OUTPATIENT)
Facility: CLINIC | Age: 68
End: 2023-08-09

## 2023-08-09 DIAGNOSIS — R19.7 DIARRHEA, UNSPECIFIED TYPE: Primary | ICD-10-CM

## 2023-08-09 RX ORDER — VANCOMYCIN HYDROCHLORIDE 125 MG/1
125 CAPSULE ORAL 2 TIMES DAILY
Qty: 28 CAPSULE | Refills: 0 | Status: SHIPPED
Start: 2023-08-09 | End: 2023-08-23

## 2023-08-09 RX ORDER — AMOXICILLIN AND CLAVULANATE POTASSIUM 875; 125 MG/1; MG/1
1 TABLET, FILM COATED ORAL 2 TIMES DAILY
Qty: 30 TABLET | Refills: 0 | Status: SHIPPED | OUTPATIENT
Start: 2023-08-09

## 2023-08-09 NOTE — TELEPHONE ENCOUNTER
Sandrine DONOVAN [facility] Clinical Staff (supporting Amilcar Gage MD)2 hours ago (11:38 AM)     SR  Hi Dr. Navi Vidal,   I am still in pain even after the hospitalization and just finishing the antibiotics two days ago. I'd like suggestions on the best doctor that performs surgery since [address]  (sp?)retired. I'd also like to speak with you about additional medication or anything else we can do to alleviate the pain. Please call my cell [phone]. Lily Rubalcava, RN Registered Nurse Signed  7/25/2023     Copy     Please see 07/25/2023 telephone encounter           Lily Rubalcava RN Registered Nurse Signed  7/25/2023     Copy     From: Basilia Oleary  To: Mireya Vidal MD  Sent: 7/23/2023  5:43 PM CDT  Subject: Diverticulistis    Hi Dr. Navi Vidla,    The meds from June 14 helped for a couple days. Since July 2 I have had now three episodes. The last two included diarrhea and vomiting. This last flare up started Friday in the middle of the night and is still  ongoing. The pain on my left side and to a lesser degree on the right is really bad. You had mentioned an IV to help. Can this be scheduled either Tuesday after 2 pm or anytime on Wednesday or Thursday? It's very bad this time. ..  [address]

## 2023-08-09 NOTE — TELEPHONE ENCOUNTER
Patient contacted and symptoms discussed, she is feeling better but still has pain after hospitalization, she is completed antibiotics x 10 days for the diverticulitis. She continues to have abdominal symptoms, she was feeling much better at 1 point. Discussed likely ongoing smoldering diverticulitis but she does have irregular bowel habits/diarrhea which is typical for her. She had been on Vanco.  Will check C. difficile this to make sure that this has not solved back in and then restart oral antibiotics. She may require IV antibiotics as an outpatient to clear the infection to    I have advised follow-up with surgery, given the phone number to contact our surgeons.

## 2023-08-09 NOTE — TELEPHONE ENCOUNTER
Dr. Fatemeh Jimenez    I spoke to [facility] ([facility])    Reports pain not as bad as before she went into the hospital last time but she is very uncomfortable and she just finished the antibiotics. Reports pain as a 5/10 and it is constant. When she walks every step hurts. No fever or chills. Stools are very loose just recently finished the antibiotics so thinks related to that. Denies any nausea or vomiting. Would like recommendation for surgeon since Dr. Culver Rolling retired, wants guidance. I told her to do a clear liquid diet for now and to present to ER if worsening pain, fever, chills, red flag symptoms.     Please advise    Thank you

## 2023-08-10 ENCOUNTER — LAB ENCOUNTER (OUTPATIENT)
Dept: LAB | Facility: HOSPITAL | Age: 68
End: 2023-08-10
Payer: COMMERCIAL

## 2023-08-10 DIAGNOSIS — J45.50 SEVERE PERSISTENT ASTHMA: Primary | ICD-10-CM

## 2023-08-10 DIAGNOSIS — R19.7 DIARRHEA OF PRESUMED INFECTIOUS ORIGIN: ICD-10-CM

## 2023-08-10 LAB
BASOPHILS # BLD AUTO: 0.11 X10(3) UL (ref 0–0.2)
BASOPHILS NFR BLD AUTO: 1.6 %
DEPRECATED RDW RBC AUTO: 38.8 FL (ref 35.1–46.3)
EOSINOPHIL # BLD AUTO: 0.07 X10(3) UL (ref 0–0.7)
EOSINOPHIL NFR BLD AUTO: 1 %
ERYTHROCYTE [DISTWIDTH] IN BLOOD BY AUTOMATED COUNT: 12.5 % (ref 11–15)
HCT VFR BLD AUTO: 38.4 %
HGB BLD-MCNC: 13.1 G/DL
IMM GRANULOCYTES # BLD AUTO: 0.02 X10(3) UL (ref 0–1)
IMM GRANULOCYTES NFR BLD: 0.3 %
LYMPHOCYTES # BLD AUTO: 2.31 X10(3) UL (ref 1–4)
LYMPHOCYTES NFR BLD AUTO: 34.1 %
MCH RBC QN AUTO: 29.2 PG (ref 26–34)
MCHC RBC AUTO-ENTMCNC: 34.1 G/DL (ref 31–37)
MCV RBC AUTO: 85.5 FL
MONOCYTES # BLD AUTO: 0.53 X10(3) UL (ref 0.1–1)
MONOCYTES NFR BLD AUTO: 7.8 %
NEUTROPHILS # BLD AUTO: 3.73 X10 (3) UL (ref 1.5–7.7)
NEUTROPHILS # BLD AUTO: 3.73 X10(3) UL (ref 1.5–7.7)
NEUTROPHILS NFR BLD AUTO: 55.2 %
PLATELET # BLD AUTO: 380 10(3)UL (ref 150–450)
RBC # BLD AUTO: 4.49 X10(6)UL
WBC # BLD AUTO: 6.8 X10(3) UL (ref 4–11)

## 2023-08-10 PROCEDURE — 36415 COLL VENOUS BLD VENIPUNCTURE: CPT

## 2023-08-10 PROCEDURE — 86001 ALLERGEN SPECIFIC IGG: CPT

## 2023-08-10 PROCEDURE — 85025 COMPLETE CBC W/AUTO DIFF WBC: CPT

## 2023-08-10 PROCEDURE — 82785 ASSAY OF IGE: CPT

## 2023-08-11 ENCOUNTER — LAB ENCOUNTER (OUTPATIENT)
Dept: LAB | Facility: HOSPITAL | Age: 68
End: 2023-08-11
Attending: INTERNAL MEDICINE
Payer: COMMERCIAL

## 2023-08-11 DIAGNOSIS — R19.7 DIARRHEA, UNSPECIFIED TYPE: Primary | ICD-10-CM

## 2023-08-11 LAB — C DIFF TOX B STL QL: NEGATIVE

## 2023-08-11 PROCEDURE — 87493 C DIFF AMPLIFIED PROBE: CPT

## 2023-08-14 LAB
D001-IGE D PTERONYSSINUS: <0.1 KU/L
D001-IGE D PTERONYSSINUS: <0.1 KU/L
D002-IGE D FARINAE: <0.1 KU/L
D002-IGE D FARINAE: <0.1 KU/L
E001-IGE CAT DANDER: <0.1 KU/L
E001-IGE CAT DANDER: <0.1 KU/L
E005-IGE DOG DANDER: <0.1 KU/L
E005-IGE DOG DANDER: <0.1 KU/L
E071-IGE MOUSE EPITHELIUM: <0.1 KU/L
E071-IGE MOUSE EPITHELIUM: <0.1 KU/L
G002-IGE BERMUDA GRASS: <0.1 KU/L
G002-IGE BERMUDA GRASS: <0.1 KU/L
G006-IGE TIMOTHY GRASS: <0.1 KU/L
G006-IGE TIMOTHY GRASS: <0.1 KU/L
I006-IGE COCKROACH GERMAN: <0.1 KU/L
I006-IGE COCKROACH GERMAN: <0.1 KU/L
IGE TOTAL: 21 IU/ML
IGE TOTAL: 21 IU/ML
M001-IGE PENICILLIUM CHRYSOGEN: <0.1 KU/L
M001-IGE PENICILLIUM CHRYSOGEN: <0.1 KU/L
M002-IGE CLADOSPORIUM HERBARUM: <0.1 KU/L
M002-IGE CLADOSPORIUM HERBARUM: <0.1 KU/L
M003-IGE ASPERGILLUS FUMIGATUS: <0.1 KU/L
M003-IGE ASPERGILLUS FUMIGATUS: <0.1 KU/L
M004-IGE MUCOR RACEMOSUS: <0.1 KU/L
M004-IGE MUCOR RACEMOSUS: <0.1 KU/L
M006-IGE ALTERNARIA ALTERNATA: <0.1 KU/L
T001-IGE MAPLE/BOX ELDER: <0.1 KU/L
T001-IGE MAPLE/BOX ELDER: <0.1 KU/L
T006-IGE CEDAR MOUNTAIN: <0.1 KU/L
T006-IGE CEDAR MOUNTAIN: <0.1 KU/L
T007-IGE OAK WHITE: <0.1 KU/L
T007-IGE OAK WHITE: <0.1 KU/L
T008-IGE  AMERICAN: <0.1 KU/L
T010-IGE WALNUT: <0.1 KU/L
T010-IGE WALNUT: <0.1 KU/L
T011-IGE MAPLE LEAF: <0.1 KU/L
T014-IGE COTTONWOOD: <0.1 KU/L
T014-IGE COTTONWOOD: <0.1 KU/L
T015-IGE ASH WHITE: <0.1 KU/L
T015-IGE ASH WHITE: <0.1 KU/L
T022-IGE PECAN, HICKORY: <0.1 KU/L
T022-IGE PECAN, HICKORY: <0.1 KU/L
T070-IGE WHITE MULBERRY: <0.1 KU/L
T070-IGE WHITE MULBERRY: <0.1 KU/L
W001-IGE RAGWEED, SHORT: <0.1 KU/L
W001-IGE RAGWEED, SHORT: <0.1 KU/L
W011-IGE THISTLE RUSSIAN: <0.1 KU/L
W011-IGE THISTLE RUSSIAN: <0.1 KU/L
W014-IGE PIGWEED COMMON: <0.1 KU/L
W014-IGE PIGWEED COMMON: <0.1 KU/L
W016-IGE ROUGH MARSHELDER: <0.1 KU/L
W016-IGE ROUGH MARSHELDER: <0.1 KU/L

## 2023-08-17 NOTE — TELEPHONE ENCOUNTER
I did restart augmentin last week as she seemed to be getting better on the medication but pain no quite gone.

## 2023-08-17 NOTE — TELEPHONE ENCOUNTER
Dr. Ruthy Mason result is back  She has an appointment to see Dr. Nelly Larsen on 8/22 and an appointment to see you on 9/13/23. I saw mention of antibiotics in your message below. Is there anything further needed?     Thank you

## 2023-08-22 ENCOUNTER — OFFICE VISIT (OUTPATIENT)
Dept: SURGERY | Facility: CLINIC | Age: 68
End: 2023-08-22

## 2023-08-22 VITALS — BODY MASS INDEX: 28 KG/M2 | WEIGHT: 171 LBS

## 2023-08-22 DIAGNOSIS — K57.92 DIVERTICULITIS: Primary | ICD-10-CM

## 2023-08-22 PROCEDURE — 99214 OFFICE O/P EST MOD 30 MIN: CPT | Performed by: SURGERY

## 2023-08-22 PROCEDURE — 1111F DSCHRG MED/CURRENT MED MERGE: CPT | Performed by: SURGERY

## 2023-08-22 RX ORDER — METRONIDAZOLE 500 MG/1
TABLET ORAL
Qty: 6 TABLET | Refills: 0 | Status: SHIPPED | OUTPATIENT
Start: 2023-08-22

## 2023-08-22 RX ORDER — NEOMYCIN SULFATE 500 MG/1
TABLET ORAL
Qty: 6 TABLET | Refills: 0 | Status: SHIPPED | OUTPATIENT
Start: 2023-08-22

## 2023-08-23 PROBLEM — K57.92 DIVERTICULITIS: Status: ACTIVE | Noted: 2023-07-26

## 2023-09-06 ENCOUNTER — TELEPHONE (OUTPATIENT)
Dept: SURGERY | Facility: CLINIC | Age: 68
End: 2023-09-06

## 2023-09-06 NOTE — TELEPHONE ENCOUNTER
I called and spoke to pt, explained per [employer] they state that insurance is due to be cancelled as of 10/1/2023. She will have her  check with his HR.

## 2023-09-06 NOTE — TELEPHONE ENCOUNTER
I called [employer] [phone], spoke to [place]. In patient surgery on 10/18/2023, requested 3 days. ICD-10 code: K57.80     CPT code: 20899      Per [place], review started, this is pending review of clinical. [employer] [place] states that policy is due to end 93/6/4643. Fax clinicals to [phone]. Request ID# [id number]. Clinicals faxed.

## 2023-09-07 ENCOUNTER — TELEPHONE (OUTPATIENT)
Dept: SURGERY | Facility: CLINIC | Age: 68
End: 2023-09-07

## 2023-09-07 DIAGNOSIS — K57.00 PERFORATED DIVERTICULUM OF DUODENUM: Primary | ICD-10-CM

## 2023-09-11 NOTE — TELEPHONE ENCOUNTER
Left detailed message on patient's VM. Insurance was renewed. Received notification the insurance approved her procedure for 10/18 inpatient CPT #53837.   Request #[id number]

## 2023-09-11 NOTE — TELEPHONE ENCOUNTER
Pt calling states insurance will be renewed on 10/1 not canceled company will continue with it please advise

## 2023-10-17 ENCOUNTER — EKG ENCOUNTER (OUTPATIENT)
Dept: LAB | Facility: HOSPITAL | Age: 68
End: 2023-10-17
Attending: SURGERY

## 2023-10-17 ENCOUNTER — LAB ENCOUNTER (OUTPATIENT)
Dept: LAB | Facility: HOSPITAL | Age: 68
End: 2023-10-17
Attending: SURGERY

## 2023-10-17 ENCOUNTER — EKG ENCOUNTER (OUTPATIENT)
Dept: LAB | Facility: HOSPITAL | Age: 68
DRG: 331 | End: 2023-10-17
Attending: SURGERY

## 2023-10-17 ENCOUNTER — LAB ENCOUNTER (OUTPATIENT)
Dept: LAB | Facility: HOSPITAL | Age: 68
DRG: 331 | End: 2023-10-17
Attending: SURGERY

## 2023-10-17 ENCOUNTER — ANESTHESIA EVENT (OUTPATIENT)
Dept: SURGERY | Facility: HOSPITAL | Age: 68
DRG: 331 | End: 2023-10-17
Payer: COMMERCIAL

## 2023-10-17 DIAGNOSIS — Z01.818 PREOP TESTING: ICD-10-CM

## 2023-10-17 LAB
ANION GAP SERPL CALC-SCNC: 10 MMOL/L (ref 0–18)
ANTIBODY SCREEN: NEGATIVE
ATRIAL RATE: 92 BPM
BASOPHILS # BLD AUTO: 0.04 X10(3) UL (ref 0–0.2)
BASOPHILS NFR BLD AUTO: 0.8 %
BUN BLD-MCNC: 10 MG/DL (ref 7–18)
BUN/CREAT SERPL: 13.9 (ref 10–20)
CALCIUM BLD-MCNC: 9.3 MG/DL (ref 8.5–10.1)
CHLORIDE SERPL-SCNC: 104 MMOL/L (ref 98–112)
CO2 SERPL-SCNC: 26 MMOL/L (ref 21–32)
CREAT BLD-MCNC: 0.72 MG/DL
DEPRECATED RDW RBC AUTO: 42.1 FL (ref 35.1–46.3)
EGFRCR SERPLBLD CKD-EPI 2021: 92 ML/MIN/1.73M2 (ref 60–?)
EOSINOPHIL # BLD AUTO: 0.05 X10(3) UL (ref 0–0.7)
EOSINOPHIL NFR BLD AUTO: 1 %
ERYTHROCYTE [DISTWIDTH] IN BLOOD BY AUTOMATED COUNT: 13 % (ref 11–15)
GLUCOSE BLD-MCNC: 108 MG/DL (ref 70–99)
HCT VFR BLD AUTO: 41.6 %
HGB BLD-MCNC: 14 G/DL
IMM GRANULOCYTES # BLD AUTO: 0.01 X10(3) UL (ref 0–1)
IMM GRANULOCYTES NFR BLD: 0.2 %
LYMPHOCYTES # BLD AUTO: 1.56 X10(3) UL (ref 1–4)
LYMPHOCYTES NFR BLD AUTO: 30.4 %
MCH RBC QN AUTO: 29.7 PG (ref 26–34)
MCHC RBC AUTO-ENTMCNC: 33.7 G/DL (ref 31–37)
MCV RBC AUTO: 88.1 FL
MONOCYTES # BLD AUTO: 0.53 X10(3) UL (ref 0.1–1)
MONOCYTES NFR BLD AUTO: 10.3 %
NEUTROPHILS # BLD AUTO: 2.95 X10 (3) UL (ref 1.5–7.7)
NEUTROPHILS # BLD AUTO: 2.95 X10(3) UL (ref 1.5–7.7)
NEUTROPHILS NFR BLD AUTO: 57.3 %
OSMOLALITY SERPL CALC.SUM OF ELEC: 290 MOSM/KG (ref 275–295)
P AXIS: 37 DEGREES
P-R INTERVAL: 152 MS
PLATELET # BLD AUTO: 268 10(3)UL (ref 150–450)
POTASSIUM SERPL-SCNC: 3.6 MMOL/L (ref 3.5–5.1)
Q-T INTERVAL: 362 MS
QRS DURATION: 102 MS
QTC CALCULATION (BEZET): 447 MS
R AXIS: -55 DEGREES
RBC # BLD AUTO: 4.72 X10(6)UL
RH BLOOD TYPE: POSITIVE
RH BLOOD TYPE: POSITIVE
SODIUM SERPL-SCNC: 140 MMOL/L (ref 136–145)
T AXIS: 56 DEGREES
VENTRICULAR RATE: 92 BPM
WBC # BLD AUTO: 5.1 X10(3) UL (ref 4–11)

## 2023-10-17 PROCEDURE — 93005 ELECTROCARDIOGRAM TRACING: CPT

## 2023-10-17 PROCEDURE — 93010 ELECTROCARDIOGRAM REPORT: CPT | Performed by: INTERNAL MEDICINE

## 2023-10-17 PROCEDURE — 85025 COMPLETE CBC W/AUTO DIFF WBC: CPT

## 2023-10-17 PROCEDURE — 80048 BASIC METABOLIC PNL TOTAL CA: CPT

## 2023-10-17 PROCEDURE — 36415 COLL VENOUS BLD VENIPUNCTURE: CPT

## 2023-10-17 PROCEDURE — 86901 BLOOD TYPING SEROLOGIC RH(D): CPT

## 2023-10-17 PROCEDURE — 86850 RBC ANTIBODY SCREEN: CPT

## 2023-10-17 PROCEDURE — 86900 BLOOD TYPING SEROLOGIC ABO: CPT

## 2023-10-17 RX ORDER — CEFAZOLIN SODIUM/WATER 2 G/20 ML
2 SYRINGE (ML) INTRAVENOUS ONCE
Status: DISCONTINUED | OUTPATIENT
Start: 2023-10-17 | End: 2023-10-17

## 2023-10-17 NOTE — PAT NURSING NOTE
EKG--done shows abnormal Thomas Juniorite from Dr. Aline Reyna was notified she said she'll notified Dr. Aline Reyna . Thomas Browne called back she said she spoke to dr. Aline Reyna let Anesthesiologist look at the EKG.

## 2023-10-18 ENCOUNTER — ANESTHESIA (OUTPATIENT)
Dept: SURGERY | Facility: HOSPITAL | Age: 68
DRG: 331 | End: 2023-10-18
Payer: COMMERCIAL

## 2023-10-18 ENCOUNTER — HOSPITAL ENCOUNTER (INPATIENT)
Facility: HOSPITAL | Age: 68
LOS: 6 days | Discharge: HOME | DRG: 331 | End: 2023-10-24
Attending: SURGERY | Admitting: SURGERY

## 2023-10-18 DIAGNOSIS — K57.92 DIVERTICULITIS: ICD-10-CM

## 2023-10-18 DIAGNOSIS — Z01.818 PREOP TESTING: Primary | ICD-10-CM

## 2023-10-18 PROBLEM — K57.32 DIVERTICULITIS LARGE INTESTINE: Status: ACTIVE | Noted: 2023-10-18

## 2023-10-18 PROBLEM — J45.909 ASTHMA (HCC): Status: RESOLVED | Noted: 2023-10-18 | Resolved: 2023-10-18

## 2023-10-18 PROBLEM — J45.909 ASTHMA (HCC): Status: ACTIVE | Noted: 2023-10-18

## 2023-10-18 PROBLEM — J45.909 ASTHMA: Status: RESOLVED | Noted: 2023-10-18 | Resolved: 2023-10-18

## 2023-10-18 PROBLEM — I10 ESSENTIAL HYPERTENSION: Status: ACTIVE | Noted: 2023-10-18

## 2023-10-18 PROBLEM — Z87.19 HISTORY OF DIVERTICULITIS: Status: ACTIVE | Noted: 2023-10-18

## 2023-10-18 PROBLEM — J44.9 COPD (CHRONIC OBSTRUCTIVE PULMONARY DISEASE) (HCC): Status: ACTIVE | Noted: 2023-10-18

## 2023-10-18 PROBLEM — J45.909 ASTHMA: Status: ACTIVE | Noted: 2023-10-18

## 2023-10-18 PROCEDURE — 0WHR8YZ INSERTION OF OTHER DEVICE INTO GENITOURINARY TRACT, VIA NATURAL OR ARTIFICIAL OPENING ENDOSCOPIC: ICD-10-PCS | Performed by: UROLOGY

## 2023-10-18 PROCEDURE — 99222 1ST HOSP IP/OBS MODERATE 55: CPT | Performed by: HOSPITALIST

## 2023-10-18 PROCEDURE — 44213 LAP MOBIL SPLENIC FL ADD-ON: CPT | Performed by: SURGERY

## 2023-10-18 PROCEDURE — 44207 L COLECTOMY/COLOPROCTOSTOMY: CPT | Performed by: SURGERY

## 2023-10-18 PROCEDURE — 52005 CYSTO W/URTRL CATHJ: CPT | Performed by: UROLOGY

## 2023-10-18 PROCEDURE — 0DTN4ZZ RESECTION OF SIGMOID COLON, PERCUTANEOUS ENDOSCOPIC APPROACH: ICD-10-PCS | Performed by: SURGERY

## 2023-10-18 RX ORDER — PANTOPRAZOLE SODIUM 20 MG/1
20 TABLET, DELAYED RELEASE ORAL
Status: DISCONTINUED | OUTPATIENT
Start: 2023-10-19 | End: 2023-10-18

## 2023-10-18 RX ORDER — DEXAMETHASONE SODIUM PHOSPHATE 4 MG/ML
VIAL (ML) INJECTION AS NEEDED
Status: DISCONTINUED | OUTPATIENT
Start: 2023-10-18 | End: 2023-10-18 | Stop reason: SURG

## 2023-10-18 RX ORDER — MORPHINE SULFATE 10 MG/ML
6 INJECTION, SOLUTION INTRAMUSCULAR; INTRAVENOUS EVERY 10 MIN PRN
Status: DISCONTINUED | OUTPATIENT
Start: 2023-10-18 | End: 2023-10-18 | Stop reason: HOSPADM

## 2023-10-18 RX ORDER — LIDOCAINE HYDROCHLORIDE 10 MG/ML
INJECTION, SOLUTION EPIDURAL; INFILTRATION; INTRACAUDAL; PERINEURAL AS NEEDED
Status: DISCONTINUED | OUTPATIENT
Start: 2023-10-18 | End: 2023-10-18 | Stop reason: SURG

## 2023-10-18 RX ORDER — ROCURONIUM BROMIDE 10 MG/ML
INJECTION, SOLUTION INTRAVENOUS AS NEEDED
Status: DISCONTINUED | OUTPATIENT
Start: 2023-10-18 | End: 2023-10-18 | Stop reason: SURG

## 2023-10-18 RX ORDER — IPRATROPIUM BROMIDE AND ALBUTEROL SULFATE 2.5; .5 MG/3ML; MG/3ML
3 SOLUTION RESPIRATORY (INHALATION) AS NEEDED
Status: DISCONTINUED | OUTPATIENT
Start: 2023-10-18 | End: 2023-10-24

## 2023-10-18 RX ORDER — SODIUM CHLORIDE, SODIUM LACTATE, POTASSIUM CHLORIDE, CALCIUM CHLORIDE 600; 310; 30; 20 MG/100ML; MG/100ML; MG/100ML; MG/100ML
INJECTION, SOLUTION INTRAVENOUS CONTINUOUS
Status: DISCONTINUED | OUTPATIENT
Start: 2023-10-18 | End: 2023-10-19

## 2023-10-18 RX ORDER — LOSARTAN POTASSIUM 50 MG/1
50 TABLET ORAL DAILY
Status: DISCONTINUED | OUTPATIENT
Start: 2023-10-19 | End: 2023-10-22

## 2023-10-18 RX ORDER — MIDAZOLAM HYDROCHLORIDE 1 MG/ML
INJECTION INTRAMUSCULAR; INTRAVENOUS AS NEEDED
Status: DISCONTINUED | OUTPATIENT
Start: 2023-10-18 | End: 2023-10-18 | Stop reason: SURG

## 2023-10-18 RX ORDER — OXYCODONE HYDROCHLORIDE 5 MG/1
2.5 TABLET ORAL EVERY 4 HOURS PRN
Status: DISCONTINUED | OUTPATIENT
Start: 2023-10-18 | End: 2023-10-24

## 2023-10-18 RX ORDER — NEOSTIGMINE METHYLSULFATE 1 MG/ML
INJECTION, SOLUTION INTRAVENOUS AS NEEDED
Status: DISCONTINUED | OUTPATIENT
Start: 2023-10-18 | End: 2023-10-18 | Stop reason: SURG

## 2023-10-18 RX ORDER — LIDOCAINE HYDROCHLORIDE 40 MG/ML
SOLUTION TOPICAL AS NEEDED
Status: DISCONTINUED | OUTPATIENT
Start: 2023-10-18 | End: 2023-10-18 | Stop reason: SURG

## 2023-10-18 RX ORDER — HEPARIN SODIUM 5000 [USP'U]/ML
5000 INJECTION, SOLUTION INTRAVENOUS; SUBCUTANEOUS EVERY 12 HOURS SCHEDULED
Status: DISCONTINUED | OUTPATIENT
Start: 2023-10-18 | End: 2023-10-24

## 2023-10-18 RX ORDER — SODIUM CHLORIDE, SODIUM LACTATE, POTASSIUM CHLORIDE, CALCIUM CHLORIDE 600; 310; 30; 20 MG/100ML; MG/100ML; MG/100ML; MG/100ML
INJECTION, SOLUTION INTRAVENOUS CONTINUOUS
Status: DISCONTINUED | OUTPATIENT
Start: 2023-10-18 | End: 2023-10-23

## 2023-10-18 RX ORDER — EPHEDRINE SULFATE 50 MG/ML
INJECTION, SOLUTION INTRAVENOUS AS NEEDED
Status: DISCONTINUED | OUTPATIENT
Start: 2023-10-18 | End: 2023-10-18 | Stop reason: SURG

## 2023-10-18 RX ORDER — HYDROMORPHONE HYDROCHLORIDE 1 MG/ML
0.4 INJECTION, SOLUTION INTRAMUSCULAR; INTRAVENOUS; SUBCUTANEOUS EVERY 2 HOUR PRN
Status: DISCONTINUED | OUTPATIENT
Start: 2023-10-18 | End: 2023-10-24

## 2023-10-18 RX ORDER — GLYCOPYRROLATE 0.2 MG/ML
INJECTION, SOLUTION INTRAMUSCULAR; INTRAVENOUS AS NEEDED
Status: DISCONTINUED | OUTPATIENT
Start: 2023-10-18 | End: 2023-10-18 | Stop reason: SURG

## 2023-10-18 RX ORDER — MAGNESIUM SULFATE HEPTAHYDRATE 500 MG/ML
INJECTION, SOLUTION INTRAMUSCULAR; INTRAVENOUS AS NEEDED
Status: DISCONTINUED | OUTPATIENT
Start: 2023-10-18 | End: 2023-10-18 | Stop reason: SURG

## 2023-10-18 RX ORDER — ACETAMINOPHEN 500 MG
1000 TABLET ORAL ONCE
Status: COMPLETED | OUTPATIENT
Start: 2023-10-18 | End: 2023-10-18

## 2023-10-18 RX ORDER — HYDROMORPHONE HYDROCHLORIDE 1 MG/ML
0.4 INJECTION, SOLUTION INTRAMUSCULAR; INTRAVENOUS; SUBCUTANEOUS EVERY 5 MIN PRN
Status: DISCONTINUED | OUTPATIENT
Start: 2023-10-18 | End: 2023-10-18 | Stop reason: HOSPADM

## 2023-10-18 RX ORDER — BUPIVACAINE HYDROCHLORIDE 5 MG/ML
INJECTION, SOLUTION EPIDURAL; INTRACAUDAL AS NEEDED
Status: DISCONTINUED | OUTPATIENT
Start: 2023-10-18 | End: 2023-10-18

## 2023-10-18 RX ORDER — HYDROMORPHONE HYDROCHLORIDE 1 MG/ML
0.2 INJECTION, SOLUTION INTRAMUSCULAR; INTRAVENOUS; SUBCUTANEOUS EVERY 5 MIN PRN
Status: DISCONTINUED | OUTPATIENT
Start: 2023-10-18 | End: 2023-10-18 | Stop reason: HOSPADM

## 2023-10-18 RX ORDER — MORPHINE SULFATE 4 MG/ML
4 INJECTION, SOLUTION INTRAMUSCULAR; INTRAVENOUS EVERY 10 MIN PRN
Status: DISCONTINUED | OUTPATIENT
Start: 2023-10-18 | End: 2023-10-18 | Stop reason: HOSPADM

## 2023-10-18 RX ORDER — CEFAZOLIN SODIUM/WATER 2 G/20 ML
2 SYRINGE (ML) INTRAVENOUS ONCE
Status: COMPLETED | OUTPATIENT
Start: 2023-10-18 | End: 2023-10-18

## 2023-10-18 RX ORDER — HYDROMORPHONE HYDROCHLORIDE 1 MG/ML
0.2 INJECTION, SOLUTION INTRAMUSCULAR; INTRAVENOUS; SUBCUTANEOUS EVERY 2 HOUR PRN
Status: DISCONTINUED | OUTPATIENT
Start: 2023-10-18 | End: 2023-10-24

## 2023-10-18 RX ORDER — SODIUM CHLORIDE, SODIUM LACTATE, POTASSIUM CHLORIDE, CALCIUM CHLORIDE 600; 310; 30; 20 MG/100ML; MG/100ML; MG/100ML; MG/100ML
INJECTION, SOLUTION INTRAVENOUS CONTINUOUS
Status: DISCONTINUED | OUTPATIENT
Start: 2023-10-18 | End: 2023-10-18 | Stop reason: HOSPADM

## 2023-10-18 RX ORDER — METRONIDAZOLE 500 MG/100ML
500 INJECTION, SOLUTION INTRAVENOUS ONCE
Status: COMPLETED | OUTPATIENT
Start: 2023-10-18 | End: 2023-10-18

## 2023-10-18 RX ORDER — OXYCODONE HYDROCHLORIDE 5 MG/1
5 TABLET ORAL EVERY 4 HOURS PRN
Status: DISCONTINUED | OUTPATIENT
Start: 2023-10-18 | End: 2023-10-24

## 2023-10-18 RX ORDER — MORPHINE SULFATE 4 MG/ML
2 INJECTION, SOLUTION INTRAMUSCULAR; INTRAVENOUS EVERY 10 MIN PRN
Status: DISCONTINUED | OUTPATIENT
Start: 2023-10-18 | End: 2023-10-18 | Stop reason: HOSPADM

## 2023-10-18 RX ORDER — BENZONATATE 100 MG/1
200 CAPSULE ORAL EVERY 4 HOURS PRN
Status: DISCONTINUED | OUTPATIENT
Start: 2023-10-18 | End: 2023-10-23

## 2023-10-18 RX ORDER — ONDANSETRON 2 MG/ML
4 INJECTION INTRAMUSCULAR; INTRAVENOUS EVERY 6 HOURS PRN
Status: DISCONTINUED | OUTPATIENT
Start: 2023-10-18 | End: 2023-10-24

## 2023-10-18 RX ORDER — HYDRALAZINE HYDROCHLORIDE 25 MG/1
100 TABLET, FILM COATED ORAL 2 TIMES DAILY
Status: DISCONTINUED | OUTPATIENT
Start: 2023-10-18 | End: 2023-10-24

## 2023-10-18 RX ORDER — NALOXONE HYDROCHLORIDE 0.4 MG/ML
0.08 INJECTION, SOLUTION INTRAMUSCULAR; INTRAVENOUS; SUBCUTANEOUS AS NEEDED
Status: DISCONTINUED | OUTPATIENT
Start: 2023-10-18 | End: 2023-10-18 | Stop reason: HOSPADM

## 2023-10-18 RX ORDER — METOCLOPRAMIDE HYDROCHLORIDE 5 MG/ML
10 INJECTION INTRAMUSCULAR; INTRAVENOUS EVERY 8 HOURS PRN
Status: DISCONTINUED | OUTPATIENT
Start: 2023-10-18 | End: 2023-10-24

## 2023-10-18 RX ORDER — KETOROLAC TROMETHAMINE 15 MG/ML
15 INJECTION, SOLUTION INTRAMUSCULAR; INTRAVENOUS EVERY 6 HOURS PRN
Status: DISPENSED | OUTPATIENT
Start: 2023-10-18 | End: 2023-10-20

## 2023-10-18 RX ORDER — HYDROMORPHONE HYDROCHLORIDE 1 MG/ML
0.6 INJECTION, SOLUTION INTRAMUSCULAR; INTRAVENOUS; SUBCUTANEOUS EVERY 5 MIN PRN
Status: DISCONTINUED | OUTPATIENT
Start: 2023-10-18 | End: 2023-10-18 | Stop reason: HOSPADM

## 2023-10-18 RX ORDER — MONTELUKAST SODIUM 10 MG/1
10 TABLET ORAL NIGHTLY
Status: DISCONTINUED | OUTPATIENT
Start: 2023-10-18 | End: 2023-10-24

## 2023-10-18 RX ADMIN — NEOSTIGMINE METHYLSULFATE 4 MG: 1 INJECTION, SOLUTION INTRAVENOUS at 12:44:00

## 2023-10-18 RX ADMIN — ROCURONIUM BROMIDE 10 MG: 10 INJECTION, SOLUTION INTRAVENOUS at 11:35:00

## 2023-10-18 RX ADMIN — LIDOCAINE HYDROCHLORIDE 4 ML: 40 SOLUTION TOPICAL at 10:03:00

## 2023-10-18 RX ADMIN — CEFAZOLIN SODIUM/WATER 2 G: 2 G/20 ML SYRINGE (ML) INTRAVENOUS at 10:03:00

## 2023-10-18 RX ADMIN — GLYCOPYRROLATE 0.6 MG: 0.2 INJECTION, SOLUTION INTRAMUSCULAR; INTRAVENOUS at 12:44:00

## 2023-10-18 RX ADMIN — ROCURONIUM BROMIDE 10 MG: 10 INJECTION, SOLUTION INTRAVENOUS at 12:04:00

## 2023-10-18 RX ADMIN — MAGNESIUM SULFATE HEPTAHYDRATE 1 G: 500 INJECTION, SOLUTION INTRAMUSCULAR; INTRAVENOUS at 10:11:00

## 2023-10-18 RX ADMIN — METRONIDAZOLE 500 MG: 500 INJECTION, SOLUTION INTRAVENOUS at 10:05:00

## 2023-10-18 RX ADMIN — LIDOCAINE HYDROCHLORIDE 50 MG: 10 INJECTION, SOLUTION EPIDURAL; INFILTRATION; INTRACAUDAL; PERINEURAL at 10:12:00

## 2023-10-18 RX ADMIN — MIDAZOLAM HYDROCHLORIDE 2 MG: 1 INJECTION INTRAMUSCULAR; INTRAVENOUS at 09:56:00

## 2023-10-18 RX ADMIN — SODIUM CHLORIDE, SODIUM LACTATE, POTASSIUM CHLORIDE, CALCIUM CHLORIDE: 600; 310; 30; 20 INJECTION, SOLUTION INTRAVENOUS at 12:47:00

## 2023-10-18 RX ADMIN — GLYCOPYRROLATE 0.2 MG: 0.2 INJECTION, SOLUTION INTRAMUSCULAR; INTRAVENOUS at 10:40:00

## 2023-10-18 RX ADMIN — LIDOCAINE HYDROCHLORIDE 50 MG: 10 INJECTION, SOLUTION EPIDURAL; INFILTRATION; INTRACAUDAL; PERINEURAL at 09:56:00

## 2023-10-18 RX ADMIN — EPHEDRINE SULFATE 5 MG: 50 INJECTION, SOLUTION INTRAVENOUS at 10:39:00

## 2023-10-18 RX ADMIN — MAGNESIUM SULFATE HEPTAHYDRATE 1 G: 500 INJECTION, SOLUTION INTRAMUSCULAR; INTRAVENOUS at 10:48:00

## 2023-10-18 RX ADMIN — ROCURONIUM BROMIDE 60 MG: 10 INJECTION, SOLUTION INTRAVENOUS at 10:00:00

## 2023-10-18 RX ADMIN — DEXAMETHASONE SODIUM PHOSPHATE 4 MG: 4 MG/ML VIAL (ML) INJECTION at 10:11:00

## 2023-10-18 RX ADMIN — SODIUM CHLORIDE, SODIUM LACTATE, POTASSIUM CHLORIDE, CALCIUM CHLORIDE: 600; 310; 30; 20 INJECTION, SOLUTION INTRAVENOUS at 09:56:00

## 2023-10-18 RX ADMIN — SODIUM CHLORIDE, SODIUM LACTATE, POTASSIUM CHLORIDE, CALCIUM CHLORIDE: 600; 310; 30; 20 INJECTION, SOLUTION INTRAVENOUS at 12:13:00

## 2023-10-18 NOTE — OPERATIVE REPORT
[facility]     Operative Report    Patient Name:  Modena Nissen  MR:  [id number]  :  1955  DOS:  10/18/23    Preop Dx:  Diverticulitis [K57.92]  Postop Dx:  Diverticulitis [K57.92]  Procedure:    Laparoscopic sigmoidectomy with colorectal anastomosis  Laparoscopic splenic flexure mobilization  Surgeon:  Brenton Potter MD  Surgical Assistant.: Jocelyn Bowen CSA, Alfredo Jones MD  EBL: Blood Output: 20 mL (10/18/2023 95:12 PM)    Complication:  None    INDICATION:  Pt is a 79year old female who with Diverticulitis [K57.92] who is scheduled for a Laparoscopic sigmoidectomy with splenic flexure mobilization(Latoya) and left lighted stent placement (Rodney Quintanilla). CONSENT:  An informed consent discussion was held with the patient regarding the nature of recurrent sigmoid diverticulitis, the treatment options and the details of the procedure. The risks including but not limited to bleeding, wound infection, intra-abdominal infection, injury to the colon, small intestine, left ureter, incomplete resection, anastomotic leak/bleeding/stricture, internal and incisional hernia were discussed. The patient expressed understanding and want to proceed with the planned procedure. TECHNIQUE:  The patient was taken to the OR and placed in supine position. General anesthesia was established and the patient was placed in lithotomy position. Dr. Rodney Quintanilla performed a cystoscopy and placement of a left lighted ureteral stent. Please see his operative note for further details. The perineum and abdomen were then prepped in standard fashion. Pneumoperitoneum was obtained using Veress needle technique through a supraumbilical incision. A 5-mm trocar was inserted under direct visualization and no injury occurred. Examination of the abdomen showed no obvious pathology. Four additional 5-mm trocars were placed in each of the abdominal quadrants.   A Pfannenstiel incision was made to place the medium Kip port device. Examination of the sigmoid colon showed areas of dense fibrosis consistent with numerous prior diverticulitis episodes. The left lighted ureteral stent was identified. We performed a lateral to medial approach by incising the line of Toldt. The descending and sigmoid colon were free from the retroperitoneum. The splenic flexure was mobilized to allow the descending colon to reach the pelvis. The mesentery of the sigmoid colon was divided using the Enseal device. The upper rectum was divided using a 60 x 3.6 mm linear stapler. The sigmoid colon was delivered through the Guerline port. The descending colon was divided and the anvil head of the 29 mm EEA was inserted. The anvil head was secured using a 3-0 PDS. While sizing the rectal stump, we noted an area of stricture. There was an area of muscle separation when we used the 29 mm sizer. The circular muscle layer and the mucosa appeared intact. I deemed it ok to proceed with the EEA and I would reinforce the area after the completion of the EEA. The handle of the EEA was inserted per rectum. We noted separation of the longitudinal muscle layer with the passage of the EEA on the anterior part of the rectum. The spike was advanced through the rectal stump and connected to the anvil head. The EEA was created and two complete donuts were seen. I further reinforced the EEA using interrupted 4-0 PDS. I also reinforced the muscle separation of the rectum with interrupted 4-0 PDS as well. The EEA was tested using air insufflation and found to be air tight. There was no tension, twisting or bleeding from the EEA. The abdomen was irrigated with saline and found to be hemostatic. I elected to place a 10 ROSAURA drain in the pelvis. The fascia of the Pfannenstiel incision was closed using 0 looped PDS. The ports were withdrawn under direct visualization and no port site bleeding was seen.  The skin incisions were closed using 4-0 vicryl. Sterile dressings were applied. All instrument and sponge counts were correct. I was present during the critical portions of the procedure.     Xena Geller MD

## 2023-10-18 NOTE — ANESTHESIA PROCEDURE NOTES
Airway  Date/Time: 10/18/2023 10:03 AM  Urgency: elective      General Information and Staff    Patient location during procedure: OR  Anesthesiologist: Denise Ornelas MD  Performed: anesthesiologist   Performed by: Denise Ornelas MD  Authorized by: Denise Ornelas MD      Indications and Patient Condition  Indications for airway management: anesthesia  Sedation level: deep  Preoxygenated: yes  Patient position: sniffing  Mask difficulty assessment: 2 - vent by mask + OA or adjuvant +/- NMBA    Final Airway Details  Final airway type: endotracheal airway      Successful airway: ETT  Cuffed: yes   Successful intubation technique: Video laryngoscopy  Endotracheal tube insertion site: oral  Blade type: Madhavi Funifir.   Blade size: #3  ETT size (mm): 7.0    Cormack-Lehane Classification: grade I - full view of glottis  Placement verified by: capnometry   Measured from: lips  ETT to lips (cm): 21  Number of attempts at approach: 1    Additional Comments  OA #80 with easy mask ventilation  Atraumatic intubation, soft bite block placed

## 2023-10-18 NOTE — OPERATIVE REPORT
[facility]    Operative Note     Michael Wilcoxecklynda Location: OR   CSN [id number] MRN [id number]   Admission Date 10/18/2023 Operation Date 10/18/2023   Attending Physician Nadine Maria MD Operating Physician Jeremy Bowie MD      Preoperative Diagnosis: Diverticulitis [K57.92]     Postoperative Diagnosis: Diverticulitis [K57.92]     Procedure Performed:   Cystoscopy, placement of lighted huyen catheter for intraoperative identification     Primary Surgeon: Jeremy Bowie MD      Assistant: none     Surgical Findings: none     Anesthesia: General     Complications: none     Implants: * No implants in log *     Specimen: none     Drains: 16 F sy catheter     Condition: stable     Estimated Blood Loss: No data recorded     Summary of Case: After consent was obtained and preoperative antibiotics administered the patient was brought into the operating room. Anesthesia was administered and she was placed in the supine position. She was then placed in the dorsolithotomy position. Preoperative antibiotics have been administered. The groin was prepped and draped in the usual sterile standard fashion. 25 Mosotho rigid cystoscope was placed per urethra. The urethra and the bladder demonstrated no abnormalities. The left ureteral orifice was cannulated using a sensor wire and advanced until resistance was met presumably in the collecting system. I then placed a Clayton outside catheter up to 18 to 19 cm or so. The patient is only 5 foot 6 inches. At this point the inner lighted diode was placed and secured in place using a sure seal adapter. 12 Mosotho Sy catheter was in place. The bundle was then connected and tied to each other using 2-0 silk ties. I then taped and using 2 inch silk tape onto the patient's right inner groin. The case was then turned over to  to complete his portion of the operation.   Catheter to be removed at the end of the surgery and leave the Sy catheter in place.     Presley Bhandari MD  10/18/2023  10:26 AM

## 2023-10-18 NOTE — H&P
[facility]    PATIENT'S NAME: Ashanti Duvalсветлана   ATTENDING PHYSICIAN: Darylene Putt, MD   PATIENT ACCOUNT#:   [id number]    LOCATION:  [address] [place] [address] RECORD #:   [id number]       YOB: 1955  ADMISSION DATE:       10/18/2023    HISTORY AND PHYSICAL EXAMINATION    REASON FOR CONSULTATION:  Medical co-management. REASON FOR ADMISSION:  Postlaparoscopic sigmoidectomy for recurrent diverticulitis. HISTORY OF PRESENT ILLNESS:  The patient is a 78-year-old  female with history of recurrent sigmoid diverticulitis and multiple hospitalizations. Latest hospitalization in June 2023. The patient was evaluated by General Surgery and deemed appropriate for laparoscopic sigmoidectomy. Today she was brought in for surgical intervention. Postprocedure, transferred to PACU for further monitoring. PAST MEDICAL HISTORY:  Diverticulosis and recurrent diverticulitis, asthma, possible COPD, gastroesophageal reflux disease, hypertension, hyperlipidemia, irritable bowel syndrome, frequent PVCs including mild cardiomyopathy requiring radiofrequency ablation. PAST SURGICAL HISTORY:  Tonsillectomy, left rotator cuff repair, bilateral knee arthroscopic procedures, bilateral breast lumpectomy, hemorrhoidectomy, left thumb trigger release, appendectomy, and hysterectomy. MEDICATIONS:  Please see medication reconciliation list.    ALLERGIES:  Erythromycin, fish oil, tobramycin, vancomycin, azithromycin; mainly side effects. SOCIAL HISTORY:  Ex-tobacco user. Social alcohol, no drug use. Lives with her family. Independent in her basic activities of daily living. FAMILY HISTORY:  Mother had hypertension. Father had tongue cancer. REVIEW OF SYSTEMS:  Postoperative pain. The patient denies any shortness of breath or chest pain. Other 12-point review of systems negative. PHYSICAL EXAMINATION:    GENERAL:  Alert. Oriented to time, place, and person.   Mild to moderate distress. VITAL SIGNS:  Temperature 98.0, pulse 75, respiratory rate 15, blood pressure 140/85, pulse ox 100% on room air. HEENT:  Atraumatic. Oropharynx clear, moist mucous membranes. Ears, nose normal.  Eyes:  Anicteric sclerae. NECK:  Supple. No lymphadenopathy. Trachea midline. Full range of motion. LUNGS:  Clear to auscultation bilaterally. Normal respiratory effort. HEART:  Regular rate and rhythm. S1, S2 auscultated. No murmur. ABDOMEN:  Soft, nondistended. Hypoactive bowel sounds. Dermabond noted on lower abdomen with ROSAURA surgical drain. EXTREMITIES:  No peripheral edema, clubbing, or cyanosis. NEUROLOGIC:  Motor and sensory intact. ASSESSMENT:    1. Recurrent sigmoid diverticulitis, status post laparoscopic sigmoidectomy with colorectal anastomosis and splenic flexure mobilization with left lighted stent placement by Urology. 2.   Hypertension. 3.   Asthma/chronic obstructive pulmonary disease. PLAN:  The patient will be admitted to general medical floor. Continue home medications, monitor respiratory status, pain control, DVT prophylaxis, monitor her hemodynamic status, clear liquid diet for now. Further recommendations to follow.     Dictated By Gurvinder Esquivel MD  d: 10/18/2023 14:16:44  t: 10/18/2023 14:44:07  Job [id number]  FB/

## 2023-10-19 PROBLEM — Z01.818 PREOP TESTING: Status: ACTIVE | Noted: 2023-10-19

## 2023-10-19 LAB
ANION GAP SERPL CALC-SCNC: 6 MMOL/L (ref 0–18)
BASOPHILS # BLD AUTO: 0.02 X10(3) UL (ref 0–0.2)
BASOPHILS NFR BLD AUTO: 0.3 %
BUN BLD-MCNC: 14 MG/DL (ref 7–18)
BUN/CREAT SERPL: 21.2 (ref 10–20)
CALCIUM BLD-MCNC: 8.5 MG/DL (ref 8.5–10.1)
CHLORIDE SERPL-SCNC: 107 MMOL/L (ref 98–112)
CO2 SERPL-SCNC: 25 MMOL/L (ref 21–32)
CREAT BLD-MCNC: 0.66 MG/DL
DEPRECATED RDW RBC AUTO: 43.4 FL (ref 35.1–46.3)
EGFRCR SERPLBLD CKD-EPI 2021: 96 ML/MIN/1.73M2 (ref 60–?)
EOSINOPHIL # BLD AUTO: 0.01 X10(3) UL (ref 0–0.7)
EOSINOPHIL NFR BLD AUTO: 0.1 %
ERYTHROCYTE [DISTWIDTH] IN BLOOD BY AUTOMATED COUNT: 13.1 % (ref 11–15)
GLUCOSE BLD-MCNC: 97 MG/DL (ref 70–99)
HCT VFR BLD AUTO: 34.2 %
HGB BLD-MCNC: 11.3 G/DL
IMM GRANULOCYTES # BLD AUTO: 0.02 X10(3) UL (ref 0–1)
IMM GRANULOCYTES NFR BLD: 0.3 %
LYMPHOCYTES # BLD AUTO: 1.19 X10(3) UL (ref 1–4)
LYMPHOCYTES NFR BLD AUTO: 16.5 %
MAGNESIUM SERPL-MCNC: 2.3 MG/DL (ref 1.6–2.6)
MCH RBC QN AUTO: 30 PG (ref 26–34)
MCHC RBC AUTO-ENTMCNC: 33 G/DL (ref 31–37)
MCV RBC AUTO: 90.7 FL
MONOCYTES # BLD AUTO: 0.59 X10(3) UL (ref 0.1–1)
MONOCYTES NFR BLD AUTO: 8.2 %
NEUTROPHILS # BLD AUTO: 5.39 X10 (3) UL (ref 1.5–7.7)
NEUTROPHILS # BLD AUTO: 5.39 X10(3) UL (ref 1.5–7.7)
NEUTROPHILS NFR BLD AUTO: 74.6 %
OSMOLALITY SERPL CALC.SUM OF ELEC: 286 MOSM/KG (ref 275–295)
PHOSPHATE SERPL-MCNC: 3.8 MG/DL (ref 2.5–4.9)
PLATELET # BLD AUTO: 234 10(3)UL (ref 150–450)
POTASSIUM SERPL-SCNC: 3.6 MMOL/L (ref 3.5–5.1)
RBC # BLD AUTO: 3.77 X10(6)UL
SODIUM SERPL-SCNC: 138 MMOL/L (ref 136–145)
WBC # BLD AUTO: 7.2 X10(3) UL (ref 4–11)

## 2023-10-19 PROCEDURE — 99233 SBSQ HOSP IP/OBS HIGH 50: CPT | Performed by: INTERNAL MEDICINE

## 2023-10-19 RX ORDER — DEXTROSE MONOHYDRATE, SODIUM CHLORIDE, AND POTASSIUM CHLORIDE 50; 1.49; 4.5 G/1000ML; G/1000ML; G/1000ML
INJECTION, SOLUTION INTRAVENOUS CONTINUOUS
Status: DISCONTINUED | OUTPATIENT
Start: 2023-10-19 | End: 2023-10-23

## 2023-10-19 NOTE — PLAN OF CARE
Patient alert and oriented x4. VSS. On room air. NPO, ice chips/ sips. No nausea. Incisions c/d/I, ROSAURA drain to bulb suction. [place] draining to gravity. Dilaudid prn for pain control, will attempt to minimize narcotics. IVF rate increased due to low UO. Ambulating with 1 assist and RW. Fall precautions in place. Addendum: Pt started on CLD, reported nausea, prn zofran given.     Problem: Patient Centered Care  Goal: Patient preferences are identified and integrated in the patient's plan of care  Description: Interventions:  - What would you like us to know as we care for you?   - Provide timely, complete, and accurate information to patient/family  - Incorporate patient and family knowledge, values, beliefs, and cultural backgrounds into the planning and delivery of care  - Encourage patient/family to participate in care and decision-making at the level they choose  - Honor patient and family perspectives and choices  Outcome: Progressing     Problem: PAIN - ADULT  Goal: Verbalizes/displays adequate comfort level or patient's stated pain goal  Description: INTERVENTIONS:  - Encourage pt to monitor pain and request assistance  - Assess pain using appropriate pain scale  - Administer analgesics based on type and severity of pain and evaluate response  - Implement non-pharmacological measures as appropriate and evaluate response  - Consider cultural and social influences on pain and pain management  - Manage/alleviate anxiety  - Utilize distraction and/or relaxation techniques  - Monitor for opioid side effects  - Notify MD/LIP if interventions unsuccessful or patient reports new pain  - Anticipate increased pain with activity and pre-medicate as appropriate  Outcome: Progressing     Problem: RISK FOR INFECTION - ADULT  Goal: Absence of fever/infection during anticipated neutropenic period  Description: INTERVENTIONS  - Monitor WBC  - Administer growth factors as ordered  - Implement neutropenic guidelines  Outcome: Progressing     Problem: SAFETY ADULT - FALL  Goal: Free from fall injury  Description: INTERVENTIONS:  - Assess pt frequently for physical needs  - Identify cognitive and physical deficits and behaviors that affect risk of falls.   - [place] fall precautions as indicated by assessment.  - Educate pt/family on patient safety including physical limitations  - Instruct pt to call for assistance with activity based on assessment  - Modify environment to reduce risk of injury  - Provide assistive devices as appropriate  - Consider OT/PT consult to assist with strengthening/mobility  - Encourage toileting schedule  Outcome: Progressing     Problem: DISCHARGE PLANNING  Goal: Discharge to home or other facility with appropriate resources  Description: INTERVENTIONS:  - Identify barriers to discharge w/pt and caregiver  - Include patient/family/discharge partner in discharge planning  - Arrange for needed discharge resources and transportation as appropriate  - Identify discharge learning needs (meds, wound care, etc)  - Arrange for interpreters to assist at discharge as needed  - Consider post-discharge preferences of patient/family/discharge partner  - Complete POLST form as appropriate  - Assess patient's ability to be responsible for managing their own health  - Refer to Case Management Department for coordinating discharge planning if the patient needs post-hospital services based on physician/LIP order or complex needs related to functional status, cognitive ability or social support system  Outcome: Progressing

## 2023-10-19 NOTE — PLAN OF CARE
Patient has 5 lap sites and a transverse incision with durmabond open to air. ROSAURA drain in place. Patient having nausea after surgery, dry heaving, no emesis, Zofran given. Patient complains of abdominal pain, Toradol and Dilaudid given. Abdominal binder in place. IS teaching provided, patient using appropriately. Patient was drowsy on arrival to the floor but is more alert as the evening progressed. Taking ice chips, patient is not ready for liquids at this time. [place] draining. IVF in place. Patient asking to ambulate up in room prior to sleeping tonight. Problem: Patient Centered Care  Goal: Patient preferences are identified and integrated in the patient's plan of care  Description: Interventions:  - What would you like us to know as we care for you?  Lives at home with   - Provide timely, complete, and accurate information to patient/family  - Incorporate patient and family knowledge, values, beliefs, and cultural backgrounds into the planning and delivery of care  - Encourage patient/family to participate in care and decision-making at the level they choose  - Honor patient and family perspectives and choices  Outcome: Progressing     Problem: PAIN - ADULT  Goal: Verbalizes/displays adequate comfort level or patient's stated pain goal  Description: INTERVENTIONS:  - Encourage pt to monitor pain and request assistance  - Assess pain using appropriate pain scale  - Administer analgesics based on type and severity of pain and evaluate response  - Implement non-pharmacological measures as appropriate and evaluate response  - Consider cultural and social influences on pain and pain management  - Manage/alleviate anxiety  - Utilize distraction and/or relaxation techniques  - Monitor for opioid side effects  - Notify MD/LIP if interventions unsuccessful or patient reports new pain  - Anticipate increased pain with activity and pre-medicate as appropriate  Outcome: Progressing     Problem: RISK FOR INFECTION - ADULT  Goal: Absence of fever/infection during anticipated neutropenic period  Description: INTERVENTIONS  - Monitor WBC  - Administer growth factors as ordered  - Implement neutropenic guidelines  Outcome: Progressing     Problem: SAFETY ADULT - FALL  Goal: Free from fall injury  Description: INTERVENTIONS:  - Assess pt frequently for physical needs  - Identify cognitive and physical deficits and behaviors that affect risk of falls.   - [place] fall precautions as indicated by assessment.  - Educate pt/family on patient safety including physical limitations  - Instruct pt to call for assistance with activity based on assessment  - Modify environment to reduce risk of injury  - Provide assistive devices as appropriate  - Consider OT/PT consult to assist with strengthening/mobility  - Encourage toileting schedule  Outcome: Progressing     Problem: DISCHARGE PLANNING  Goal: Discharge to home or other facility with appropriate resources  Description: INTERVENTIONS:  - Identify barriers to discharge w/pt and caregiver  - Include patient/family/discharge partner in discharge planning  - Arrange for needed discharge resources and transportation as appropriate  - Identify discharge learning needs (meds, wound care, etc)  - Arrange for interpreters to assist at discharge as needed  - Consider post-discharge preferences of patient/family/discharge partner  - Complete POLST form as appropriate  - Assess patient's ability to be responsible for managing their own health  - Refer to Case Management Department for coordinating discharge planning if the patient needs post-hospital services based on physician/LIP order or complex needs related to functional status, cognitive ability or social support system  Outcome: Progressing

## 2023-10-19 NOTE — PLAN OF CARE
Patient is alert and oriented. Room air. Vital signs stable, monitored Q4. Salinas in place. NPO with ice chips and sips of clears. Up with SBA and a walker. Ambulated. IVF continued. Pain managed with prn dilaudid and Toradol. Prn Reglan given for nausea. Surgical incisions clean/dry/intact with abdominal binder in place. L ROSAURA to bulb suction. Call light and personal belongings within reach. Call light and personal belongings within reach. Safety precautions in place.      Problem: Patient Centered Care  Goal: Patient preferences are identified and integrated in the patient's plan of care  Description: Interventions:  - Provide timely, complete, and accurate information to patient/family  - Incorporate patient and family knowledge, values, beliefs, and cultural backgrounds into the planning and delivery of care  - Encourage patient/family to participate in care and decision-making at the level they choose  - Honor patient and family perspectives and choices  Outcome: Progressing     Problem: PAIN - ADULT  Goal: Verbalizes/displays adequate comfort level or patient's stated pain goal  Description: INTERVENTIONS:  - Encourage pt to monitor pain and request assistance  - Assess pain using appropriate pain scale  - Administer analgesics based on type and severity of pain and evaluate response  - Implement non-pharmacological measures as appropriate and evaluate response  - Consider cultural and social influences on pain and pain management  - Manage/alleviate anxiety  - Utilize distraction and/or relaxation techniques  - Monitor for opioid side effects  - Notify MD/LIP if interventions unsuccessful or patient reports new pain  - Anticipate increased pain with activity and pre-medicate as appropriate  Outcome: Progressing     Problem: RISK FOR INFECTION - ADULT  Goal: Absence of fever/infection during anticipated neutropenic period  Description: INTERVENTIONS  - Monitor WBC  - Administer growth factors as ordered  - Implement neutropenic guidelines  Outcome: Progressing     Problem: SAFETY ADULT - FALL  Goal: Free from fall injury  Description: INTERVENTIONS:  - Assess pt frequently for physical needs  - Identify cognitive and physical deficits and behaviors that affect risk of falls.   - [place] fall precautions as indicated by assessment.  - Educate pt/family on patient safety including physical limitations  - Instruct pt to call for assistance with activity based on assessment  - Modify environment to reduce risk of injury  - Provide assistive devices as appropriate  - Consider OT/PT consult to assist with strengthening/mobility  - Encourage toileting schedule  Outcome: Progressing     Problem: DISCHARGE PLANNING  Goal: Discharge to home or other facility with appropriate resources  Description: INTERVENTIONS:  - Identify barriers to discharge w/pt and caregiver  - Include patient/family/discharge partner in discharge planning  - Arrange for needed discharge resources and transportation as appropriate  - Identify discharge learning needs (meds, wound care, etc)  - Arrange for interpreters to assist at discharge as needed  - Consider post-discharge preferences of patient/family/discharge partner  - Complete POLST form as appropriate  - Assess patient's ability to be responsible for managing their own health  - Refer to Case Management Department for coordinating discharge planning if the patient needs post-hospital services based on physician/LIP order or complex needs related to functional status, cognitive ability or social support system  Outcome: Progressing

## 2023-10-20 LAB
ANION GAP SERPL CALC-SCNC: 4 MMOL/L (ref 0–18)
BASOPHILS # BLD AUTO: 0.02 X10(3) UL (ref 0–0.2)
BASOPHILS NFR BLD AUTO: 0.3 %
BUN BLD-MCNC: 7 MG/DL (ref 7–18)
BUN/CREAT SERPL: 12.5 (ref 10–20)
CALCIUM BLD-MCNC: 8.6 MG/DL (ref 8.5–10.1)
CHLORIDE SERPL-SCNC: 109 MMOL/L (ref 98–112)
CO2 SERPL-SCNC: 27 MMOL/L (ref 21–32)
CREAT BLD-MCNC: 0.56 MG/DL
DEPRECATED RDW RBC AUTO: 40.2 FL (ref 35.1–46.3)
EGFRCR SERPLBLD CKD-EPI 2021: 100 ML/MIN/1.73M2 (ref 60–?)
EOSINOPHIL # BLD AUTO: 0.06 X10(3) UL (ref 0–0.7)
EOSINOPHIL NFR BLD AUTO: 0.9 %
ERYTHROCYTE [DISTWIDTH] IN BLOOD BY AUTOMATED COUNT: 12.5 % (ref 11–15)
GLUCOSE BLD-MCNC: 120 MG/DL (ref 70–99)
HCT VFR BLD AUTO: 35 %
HGB BLD-MCNC: 11.9 G/DL
IMM GRANULOCYTES # BLD AUTO: 0.02 X10(3) UL (ref 0–1)
IMM GRANULOCYTES NFR BLD: 0.3 %
LYMPHOCYTES # BLD AUTO: 1.44 X10(3) UL (ref 1–4)
LYMPHOCYTES NFR BLD AUTO: 21.3 %
MCH RBC QN AUTO: 29.8 PG (ref 26–34)
MCHC RBC AUTO-ENTMCNC: 34 G/DL (ref 31–37)
MCV RBC AUTO: 87.7 FL
MONOCYTES # BLD AUTO: 0.51 X10(3) UL (ref 0.1–1)
MONOCYTES NFR BLD AUTO: 7.6 %
NEUTROPHILS # BLD AUTO: 4.7 X10 (3) UL (ref 1.5–7.7)
NEUTROPHILS # BLD AUTO: 4.7 X10(3) UL (ref 1.5–7.7)
NEUTROPHILS NFR BLD AUTO: 69.6 %
OSMOLALITY SERPL CALC.SUM OF ELEC: 289 MOSM/KG (ref 275–295)
PLATELET # BLD AUTO: 236 10(3)UL (ref 150–450)
POTASSIUM SERPL-SCNC: 3.6 MMOL/L (ref 3.5–5.1)
RBC # BLD AUTO: 3.99 X10(6)UL
SODIUM SERPL-SCNC: 140 MMOL/L (ref 136–145)
WBC # BLD AUTO: 6.8 X10(3) UL (ref 4–11)

## 2023-10-20 PROCEDURE — 99233 SBSQ HOSP IP/OBS HIGH 50: CPT | Performed by: INTERNAL MEDICINE

## 2023-10-20 NOTE — PLAN OF CARE
Patient is alert and oriented. Room air. Salinas in place and draining. Up independently. IVF continued. PRN Toradol given for pain. Prn Reglan given for nausea. Surgical incisions clean/dry/intact. L ROSAURA to bulb suction. Call light and personal belongings within reach. Call light and personal belongings within reach. Safety precautions in place.     Problem: Patient Centered Care  Goal: Patient preferences are identified and integrated in the patient's plan of care  Description: Interventions:  - Provide timely, complete, and accurate information to patient/family  - Incorporate patient and family knowledge, values, beliefs, and cultural backgrounds into the planning and delivery of care  - Encourage patient/family to participate in care and decision-making at the level they choose  - Honor patient and family perspectives and choices  Outcome: Progressing     Problem: PAIN - ADULT  Goal: Verbalizes/displays adequate comfort level or patient's stated pain goal  Description: INTERVENTIONS:  - Encourage pt to monitor pain and request assistance  - Assess pain using appropriate pain scale  - Administer analgesics based on type and severity of pain and evaluate response  - Implement non-pharmacological measures as appropriate and evaluate response  - Consider cultural and social influences on pain and pain management  - Manage/alleviate anxiety  - Utilize distraction and/or relaxation techniques  - Monitor for opioid side effects  - Notify MD/LIP if interventions unsuccessful or patient reports new pain  - Anticipate increased pain with activity and pre-medicate as appropriate  Outcome: Progressing     Problem: RISK FOR INFECTION - ADULT  Goal: Absence of fever/infection during anticipated neutropenic period  Description: INTERVENTIONS  - Monitor WBC  - Administer growth factors as ordered  - Implement neutropenic guidelines  Outcome: Progressing     Problem: SAFETY ADULT - FALL  Goal: Free from fall injury  Description: INTERVENTIONS:  - Assess pt frequently for physical needs  - Identify cognitive and physical deficits and behaviors that affect risk of falls.   - [place] fall precautions as indicated by assessment.  - Educate pt/family on patient safety including physical limitations  - Instruct pt to call for assistance with activity based on assessment  - Modify environment to reduce risk of injury  - Provide assistive devices as appropriate  - Consider OT/PT consult to assist with strengthening/mobility  - Encourage toileting schedule  Outcome: Progressing     Problem: DISCHARGE PLANNING  Goal: Discharge to home or other facility with appropriate resources  Description: INTERVENTIONS:  - Identify barriers to discharge w/pt and caregiver  - Include patient/family/discharge partner in discharge planning  - Arrange for needed discharge resources and transportation as appropriate  - Identify discharge learning needs (meds, wound care, etc)  - Arrange for interpreters to assist at discharge as needed  - Consider post-discharge preferences of patient/family/discharge partner  - Complete POLST form as appropriate  - Assess patient's ability to be responsible for managing their own health  - Refer to Case Management Department for coordinating discharge planning if the patient needs post-hospital services based on physician/LIP order or complex needs related to functional status, cognitive ability or social support system  Outcome: Progressing

## 2023-10-21 LAB
ANION GAP SERPL CALC-SCNC: 6 MMOL/L (ref 0–18)
BASOPHILS # BLD AUTO: 0.03 X10(3) UL (ref 0–0.2)
BASOPHILS NFR BLD AUTO: 0.5 %
BUN BLD-MCNC: 6 MG/DL (ref 7–18)
BUN/CREAT SERPL: 12.5 (ref 10–20)
CALCIUM BLD-MCNC: 8.7 MG/DL (ref 8.5–10.1)
CHLORIDE SERPL-SCNC: 107 MMOL/L (ref 98–112)
CO2 SERPL-SCNC: 25 MMOL/L (ref 21–32)
CREAT BLD-MCNC: 0.48 MG/DL
DEPRECATED RDW RBC AUTO: 40.5 FL (ref 35.1–46.3)
EGFRCR SERPLBLD CKD-EPI 2021: 104 ML/MIN/1.73M2 (ref 60–?)
EOSINOPHIL # BLD AUTO: 0.11 X10(3) UL (ref 0–0.7)
EOSINOPHIL NFR BLD AUTO: 1.7 %
ERYTHROCYTE [DISTWIDTH] IN BLOOD BY AUTOMATED COUNT: 12.4 % (ref 11–15)
GLUCOSE BLD-MCNC: 104 MG/DL (ref 70–99)
HCT VFR BLD AUTO: 37.9 %
HGB BLD-MCNC: 12.5 G/DL
IMM GRANULOCYTES # BLD AUTO: 0.03 X10(3) UL (ref 0–1)
IMM GRANULOCYTES NFR BLD: 0.5 %
LYMPHOCYTES # BLD AUTO: 1.59 X10(3) UL (ref 1–4)
LYMPHOCYTES NFR BLD AUTO: 25.1 %
MCH RBC QN AUTO: 29.4 PG (ref 26–34)
MCHC RBC AUTO-ENTMCNC: 33 G/DL (ref 31–37)
MCV RBC AUTO: 89.2 FL
MONOCYTES # BLD AUTO: 0.59 X10(3) UL (ref 0.1–1)
MONOCYTES NFR BLD AUTO: 9.3 %
NEUTROPHILS # BLD AUTO: 3.99 X10 (3) UL (ref 1.5–7.7)
NEUTROPHILS # BLD AUTO: 3.99 X10(3) UL (ref 1.5–7.7)
NEUTROPHILS NFR BLD AUTO: 62.9 %
OSMOLALITY SERPL CALC.SUM OF ELEC: 284 MOSM/KG (ref 275–295)
PLATELET # BLD AUTO: 244 10(3)UL (ref 150–450)
POTASSIUM SERPL-SCNC: 3.7 MMOL/L (ref 3.5–5.1)
RBC # BLD AUTO: 4.25 X10(6)UL
SODIUM SERPL-SCNC: 138 MMOL/L (ref 136–145)
WBC # BLD AUTO: 6.3 X10(3) UL (ref 4–11)

## 2023-10-21 PROCEDURE — 99233 SBSQ HOSP IP/OBS HIGH 50: CPT | Performed by: INTERNAL MEDICINE

## 2023-10-21 RX ORDER — CARVEDILOL 6.25 MG/1
6.25 TABLET ORAL 2 TIMES DAILY WITH MEALS
Status: DISCONTINUED | OUTPATIENT
Start: 2023-10-21 | End: 2023-10-22

## 2023-10-21 NOTE — PLAN OF CARE
Patient A+Ox4 on room air resting in bed. Call light within reach. Pain medication given as needed. Tessalon given for cough. IV fluids infusing at 100 ml/hr. Tolerating clear liquids. L ROSAURA drain to bulb suction. [place] in place. Elevated BP in morning. MD notified. Manual blood pressure taken; BP still elevated. Per MD, told to move morning blood pressure medication to this time. PO Hydralazine and losartan given. Problem: Patient Centered Care  Goal: Patient preferences are identified and integrated in the patient's plan of care  Description: Interventions:  - What would you like us to know as we care for you?  From home with family  - Provide timely, complete, and accurate information to patient/family  - Incorporate patient and family knowledge, values, beliefs, and cultural backgrounds into the planning and delivery of care  - Encourage patient/family to participate in care and decision-making at the level they choose  - Honor patient and family perspectives and choices  10/21/2023 0108 by Bassam Wells RN  Outcome: Progressing  10/21/2023 0106 by Bassam Wells RN  Outcome: Progressing     Problem: PAIN - ADULT  Goal: Verbalizes/displays adequate comfort level or patient's stated pain goal  Description: INTERVENTIONS:  - Encourage pt to monitor pain and request assistance  - Assess pain using appropriate pain scale  - Administer analgesics based on type and severity of pain and evaluate response  - Implement non-pharmacological measures as appropriate and evaluate response  - Consider cultural and social influences on pain and pain management  - Manage/alleviate anxiety  - Utilize distraction and/or relaxation techniques  - Monitor for opioid side effects  - Notify MD/LIP if interventions unsuccessful or patient reports new pain  - Anticipate increased pain with activity and pre-medicate as appropriate  10/21/2023 0108 by Bassam Wells RN  Outcome: Progressing  10/21/2023 0106 by Cheri Buckner Nishant De La Torre RN  Outcome: Progressing     Problem: RISK FOR INFECTION - ADULT  Goal: Absence of fever/infection during anticipated neutropenic period  Description: INTERVENTIONS  - Monitor WBC  - Administer growth factors as ordered  - Implement neutropenic guidelines  10/21/2023 0108 by Tammy Akins RN  Outcome: Progressing  10/21/2023 0106 by Tammy Akins RN  Outcome: Progressing     Problem: SAFETY ADULT - FALL  Goal: Free from fall injury  Description: INTERVENTIONS:  - Assess pt frequently for physical needs  - Identify cognitive and physical deficits and behaviors that affect risk of falls.   - [place] fall precautions as indicated by assessment.  - Educate pt/family on patient safety including physical limitations  - Instruct pt to call for assistance with activity based on assessment  - Modify environment to reduce risk of injury  - Provide assistive devices as appropriate  - Consider OT/PT consult to assist with strengthening/mobility  - Encourage toileting schedule  10/21/2023 0108 by Tammy Akins RN  Outcome: Progressing  10/21/2023 0106 by Tammy Akins RN  Outcome: Progressing     Problem: DISCHARGE PLANNING  Goal: Discharge to home or other facility with appropriate resources  Description: INTERVENTIONS:  - Identify barriers to discharge w/pt and caregiver  - Include patient/family/discharge partner in discharge planning  - Arrange for needed discharge resources and transportation as appropriate  - Identify discharge learning needs (meds, wound care, etc)  - Arrange for interpreters to assist at discharge as needed  - Consider post-discharge preferences of patient/family/discharge partner  - Complete POLST form as appropriate  - Assess patient's ability to be responsible for managing their own health  - Refer to Case Management Department for coordinating discharge planning if the patient needs post-hospital services based on physician/LIP order or complex needs related to functional status, cognitive ability or social support system  10/21/2023 0108 by Joanie Lake RN  Outcome: Progressing  10/21/2023 0106 by Joanie Lake RN  Outcome: Progressing     Problem: Patient/Family Goals  Goal: Patient/Family Long Term Goal  Description: Patient's Long Term Goal: Discharge home    Interventions:  - Give meds as prescribed  - Manage pain  - Monitor incision sites  - Frequent ambulation  - Frequent deep breathing  - See additional Care Plan goals for specific interventions  Outcome: Progressing  Goal: Patient/Family Short Term Goal  Description: Patient's Short Term Goal: Manage pain    Interventions:   - Give pain meds as needed  - Monitor pain  - Provide non-pharmacological pain relief  - See additional Care Plan goals for specific interventions  Outcome: Progressing

## 2023-10-21 NOTE — PLAN OF CARE
Plan of care reviewed with patient. Tolerating clear liquids. Oxycodone given for pain management with good response. [place] maintained. ROSAURA drain to bulb suction. Ambulating in room. IVF continued. Safety measures in place and call light within reach.

## 2023-10-21 NOTE — PLAN OF CARE
A/O x 4. Blood pressure elevated this morning. MD notified and pt started on carvedilol with good results. Diet advanced to full liquids, tolerated well but poor appetite. Denies nausea. Passing flatus. Sal[place] in place with good UOP. Walking in hallways. Abdominal lap sites clean/dry/intact. Left abd ROSAURA drain with serosanguineous output. IVF infusing. Oxycodone and dilaudid for pain. Bed in lowest position, call light in reach, frequent rounding, nonskid footwear, fall precautions in place.

## 2023-10-22 LAB
ANION GAP SERPL CALC-SCNC: 5 MMOL/L (ref 0–18)
BASOPHILS # BLD AUTO: 0.03 X10(3) UL (ref 0–0.2)
BASOPHILS NFR BLD AUTO: 0.5 %
BUN BLD-MCNC: 7 MG/DL (ref 7–18)
BUN/CREAT SERPL: 13.7 (ref 10–20)
CALCIUM BLD-MCNC: 8.9 MG/DL (ref 8.5–10.1)
CHLORIDE SERPL-SCNC: 107 MMOL/L (ref 98–112)
CO2 SERPL-SCNC: 26 MMOL/L (ref 21–32)
CREAT BLD-MCNC: 0.51 MG/DL
DEPRECATED RDW RBC AUTO: 39.6 FL (ref 35.1–46.3)
EGFRCR SERPLBLD CKD-EPI 2021: 102 ML/MIN/1.73M2 (ref 60–?)
EOSINOPHIL # BLD AUTO: 0.27 X10(3) UL (ref 0–0.7)
EOSINOPHIL NFR BLD AUTO: 4.6 %
ERYTHROCYTE [DISTWIDTH] IN BLOOD BY AUTOMATED COUNT: 12.3 % (ref 11–15)
GLUCOSE BLD-MCNC: 116 MG/DL (ref 70–99)
HCT VFR BLD AUTO: 37.4 %
HGB BLD-MCNC: 12.5 G/DL
IMM GRANULOCYTES # BLD AUTO: 0.03 X10(3) UL (ref 0–1)
IMM GRANULOCYTES NFR BLD: 0.5 %
LYMPHOCYTES # BLD AUTO: 1.94 X10(3) UL (ref 1–4)
LYMPHOCYTES NFR BLD AUTO: 32.9 %
MCH RBC QN AUTO: 29.3 PG (ref 26–34)
MCHC RBC AUTO-ENTMCNC: 33.4 G/DL (ref 31–37)
MCV RBC AUTO: 87.6 FL
MONOCYTES # BLD AUTO: 0.48 X10(3) UL (ref 0.1–1)
MONOCYTES NFR BLD AUTO: 8.1 %
NEUTROPHILS # BLD AUTO: 3.15 X10 (3) UL (ref 1.5–7.7)
NEUTROPHILS # BLD AUTO: 3.15 X10(3) UL (ref 1.5–7.7)
NEUTROPHILS NFR BLD AUTO: 53.4 %
OSMOLALITY SERPL CALC.SUM OF ELEC: 285 MOSM/KG (ref 275–295)
PLATELET # BLD AUTO: 274 10(3)UL (ref 150–450)
POTASSIUM SERPL-SCNC: 3.6 MMOL/L (ref 3.5–5.1)
RBC # BLD AUTO: 4.27 X10(6)UL
SODIUM SERPL-SCNC: 138 MMOL/L (ref 136–145)
WBC # BLD AUTO: 5.9 X10(3) UL (ref 4–11)

## 2023-10-22 PROCEDURE — 99233 SBSQ HOSP IP/OBS HIGH 50: CPT | Performed by: INTERNAL MEDICINE

## 2023-10-22 RX ORDER — CARVEDILOL 12.5 MG/1
12.5 TABLET ORAL 2 TIMES DAILY WITH MEALS
Status: DISCONTINUED | OUTPATIENT
Start: 2023-10-22 | End: 2023-10-24

## 2023-10-22 RX ORDER — DIPHENHYDRAMINE HCL 25 MG
25 CAPSULE ORAL EVERY 6 HOURS PRN
Status: DISCONTINUED | OUTPATIENT
Start: 2023-10-22 | End: 2023-10-24

## 2023-10-22 RX ORDER — LOSARTAN POTASSIUM 100 MG/1
100 TABLET ORAL DAILY
Status: DISCONTINUED | OUTPATIENT
Start: 2023-10-23 | End: 2023-10-24

## 2023-10-22 NOTE — PLAN OF CARE
Pt A&Ox4. On RA. PRN oxy given for pain. PRN benadryl for itching. IV fluids continued. ABD incision with redness surrounding & pt states itching. MD aware of rash. ROSAURA drain intact to bulb suction. [place] catheter intact & draining. Passing gas and small BM during shift per Pt. Up independently. Tolerating full liquid diet. Safety measures in place, call light within reach. Problem: Patient Centered Care  Goal: Patient preferences are identified and integrated in the patient's plan of care  Description: Interventions:  - What would you like us to know as we care for you?  From home with family  - Provide timely, complete, and accurate information to patient/family  - Incorporate patient and family knowledge, values, beliefs, and cultural backgrounds into the planning and delivery of care  - Encourage patient/family to participate in care and decision-making at the level they choose  - Honor patient and family perspectives and choices  Outcome: Progressing     Problem: PAIN - ADULT  Goal: Verbalizes/displays adequate comfort level or patient's stated pain goal  Description: INTERVENTIONS:  - Encourage pt to monitor pain and request assistance  - Assess pain using appropriate pain scale  - Administer analgesics based on type and severity of pain and evaluate response  - Implement non-pharmacological measures as appropriate and evaluate response  - Consider cultural and social influences on pain and pain management  - Manage/alleviate anxiety  - Utilize distraction and/or relaxation techniques  - Monitor for opioid side effects  - Notify MD/LIP if interventions unsuccessful or patient reports new pain  - Anticipate increased pain with activity and pre-medicate as appropriate  Outcome: Progressing     Problem: RISK FOR INFECTION - ADULT  Goal: Absence of fever/infection during anticipated neutropenic period  Description: INTERVENTIONS  - Monitor WBC  - Administer growth factors as ordered  - Implement neutropenic guidelines  Outcome: Progressing     Problem: SAFETY ADULT - FALL  Goal: Free from fall injury  Description: INTERVENTIONS:  - Assess pt frequently for physical needs  - Identify cognitive and physical deficits and behaviors that affect risk of falls.   - [place] fall precautions as indicated by assessment.  - Educate pt/family on patient safety including physical limitations  - Instruct pt to call for assistance with activity based on assessment  - Modify environment to reduce risk of injury  - Provide assistive devices as appropriate  - Consider OT/PT consult to assist with strengthening/mobility  - Encourage toileting schedule  Outcome: Progressing     Problem: DISCHARGE PLANNING  Goal: Discharge to home or other facility with appropriate resources  Description: INTERVENTIONS:  - Identify barriers to discharge w/pt and caregiver  - Include patient/family/discharge partner in discharge planning  - Arrange for needed discharge resources and transportation as appropriate  - Identify discharge learning needs (meds, wound care, etc)  - Arrange for interpreters to assist at discharge as needed  - Consider post-discharge preferences of patient/family/discharge partner  - Complete POLST form as appropriate  - Assess patient's ability to be responsible for managing their own health  - Refer to Case Management Department for coordinating discharge planning if the patient needs post-hospital services based on physician/LIP order or complex needs related to functional status, cognitive ability or social support system  Outcome: Progressing     Problem: Patient/Family Goals  Goal: Patient/Family Long Term Goal  Description: Patient's Long Term Goal: Discharge home    Interventions:  - Give meds as prescribed  - Manage pain  - Monitor incision sites  - Frequent ambulation  - Frequent deep breathing  - See additional Care Plan goals for specific interventions  Outcome: Progressing  Goal: Patient/Family Short Term Goal  Description: Patient's Short Term Goal: Manage pain    Interventions:   - Give pain meds as needed  - Monitor pain  - Provide non-pharmacological pain relief  - See additional Care Plan goals for specific interventions  Outcome: Progressing

## 2023-10-22 NOTE — PLAN OF CARE
Patient A+Ox4 on room air resting in bed. Oxycodone given for pain as needed. L ROSAURA maintained draining serosanguinous fluid. Salinas in place and draining urine. Tolerating full liquid diet. Left PIV removed and R forearm IV placed and infusing fluids at 100 mL/hr. New redness around surgical lap sites and itchiness per patient; MD notified; benadryl ordered and administered. Call light and belongings within reach. Problem: Patient Centered Care  Goal: Patient preferences are identified and integrated in the patient's plan of care  Description: Interventions:  - What would you like us to know as we care for you?  From home with family  - Provide timely, complete, and accurate information to patient/family  - Incorporate patient and family knowledge, values, beliefs, and cultural backgrounds into the planning and delivery of care  - Encourage patient/family to participate in care and decision-making at the level they choose  - Honor patient and family perspectives and choices  Outcome: Progressing     Problem: PAIN - ADULT  Goal: Verbalizes/displays adequate comfort level or patient's stated pain goal  Description: INTERVENTIONS:  - Encourage pt to monitor pain and request assistance  - Assess pain using appropriate pain scale  - Administer analgesics based on type and severity of pain and evaluate response  - Implement non-pharmacological measures as appropriate and evaluate response  - Consider cultural and social influences on pain and pain management  - Manage/alleviate anxiety  - Utilize distraction and/or relaxation techniques  - Monitor for opioid side effects  - Notify MD/LIP if interventions unsuccessful or patient reports new pain  - Anticipate increased pain with activity and pre-medicate as appropriate  Outcome: Progressing     Problem: RISK FOR INFECTION - ADULT  Goal: Absence of fever/infection during anticipated neutropenic period  Description: INTERVENTIONS  - Monitor WBC  - Administer growth factors as ordered  - Implement neutropenic guidelines  Outcome: Progressing     Problem: SAFETY ADULT - FALL  Goal: Free from fall injury  Description: INTERVENTIONS:  - Assess pt frequently for physical needs  - Identify cognitive and physical deficits and behaviors that affect risk of falls.   - [place] fall precautions as indicated by assessment.  - Educate pt/family on patient safety including physical limitations  - Instruct pt to call for assistance with activity based on assessment  - Modify environment to reduce risk of injury  - Provide assistive devices as appropriate  - Consider OT/PT consult to assist with strengthening/mobility  - Encourage toileting schedule  Outcome: Progressing     Problem: DISCHARGE PLANNING  Goal: Discharge to home or other facility with appropriate resources  Description: INTERVENTIONS:  - Identify barriers to discharge w/pt and caregiver  - Include patient/family/discharge partner in discharge planning  - Arrange for needed discharge resources and transportation as appropriate  - Identify discharge learning needs (meds, wound care, etc)  - Arrange for interpreters to assist at discharge as needed  - Consider post-discharge preferences of patient/family/discharge partner  - Complete POLST form as appropriate  - Assess patient's ability to be responsible for managing their own health  - Refer to Case Management Department for coordinating discharge planning if the patient needs post-hospital services based on physician/LIP order or complex needs related to functional status, cognitive ability or social support system  Outcome: Progressing     Problem: Patient/Family Goals  Goal: Patient/Family Long Term Goal  Description: Patient's Long Term Goal: Discharge home    Interventions:  - Give meds as prescribed  - Manage pain  - Monitor incision sites  - Frequent ambulation  - Frequent deep breathing  - See additional Care Plan goals for specific interventions  Outcome: Progressing  Goal: Patient/Family Short Term Goal  Description: Patient's Short Term Goal: Manage pain    Interventions:   - Give pain meds as needed  - Monitor pain  - Provide non-pharmacological pain relief  - See additional Care Plan goals for specific interventions  Outcome: Progressing

## 2023-10-23 LAB
ANION GAP SERPL CALC-SCNC: 6 MMOL/L (ref 0–18)
BASOPHILS # BLD AUTO: 0.06 X10(3) UL (ref 0–0.2)
BASOPHILS NFR BLD AUTO: 1 %
BUN BLD-MCNC: 6 MG/DL (ref 7–18)
BUN/CREAT SERPL: 10.5 (ref 10–20)
CALCIUM BLD-MCNC: 8.8 MG/DL (ref 8.5–10.1)
CHLORIDE SERPL-SCNC: 107 MMOL/L (ref 98–112)
CO2 SERPL-SCNC: 26 MMOL/L (ref 21–32)
CREAT BLD-MCNC: 0.57 MG/DL
DEPRECATED RDW RBC AUTO: 39.2 FL (ref 35.1–46.3)
EGFRCR SERPLBLD CKD-EPI 2021: 100 ML/MIN/1.73M2 (ref 60–?)
EOSINOPHIL # BLD AUTO: 0.33 X10(3) UL (ref 0–0.7)
EOSINOPHIL NFR BLD AUTO: 5.5 %
ERYTHROCYTE [DISTWIDTH] IN BLOOD BY AUTOMATED COUNT: 12.2 % (ref 11–15)
GLUCOSE BLD-MCNC: 105 MG/DL (ref 70–99)
HCT VFR BLD AUTO: 35.2 %
HGB BLD-MCNC: 12 G/DL
IMM GRANULOCYTES # BLD AUTO: 0.03 X10(3) UL (ref 0–1)
IMM GRANULOCYTES NFR BLD: 0.5 %
LYMPHOCYTES # BLD AUTO: 2 X10(3) UL (ref 1–4)
LYMPHOCYTES NFR BLD AUTO: 33.4 %
MCH RBC QN AUTO: 29.6 PG (ref 26–34)
MCHC RBC AUTO-ENTMCNC: 34.1 G/DL (ref 31–37)
MCV RBC AUTO: 86.9 FL
MONOCYTES # BLD AUTO: 0.59 X10(3) UL (ref 0.1–1)
MONOCYTES NFR BLD AUTO: 9.9 %
NEUTROPHILS # BLD AUTO: 2.97 X10 (3) UL (ref 1.5–7.7)
NEUTROPHILS # BLD AUTO: 2.97 X10(3) UL (ref 1.5–7.7)
NEUTROPHILS NFR BLD AUTO: 49.7 %
OSMOLALITY SERPL CALC.SUM OF ELEC: 286 MOSM/KG (ref 275–295)
PLATELET # BLD AUTO: 256 10(3)UL (ref 150–450)
POTASSIUM SERPL-SCNC: 3.9 MMOL/L (ref 3.5–5.1)
RBC # BLD AUTO: 4.05 X10(6)UL
SODIUM SERPL-SCNC: 139 MMOL/L (ref 136–145)
WBC # BLD AUTO: 6 X10(3) UL (ref 4–11)

## 2023-10-23 PROCEDURE — 99233 SBSQ HOSP IP/OBS HIGH 50: CPT | Performed by: INTERNAL MEDICINE

## 2023-10-23 RX ORDER — DIAPER,BRIEF,INFANT-TODD,DISP
EACH MISCELLANEOUS 3 TIMES DAILY PRN
Status: DISCONTINUED | OUTPATIENT
Start: 2023-10-23 | End: 2023-10-24

## 2023-10-23 RX ORDER — DIAPER,BRIEF,INFANT-TODD,DISP
EACH MISCELLANEOUS 2 TIMES DAILY
Status: DISCONTINUED | OUTPATIENT
Start: 2023-10-23 | End: 2023-10-23

## 2023-10-23 NOTE — PLAN OF CARE
Patient A+Ox4 on room air resting in bed. Call light within reach. Oxycodone given as needed. Incision sites continue to be red and itchy due to possible sensitivity to surgical glue; otherwise clean/dry/intact; benadryl given. IV fluids infusing. L ROSAURA in place and draining to bulb suction serosanguinous fluid. Salinas in place and draining urine. Problem: Patient Centered Care  Goal: Patient preferences are identified and integrated in the patient's plan of care  Description: Interventions:  - What would you like us to know as we care for you?  From home with family  - Provide timely, complete, and accurate information to patient/family  - Incorporate patient and family knowledge, values, beliefs, and cultural backgrounds into the planning and delivery of care  - Encourage patient/family to participate in care and decision-making at the level they choose  - Honor patient and family perspectives and choices  Outcome: Progressing     Problem: PAIN - ADULT  Goal: Verbalizes/displays adequate comfort level or patient's stated pain goal  Description: INTERVENTIONS:  - Encourage pt to monitor pain and request assistance  - Assess pain using appropriate pain scale  - Administer analgesics based on type and severity of pain and evaluate response  - Implement non-pharmacological measures as appropriate and evaluate response  - Consider cultural and social influences on pain and pain management  - Manage/alleviate anxiety  - Utilize distraction and/or relaxation techniques  - Monitor for opioid side effects  - Notify MD/LIP if interventions unsuccessful or patient reports new pain  - Anticipate increased pain with activity and pre-medicate as appropriate  Outcome: Progressing     Problem: RISK FOR INFECTION - ADULT  Goal: Absence of fever/infection during anticipated neutropenic period  Description: INTERVENTIONS  - Monitor WBC  - Administer growth factors as ordered  - Implement neutropenic guidelines  Outcome: Progressing     Problem: SAFETY ADULT - FALL  Goal: Free from fall injury  Description: INTERVENTIONS:  - Assess pt frequently for physical needs  - Identify cognitive and physical deficits and behaviors that affect risk of falls.   - [place] fall precautions as indicated by assessment.  - Educate pt/family on patient safety including physical limitations  - Instruct pt to call for assistance with activity based on assessment  - Modify environment to reduce risk of injury  - Provide assistive devices as appropriate  - Consider OT/PT consult to assist with strengthening/mobility  - Encourage toileting schedule  Outcome: Progressing     Problem: DISCHARGE PLANNING  Goal: Discharge to home or other facility with appropriate resources  Description: INTERVENTIONS:  - Identify barriers to discharge w/pt and caregiver  - Include patient/family/discharge partner in discharge planning  - Arrange for needed discharge resources and transportation as appropriate  - Identify discharge learning needs (meds, wound care, etc)  - Arrange for interpreters to assist at discharge as needed  - Consider post-discharge preferences of patient/family/discharge partner  - Complete POLST form as appropriate  - Assess patient's ability to be responsible for managing their own health  - Refer to Case Management Department for coordinating discharge planning if the patient needs post-hospital services based on physician/LIP order or complex needs related to functional status, cognitive ability or social support system  Outcome: Progressing     Problem: Patient/Family Goals  Goal: Patient/Family Long Term Goal  Description: Patient's Long Term Goal: Discharge home    Interventions:  - Give meds as prescribed  - Manage pain  - Monitor incision sites  - Frequent ambulation  - Frequent deep breathing  - See additional Care Plan goals for specific interventions  Outcome: Progressing  Goal: Patient/Family Short Term Goal  Description: Patient's Short Term Goal: Manage pain    Interventions:   - Give pain meds as needed  - Monitor pain  - Provide non-pharmacological pain relief  - See additional Care Plan goals for specific interventions  Outcome: Progressing

## 2023-10-23 NOTE — PLAN OF CARE
Pt A&Ox4. On Ra. PRN oxy for pain. PRN benadryl for itching to ABD. ABD incisions with redness & pruritus. MD aware. Attempted to remove dermabond glue per PA's order to improve itching. ROSAURA to LLQ to bulb suction. [place] removed, due to void. Passing gas, no BM during shift. Tolerating low fiber soft diet. Up independently. Plan for discharge tomorrow. Safety measures in place, call light within reach. Problem: Patient Centered Care  Goal: Patient preferences are identified and integrated in the patient's plan of care  Description: Interventions:  - What would you like us to know as we care for you?  From home with family  - Provide timely, complete, and accurate information to patient/family  - Incorporate patient and family knowledge, values, beliefs, and cultural backgrounds into the planning and delivery of care  - Encourage patient/family to participate in care and decision-making at the level they choose  - Honor patient and family perspectives and choices  Outcome: Progressing     Problem: PAIN - ADULT  Goal: Verbalizes/displays adequate comfort level or patient's stated pain goal  Description: INTERVENTIONS:  - Encourage pt to monitor pain and request assistance  - Assess pain using appropriate pain scale  - Administer analgesics based on type and severity of pain and evaluate response  - Implement non-pharmacological measures as appropriate and evaluate response  - Consider cultural and social influences on pain and pain management  - Manage/alleviate anxiety  - Utilize distraction and/or relaxation techniques  - Monitor for opioid side effects  - Notify MD/LIP if interventions unsuccessful or patient reports new pain  - Anticipate increased pain with activity and pre-medicate as appropriate  Outcome: Progressing     Problem: RISK FOR INFECTION - ADULT  Goal: Absence of fever/infection during anticipated neutropenic period  Description: INTERVENTIONS  - Monitor WBC  - Administer growth factors as ordered  - Implement neutropenic guidelines  Outcome: Progressing     Problem: SAFETY ADULT - FALL  Goal: Free from fall injury  Description: INTERVENTIONS:  - Assess pt frequently for physical needs  - Identify cognitive and physical deficits and behaviors that affect risk of falls.   - [place] fall precautions as indicated by assessment.  - Educate pt/family on patient safety including physical limitations  - Instruct pt to call for assistance with activity based on assessment  - Modify environment to reduce risk of injury  - Provide assistive devices as appropriate  - Consider OT/PT consult to assist with strengthening/mobility  - Encourage toileting schedule  Outcome: Progressing     Problem: DISCHARGE PLANNING  Goal: Discharge to home or other facility with appropriate resources  Description: INTERVENTIONS:  - Identify barriers to discharge w/pt and caregiver  - Include patient/family/discharge partner in discharge planning  - Arrange for needed discharge resources and transportation as appropriate  - Identify discharge learning needs (meds, wound care, etc)  - Arrange for interpreters to assist at discharge as needed  - Consider post-discharge preferences of patient/family/discharge partner  - Complete POLST form as appropriate  - Assess patient's ability to be responsible for managing their own health  - Refer to Case Management Department for coordinating discharge planning if the patient needs post-hospital services based on physician/LIP order or complex needs related to functional status, cognitive ability or social support system  Outcome: Progressing     Problem: Patient/Family Goals  Goal: Patient/Family Long Term Goal  Description: Patient's Long Term Goal: Discharge home    Interventions:  - Give meds as prescribed  - Manage pain  - Monitor incision sites  - Frequent ambulation  - Frequent deep breathing  - See additional Care Plan goals for specific interventions  Outcome: Progressing  Goal: Patient/Family Short Term Goal  Description: Patient's Short Term Goal: Manage pain    Interventions:   - Give pain meds as needed  - Monitor pain  - Provide non-pharmacological pain relief  - See additional Care Plan goals for specific interventions  Outcome: Progressing

## 2023-10-24 VITALS
TEMPERATURE: 98 F | WEIGHT: 166.5 LBS | RESPIRATION RATE: 18 BRPM | OXYGEN SATURATION: 97 % | BODY MASS INDEX: 26.76 KG/M2 | HEIGHT: 66 IN | HEART RATE: 69 BPM | DIASTOLIC BLOOD PRESSURE: 75 MMHG | SYSTOLIC BLOOD PRESSURE: 126 MMHG

## 2023-10-24 LAB
ANION GAP SERPL CALC-SCNC: 7 MMOL/L (ref 0–18)
BASOPHILS # BLD AUTO: 0.06 X10(3) UL (ref 0–0.2)
BASOPHILS NFR BLD AUTO: 1 %
BUN BLD-MCNC: 12 MG/DL (ref 7–18)
BUN/CREAT SERPL: 19.4 (ref 10–20)
CALCIUM BLD-MCNC: 9.1 MG/DL (ref 8.5–10.1)
CHLORIDE SERPL-SCNC: 103 MMOL/L (ref 98–112)
CO2 SERPL-SCNC: 26 MMOL/L (ref 21–32)
CREAT BLD-MCNC: 0.62 MG/DL
DEPRECATED RDW RBC AUTO: 38.5 FL (ref 35.1–46.3)
EGFRCR SERPLBLD CKD-EPI 2021: 98 ML/MIN/1.73M2 (ref 60–?)
EOSINOPHIL # BLD AUTO: 0.3 X10(3) UL (ref 0–0.7)
EOSINOPHIL NFR BLD AUTO: 4.8 %
ERYTHROCYTE [DISTWIDTH] IN BLOOD BY AUTOMATED COUNT: 12.1 % (ref 11–15)
GLUCOSE BLD-MCNC: 98 MG/DL (ref 70–99)
HCT VFR BLD AUTO: 36 %
HGB BLD-MCNC: 12.3 G/DL
IMM GRANULOCYTES # BLD AUTO: 0.06 X10(3) UL (ref 0–1)
IMM GRANULOCYTES NFR BLD: 1 %
LYMPHOCYTES # BLD AUTO: 1.8 X10(3) UL (ref 1–4)
LYMPHOCYTES NFR BLD AUTO: 28.8 %
MCH RBC QN AUTO: 29.4 PG (ref 26–34)
MCHC RBC AUTO-ENTMCNC: 34.2 G/DL (ref 31–37)
MCV RBC AUTO: 86.1 FL
MONOCYTES # BLD AUTO: 0.65 X10(3) UL (ref 0.1–1)
MONOCYTES NFR BLD AUTO: 10.4 %
NEUTROPHILS # BLD AUTO: 3.38 X10 (3) UL (ref 1.5–7.7)
NEUTROPHILS # BLD AUTO: 3.38 X10(3) UL (ref 1.5–7.7)
NEUTROPHILS NFR BLD AUTO: 54 %
OSMOLALITY SERPL CALC.SUM OF ELEC: 282 MOSM/KG (ref 275–295)
PLATELET # BLD AUTO: 279 10(3)UL (ref 150–450)
POTASSIUM SERPL-SCNC: 3.8 MMOL/L (ref 3.5–5.1)
RBC # BLD AUTO: 4.18 X10(6)UL
SODIUM SERPL-SCNC: 136 MMOL/L (ref 136–145)
WBC # BLD AUTO: 6.3 X10(3) UL (ref 4–11)

## 2023-10-24 PROCEDURE — 99239 HOSP IP/OBS DSCHRG MGMT >30: CPT | Performed by: INTERNAL MEDICINE

## 2023-10-24 RX ORDER — LOSARTAN POTASSIUM 100 MG/1
100 TABLET ORAL DAILY
Qty: 30 TABLET | Refills: 0 | Status: SHIPPED | OUTPATIENT
Start: 2023-10-25

## 2023-10-24 RX ORDER — NALOXONE HYDROCHLORIDE 4 MG/.1ML
4 SPRAY NASAL AS NEEDED
Qty: 1 KIT | Refills: 0 | Status: SHIPPED | OUTPATIENT
Start: 2023-10-24

## 2023-10-24 RX ORDER — BENZONATATE 100 MG/1
200 CAPSULE ORAL 3 TIMES DAILY PRN
Status: DISCONTINUED | OUTPATIENT
Start: 2023-10-24 | End: 2023-10-24

## 2023-10-24 RX ORDER — CARVEDILOL 12.5 MG/1
12.5 TABLET ORAL 2 TIMES DAILY WITH MEALS
Qty: 60 TABLET | Refills: 0 | Status: SHIPPED | OUTPATIENT
Start: 2023-10-24

## 2023-10-24 RX ORDER — OXYCODONE HYDROCHLORIDE 5 MG/1
5 TABLET ORAL EVERY 6 HOURS PRN
Qty: 5 TABLET | Refills: 0 | Status: SHIPPED | OUTPATIENT
Start: 2023-10-24

## 2023-10-24 NOTE — PLAN OF CARE
Patient is alert and orientated x4. Tolerating fiber soft diet. PRN oxy for pain. PRN Benadryl given for itching to her abd. Left ROSAURA to LLQ to bulb suction. Call light with in reach. All safety measures in place. Problem: Patient Centered Care  Goal: Patient preferences are identified and integrated in the patient's plan of care  Description: Interventions:  - What would you like us to know as we care for you?  From home with family  - Provide timely, complete, and accurate information to patient/family  - Incorporate patient and family knowledge, values, beliefs, and cultural backgrounds into the planning and delivery of care  - Encourage patient/family to participate in care and decision-making at the level they choose  - Honor patient and family perspectives and choices  Outcome: Progressing     Problem: PAIN - ADULT  Goal: Verbalizes/displays adequate comfort level or patient's stated pain goal  Description: INTERVENTIONS:  - Encourage pt to monitor pain and request assistance  - Assess pain using appropriate pain scale  - Administer analgesics based on type and severity of pain and evaluate response  - Implement non-pharmacological measures as appropriate and evaluate response  - Consider cultural and social influences on pain and pain management  - Manage/alleviate anxiety  - Utilize distraction and/or relaxation techniques  - Monitor for opioid side effects  - Notify MD/LIP if interventions unsuccessful or patient reports new pain  - Anticipate increased pain with activity and pre-medicate as appropriate  Outcome: Progressing     Problem: RISK FOR INFECTION - ADULT  Goal: Absence of fever/infection during anticipated neutropenic period  Description: INTERVENTIONS  - Monitor WBC  - Administer growth factors as ordered  - Implement neutropenic guidelines  Outcome: Progressing     Problem: SAFETY ADULT - FALL  Goal: Free from fall injury  Description: INTERVENTIONS:  - Assess pt frequently for physical needs  - Identify cognitive and physical deficits and behaviors that affect risk of falls.   - [place] fall precautions as indicated by assessment.  - Educate pt/family on patient safety including physical limitations  - Instruct pt to call for assistance with activity based on assessment  - Modify environment to reduce risk of injury  - Provide assistive devices as appropriate  - Consider OT/PT consult to assist with strengthening/mobility  - Encourage toileting schedule  Outcome: Progressing     Problem: DISCHARGE PLANNING  Goal: Discharge to home or other facility with appropriate resources  Description: INTERVENTIONS:  - Identify barriers to discharge w/pt and caregiver  - Include patient/family/discharge partner in discharge planning  - Arrange for needed discharge resources and transportation as appropriate  - Identify discharge learning needs (meds, wound care, etc)  - Arrange for interpreters to assist at discharge as needed  - Consider post-discharge preferences of patient/family/discharge partner  - Complete POLST form as appropriate  - Assess patient's ability to be responsible for managing their own health  - Refer to Case Management Department for coordinating discharge planning if the patient needs post-hospital services based on physician/LIP order or complex needs related to functional status, cognitive ability or social support system  Outcome: Progressing     Problem: Patient/Family Goals  Goal: Patient/Family Long Term Goal  Description: Patient's Long Term Goal: Discharge home    Interventions:  - Give meds as prescribed  - Manage pain  - Monitor incision sites  - Frequent ambulation  - Frequent deep breathing  - See additional Care Plan goals for specific interventions  Outcome: Progressing  Goal: Patient/Family Short Term Goal  Description: Patient's Short Term Goal: Manage pain    Interventions:   - Give pain meds as needed  - Monitor pain  - Provide non-pharmacological pain relief  - See additional Care Plan goals for specific interventions  Outcome: Progressing

## 2023-10-24 NOTE — DISCHARGE INSTRUCTIONS
Home Care Instructions    LAPAROSCOPIC SIGMOIDECTOMY    1. You have incisions with absorbable sutures underneath the skin so no suture removal are needed. 2. You can shower the day after surgery. The incisions can get wet with water and soap. Just pat your incisions dry after showering. Avoid soaking in a bath tub for one week. Avoid heavy lifting (greater than 10 lbs or anything heavier than a gallon of milk) for six weeks after surgery. 3. Be up and around when you are home. The more you walk, the faster your recovery will be.    4. You may have an abdominal binder (medical girdle). Wear it when you are up and moving around. The bottom of the binder should cover a little of your hip bones to provide support to your lower abdomen. Avoid wearing the binder too high as it may make your discomfort worse. 5. Take pain medications around the clock for the first few days after surgery regardless if you have pain or not. The pain medications take about 30 minutes to work so if you wait until you experience pain, then you might be uncomfortable during those 30 minutes. 6.  A well known side effect of pain medication is constipation. It is the number 1, 2, and 3 reason why patients call me after surgery. Adequate hydration and stool softeners are ways to minimize the risk of constipation after surgery. Drink a lot of fluid when you are at home. Check your urine color. If it's dark, then you are dehydrated and need to drink more water. Take as many stool softeners (morning, noon, evening) as you need to have about one bowel movement a day. Don't let four or five days go by without a bowel movement. If that occurs, then you might need a rectal suppository or an enema to treat the constipation. 7.  You may drive when you are no longer taking prescription pain medications with narcotics.   If your degree of discomfort is minimal, extra strength tylenol alternating with ibuprofen could be used as necessary. 8. Please contact the office [phone]) to schedule a clinic visit approximately two weeks after surgery. 9. Signs and symptoms of post-operative problems include abdominal pain associated with nausea and/or vomiting, fever, chills, excessive drainage or pain at the incision sites, leg swelling/pain, or chest pain. Contact our office immediately if these signs or symptoms occur. 10. If you have any problems or questions please contact me at any time day or night. My cell phone number is [phone].      - follow up in 1-2 weeks with Dr. Ghazal Rocha for a postoperative appointment.

## 2023-10-31 ENCOUNTER — OFFICE VISIT (OUTPATIENT)
Dept: SURGERY | Facility: CLINIC | Age: 68
End: 2023-10-31

## 2023-10-31 VITALS — BODY MASS INDEX: 26.68 KG/M2 | WEIGHT: 166 LBS | HEIGHT: 66 IN

## 2023-10-31 DIAGNOSIS — Z09 POSTOPERATIVE EXAMINATION: Primary | ICD-10-CM

## 2023-10-31 PROCEDURE — 1111F DSCHRG MED/CURRENT MED MERGE: CPT | Performed by: SURGERY

## 2023-10-31 PROCEDURE — 3008F BODY MASS INDEX DOCD: CPT | Performed by: SURGERY

## 2023-10-31 PROCEDURE — 99024 POSTOP FOLLOW-UP VISIT: CPT | Performed by: SURGERY

## 2023-11-09 ENCOUNTER — TELEPHONE (OUTPATIENT)
Dept: SURGERY | Facility: CLINIC | Age: 68
End: 2023-11-09

## 2023-11-09 NOTE — TELEPHONE ENCOUNTER
Patient indicates she went back to work Monday 11/6 and still having pain. Patient would like note to return to work, but would like the note to indicate that  if she is still feeling discomfort she can have the option to work from home. *patient request to send response through [employer] with the option for her to reply back if needed, thanks.

## 2023-12-05 ENCOUNTER — OFFICE VISIT (OUTPATIENT)
Dept: SURGERY | Facility: CLINIC | Age: 68
End: 2023-12-05

## 2023-12-05 ENCOUNTER — LAB ENCOUNTER (OUTPATIENT)
Dept: LAB | Facility: HOSPITAL | Age: 68
End: 2023-12-05
Attending: SURGERY
Payer: COMMERCIAL

## 2023-12-05 VITALS — WEIGHT: 166 LBS | BODY MASS INDEX: 27 KG/M2

## 2023-12-05 DIAGNOSIS — Z09 POSTOPERATIVE EXAMINATION: ICD-10-CM

## 2023-12-05 DIAGNOSIS — Z09 POSTOPERATIVE EXAMINATION: Primary | ICD-10-CM

## 2023-12-05 LAB
BASOPHILS # BLD AUTO: 0.09 X10(3) UL (ref 0–0.2)
BASOPHILS NFR BLD AUTO: 1.3 %
DEPRECATED RDW RBC AUTO: 41.3 FL (ref 35.1–46.3)
EOSINOPHIL # BLD AUTO: 0.06 X10(3) UL (ref 0–0.7)
EOSINOPHIL NFR BLD AUTO: 0.9 %
ERYTHROCYTE [DISTWIDTH] IN BLOOD BY AUTOMATED COUNT: 12.9 % (ref 11–15)
HCT VFR BLD AUTO: 39.5 %
HGB BLD-MCNC: 13.6 G/DL
IMM GRANULOCYTES # BLD AUTO: 0.03 X10(3) UL (ref 0–1)
IMM GRANULOCYTES NFR BLD: 0.4 %
LYMPHOCYTES # BLD AUTO: 2 X10(3) UL (ref 1–4)
LYMPHOCYTES NFR BLD AUTO: 29.3 %
MCH RBC QN AUTO: 30.2 PG (ref 26–34)
MCHC RBC AUTO-ENTMCNC: 34.4 G/DL (ref 31–37)
MCV RBC AUTO: 87.8 FL
MONOCYTES # BLD AUTO: 0.49 X10(3) UL (ref 0.1–1)
MONOCYTES NFR BLD AUTO: 7.2 %
NEUTROPHILS # BLD AUTO: 4.16 X10 (3) UL (ref 1.5–7.7)
NEUTROPHILS # BLD AUTO: 4.16 X10(3) UL (ref 1.5–7.7)
NEUTROPHILS NFR BLD AUTO: 60.9 %
PLATELET # BLD AUTO: 309 10(3)UL (ref 150–450)
RBC # BLD AUTO: 4.5 X10(6)UL
WBC # BLD AUTO: 6.8 X10(3) UL (ref 4–11)

## 2023-12-05 PROCEDURE — 85025 COMPLETE CBC W/AUTO DIFF WBC: CPT

## 2023-12-05 PROCEDURE — 36415 COLL VENOUS BLD VENIPUNCTURE: CPT

## 2023-12-05 PROCEDURE — 99024 POSTOP FOLLOW-UP VISIT: CPT | Performed by: SURGERY

## 2023-12-17 ENCOUNTER — HOSPITAL ENCOUNTER (OUTPATIENT)
Dept: MRI IMAGING | Age: 68
End: 2023-12-17
Attending: ORTHOPAEDIC SURGERY
Payer: COMMERCIAL

## 2023-12-17 ENCOUNTER — HOSPITAL ENCOUNTER (OUTPATIENT)
Dept: MRI IMAGING | Age: 68
Discharge: HOME | End: 2023-12-17
Attending: ORTHOPAEDIC SURGERY
Payer: COMMERCIAL

## 2023-12-17 DIAGNOSIS — M25.511 ACUTE PAIN OF RIGHT SHOULDER: ICD-10-CM

## 2023-12-17 PROCEDURE — 73221 MRI JOINT UPR EXTREM W/O DYE: CPT | Performed by: ORTHOPAEDIC SURGERY

## 2024-01-02 ENCOUNTER — HOSPITAL ENCOUNTER (OUTPATIENT)
Dept: NUTRITION | Facility: HOSPITAL | Age: 69
Discharge: HOME | End: 2024-01-02
Attending: INTERNAL MEDICINE
Payer: COMMERCIAL

## 2024-01-02 DIAGNOSIS — K57.90 DIVERTICULOSIS: Primary | ICD-10-CM

## 2024-01-02 PROCEDURE — 97802 MEDICAL NUTRITION INDIV IN: CPT | Performed by: DIETITIAN, REGISTERED

## 2024-01-02 NOTE — PROGRESS NOTES
Nutrition Assessment    Sandrine Oleary is a 68 year old female.    Referred by: Attending  Referring Physician Name: Aleksey Anderson    Assessment     Medical Nutrition Therapy Comment: Diverticulitis  Visit Information: Initial Visit 1/2/24  30 minutes spent with patient    ANTHROPOMETRICS  HT: 5' 6\"  WT: 166 lb   BMI: 26  BMI CLASSIFICATION: 25-29.9 kg/m2 - overweight      SIGNIFICANT MEDICAL Hx  Significant Past Medical Hx: Diverticulitis episodes, 12 times. s/p lap sigmoidectomy, HTN, COPD  Pertinent Medications:   Current Outpatient Medications:     losartan 100 MG Oral Tab, Take 1 tablet (100 mg total) by mouth daily., Disp: 30 tablet, Rfl: 0    carvedilol 12.5 MG Oral Tab, Take 1 tablet (12.5 mg total) by mouth 2 (two) times daily with meals., Disp: 60 tablet, Rfl: 0    oxyCODONE 5 MG Oral Tab, Take 1 tablet (5 mg total) by mouth every 6 (six) hours as needed., Disp: 5 tablet, Rfl: 0    Naloxone HCl 4 MG/0.1ML Nasal Liquid, 4 mg by Nasal route as needed. If patient remains unresponsive, repeat dose in other nostril 2-5 minutes after first dose., Disp: 1 kit, Rfl: 0    hydrALAZINE 100 MG Oral Tab, Take 1 tablet (100 mg total) by mouth 2 (two) times daily., Disp: 60 tablet, Rfl: 0    montelukast 10 MG Oral Tab, Take 1 tablet (10 mg total) by mouth nightly., Disp: , Rfl:     Esomeprazole Magnesium 20 MG Oral Capsule Delayed Release, Take 1 capsule (20 mg total) by mouth every morning before breakfast., Disp: , Rfl:     Fexofenadine HCl 60 MG Oral Tab, Take 1 tablet (60 mg total) by mouth daily., Disp: , Rfl:     fluticasone-Umeclidin-Vilant (TRELEGY ELLIPTA) 200-62.5-25 MCG/INH Inhalation Aerosol Powder, Breath Activated, Inhale 1 puff into the lungs daily., Disp: , Rfl:     ALBUTEROL SULFATE HFA IN, Inhale 2 puffs into the lungs as needed.  , Disp: , Rfl:     benzonatate 200 MG Oral Cap, Take 1 capsule (200 mg total) by mouth in the morning and 1 capsule (200 mg total) before bedtime., Disp: , Rfl:      RESTASIS 0.05 % Ophthalmic Emulsion, Place 1 drop into both eyes daily., Disp: , Rfl: 4    ipratropium-albuterol 0.5-2.5 (3) MG/3ML Inhalation Solution, Take 3 mL by nebulization as needed., Disp: , Rfl:     DIET HISTORY  Number of meals per day: 3  Number of snacks per day: 1  Comment: Patient met with dietitian to discuss a high fiber. She reports 12 episodes of diverticulitis in the past. She had a sigmoid resection on 10/18/23 and has healed well with no abdominal pain. She is having regular bowel movements and states she feels well. Discussed a high fiber diet to prevent future incidents of diverticulitis.    PHYSICAL ACTIVITY  Type: walk and bike  Duration: 30 minutes  Frequency: per day  Physical Assessment: Meets goals for heart health.    Nutrition Diagnosis:  Altered GI function due to history of diverticulitis as evidenced by recent sigmoidectomy    Intervention     Nutrition Education: Recommended Modification  Nutrition/Diet Handouts Given: GI Handouts from Online Diet Manual:  High Fiber      NUTRITION PRESCRIPTION:      Needs:  1600 Calorie     70-80 gm Protein      Oral Diet Prescription: High Fiber      Goals     Nutrition Goals:   Increase dietary fiber to 25-30 grams per day.  Drink 80 ounces of fluid per day.  Choose more whole grains, beans, fruits and vegetables.  Add psyllium fiber, 1 tables spoon per day.    Recommendation to MD: none at this time    Assessment of Ability/Barriers     Patient and/or Family Will: Verbalize Understanding    Patient and/or Family Ability to Learn: Retain Information    Readiness to Learn: Motivated    Barriers to Learning: None      1/2/2024  Gini Stevenson RD

## 2024-01-26 ENCOUNTER — HOSPITAL ENCOUNTER (OUTPATIENT)
Dept: CT IMAGING | Age: 69
Discharge: HOME | End: 2024-01-26
Attending: ALLERGY & IMMUNOLOGY
Payer: COMMERCIAL

## 2024-01-26 DIAGNOSIS — R05.3 CHRONIC COUGH: ICD-10-CM

## 2024-01-26 DIAGNOSIS — J44.9 COPD (CHRONIC OBSTRUCTIVE PULMONARY DISEASE) (HCC): ICD-10-CM

## 2024-01-26 DIAGNOSIS — R91.8 PULMONARY NODULES: ICD-10-CM

## 2024-01-26 PROCEDURE — 71250 CT THORAX DX C-: CPT | Performed by: ALLERGY & IMMUNOLOGY

## 2024-03-15 DIAGNOSIS — I65.23 CAROTID STENOSIS, ASYMPTOMATIC, BILATERAL: Primary | ICD-10-CM

## 2024-03-18 ENCOUNTER — ORDER TRANSCRIPTION (OUTPATIENT)
Dept: ADMINISTRATIVE | Facility: HOSPITAL | Age: 69
End: 2024-03-18

## 2024-03-18 DIAGNOSIS — J44.9 COPD (CHRONIC OBSTRUCTIVE PULMONARY DISEASE) (HCC): Primary | ICD-10-CM

## 2024-03-18 DIAGNOSIS — R05.9 COUGH: ICD-10-CM

## 2024-03-18 DIAGNOSIS — J45.909 ASTHMA (HCC): ICD-10-CM

## 2024-03-20 ENCOUNTER — LAB ENCOUNTER (OUTPATIENT)
Dept: LAB | Facility: HOSPITAL | Age: 69
End: 2024-03-20
Attending: INTERNAL MEDICINE
Payer: COMMERCIAL

## 2024-03-20 DIAGNOSIS — I49.3 VENTRICULAR PREMATURE BEATS: ICD-10-CM

## 2024-03-20 DIAGNOSIS — R09.89 CAROTID BRUIT: ICD-10-CM

## 2024-03-20 DIAGNOSIS — I70.0 ATHEROSCLEROSIS OF AORTA (HCC): Primary | ICD-10-CM

## 2024-03-20 DIAGNOSIS — I10 HYPERTENSION: ICD-10-CM

## 2024-03-20 LAB
ALBUMIN SERPL-MCNC: 4.3 G/DL (ref 3.2–4.8)
ALBUMIN/GLOB SERPL: 1.8 {RATIO} (ref 1–2)
ALP LIVER SERPL-CCNC: 57 U/L
ALT SERPL-CCNC: 17 U/L
ANION GAP SERPL CALC-SCNC: 8 MMOL/L (ref 0–18)
AST SERPL-CCNC: 19 U/L (ref ?–34)
BILIRUB SERPL-MCNC: 0.9 MG/DL (ref 0.2–1.1)
BUN BLD-MCNC: 13 MG/DL (ref 9–23)
BUN/CREAT SERPL: 18.6 (ref 10–20)
CALCIUM BLD-MCNC: 9.6 MG/DL (ref 8.7–10.4)
CHLORIDE SERPL-SCNC: 107 MMOL/L (ref 98–112)
CHOLEST SERPL-MCNC: 207 MG/DL (ref ?–200)
CO2 SERPL-SCNC: 26 MMOL/L (ref 21–32)
CREAT BLD-MCNC: 0.7 MG/DL
EGFRCR SERPLBLD CKD-EPI 2021: 94 ML/MIN/1.73M2 (ref 60–?)
FASTING PATIENT LIPID ANSWER: YES
FASTING STATUS PATIENT QL REPORTED: YES
GLOBULIN PLAS-MCNC: 2.4 G/DL (ref 2.8–4.4)
GLUCOSE BLD-MCNC: 100 MG/DL (ref 70–99)
HDLC SERPL-MCNC: 70 MG/DL (ref 40–59)
LDLC SERPL CALC-MCNC: 122 MG/DL (ref ?–100)
NONHDLC SERPL-MCNC: 137 MG/DL (ref ?–130)
OSMOLALITY SERPL CALC.SUM OF ELEC: 292 MOSM/KG (ref 275–295)
POTASSIUM SERPL-SCNC: 3.8 MMOL/L (ref 3.5–5.1)
PROT SERPL-MCNC: 6.7 G/DL (ref 5.7–8.2)
SODIUM SERPL-SCNC: 141 MMOL/L (ref 136–145)
TRIGL SERPL-MCNC: 84 MG/DL (ref 30–149)
TSI SER-ACNC: 1.64 MIU/ML (ref 0.55–4.78)
VLDLC SERPL CALC-MCNC: 15 MG/DL (ref 0–30)

## 2024-03-20 PROCEDURE — 36415 COLL VENOUS BLD VENIPUNCTURE: CPT

## 2024-03-20 PROCEDURE — 80053 COMPREHEN METABOLIC PANEL: CPT

## 2024-03-20 PROCEDURE — 84443 ASSAY THYROID STIM HORMONE: CPT

## 2024-03-20 PROCEDURE — 80061 LIPID PANEL: CPT

## 2024-03-30 ENCOUNTER — RT VISIT (OUTPATIENT)
Dept: RESPIRATORY THERAPY | Facility: HOSPITAL | Age: 69
End: 2024-03-30
Attending: ALLERGY & IMMUNOLOGY
Payer: COMMERCIAL

## 2024-03-30 DIAGNOSIS — J44.9 COPD (CHRONIC OBSTRUCTIVE PULMONARY DISEASE) (HCC): ICD-10-CM

## 2024-03-30 DIAGNOSIS — J45.909 ASTHMA (HCC): ICD-10-CM

## 2024-03-30 DIAGNOSIS — R05.9 COUGH: ICD-10-CM

## 2024-03-30 PROCEDURE — 94060 EVALUATION OF WHEEZING: CPT | Performed by: INTERNAL MEDICINE

## 2024-03-30 PROCEDURE — 94726 PLETHYSMOGRAPHY LUNG VOLUMES: CPT | Performed by: INTERNAL MEDICINE

## 2024-03-30 PROCEDURE — 94729 DIFFUSING CAPACITY: CPT | Performed by: INTERNAL MEDICINE

## 2024-05-01 ENCOUNTER — ORDER TRANSCRIPTION (OUTPATIENT)
Dept: PHYSICAL THERAPY | Facility: HOSPITAL | Age: 69
End: 2024-05-01

## 2024-05-01 DIAGNOSIS — Z98.890 S/P ROTATOR CUFF REPAIR: Primary | ICD-10-CM

## 2024-05-17 ENCOUNTER — TELEPHONE (OUTPATIENT)
Dept: PHYSICAL THERAPY | Facility: HOSPITAL | Age: 69
End: 2024-05-17

## 2024-05-21 ENCOUNTER — OFFICE VISIT (OUTPATIENT)
Dept: PHYSICAL THERAPY | Facility: HOSPITAL | Age: 69
End: 2024-05-21
Attending: INTERNAL MEDICINE
Payer: COMMERCIAL

## 2024-05-21 DIAGNOSIS — Z98.890 S/P ROTATOR CUFF REPAIR: Primary | ICD-10-CM

## 2024-05-21 PROCEDURE — 97110 THERAPEUTIC EXERCISES: CPT

## 2024-05-21 PROCEDURE — 97162 PT EVAL MOD COMPLEX 30 MIN: CPT

## 2024-05-21 NOTE — PROGRESS NOTES
POST-OP SHOULDER EVALUATION:     Diagnosis:   S/P rotator cuff repair (Z98.890)        right Shoulder Arthroscopic rotator cuff repair, subacromial decompression, distal clavicle excision Referring Provider: Skyler Ponce  Date of Evaluation:    5/21/2024    Precautions:  adhere to the \"large repair\" protocol, sling 4 weeks. Visual Impairment, HTN, breast cancer Next MD visit:   TBD  Date of Surgery: 5/6/2024     PATIENT SUMMARY   Sandrine Oleary is a right hand dominant 68 year old female who presents to therapy today s/p right Shoulder Arthroscopic rotator cuff repair, subacromial decompression, distal clavicle excision on 5/6/2024 with complaints of pain and stiffness.    History of Current Condition: Patient underwent aforementioned procedure on 5/6/2024, reporting no complications after surgery. Has been wearing sling 24/7 but took out abduction pillow and states the surgical team was OK with this. Since surgery patient has been having a lot of pain, most notably with sleeping. Prior to surgery patient reports no JANET, believes shoulder injury from overuse, did have RTC repair on L shoulder many years ago with full recovery. Most recently pt followed up with surgical PA on 5/13/2024 who, per patient, told her to come to therapy for the evaluation but not to start moving the arm, even passively until next week. Per office visit note \" Informed patient to continue to wear the sling, especially at night and when around other people until 4 weeks post-op. They may remove the sling when sedentary, ambulating throughout their home, and for desk/computer work\"      N/T: Yes,Distal hand, reporting hx of carpal tunnel.     Pt describes pain level current 7/10, at best 6/10, at worst 9/10.   Relieving: Switching between tylenol/advil. Using ice machine multiple times a day.   Quality: Aching, burning, occasional sharp pain down the arm.     Current functional limitations include all everyday activities due to  surgical precautions; dressing, bathing, blow drying her hair, driving, sleeping, housework, and is currently off work.     Home Set Up: Lives with , receiving help with activity around the house.   Occupation/recreation: Professional , working full time, computer work. Enjoys bowling, golf, riding her bike, gardening but currently unable to do all tasks.     Sandrine describes prior level of function independent, pain limited. Pt goals include returning to all activities without pain.    Past medical history was reviewed with Sandrine. Significant findings include   Past Medical History:    Alcohol use    Anemia    Anxiety    Arrhythmia    PVC    Asthma (HCC)    Breast cancer (HCC)    [place] left    Chronic ulcer of right leg (HCC)    Colon polyp    COPD (chronic obstructive pulmonary disease) (HCC)    d/t radiation for breast cancer    Diverticulosis    Dysplastic nevi    Exposure to medical diagnostic radiation    GERD (gastroesophageal reflux disease)    Hemorrhoids    High blood pressure    History of chemotherapy    History of therapeutic radiation    Hyperlipidemia    Hypertension    IBS (irritable bowel syndrome)    Klebsiella pneumoniae infection    Pelvic prolapse    Personal history of antineoplastic chemotherapy    PVC (premature ventricular contraction)         ASSESSMENT  Sandrine presents to physical therapy evaluation with primary c/o R shoulder pain and stiffness s/p RTC repair 5/6/24. Testing in session today did not look at RUE as pt was told to hold off on movement until next week. LUE presents full ROM and strength without limitation. Functional deficits include but are not limited to all functional tasks including RUE- dressing, bathing, blow drying her hair, driving, sleeping, housework, and is currently off work. .  Signs and symptoms are consistent with diagnosis of referring diagnosis. Pt and PT discussed evaluation findings, pathology, POC and HEP.  Throughout session pt  demos movements with RLE while in sling, extensive education in session on not using RUE at this time at all,  pt agreeable. Readjusted sling in session to ensure supportive posturing without AROM at arm Pt voiced understanding and performs HEP correctly without reported pain. Skilled Physical Therapy is medically necessary to address the above impairments and reach functional goals.     OBJECTIVE:   Observation/Posture: presents with sling in posterior/elevated position, utilizing RUE to doff jacket   Depressed R scapulae    Palpation: TTP along shoulder joint, slight winging at scapulae  Sensation: grossly intact to light touch    AROM: (* denotes performed with pain)  Shoulder  Elbow   Flexion: R NT; L 157  Abduction: R NT; L 147  ER: R NT; L T1  IR: R NT; L T10 Flexion: R 140; L 140  Extension: R -10*; L 0     PROM: (* denotes performed with pain)  Shoulder    Flexion: R NT   Abduction: R NT  ER: R NT  IR: R NT     Accessory motion: Not formally assessed in session    Flexibility: Not tested    Strength/MMT: (* denotes performed with pain)  Shoulder Scapular   Flexion: R NT/5; L 5/5  Abduction: R NT/5; L 5/5  ER: R NT/5; L 5/5  IR: R NT/5; L 5/5 Pt unable to assume testing position       Today’s Treatment and Response:   Pt education was provided on exam findings, treatment diagnosis, treatment plan, expectations, and prognosis. Pt was also provided recommendations for activity modifications, possible soreness after evaluation, modalities as needed [ice/heat], and postural corrections.   Patient was instructed in and issued a HEP for:    Access Code: [email or web address]  URL: [email or web address]  Date: 05/21/2024  Prepared by: Elizabeth Chong    Exercises  - Thumb Opposition  - 5 x daily - 7 x weekly - 10 reps  - Seated Wrist Flexion Stretch  - 5 x daily - 7 x weekly - 5 reps - 10 second hold  - Seated Wrist Extension Stretch  - 5 x daily - 7 x weekly - 5 reps - 10 second hold      Charges: PT [place]  Moderate Complexity, 1 TE      Total Timed Treatment: 10' TE min     Total Treatment Time: 45'  min     Based on clinical rationale and outcome measures, this evaluation involved Moderate Complexity decision making due to 3+ personal factors/comorbidities, 4+ body structures involved/activity limitations, and evolving symptoms including changing pain levels.    PLAN OF CARE:    Goals: (To be met in 20 visits)   Pt will report improved ability to sleep without waking due to shoulder pain  Pt will improve shoulder flexion AROM to >130 degrees to be able to reach into overhead cabinets without pain or restriction  Pt will improve shoulder abduction AROM to >130 degrees to improve ability to don deodorant, don/doff shirts, and wash hair  Pt will increase shoulder AROM ER to T1 to reach and fasten seatbelt   Pt will increase shoulder AROM IR to T12 to be able to reach in back pocket, tuck in shirt, and turn steering wheel without pain  Pt will improve shoulder strength throughout to 4/5 to improve function with ADLs including bathing and dressing independently.   Pt will demonstrate increased mid/low trap strength to 4/5 to promote improved shoulder mechanics and stabilization with ADL such as lifting and reaching   Pt will be independent and compliant with comprehensive HEP to maintain progress achieved in PT     Frequency / Duration: Patient will be seen for 1-2 x/week or a total of 20 visits over a 90 day period.  Treatment will include: Manual Therapy, Neuromuscular Re-education, Self-Care Home Management, Therapeutic Activities, Therapeutic Exercise, and Home Exercise Program instruction    Education or treatment limitation: None  Rehab Potential:good    QuickDASH Outcome Score  Score: 84.09 % (5/21/2024  3:40 PM)      Patient/Family/Caregiver was advised of these findings, precautions, and treatment options and has agreed to actively participate in planning and for this course of care.    Thank you for your  referral. Please co-sign or sign and return this letter via fax as soon as possible to [phone]. If you have any questions, please contact me at Dept: [phone]    Sincerely,  Electronically signed by therapist: Elizabeth Chong PT  Physician's certification required: Yes  I certify the need for these services furnished under this plan of treatment and while under my care.    X___________________________________________________ Date____________________    Certification From: 5/21/2024  To:8/19/2024

## 2024-05-23 ENCOUNTER — APPOINTMENT (OUTPATIENT)
Dept: PHYSICAL THERAPY | Facility: HOSPITAL | Age: 69
End: 2024-05-23
Attending: INTERNAL MEDICINE
Payer: COMMERCIAL

## 2024-05-30 ENCOUNTER — OFFICE VISIT (OUTPATIENT)
Dept: PHYSICAL THERAPY | Facility: HOSPITAL | Age: 69
End: 2024-05-30
Attending: INTERNAL MEDICINE
Payer: COMMERCIAL

## 2024-05-30 PROCEDURE — 97110 THERAPEUTIC EXERCISES: CPT

## 2024-05-30 PROCEDURE — 97140 MANUAL THERAPY 1/> REGIONS: CPT

## 2024-05-30 PROCEDURE — 97112 NEUROMUSCULAR REEDUCATION: CPT

## 2024-05-30 NOTE — PROGRESS NOTES
Diagnosis:   S/P rotator cuff repair (Z98.890)    right Shoulder Arthroscopic rotator cuff repair, subacromial decompression, distal clavicle excision      Referring Provider: Skyler Ponce   Surgeon: Dr. Jose Gutierres Date of Evaluation:    5/21/2024    Precautions:  adhere to the \"large repair\" protocol, sling 4 weeks. Visual Impairment, HTN, breast cancer Next MD Visit:   Not Scheduled    Date of Surgery: 5/6/2024    Insurance Primary/Secondary: [employer] PPO / N/A     # Auth Visits: no auth, 20 per POC (PN at 10v)           5/30/2024: 3 weeks and 3 days s/p large rotator cuff repair    Subjective: Has had a \"rough\" couple days with pain but feels like she's got a little more mobility. Not wearing the sling when at home    Pain: 6/10 GH joint and down into the lateral arm      Objective:     5/30/2024   Palpation: soft tissue restriction and TTP throughout R shoulder complex; infraspinatus, teres minor, supraspinatus, upper trapezius, and rhomboids     PROM: (* denotes performed with pain)  Shoulder  Elbow   Flexion: R 40*   Scapation: R 65  ER: R 15 (discomfort)   IR: R 50 Flexion: R 140  Extension: R -10*        Assessment: PROM assessed within pain tolerated range, most difficulty noted with flexion, stayed in comfortable range. Reiterating again to patient the importance of maintaining precautions, no active ROM, like when trying to lift RUE to don/dof sling. Utilizing efflurage at soft tissue surrounding capsule to address pain levels, pt reporting improvement afterwards.     Goals: (To be met in 20 visits)   Pt will report improved ability to sleep without waking due to shoulder pain  Pt will improve shoulder flexion AROM to >130 degrees to be able to reach into overhead cabinets without pain or restriction  Pt will improve shoulder abduction AROM to >130 degrees to improve ability to don deodorant, don/doff shirts, and wash hair  Pt will increase shoulder AROM ER to T1 to reach and fasten seatbelt   Pt will  increase shoulder AROM IR to T12 to be able to reach in back pocket, tuck in shirt, and turn steering wheel without pain  Pt will improve shoulder strength throughout to 4/5 to improve function with ADLs including bathing and dressing independently.   Pt will demonstrate increased mid/low trap strength to 4/5 to promote improved shoulder mechanics and stabilization with ADL such as lifting and reaching   Pt will be independent and compliant with comprehensive HEP to maintain progress achieved in PT      QuickDASH Outcome Score  Score: 84.09 % (5/21/2024  3:40 PM)     Plan:Patient will be seen for 1-2 x/week or a total of 20 visits over a 90 day period.  Treatment will include: Manual Therapy, Neuromuscular Re-education, Self-Care Home Management, Therapeutic Activities, Therapeutic Exercise, and Home Exercise Program instruction   Progress per protocol: Pendulum, PROM, elbow AROM, scapular clock   Date: 5/30/2024  TX#: 2/20'  Date:                 TX#: 3/ Date:                 TX#: 4/ Date:                 TX#: 5/ Date:   Tx#: 6/   MT: 12'   Shoulder harmonics  S/L STM, rose marie efflurage posterior shoulder complex       TE: 25'   PROM within protocol- flexion, scaption, IR/ER   Seated UT stretch, 4x10s R  Seated Digiflex, red x10   Supine elbow flexion/extension to tolerance 2x10  Supine pronation/supination to tolerance   HEP update- seated UT stretch        NMR: 8'   S/L scapular retraction, x15, 5s hold (tactile cue)               HEP:   Access Code: [email or web address]  URL: [email or web address]  Date: 05/21/2024  Prepared by: Elizabeth Chong     Exercises  - Thumb Opposition  - 5 x daily - 7 x weekly - 10 reps  - Seated Wrist Flexion Stretch  - 5 x daily - 7 x weekly - 5 reps - 10 second hold  - Seated Wrist Extension Stretch  - 5 x daily - 7 x weekly - 5 reps - 10 second hold       Charges: 1 Manual, 2 TE, 1 NMR       Total Timed Treatment: 45'  min  Total Treatment Time: 45'  min

## 2024-06-04 ENCOUNTER — OFFICE VISIT (OUTPATIENT)
Dept: PHYSICAL THERAPY | Facility: HOSPITAL | Age: 69
End: 2024-06-04
Attending: INTERNAL MEDICINE
Payer: COMMERCIAL

## 2024-06-04 PROCEDURE — 97112 NEUROMUSCULAR REEDUCATION: CPT

## 2024-06-04 PROCEDURE — 97140 MANUAL THERAPY 1/> REGIONS: CPT

## 2024-06-04 PROCEDURE — 97110 THERAPEUTIC EXERCISES: CPT

## 2024-06-04 NOTE — PROGRESS NOTES
Diagnosis:   S/P rotator cuff repair (Z98.890)    right Shoulder Arthroscopic rotator cuff repair, subacromial decompression, distal clavicle excision      Referring Provider: Skyler Ponce   Surgeon: Dr. Jose Gutierres Date of Evaluation:    5/21/2024    Precautions:  adhere to the \"large repair\" protocol, sling 4 weeks.     Visual Impairment, HTN, breast cancer    No AAROM until 6 week, no AROM until 8 week no resisted RC strengthening 12 week, PROM to tolerance, avoid adduction   Next MD Visit:   Not Scheduled    Date of Surgery: 5/6/2024    Insurance Primary/Secondary: [employer] PPO / N/A     # Auth Visits: no auth, 20 per POC (PN at 10v)           5/30/2024: 4 week, 1 days s/p large rotator cuff repair    Subjective: Presents without sling to session; states she took it off yesterday but will wear it to the baby shower this weekend. Was a little sore after last therapy session but fine by next morning.     Pain: 6/10 GH joint and down into the lateral arm      Objective:     5/30/2024   Palpation: soft tissue restriction and TTP throughout R shoulder complex; infraspinatus, teres minor, supraspinatus, upper trapezius, and rhomboids     PROM: (* denotes performed with pain)  Shoulder  Elbow   Flexion: R 40*   Scapation: R 65  ER: R 15 (discomfort)   IR: R 50 Flexion: R 140  Extension: R -10*        Assessment: Improving PROM all direction, empty end feel all direction with focus on tolerable range and minimal pain. Most limited with ER, rest breaks given as needed for pain management, also instructing patient in diaphragmatic breathing and pendulums for pain management with good response, given as HEP.     Goals: (To be met in 20 visits)   Pt will report improved ability to sleep without waking due to shoulder pain  Pt will improve shoulder flexion AROM to >130 degrees to be able to reach into overhead cabinets without pain or restriction  Pt will improve shoulder abduction AROM to >130 degrees to improve ability to  don deodorant, don/doff shirts, and wash hair  Pt will increase shoulder AROM ER to T1 to reach and fasten seatbelt   Pt will increase shoulder AROM IR to T12 to be able to reach in back pocket, tuck in shirt, and turn steering wheel without pain  Pt will improve shoulder strength throughout to 4/5 to improve function with ADLs including bathing and dressing independently.   Pt will demonstrate increased mid/low trap strength to 4/5 to promote improved shoulder mechanics and stabilization with ADL such as lifting and reaching   Pt will be independent and compliant with comprehensive HEP to maintain progress achieved in PT      QuickDASH Outcome Score  Score: 84.09 % (5/21/2024  3:40 PM)       Plan:Patient will be seen for 1-2 x/week or a total of 20 visits over a 90 day period.  Treatment will include: Manual Therapy, Neuromuscular Re-education, Self-Care Home Management, Therapeutic Activities, Therapeutic Exercise, and Home Exercise Program instruction   Progress per protocol: Pendulum, PROM, elbow AROM, scapular clock   Date: 5/30/2024  TX#: 2/20'  Date:     6/4/2024             TX#: 3/20 Date:                 TX#: 4/ Date:                 TX#: 5/ Date:   Tx#: 6/   MT: 12'   Shoulder harmonics  S/L STM, rose marie efflurage posterior shoulder complex MT: 8'   Shoulder harmonics  S/L STM, rose marie efflurage posterior shoulder complex        TE: 25'   PROM within protocol- flexion, scaption, IR/ER   Seated UT stretch, 4x10s R  Seated Digiflex, red x10   Supine elbow flexion/extension to tolerance 2x10  Supine pronation/supination to tolerance   HEP update- seated UT stretch  TE: 25'  PROM within protocol- flexion, scaption, IR/ER   Standing pendulum, forward/retro, horizontal   Pt edu- slight wear/schedule  Standing elbow flexion/extension AROM x10ea          NMR: 8'   S/L scapular retraction, x15, 5s hold (tactile cue)  NMR: 10'   S/L scapular clock, x8 ea (PT assist)             HEP:   Access Code: [email or web address]  URL:  [email or web address]  Date: 05/21/2024  Prepared by: Elizabeth Chong     Exercises  - Thumb Opposition  - 5 x daily - 7 x weekly - 10 reps  - Seated Wrist Flexion Stretch  - 5 x daily - 7 x weekly - 5 reps - 10 second hold  - Seated Wrist Extension Stretch  - 5 x daily - 7 x weekly - 5 reps - 10 second hold      Charges: 1 Manual, 2 TE, 1 NMR       Total Timed Treatment: 43'  min  Total Treatment Time: 43'  min

## 2024-06-06 ENCOUNTER — OFFICE VISIT (OUTPATIENT)
Dept: PHYSICAL THERAPY | Facility: HOSPITAL | Age: 69
End: 2024-06-06
Attending: INTERNAL MEDICINE
Payer: COMMERCIAL

## 2024-06-06 PROCEDURE — 97140 MANUAL THERAPY 1/> REGIONS: CPT

## 2024-06-06 PROCEDURE — 97110 THERAPEUTIC EXERCISES: CPT

## 2024-06-06 NOTE — PROGRESS NOTES
Diagnosis:   S/P rotator cuff repair (Z98.890)    right Shoulder Arthroscopic rotator cuff repair, subacromial decompression, distal clavicle excision      Referring Provider: Skyler Ponce   Surgeon: Dr. Jose Gutierres Date of Evaluation:    5/21/2024    Precautions:  adhere to the \"large repair\" protocol, sling 4 weeks.     Visual Impairment, HTN, breast cancer    No AAROM until 6 week, no AROM until 8 week no resisted RC strengthening 12 week, PROM to tolerance, avoid adduction   Next MD Visit:   Not Scheduled    Date of Surgery: 5/6/2024    Insurance Primary/Secondary: [employer] PPO / N/A     # Auth Visits: no auth, 20 per POC (PN at 10v)           5/30/2024: 4 week, 1 days s/p large rotator cuff repair    Subjective: Patient says that she was sore after last session, so she iced her R shoulder. Patient reported having increased pain yesterday and today, most notably at cervical spine and upper trapezius. Has not used sling since D/C.     Pain: 6/10 GH joint and down into the lateral arm      Objective:     5/30/2024   Palpation: soft tissue restriction and TTP throughout R shoulder complex; infraspinatus, teres minor, supraspinatus, upper trapezius, and rhomboids     PROM: (* denotes performed with pain)  Shoulder  Elbow   Flexion: R 40*   Scapation: R 65  ER: R 15 (discomfort)   IR: R 50 Flexion: R 140  Extension: R -10*        Assessment: Focused on pain management and maintaining patients current ROM in her R shoulder. Performed STM on her R upper trapezius to decrease muscle tension for pain relief and postural correction. Patient was able to perform pendulums within a tolerable range, reviewed with cueing for PROM as pt was having hard time with exercise at home. Reiterating to patient importance of PROM and decreased use of RUE with daily tasks, such as laundry and carrying items.       Goals: (To be met in 20 visits)   Pt will report improved ability to sleep without waking due to shoulder pain  Pt will  improve shoulder flexion AROM to >130 degrees to be able to reach into overhead cabinets without pain or restriction  Pt will improve shoulder abduction AROM to >130 degrees to improve ability to don deodorant, don/doff shirts, and wash hair  Pt will increase shoulder AROM ER to T1 to reach and fasten seatbelt   Pt will increase shoulder AROM IR to T12 to be able to reach in back pocket, tuck in shirt, and turn steering wheel without pain  Pt will improve shoulder strength throughout to 4/5 to improve function with ADLs including bathing and dressing independently.   Pt will demonstrate increased mid/low trap strength to 4/5 to promote improved shoulder mechanics and stabilization with ADL such as lifting and reaching   Pt will be independent and compliant with comprehensive HEP to maintain progress achieved in PT      QuickDASH Outcome Score  Score: 84.09 % (5/21/2024  3:40 PM)       Plan:Patient will be seen for 1-2 x/week or a total of 20 visits over a 90 day period.  Treatment will include: Manual Therapy, Neuromuscular Re-education, Self-Care Home Management, Therapeutic Activities, Therapeutic Exercise, and Home Exercise Program instruction   Progress per protocol: Pendulum, PROM, elbow AROM, scapular clock   Date: 5/30/2024  TX#: 2/20'  Date:     6/4/2024             TX#: 3/20 Date: 6/6/2024                TX#: 4/20 Date:                 TX#: 5/ Date:   Tx#: 6/   MT: 12'   Shoulder harmonics  S/L STM, rose marie efflurage posterior shoulder complex MT: 8'   Shoulder harmonics  S/L STM, rose marie efflurage posterior shoulder complex   MT: 10  STM effleurage on R upper trapezius  Shoulder harmonics     TE: 25'   PROM within protocol- flexion, scaption, IR/ER   Seated UT stretch, 4x10s R  Seated Digiflex, red x10   Supine elbow flexion/extension to tolerance 2x10  Supine pronation/supination to tolerance   HEP update- seated UT stretch  TE: 25'  PROM within protocol- flexion, scaption, IR/ER   Standing pendulum,  forward/retro, horizontal   Pt edu- slight wear/schedule  Standing elbow flexion/extension AROM x10ea     TE:35  PROM within protocol- flexion, scaption, IR/ER   Standing pendulum, forward/retro, horizontal   Standing elbow flexion/extension AROM 3x10ea   Seated Digiflex, red x15  Seated scapular retractions 2x10  Seated supination/pronation x20  Supine Chin tucks 2x10  L upper trap stretch 3x 15 sec hold  Patient re-education on pendulums               NMR: 8'   S/L scapular retraction, x15, 5s hold (tactile cue)  NMR: 10'   S/L scapular clock, x8 ea (PT assist)             HEP:   Access Code: [email or web address]  URL: [email or web address]  Date: 05/21/2024  Prepared by: Elizabeth Chong     Exercises  - Thumb Opposition  - 5 x daily - 7 x weekly - 10 reps  - Seated Wrist Flexion Stretch  - 5 x daily - 7 x weekly - 5 reps - 10 second hold  - Seated Wrist Extension Stretch  - 5 x daily - 7 x weekly - 5 reps - 10 second hold    Charges: 1 Manual, 2 TE       Total Timed Treatment: 45'  min  Total Treatment Time: 45'  min

## 2024-06-11 ENCOUNTER — OFFICE VISIT (OUTPATIENT)
Dept: PHYSICAL THERAPY | Facility: HOSPITAL | Age: 69
End: 2024-06-11
Attending: INTERNAL MEDICINE
Payer: COMMERCIAL

## 2024-06-11 PROCEDURE — 97110 THERAPEUTIC EXERCISES: CPT

## 2024-06-11 PROCEDURE — 97140 MANUAL THERAPY 1/> REGIONS: CPT

## 2024-06-11 PROCEDURE — 97112 NEUROMUSCULAR REEDUCATION: CPT

## 2024-06-11 NOTE — PROGRESS NOTES
Diagnosis:   S/P rotator cuff repair (Z98.890)    right Shoulder Arthroscopic rotator cuff repair, subacromial decompression, distal clavicle excision    Referring Provider: Skyler Ponce   Surgeon: Dr. Jose Gutierres Date of Evaluation:    5/21/2024    Precautions:  adhere to the \"large repair\" protocol, sling 4 weeks.     Visual Impairment, HTN, breast cancer    No AAROM until 6 week, no AROM until 8 week no resisted RC strengthening 12 week, PROM to tolerance, avoid adduction   Next MD Visit:   Not Scheduled    Date of Surgery: 5/6/2024    Insurance Primary/Secondary: [employer] PPO / N/A     # Auth Visits: no auth, 20 per POC (PN at 10v)           6/11/2024: 5 week, 1 days s/p large rotator cuff repair    Subjective: Sunday was very sore; had a bridal shower, [employer], and then went to [employer] for concert that evening. States she wore her sling for the day. Feeling much better today. Was sore after therapy and then fine by next morning.     Pain: 4/10      Objective:     5/30/2024   Palpation: soft tissue restriction and TTP throughout R shoulder complex; infraspinatus, teres minor, supraspinatus, upper trapezius, and rhomboids     PROM: (* denotes performed with pain)  Shoulder  Elbow   Flexion: R 40*   Scapation: R 65  ER: R 15 (discomfort)   IR: R 50 Flexion: R 140  Extension: R -10*        Assessment: Improving PROM all direction, decreased pain and increased tolerance to reps noted today. Tactile cue for scapular clock, able to perform independently with fatigue by end of reps.       Goals: (To be met in 20 visits)   Pt will report improved ability to sleep without waking due to shoulder pain  Pt will improve shoulder flexion AROM to >130 degrees to be able to reach into overhead cabinets without pain or restriction  Pt will improve shoulder abduction AROM to >130 degrees to improve ability to don deodorant, don/doff shirts, and wash hair  Pt will increase shoulder AROM ER to T1 to reach and fasten seatbelt   Pt  will increase shoulder AROM IR to T12 to be able to reach in back pocket, tuck in shirt, and turn steering wheel without pain  Pt will improve shoulder strength throughout to 4/5 to improve function with ADLs including bathing and dressing independently.   Pt will demonstrate increased mid/low trap strength to 4/5 to promote improved shoulder mechanics and stabilization with ADL such as lifting and reaching   Pt will be independent and compliant with comprehensive HEP to maintain progress achieved in PT      QuickDASH Outcome Score  Score: 84.09 % (5/21/2024  3:40 PM)       Plan:Patient will be seen for 1-2 x/week or a total of 20 visits over a 90 day period.  Treatment will include: Manual Therapy, Neuromuscular Re-education, Self-Care Home Management, Therapeutic Activities, Therapeutic Exercise, and Home Exercise Program instruction   Progress per protocol: PROM as tolerated, biceps strengthening, scapular stability, cervical training    Date: 5/30/2024  TX#: 2/20'  Date:     6/4/2024             TX#: 3/20 Date: 6/6/2024                TX#: 4/20 Date:    6/11/2024             TX#: 5/20 Date:   Tx#: 6/   MT: 12'   Shoulder harmonics  S/L STM, rose marie efflurage posterior shoulder complex MT: 8'   Shoulder harmonics  S/L STM, rose marie efflurage posterior shoulder complex   MT: 10  STM effleurage on R upper trapezius  Shoulder harmonics MT: 8'   STM effleurage on R upper trapezius  Shoulder harmonics    TE: 25'   PROM within protocol- flexion, scaption, IR/ER   Seated UT stretch, 4x10s R  Seated Digiflex, red x10   Supine elbow flexion/extension to tolerance 2x10  Supine pronation/supination to tolerance   HEP update- seated UT stretch  TE: 25'  PROM within protocol- flexion, scaption, IR/ER   Standing pendulum, forward/retro, horizontal   Pt edu- slight wear/schedule  Standing elbow flexion/extension AROM x10ea     TE:35  PROM within protocol- flexion, scaption, IR/ER   Standing pendulum, forward/retro, horizontal    Standing elbow flexion/extension AROM 3x10ea   Seated Digiflex, red x15  Seated scapular retractions 2x10  Seated supination/pronation x20  Supine Chin tucks 2x10  L upper trap stretch 3x 15 sec hold  Patient re-education on pendulums           TE: 23'  PROM within protocol- flexion, scaption, IR/ER   AROM, elbow flexion/extension x15 ea  Seated ER with cane, 10x10s  Seated table slide, PROM, flexion 2x8     NMR: 8'   S/L scapular retraction, x15, 5s hold (tactile cue)  NMR: 10'   S/L scapular clock, x8 ea (PT assist)  NMR: 10'   S/L scapular clock, x8 ea   S/L scapular retraction, x10            HEP:   Access Code: [email or web address]  URL: [email or web address]  Date: 05/21/2024  Prepared by: Elizabeth Chong     Exercises  - Thumb Opposition  - 5 x daily - 7 x weekly - 10 reps  - Seated Wrist Flexion Stretch  - 5 x daily - 7 x weekly - 5 reps - 10 second hold  - Seated Wrist Extension Stretch  - 5 x daily - 7 x weekly - 5 reps - 10 second hold    Pendulum, forward/lateral     Charges: 1 Manual, 2 TE, 1 NMR       Total Timed Treatment: 41'  min  Total Treatment Time: 41'  min

## 2024-06-13 ENCOUNTER — OFFICE VISIT (OUTPATIENT)
Dept: PHYSICAL THERAPY | Facility: HOSPITAL | Age: 69
End: 2024-06-13
Attending: INTERNAL MEDICINE
Payer: COMMERCIAL

## 2024-06-13 PROCEDURE — 97110 THERAPEUTIC EXERCISES: CPT

## 2024-06-13 PROCEDURE — 97140 MANUAL THERAPY 1/> REGIONS: CPT

## 2024-06-13 NOTE — PROGRESS NOTES
Diagnosis:   S/P rotator cuff repair (Z98.890)    right Shoulder Arthroscopic rotator cuff repair, subacromial decompression, distal clavicle excision    Referring Provider: Skyler Ponce   Surgeon: Dr. Jose Gutierres Date of Evaluation:    5/21/2024    Precautions:  adhere to the \"large repair\" protocol, sling 4 weeks.     Visual Impairment, HTN, breast cancer    No AAROM until 6 week, no AROM until 8 week no resisted RC strengthening 12 week, PROM to tolerance, avoid adduction   Next MD Visit:   Not Scheduled    Date of Surgery: 5/6/2024    Insurance Primary/Secondary: [employer] PPO / N/A     # Auth Visits: no auth, 20 per POC (PN at 10v)           6/11/2024: 5 week, 1 days s/p large rotator cuff repair    Subjective: Was feeling good up until about 3 minutes prior to session when she moved the shoulder forward and felt a \"crack\" then was sore. Prior was doing well throughout the week.      Pain: 5/10      Objective:     5/30/2024   Palpation: soft tissue restriction and TTP throughout R shoulder complex; infraspinatus, teres minor, supraspinatus, upper trapezius, and rhomboids     PROM: (* denotes performed with pain)  Shoulder  Elbow   Flexion: R 40*   Scapation: R 65  ER: R 15 (discomfort)   IR: R 50 Flexion: R 140  Extension: R -10*        Assessment: Pt reporting \"crack\" and soreness at start of session after abnormal movement with R shoulder; no notable impairments seen with PROM throughout session but will monitor as needed. Reiterating no AROM at this time, pt agreeable. PROM progressing very well at this time.       Goals: (To be met in 20 visits)   Pt will report improved ability to sleep without waking due to shoulder pain  Pt will improve shoulder flexion AROM to >130 degrees to be able to reach into overhead cabinets without pain or restriction  Pt will improve shoulder abduction AROM to >130 degrees to improve ability to don deodorant, don/doff shirts, and wash hair  Pt will increase shoulder AROM ER to  T1 to reach and fasten seatbelt   Pt will increase shoulder AROM IR to T12 to be able to reach in back pocket, tuck in shirt, and turn steering wheel without pain  Pt will improve shoulder strength throughout to 4/5 to improve function with ADLs including bathing and dressing independently.   Pt will demonstrate increased mid/low trap strength to 4/5 to promote improved shoulder mechanics and stabilization with ADL such as lifting and reaching   Pt will be independent and compliant with comprehensive HEP to maintain progress achieved in PT      QuickDASH Outcome Score  Score: 84.09 % (5/21/2024  3:40 PM)       Plan:Patient will be seen for 1-2 x/week or a total of 20 visits over a 90 day period.  Treatment will include: Manual Therapy, Neuromuscular Re-education, Self-Care Home Management, Therapeutic Activities, Therapeutic Exercise, and Home Exercise Program instruction   Progress per protocol: PROM as tolerated, biceps strengthening, scapular stability, cervical training  Date: 5/30/2024  TX#: 2/20'  Date:     6/4/2024             TX#: 3/20 Date: 6/6/2024                TX#: 4/20 Date:    6/11/2024             TX#: 5/20 Date: 6/13/2024   Tx#: 6/20   MT: 12'   Shoulder harmonics  S/L STM, rose marie efflurage posterior shoulder complex MT: 8'   Shoulder harmonics  S/L STM, rose marie efflurage posterior shoulder complex   MT: 10  STM effleurage on R upper trapezius  Shoulder harmonics MT: 8'   STM effleurage on R upper trapezius  Shoulder harmonics MT: 12'   Gr I inferior GH glide  Shoulder harmonics   TE: 25'   PROM within protocol- flexion, scaption, IR/ER   Seated UT stretch, 4x10s R  Seated Digiflex, red x10   Supine elbow flexion/extension to tolerance 2x10  Supine pronation/supination to tolerance   HEP update- seated UT stretch  TE: 25'  PROM within protocol- flexion, scaption, IR/ER   Standing pendulum, forward/retro, horizontal   Pt edu- slight wear/schedule  Standing elbow flexion/extension AROM x10ea      TE:35  PROM within protocol- flexion, scaption, IR/ER   Standing pendulum, forward/retro, horizontal   Standing elbow flexion/extension AROM 3x10ea   Seated Digiflex, red x15  Seated scapular retractions 2x10  Seated supination/pronation x20  Supine Chin tucks 2x10  L upper trap stretch 3x 15 sec hold  Patient re-education on pendulums           TE: 23'  PROM within protocol- flexion, scaption, IR/ER   AROM, elbow flexion/extension x15 ea  Seated ER with cane, 10x10s  Seated table slide, PROM, flexion 2x8  TE: 26'   Pendulum, 2x1'   PROM as tolerated- flexion, scaption, abduction, IR, ER  Elbow flexion/extension supine x20  Standing pulley, flexion PROM x10  Supine chin tuck x10, 5s hold        NMR: 8'   S/L scapular retraction, x15, 5s hold (tactile cue)  NMR: 10'   S/L scapular clock, x8 ea (PT assist)  NMR: 10'   S/L scapular clock, x8 ea   S/L scapular retraction, x10            HEP:   Access Code: [email or web address]  URL: [email or web address]  Date: 05/21/2024  Prepared by: Elizabeth Chong     Exercises  - Thumb Opposition  - 5 x daily - 7 x weekly - 10 reps  - Seated Wrist Flexion Stretch  - 5 x daily - 7 x weekly - 5 reps - 10 second hold  - Seated Wrist Extension Stretch  - 5 x daily - 7 x weekly - 5 reps - 10 second hold    Pendulum, forward/lateral     Charges: 1 Manual, 2 TE      Total Timed Treatment: 38'  min  Total Treatment Time: 38'  min

## 2024-06-17 ENCOUNTER — TELEPHONE (OUTPATIENT)
Dept: PHYSICAL THERAPY | Facility: HOSPITAL | Age: 69
End: 2024-06-17

## 2024-06-18 ENCOUNTER — OFFICE VISIT (OUTPATIENT)
Dept: PHYSICAL THERAPY | Facility: HOSPITAL | Age: 69
End: 2024-06-18
Attending: INTERNAL MEDICINE
Payer: COMMERCIAL

## 2024-06-18 PROCEDURE — 97110 THERAPEUTIC EXERCISES: CPT

## 2024-06-18 PROCEDURE — 97112 NEUROMUSCULAR REEDUCATION: CPT

## 2024-06-18 PROCEDURE — 97140 MANUAL THERAPY 1/> REGIONS: CPT

## 2024-06-18 NOTE — PROGRESS NOTES
Diagnosis:   S/P rotator cuff repair (Z98.890)    right Shoulder Arthroscopic rotator cuff repair, subacromial decompression, distal clavicle excision    Referring Provider: Skyler Ponce   Surgeon: Dr. Jose Gutierres Date of Evaluation:    5/21/2024    Precautions:  adhere to the \"large repair\" protocol, sling 4 weeks.     Visual Impairment, HTN, breast cancer    No AAROM until 6 week, no AROM until 8 week no resisted RC strengthening 12 week, PROM to tolerance, avoid adduction   Next MD Visit:   Not Scheduled    Date of Surgery: 5/6/2024    Insurance Primary/Secondary: [employer] [place] PPO / N/A     # Auth Visits: no auth, 20 per POC (PN at 10v)           6/18/2024: 6 week, 1 days s/p large rotator cuff repair    Subjective: Overall feeling better, has not had any cracking or a lot of pain last last time.       Pain: 4-5/10      Objective:     5/30/2024   Palpation: soft tissue restriction and TTP throughout R shoulder complex; infraspinatus, teres minor, supraspinatus, upper trapezius, and rhomboids     PROM: (* denotes performed with pain)  Shoulder  Elbow   Flexion: R 40*   Scapation: R 65  ER: R 15 (discomfort)   IR: R 50 Flexion: R 140  Extension: R -10*        Assessment: Near full PROM all direction without pain, most limited with IR/ER but minimal pain noted. Introducing AAROM into shoulder flexion and deltoid submax isometrics per protocol, pt tolerating flexion/extension well but abduction increases pain so held at this time. Suggesting purchase of pulleys for home to maintain ROM seen in therapy.     Goals: (To be met in 20 visits)   Pt will report improved ability to sleep without waking due to shoulder pain  Pt will improve shoulder flexion AROM to >130 degrees to be able to reach into overhead cabinets without pain or restriction  Pt will improve shoulder abduction AROM to >130 degrees to improve ability to don deodorant, don/doff shirts, and wash hair  Pt will increase shoulder AROM ER to T1 to reach and fasten  seatbelt   Pt will increase shoulder AROM IR to T12 to be able to reach in back pocket, tuck in shirt, and turn steering wheel without pain  Pt will improve shoulder strength throughout to 4/5 to improve function with ADLs including bathing and dressing independently.   Pt will demonstrate increased mid/low trap strength to 4/5 to promote improved shoulder mechanics and stabilization with ADL such as lifting and reaching   Pt will be independent and compliant with comprehensive HEP to maintain progress achieved in PT      QuickDASH Outcome Score  Score: 84.09 % (5/21/2024  3:40 PM)       Plan:Patient will be seen for 1-2 x/week or a total of 20 visits over a 90 day period.  Treatment will include: Manual Therapy, Neuromuscular Re-education, Self-Care Home Management, Therapeutic Activities, Therapeutic Exercise, and Home Exercise Program instruction   Progress per protocol: MARISOL, scapular stability, submax isometric  Date: 5/30/2024  TX#: 2/20'  Date:     6/4/2024             TX#: 3/20 Date: 6/6/2024                TX#: 4/20 Date:    6/11/2024             TX#: 5/20 Date: 6/13/2024   Tx#: 6/20 Date: 6/18/2024   Tx: 7/20   MT: 12'   Shoulder harmonics  S/L STM, rose marie efflurage posterior shoulder complex MT: 8'   Shoulder harmonics  S/L STM, rose marie efflurage posterior shoulder complex   MT: 10  STM effleurage on R upper trapezius  Shoulder harmonics MT: 8'   STM effleurage on R upper trapezius  Shoulder harmonics MT: 12'   Gr I inferior GH glide  Shoulder harmonics MT: 10'   Gr I inferior GH glide  Shoulder harmonics   TE: 25'   PROM within protocol- flexion, scaption, IR/ER   Seated UT stretch, 4x10s R  Seated Digiflex, red x10   Supine elbow flexion/extension to tolerance 2x10  Supine pronation/supination to tolerance   HEP update- seated UT stretch  TE: 25'  PROM within protocol- flexion, scaption, IR/ER   Standing pendulum, forward/retro, horizontal   Pt edu- slight wear/schedule  Standing elbow  flexion/extension AROM x10ea     TE:35  PROM within protocol- flexion, scaption, IR/ER   Standing pendulum, forward/retro, horizontal   Standing elbow flexion/extension AROM 3x10ea   Seated Digiflex, red x15  Seated scapular retractions 2x10  Seated supination/pronation x20  Supine Chin tucks 2x10  L upper trap stretch 3x 15 sec hold  Patient re-education on pendulums           TE: 23'  PROM within protocol- flexion, scaption, IR/ER   AROM, elbow flexion/extension x15 ea  Seated ER with cane, 10x10s  Seated table slide, PROM, flexion 2x8  TE: 26'   Pendulum, 2x1'   PROM as tolerated- flexion, scaption, abduction, IR, ER  Elbow flexion/extension supine x20  Standing pulley, flexion PROM x10  Supine chin tuck x10, 5s hold      TE: 23'  PROM as tolerated- flexion, scaption, abduction, IR, ER  Supine flexion (BUE), AAROM 2x10  Elbow flexion/extension supine x20  Standing pulley, flexion, grossly 90 deg x20  Scaption, SB on table AAROM x15     NMR: 8'   S/L scapular retraction, x15, 5s hold (tactile cue)  NMR: 10'   S/L scapular clock, x8 ea (PT assist)  NMR: 10'   S/L scapular clock, x8 ea   S/L scapular retraction, x10   NMR: 12'  Seated scapular retraction, 2x10  Isometric, flexion/abduction (5/10 pain), extension  x10, 5s hold           HEP:   Access Code: [email or web address]  URL: [email or web address]  Date: 05/21/2024  Prepared by: Elizabeth Chong     Exercises  - Thumb Opposition  - 5 x daily - 7 x weekly - 10 reps  - Seated Wrist Flexion Stretch  - 5 x daily - 7 x weekly - 5 reps - 10 second hold  - Seated Wrist Extension Stretch  - 5 x daily - 7 x weekly - 5 reps - 10 second hold    Pendulum, forward/lateral     Charges: 1 Manual, 2 TE, 1 NMR    Total Timed Treatment: 45'  min  Total Treatment Time: 45'  min

## 2024-06-20 ENCOUNTER — OFFICE VISIT (OUTPATIENT)
Dept: PHYSICAL THERAPY | Facility: HOSPITAL | Age: 69
End: 2024-06-20
Attending: INTERNAL MEDICINE
Payer: COMMERCIAL

## 2024-06-20 PROCEDURE — 97112 NEUROMUSCULAR REEDUCATION: CPT

## 2024-06-20 PROCEDURE — 97110 THERAPEUTIC EXERCISES: CPT

## 2024-06-20 PROCEDURE — 97140 MANUAL THERAPY 1/> REGIONS: CPT

## 2024-06-20 NOTE — PROGRESS NOTES
Diagnosis:   S/P rotator cuff repair (Z98.890)    right Shoulder Arthroscopic rotator cuff repair, subacromial decompression, distal clavicle excision    Referring Provider: Skyler Ponce   Surgeon: Dr. Jose Gutierres Date of Evaluation:    5/21/2024    Precautions:  adhere to the \"large repair\" protocol, sling 4 weeks.     Visual Impairment, HTN, breast cancer    No AAROM until 6 week, no AROM until 8 week no resisted RC strengthening 12 week, PROM to tolerance, avoid adduction   Next MD Visit:   Not Scheduled    Date of Surgery: 5/6/2024    Insurance Primary/Secondary: [employer] [place] PPO / N/A     # Auth Visits: no auth, 20 per POC (PN at 10v)           6/18/2024: 6 week, 3 days s/p large rotator cuff repair    Subjective: States she was vacuuming and mopping yesterday, was a little sore but okay. Pulleys came but has not opened yet.       Pain: 4-5 /10      Objective:     5/30/2024   Palpation: soft tissue restriction and TTP throughout R shoulder complex; infraspinatus, teres minor, supraspinatus, upper trapezius, and rhomboids     PROM: (* denotes performed with pain)  Shoulder  Elbow   Flexion: R 40*   Scapation: R 65  ER: R 15 (discomfort)   IR: R 50 Flexion: R 140  Extension: R -10*        Assessment: AAROM tolerated well in session, working through minimal pain range. Reiterating protocol to patient regarding push/pull restrictions. Utilizing cervical and scapular stability exercises for postural re-education, HEP updated and reported below.      Goals: (To be met in 20 visits)   Pt will report improved ability to sleep without waking due to shoulder pain  Pt will improve shoulder flexion AROM to >130 degrees to be able to reach into overhead cabinets without pain or restriction  Pt will improve shoulder abduction AROM to >130 degrees to improve ability to don deodorant, don/doff shirts, and wash hair  Pt will increase shoulder AROM ER to T1 to reach and fasten seatbelt   Pt will increase shoulder AROM IR to T12 to  be able to reach in back pocket, tuck in shirt, and turn steering wheel without pain  Pt will improve shoulder strength throughout to 4/5 to improve function with ADLs including bathing and dressing independently.   Pt will demonstrate increased mid/low trap strength to 4/5 to promote improved shoulder mechanics and stabilization with ADL such as lifting and reaching   Pt will be independent and compliant with comprehensive HEP to maintain progress achieved in PT      QuickDASH Outcome Score  Score: 84.09 % (5/21/2024  3:40 PM)       Plan:Patient will be seen for 1-2 x/week or a total of 20 visits over a 90 day period.  Treatment will include: Manual Therapy, Neuromuscular Re-education, Self-Care Home Management, Therapeutic Activities, Therapeutic Exercise, and Home Exercise Program instruction   Progress per protocol: MARISOL, scapular stability, submax isometric,PN   Date: 5/30/2024  TX#: 2/20'  Date:     6/4/2024             TX#: 3/20 Date: 6/6/2024                TX#: 4/20 Date:    6/11/2024             TX#: 5/20 Date: 6/13/2024   Tx#: 6/20 Date: 6/18/2024   Tx: 7/20 Date: 6/20/2024   Tx: 8/20   MT: 12'   Shoulder harmonics  S/L STM, rose marie efflurage posterior shoulder complex MT: 8'   Shoulder harmonics  S/L STM, rose marie efflurage posterior shoulder complex   MT: 10  STM effleurage on R upper trapezius  Shoulder harmonics MT: 8'   STM effleurage on R upper trapezius  Shoulder harmonics MT: 12'   Gr I inferior GH glide  Shoulder harmonics MT: 10'   Gr I inferior GH glide  Shoulder harmonics MT: 8'   Gr I inferior GH glide  Shoulder harmonics   TE: 25'   PROM within protocol- flexion, scaption, IR/ER   Seated UT stretch, 4x10s R  Seated Digiflex, red x10   Supine elbow flexion/extension to tolerance 2x10  Supine pronation/supination to tolerance   HEP update- seated UT stretch  TE: 25'  PROM within protocol- flexion, scaption, IR/ER   Standing pendulum, forward/retro, horizontal   Pt edu- slight  wear/schedule  Standing elbow flexion/extension AROM x10ea     TE:35  PROM within protocol- flexion, scaption, IR/ER   Standing pendulum, forward/retro, horizontal   Standing elbow flexion/extension AROM 3x10ea   Seated Digiflex, red x15  Seated scapular retractions 2x10  Seated supination/pronation x20  Supine Chin tucks 2x10  L upper trap stretch 3x 15 sec hold  Patient re-education on pendulums           TE: 23'  PROM within protocol- flexion, scaption, IR/ER   AROM, elbow flexion/extension x15 ea  Seated ER with cane, 10x10s  Seated table slide, PROM, flexion 2x8  TE: 26'   Pendulum, 2x1'   PROM as tolerated- flexion, scaption, abduction, IR, ER  Elbow flexion/extension supine x20  Standing pulley, flexion PROM x10  Supine chin tuck x10, 5s hold      TE: 23'  PROM as tolerated- flexion, scaption, abduction, IR, ER  Supine flexion (BUE), AAROM 2x10  Elbow flexion/extension supine x20  Standing pulley, flexion, grossly 90 deg x20  Scaption, SB on table AAROM x15   TE: 25'  Seated pulleys, flexion, 2'   PROM as tolerated- flexion, scaption, abduction, IR, ER  Supine flexion (BUE), AAROM 2x10  Chin tuck with rotation, supine x15ea  Abduction, SB on table AAROM 2x10     NMR: 8'   S/L scapular retraction, x15, 5s hold (tactile cue)  NMR: 10'   S/L scapular clock, x8 ea (PT assist)  NMR: 10'   S/L scapular clock, x8 ea   S/L scapular retraction, x10   NMR: 12'  Seated scapular retraction, 2x10  Isometric, flexion/abduction (5/10 pain), extension  x10, 5s hold NMR: 12'  Prone scapular retraction, 2x10, 5s hold  Isometric, flexion/ abduction (10% contraction), extension  x10, 5s hold            HEP:   Access Code: [email or web address]  URL: [email or web address]  Date: 05/21/2024  Prepared by: Elizabeth Chong     Exercises  - Thumb Opposition  - 5 x daily - 7 x weekly - 10 reps  - Seated Wrist Flexion Stretch  - 5 x daily - 7 x weekly - 5 reps - 10 second hold  - Seated Wrist Extension Stretch  - 5 x daily - 7 x  weekly - 5 reps - 10 second hold    Pendulum, forward/lateral     Access Code: [email or web address]  Date: 06/20/2024  Prepared by: Elizabeth Chong  - Seated Shoulder Flexion AAROM with Pulley Behind  - 1 x daily - 7 x weekly - 3 sets - 10 reps  - Supine Shoulder Flexion AAROM with Hands Clasped  - 1 x daily - 7 x weekly - 2 sets - 10 reps  - Isometric Shoulder Flexion at Wall  - 1 x daily - 7 x weekly - 10 reps - 5 second hold  - Isometric Shoulder Extension at Wall  - 1 x daily - 7 x weekly - 10 reps - 5 second hold  - Isometric Shoulder Abduction at Wall  - 1 x daily - 7 x weekly - 10 reps - 5 second hold    Charges: 1 Manual, 2 TE, 1 NMR    Total Timed Treatment: 45'  min  Total Treatment Time: 45'  min

## 2024-06-25 ENCOUNTER — TELEPHONE (OUTPATIENT)
Dept: PHYSICAL THERAPY | Facility: HOSPITAL | Age: 69
End: 2024-06-25

## 2024-06-25 ENCOUNTER — APPOINTMENT (OUTPATIENT)
Dept: PHYSICAL THERAPY | Facility: HOSPITAL | Age: 69
End: 2024-06-25
Attending: INTERNAL MEDICINE
Payer: COMMERCIAL

## 2024-06-26 NOTE — PROGRESS NOTES
Diagnosis:   S/P rotator cuff repair (Z98.890)    right Shoulder Arthroscopic rotator cuff repair, subacromial decompression, distal clavicle excision    Referring Provider: Skyler Ponce   Surgeon: Dr. Jose Gutierres Date of Evaluation:    5/21/2024    Precautions:  adhere to the \"large repair\" protocol, sling 4 weeks.     Visual Impairment, HTN, breast cancer    No AAROM until 6 week, no AROM until 8 week no resisted RC strengthening 12 week, PROM to tolerance, avoid adduction   Next MD Visit:   Not Scheduled    Date of Surgery: 5/6/2024    Insurance Primary/Secondary: [employer] [place] PPO / N/A     # Auth Visits: no auth, 20 per POC (PN at 10v)           6/27/2024: 7 week, 3 days s/p large rotator cuff repair    Subjective: Pt says the shoulder pain is going down. She saw the doctor Monday, says the surgeon told her to practice moving her arm more.  Pain: 3 /10      Objective:     5/30/2024   Palpation: soft tissue restriction and TTP throughout R shoulder complex; infraspinatus, teres minor, supraspinatus, upper trapezius, and rhomboids     PROM: (* denotes performed with pain)  Shoulder  Elbow   Flexion: R 40*   Scapation: R 65  ER: R 15 (discomfort)   IR: R 50 Flexion: R 140  Extension: R -10*        Assessment: AAROM tolerated well in session, working through minimal pain range. Progressed to standing AAROM and included ER/IR sub-max isometrics. Pt was sore after ER isometrics, otherwise no complaints. Told pt to ice, will monitor response to new exercises and progress according to the protocol.       Goals: (To be met in 20 visits)   Pt will report improved ability to sleep without waking due to shoulder pain  Pt will improve shoulder flexion AROM to >130 degrees to be able to reach into overhead cabinets without pain or restriction  Pt will improve shoulder abduction AROM to >130 degrees to improve ability to don deodorant, don/doff shirts, and wash hair  Pt will increase shoulder AROM ER to T1 to reach and fasten  seatbelt   Pt will increase shoulder AROM IR to T12 to be able to reach in back pocket, tuck in shirt, and turn steering wheel without pain  Pt will improve shoulder strength throughout to 4/5 to improve function with ADLs including bathing and dressing independently.   Pt will demonstrate increased mid/low trap strength to 4/5 to promote improved shoulder mechanics and stabilization with ADL such as lifting and reaching   Pt will be independent and compliant with comprehensive HEP to maintain progress achieved in PT      QuickDASH Outcome Score  Score: 84.09 % (5/21/2024  3:40 PM)       Plan:Patient will be seen for 1-2 x/week or a total of 20 visits over a 90 day period.  Treatment will include: Manual Therapy, Neuromuscular Re-education, Self-Care Home Management, Therapeutic Activities, Therapeutic Exercise, and Home Exercise Program instruction   Progress per protocol: MARISOL, scapular stability, submax isometric,PN   Date: 6/6/2024                TX#: 4/20 Date: 6/11/2024             TX#: 5/20 Date: 6/13/2024   Tx#: 6/20 Date: 6/18/2024   Tx: 7/20 Date: 6/20/2024   Tx: 8/20 Date: 6/27/24  Tx#: 9/20   MT: 10  STM effleurage on R upper trapezius  Shoulder harmonics MT: 8'   STM effleurage on R upper trapezius  Shoulder harmonics MT: 12'   Gr I inferior GH glide  Shoulder harmonics MT: 10'   Gr I inferior GH glide  Shoulder harmonics MT: 8'   Gr I inferior GH glide  Shoulder harmonics MT: 10'   Gr I inferior GH glide  Shoulder harmonics   TE:35  PROM within protocol- flexion, scaption, IR/ER   Standing pendulum, forward/retro, horizontal   Standing elbow flexion/extension AROM 3x10ea   Seated Digiflex, red x15  Seated scapular retractions 2x10  Seated supination/pronation x20  Supine Chin tucks 2x10  L upper trap stretch 3x 15 sec hold  Patient re-education on pendulums           TE: 23'  PROM within protocol- flexion, scaption, IR/ER   AROM, elbow flexion/extension x15 ea  Seated ER with cane, 10x10s  Seated  table slide, PROM, flexion 2x8  TE: 26'   Pendulum, 2x1'   PROM as tolerated- flexion, scaption, abduction, IR, ER  Elbow flexion/extension supine x20  Standing pulley, flexion PROM x10  Supine chin tuck x10, 5s hold      TE: 23'  PROM as tolerated- flexion, scaption, abduction, IR, ER  Supine flexion (BUE), AAROM 2x10  Elbow flexion/extension supine x20  Standing pulley, flexion, grossly 90 deg x20  Scaption, SB on table AAROM x15   TE: 25'  Seated pulleys, flexion, 2'   PROM as tolerated- flexion, scaption, abduction, IR, ER  Supine flexion (BUE), AAROM 2x10  Chin tuck with rotation, supine x15ea  Abduction, SB on table AAROM 2x10   TE: 23'  PROM as tolerated- flexion, scaption, abduction, IR, ER  Supine flexion (BUE), AAROM with cane 2x10  Standing scaption AAROM with cane (left arm assist R) x10  Standing flexion AAROM with cane (left arm assist R) x10       NMR: 10'   S/L scapular clock, x8 ea   S/L scapular retraction, x10   NMR: 12'  Seated scapular retraction, 2x10  Isometric, flexion/abduction (5/10 pain), extension  x10, 5s hold NMR: 12'  Prone scapular retraction, 2x10, 5s hold  Isometric, flexion/ abduction (10% contraction), extension  x10, 5s hold NMR: 10'  Scap reaction with YTB (no rowing) x10   Isometric, flexion/ abduction (10% contraction), extension  x10, 10s hold; IR, ER x10 5s           HEP:   Access Code: [email or web address]  URL: [email or web address]  Date: 05/21/2024  Prepared by: Elizabeth Chong     Exercises  - Thumb Opposition  - 5 x daily - 7 x weekly - 10 reps  - Seated Wrist Flexion Stretch  - 5 x daily - 7 x weekly - 5 reps - 10 second hold  - Seated Wrist Extension Stretch  - 5 x daily - 7 x weekly - 5 reps - 10 second hold    Pendulum, forward/lateral     Access Code: [email or web address]  Date: 06/20/2024  Prepared by: Elizabeth Chong  - Seated Shoulder Flexion AAROM with Pulley Behind  - 1 x daily - 7 x weekly - 3 sets - 10 reps  - Supine Shoulder Flexion AAROM with Hands Clasped   - 1 x daily - 7 x weekly - 2 sets - 10 reps  - Isometric Shoulder Flexion at Wall  - 1 x daily - 7 x weekly - 10 reps - 5 second hold  - Isometric Shoulder Extension at Wall  - 1 x daily - 7 x weekly - 10 reps - 5 second hold  - Isometric Shoulder Abduction at Wall  - 1 x daily - 7 x weekly - 10 reps - 5 second hold    Charges: 1 Manual, 2 TE, 1 NMR    Total Timed Treatment: 43'  min  Total Treatment Time: 43'  min

## 2024-06-27 ENCOUNTER — OFFICE VISIT (OUTPATIENT)
Dept: PHYSICAL THERAPY | Facility: HOSPITAL | Age: 69
End: 2024-06-27
Attending: INTERNAL MEDICINE
Payer: COMMERCIAL

## 2024-06-27 PROCEDURE — 97140 MANUAL THERAPY 1/> REGIONS: CPT

## 2024-06-27 PROCEDURE — 97110 THERAPEUTIC EXERCISES: CPT

## 2024-06-27 PROCEDURE — 97112 NEUROMUSCULAR REEDUCATION: CPT

## 2024-07-01 ENCOUNTER — OFFICE VISIT (OUTPATIENT)
Dept: PULMONOLOGY | Facility: CLINIC | Age: 69
End: 2024-07-01

## 2024-07-01 VITALS
RESPIRATION RATE: 12 BRPM | HEART RATE: 76 BPM | HEIGHT: 66 IN | DIASTOLIC BLOOD PRESSURE: 91 MMHG | BODY MASS INDEX: 26.84 KG/M2 | WEIGHT: 167 LBS | SYSTOLIC BLOOD PRESSURE: 148 MMHG | OXYGEN SATURATION: 97 %

## 2024-07-01 DIAGNOSIS — R05.3 CHRONIC COUGH: Primary | ICD-10-CM

## 2024-07-01 RX ORDER — ROSUVASTATIN CALCIUM 10 MG/1
10 TABLET, COATED ORAL NIGHTLY
COMMUNITY
Start: 2024-04-15

## 2024-07-01 RX ORDER — FLUTICASONE FUROATE, UMECLIDINIUM BROMIDE AND VILANTEROL TRIFENATATE 100; 62.5; 25 UG/1; UG/1; UG/1
1 POWDER RESPIRATORY (INHALATION) DAILY
Qty: 1 EACH | Refills: 5 | Status: SHIPPED | OUTPATIENT
Start: 2024-07-01

## 2024-07-01 NOTE — PROGRESS NOTES
Dear Jovan:           As you know, Sandrine Oleary is a 68-year-old female who I am now evaluating for cough.    HISTORY OF PRESENT ILLNESS: The patient describes having intermittent cough for the past 10 years.  She is well at present but gets intermittent bronchitic syndrome associated with wheezing.  She has triggers that include being in dirty buildings including her place of employment up until recently.  She would always wheezing being worse while at work.  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  She does have postnasal drip.  She had been taking ipratropium nasally but it resulted in nosebleeds.  She does get acid reflux symptomatology now responding to esomeprazole.  Her wheezing and cough symptomatology do improve in response to Trelegy and albuterol.  She denies hemoptysis, chest pain, pleurisy, personal or family history of deep venous thrombosis, TB exposure.  She has dyspnea with stairs.    PAST MEDICAL AND SURGICAL HISTORY:   1.  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    FAMILY HISTORY: Mother  MVI, father  of pneumonia and OBS    ALLERGIES TO MEDICATIONS: Erythromycin vancomycin tobramycin azithromycin Fish oil    MEDICATIONS: Albuterol benzonatate carvedilol esomeprazole femoral stretch sitting Trelegy 200 hydralazine DuoNeb losartan Singulair rosuvastatin    REVIEW OF SYSTEMS: Review of Systems:  Vision normal.  Dry eyes, ear nose and throat normal. Bowel normal. Bladder function normal. No depression. No thyroid disease. No lymphatic system concerns.  Psoriasis. Muscles  and joints unremarkable. No weight loss no weight gain.    PHYSICAL EXAMINATION: Physical Examination:  Vital signs normal. HEENT examination is unremarkable with pupils equal round and reactive to light and accommodation. Neck without adenopathy, thyromegaly, JVD nor bruit. Lungs clear to auscultation and percussion. Cardiac regular rate and rhythm no murmur.  Occasional extrasystole.  Abdomen nontender, without hepatosplenomegaly and no mass appreciable. Extremities and Musculoskeletal without clubbing cyanosis nor edema, and mobility acceptable. Neurologic grossly intact with symmetric tone and strength and reflex.    LABORATORY: TSH normal.  CT scan of the chest and high-resolution CT scan of the chest from 2018-unremarkable and no evidence of interstitial lung disease.  There is a stable 6 mm nodule.  Chest x-ray-relatively large mainstem bronchi    ASSESSMENT AND PLAN:  PROBLEM 1.  Chronic cough-patient is doing well at present.  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  Also viral infections will trigger.  Additionally, postnasal drip and acid reflux may be contributing to a less degree.  She is on an aggressive regime of medicines and I think it reasonable to taper the Trelegy from the 200 dosing down to the 100 dosing and see how she does clinically.  Lungs sound good at present.    RECOMMENDATIONS:  1.  Increase Trelegy from 200 dosing down to 100.  2.  Continue albuterol as needed  3.  Singulair  4.  Return to see me in three 6 to 12-month intervals or sooner if needed and contact me immediately if new trouble in the meantime.  5.  Annual flu shot, COVID boosters, RSV vaccine  6.  Continue the esomeprazole and the patient should not eat or drink within a couple hours prior to lying flat.    I am delighted to assist in Sandrine's care.            With warmest regards,     Shantanu LARRY  MD Norma  Medical Director, Critical Care, [facility]  Medical Director, [facility]

## 2024-07-02 ENCOUNTER — OFFICE VISIT (OUTPATIENT)
Dept: PHYSICAL THERAPY | Facility: HOSPITAL | Age: 69
End: 2024-07-02
Attending: INTERNAL MEDICINE
Payer: COMMERCIAL

## 2024-07-02 ENCOUNTER — PATIENT MESSAGE (OUTPATIENT)
Dept: PULMONOLOGY | Facility: CLINIC | Age: 69
End: 2024-07-02

## 2024-07-02 PROCEDURE — 97140 MANUAL THERAPY 1/> REGIONS: CPT

## 2024-07-02 PROCEDURE — 97110 THERAPEUTIC EXERCISES: CPT

## 2024-07-02 NOTE — TELEPHONE ENCOUNTER
From: Sandrine Oleary  To: Shantanu Green  Sent: 7/2/2024 9:58 AM CDT  Subject: Thank you    Dear Dr. Green,    Thanks for your time yesterday. I forgot to ask you for a referral for a new allergist. My allergist of 32 years in [place] just retired. I would really appreciate an allergist in [place] and hope you can get back to me with a name.    Sincerely,    Sandrine Oleary

## 2024-07-02 NOTE — PROGRESS NOTES
Progress Summary  Pt has attended 10 visits in Physical Therapy.     Diagnosis:   S/P rotator cuff repair (Z98.890)    right Shoulder Arthroscopic rotator cuff repair, subacromial decompression, distal clavicle excision    Referring Provider: Skyler Ponce   Surgeon: Dr. Jose Gutierres Date of Evaluation:    5/21/2024    Precautions:  adhere to the \"large repair\" protocol, sling 4 weeks.     Visual Impairment, HTN, breast cancer    No AAROM until 6 week, no AROM until 8 week no resisted RC strengthening 12 week, PROM to tolerance, avoid adduction   Next MD Visit:   October 2024     Date of Surgery: 5/6/2024    Insurance Primary/Secondary: [employer] [place] PPO / N/A     # Auth Visits: no auth, 20 per POC (PN at 10v)           7/2/2024: 8 week, 1 day s/p large rotator cuff repair    Subjective: Feels like she's most limited by pain with sleeping, seems like she can't get comfortable. Feels that the mobility has improved.   Pain: 3 /10      Objective:     5/30/2024   Palpation: soft tissue restriction and TTP throughout R shoulder complex; infraspinatus, teres minor, supraspinatus, upper trapezius, and rhomboids     7/2/2024   AROM: (* denotes performed with pain)   EOS: end of session  Shoulder  Elbow   Flexion: R 75* (127 EOS)   Abduction: R 40* (90 EOS)  ER (elbow neutral): R 35  IR (elbow Neutral): R 85  Flexion: R 140  Extension: R 0       Shoulder PROM: (* denotes performed with pain)  7/2/2024  Evaluation     Flexion: R 150  Abduction: R 140  ER (30 deg abd):R 70    IR (30 deg abd): R 65  Flexion: R 40*   Scapation: R 65  ER: R 15 (discomfort)   IR: R 50      Assessment: Patient has attended 10 visits s/p large rotator cuff repair 5/6/2024. At this time patient demonstrates near full PROM in all directions without pain, most limited into IR and end range abduction. AROM progresses within session as noted above suggesting improvement in muscle activation and recruitment. Reiterating to patient the importance of slow and  controlled movement, pt agreeable, progressing well per protocol at this time. Would benefit from continued skilled physical therapy to achieve full ROM, improve strength, and return to all functional activity without risk of re-injury.      Goals: (To be met in 20 visits)   Pt will report improved ability to sleep without waking due to shoulder pain Continued   Pt will improve shoulder flexion AROM to >130 degrees to be able to reach into overhead cabinets without pain or restrictionContinued   Pt will improve shoulder abduction AROM to >130 degrees to improve ability to don deodorant, don/doff shirts, and wash hairContinued   Pt will increase shoulder AROM ER to T1 to reach and fasten seatbelt Continued   Pt will increase shoulder AROM IR to T12 to be able to reach in back pocket, tuck in shirt, and turn steering wheel without painContinued   Pt will improve shoulder strength throughout to 4/5 to improve function with ADLs including bathing and dressing independently. Continued   Pt will demonstrate increased mid/low trap strength to 4/5 to promote improved shoulder mechanics and stabilization with ADL such as lifting and reaching Continued   Pt will be independent and compliant with comprehensive HEP to maintain progress achieved in PT Continued      QuickDASH Outcome Score  Score: 84.09 % (5/21/2024  3:40 PM)    Post QuickDASH Outcome Score  Post Score: 63.64 % (7/2/2024  2:43 PM)    20.45 % improvement    Plan: Continue skilled Physical Therapy 2 x/week or a total of 20 visits over a 90 day period. Treatment will include: manual therapy, therapeutic exercise, therapeutic activity, neuromuscular re-education, HEP instruction and patient education.       Patient/Family/Caregiver was advised of these findings, precautions, and treatment options and has agreed to actively participate in planning and for this course of care.    Thank you for your referral. If you have any questions, please contact me at Dept:  [phone].    Sincerely,  Electronically signed by therapist: Elizabeth Chong PT     Physician's certification required:  Yes  Please co-sign or sign and return this letter via fax as soon as possible to [phone].   I certify the need for these services furnished under this plan of treatment and while under my care.    X___________________________________________________ Date____________________    Certification From: 7/2/2024  To:9/30/2024     Date: 6/6/2024                TX#: 4/20 Date: 6/11/2024             TX#: 5/20 Date: 6/13/2024   Tx#: 6/20 Date: 6/18/2024   Tx: 7/20 Date: 6/20/2024   Tx: 8/20 Date: 6/27/24  Tx#: 9/20 Date: 7/2/2024   Tx: 10/20   MT: 10  STM effleurage on R upper trapezius  Shoulder harmonics MT: 8'   STM effleurage on R upper trapezius  Shoulder harmonics MT: 12'   Gr I inferior GH glide  Shoulder harmonics MT: 10'   Gr I inferior GH glide  Shoulder harmonics MT: 8'   Gr I inferior GH glide  Shoulder harmonics MT: 10'   Gr I inferior GH glide  Shoulder harmonics MT: 8'   Gr I inferior GH glide  Shoulder harmonics   TE:35  PROM within protocol- flexion, scaption, IR/ER   Standing pendulum, forward/retro, horizontal   Standing elbow flexion/extension AROM 3x10ea   Seated Digiflex, red x15  Seated scapular retractions 2x10  Seated supination/pronation x20  Supine Chin tucks 2x10  L upper trap stretch 3x 15 sec hold  Patient re-education on pendulums           TE: 23'  PROM within protocol- flexion, scaption, IR/ER   AROM, elbow flexion/extension x15 ea  Seated ER with cane, 10x10s  Seated table slide, PROM, flexion 2x8  TE: 26'   Pendulum, 2x1'   PROM as tolerated- flexion, scaption, abduction, IR, ER  Elbow flexion/extension supine x20  Standing pulley, flexion PROM x10  Supine chin tuck x10, 5s hold      TE: 23'  PROM as tolerated- flexion, scaption, abduction, IR, ER  Supine flexion (BUE), AAROM 2x10  Elbow flexion/extension supine x20  Standing pulley, flexion, grossly 90 deg  x20  Scaption, SB on table AAROM x15   TE: 25'  Seated pulleys, flexion, 2'   PROM as tolerated- flexion, scaption, abduction, IR, ER  Supine flexion (BUE), AAROM 2x10  Chin tuck with rotation, supine x15ea  Abduction, SB on table AAROM 2x10   TE: 23'  PROM as tolerated- flexion, scaption, abduction, IR, ER  Supine flexion (BUE), AAROM with cane 2x10  Standing scaption AAROM with cane (left arm assist R) x10  Standing flexion AAROM with cane (left arm assist R) x10    TE: 32'  Reassessment  Seated pulleys, flexion, 2'  Finger Ladder- flexion x10  Chest Press (B) 2# x10  S/L ER, AROM 2x10 (fatiguing)   S/L abduction, to 90 AAROM (from PT) x15    Wall slide, flexion x5           NMR: 10'   S/L scapular clock, x8 ea   S/L scapular retraction, x10   NMR: 12'  Seated scapular retraction, 2x10  Isometric, flexion/abduction (5/10 pain), extension  x10, 5s hold NMR: 12'  Prone scapular retraction, 2x10, 5s hold  Isometric, flexion/ abduction (10% contraction), extension  x10, 5s hold NMR: 10'  Scap reaction with YTB (no rowing) x10   Isometric, flexion/ abduction (10% contraction), extension  x10, 10s hold; IR, ER x10 5s               HEP:   Access Code: [email or web address]  URL: [email or web address]  Date: 05/21/2024  Prepared by: Elizabeth Chong     Exercises  - Thumb Opposition  - 5 x daily - 7 x weekly - 10 reps  - Seated Wrist Flexion Stretch  - 5 x daily - 7 x weekly - 5 reps - 10 second hold  - Seated Wrist Extension Stretch  - 5 x daily - 7 x weekly - 5 reps - 10 second hold    Pendulum, forward/lateral     Access Code: [email or web address]  Date: 06/20/2024  Prepared by: Elizabeth Chong  - Seated Shoulder Flexion AAROM with Pulley Behind  - 1 x daily - 7 x weekly - 3 sets - 10 reps  - Supine Shoulder Flexion AAROM with Hands Clasped  - 1 x daily - 7 x weekly - 2 sets - 10 reps  - Isometric Shoulder Flexion at Wall  - 1 x daily - 7 x weekly - 10 reps - 5 second hold  - Isometric Shoulder Extension at Wall  - 1 x  daily - 7 x weekly - 10 reps - 5 second hold  - Isometric Shoulder Abduction at Wall  - 1 x daily - 7 x weekly - 10 reps - 5 second hold    Charges: 1 Manual, 2 TE   Total Timed Treatment: 40'  min  Total Treatment Time: 40'  min

## 2024-07-05 ENCOUNTER — OFFICE VISIT (OUTPATIENT)
Dept: PHYSICAL THERAPY | Facility: HOSPITAL | Age: 69
End: 2024-07-05
Attending: INTERNAL MEDICINE
Payer: COMMERCIAL

## 2024-07-05 PROCEDURE — 97140 MANUAL THERAPY 1/> REGIONS: CPT

## 2024-07-05 PROCEDURE — 97112 NEUROMUSCULAR REEDUCATION: CPT

## 2024-07-05 PROCEDURE — 97110 THERAPEUTIC EXERCISES: CPT

## 2024-07-05 NOTE — PROGRESS NOTES
Diagnosis:   S/P rotator cuff repair (Z98.890)    right Shoulder Arthroscopic rotator cuff repair, subacromial decompression, distal clavicle excision    Referring Provider: Skyler Ponce   Surgeon: Dr. Jose Gutierres Date of Evaluation:    5/21/2024    Precautions:  adhere to the \"large repair\" protocol, sling 4 weeks.     Visual Impairment, HTN, breast cancer    No AAROM until 6 week, no AROM until 8 week no resisted RC strengthening 12 week, PROM to tolerance, avoid adduction   Next MD Visit:   October 2024     Date of Surgery: 5/6/2024    Insurance Primary/Secondary: [employer] [place] PPO / N/A     # Auth Visits: no auth, 20 per POC (PN at 10v)           7/2/2024: 8 week, 1 day s/p large rotator cuff repair    Subjective: Feels the pain is still present but her movement is getting better.  Was a little sore after last session but okay after ibuprofen.   Pain:  3 /10      Objective:     5/30/2024   Palpation: soft tissue restriction and TTP throughout R shoulder complex; infraspinatus, teres minor, supraspinatus, upper trapezius, and rhomboids     7/2/2024   AROM: (* denotes performed with pain)   EOS: end of session  Shoulder  Elbow   Flexion: R 75* (127 EOS)   Abduction: R 40* (90 EOS)  ER (elbow neutral): R 35  IR (elbow Neutral): R 85  Flexion: R 140  Extension: R 0       Shoulder PROM: (* denotes performed with pain)  7/2/2024  Evaluation     Flexion: R 150  Abduction: R 140  ER (30 deg abd):R 70    IR (30 deg abd): R 65  Flexion: R 40*   Scapation: R 65  ER: R 15 (discomfort)   IR: R 50      Assessment: Increasing resistance training and AROM to tolerance, pt tolerates all exercise well with rest breaks given throughout session. Tactile cueing for upright posturing without upper trapezius compensation.      Goals: (To be met in 20 visits)   Pt will report improved ability to sleep without waking due to shoulder pain Continued   Pt will improve shoulder flexion AROM to >130 degrees to be able to reach into overhead  cabinets without pain or restrictionContinued   Pt will improve shoulder abduction AROM to >130 degrees to improve ability to don deodorant, don/doff shirts, and wash hairContinued   Pt will increase shoulder AROM ER to T1 to reach and fasten seatbelt Continued   Pt will increase shoulder AROM IR to T12 to be able to reach in back pocket, tuck in shirt, and turn steering wheel without painContinued   Pt will improve shoulder strength throughout to 4/5 to improve function with ADLs including bathing and dressing independently. Continued   Pt will demonstrate increased mid/low trap strength to 4/5 to promote improved shoulder mechanics and stabilization with ADL such as lifting and reaching Continued   Pt will be independent and compliant with comprehensive HEP to maintain progress achieved in PT Continued      QuickDASH Outcome Score  Score: 84.09 % (5/21/2024  3:40 PM)    Post QuickDASH Outcome Score  Post Score: 63.64 % (7/2/2024  2:43 PM)    20.45 % improvement    Plan: Continue skilled Physical Therapy 2 x/week or a total of 20 visits over a 90 day period. Treatment will include: manual therapy, therapeutic exercise, therapeutic activity, neuromuscular re-education, HEP instruction and patient education.     Future Session: walkout isometric, scapular strengthening, AROM gravity minimized     Date: 6/6/2024                TX#: 4/20 Date: 6/11/2024             TX#: 5/20 Date: 6/13/2024   Tx#: 6/20 Date: 6/18/2024   Tx: 7/20 Date: 6/20/2024   Tx: 8/20 Date: 6/27/24  Tx#: 9/20 Date: 7/2/2024   Tx: 10/20 Date: 7/5/2024   Tx: 11/20   MT: 10  STM effleurage on R upper trapezius  Shoulder harmonics MT: 8'   STM effleurage on R upper trapezius  Shoulder harmonics MT: 12'   Gr I inferior GH glide  Shoulder harmonics MT: 10'   Gr I inferior GH glide  Shoulder harmonics MT: 8'   Gr I inferior GH glide  Shoulder harmonics MT: 10'   Gr I inferior GH glide  Shoulder harmonics MT: 8'   Gr I inferior GH glide  Shoulder harmonics  MT: 8'   Gr II inferior GH glide  Shoulder harmonics   TE:35  PROM within protocol- flexion, scaption, IR/ER   Standing pendulum, forward/retro, horizontal   Standing elbow flexion/extension AROM 3x10ea   Seated Digiflex, red x15  Seated scapular retractions 2x10  Seated supination/pronation x20  Supine Chin tucks 2x10  L upper trap stretch 3x 15 sec hold  Patient re-education on pendulums           TE: 23'  PROM within protocol- flexion, scaption, IR/ER   AROM, elbow flexion/extension x15 ea  Seated ER with cane, 10x10s  Seated table slide, PROM, flexion 2x8  TE: 26'   Pendulum, 2x1'   PROM as tolerated- flexion, scaption, abduction, IR, ER  Elbow flexion/extension supine x20  Standing pulley, flexion PROM x10  Supine chin tuck x10, 5s hold      TE: 23'  PROM as tolerated- flexion, scaption, abduction, IR, ER  Supine flexion (BUE), AAROM 2x10  Elbow flexion/extension supine x20  Standing pulley, flexion, grossly 90 deg x20  Scaption, SB on table AAROM x15   TE: 25'  Seated pulleys, flexion, 2'   PROM as tolerated- flexion, scaption, abduction, IR, ER  Supine flexion (BUE), AAROM 2x10  Chin tuck with rotation, supine x15ea  Abduction, SB on table AAROM 2x10   TE: 23'  PROM as tolerated- flexion, scaption, abduction, IR, ER  Supine flexion (BUE), AAROM with cane 2x10  Standing scaption AAROM with cane (left arm assist R) x10  Standing flexion AAROM with cane (left arm assist R) x10    TE: 32'  Reassessment  Seated pulleys, flexion, 2'  Finger Ladder- flexion x10  Chest Press (B) 2# x10  S/L ER, AROM 2x10 (fatiguing)   S/L abduction, to 90 AAROM (from PT) x15    Wall slide, flexion x5        TE: 25'  Seated pulleys, flexion x20  Seated pulleys, scaption, x20  Finger Ladder, flexion, x10  Standing bicep curl, GTB 2x10  Standing tricep extension RTB 2x8 (fatiguing)   Supine IR/ER, 40deg abd x10ea   S/L abduction, to 90 AAROM (from PT) x15    S/L ER, AROM 3x5     NMR: 10'   S/L scapular clock, x8 ea   S/L scapular  retraction, x10   NMR: 12'  Seated scapular retraction, 2x10  Isometric, flexion/abduction (5/10 pain), extension  x10, 5s hold NMR: 12'  Prone scapular retraction, 2x10, 5s hold  Isometric, flexion/ abduction (10% contraction), extension  x10, 5s hold NMR: 10'  Scap reaction with YTB (no rowing) x10   Isometric, flexion/ abduction (10% contraction), extension  x10, 10s hold; IR, ER x10 5s    NMR: 12'  Unilateral row, YTB 2x10  Shoulder extension, unilateral YTB 2x8  IR isometric walkout, YTB 2x10             HEP:   Access Code: [email or web address]  URL: [email or web address]  Date: 05/21/2024  Prepared by: Elizabeth Chong     Exercises  - Thumb Opposition  - 5 x daily - 7 x weekly - 10 reps  - Seated Wrist Flexion Stretch  - 5 x daily - 7 x weekly - 5 reps - 10 second hold  - Seated Wrist Extension Stretch  - 5 x daily - 7 x weekly - 5 reps - 10 second hold    Pendulum, forward/lateral     Access Code: [email or web address]  Date: 06/20/2024  Prepared by: Elizabeth Chong  - Seated Shoulder Flexion AAROM with Pulley Behind  - 1 x daily - 7 x weekly - 3 sets - 10 reps  - Supine Shoulder Flexion AAROM with Hands Clasped  - 1 x daily - 7 x weekly - 2 sets - 10 reps  - Isometric Shoulder Flexion at Wall  - 1 x daily - 7 x weekly - 10 reps - 5 second hold  - Isometric Shoulder Extension at Wall  - 1 x daily - 7 x weekly - 10 reps - 5 second hold  - Isometric Shoulder Abduction at Wall  - 1 x daily - 7 x weekly - 10 reps - 5 second hold    Charges: 1 Manual, 2 TE, 1 NMR   Total Timed Treatment: 45'  min  Total Treatment Time: 45'  min

## 2024-07-08 ENCOUNTER — OFFICE VISIT (OUTPATIENT)
Dept: PHYSICAL THERAPY | Facility: HOSPITAL | Age: 69
End: 2024-07-08
Attending: INTERNAL MEDICINE
Payer: COMMERCIAL

## 2024-07-08 PROCEDURE — 97112 NEUROMUSCULAR REEDUCATION: CPT

## 2024-07-08 PROCEDURE — 97110 THERAPEUTIC EXERCISES: CPT

## 2024-07-08 PROCEDURE — 97140 MANUAL THERAPY 1/> REGIONS: CPT

## 2024-07-08 NOTE — PROGRESS NOTES
Diagnosis:   S/P rotator cuff repair (Z98.890)    right Shoulder Arthroscopic rotator cuff repair, subacromial decompression, distal clavicle excision    Referring Provider: Skyler Ponce   Surgeon: Dr. Jose Gutierres Date of Evaluation:    5/21/2024    Precautions:  adhere to the \"large repair\" protocol, sling 4 weeks.     Visual Impairment, HTN, breast cancer    No AAROM until 6 week, no AROM until 8 week no resisted RC strengthening 12 week,     PROM to tolerance, avoid adduction   Next MD Visit:   October 2024     Date of Surgery: 5/6/2024    Insurance Primary/Secondary: [employer] [place] PPO / N/A     # Auth Visits: no auth, 20 per POC (PN at 10v)           7/2/2024: 8 week, 1 day s/p large rotator cuff repair    Subjective: Shoulder is feeling good today; did reach for a glass of wine on Saturday quickly and felt a sharp pain, relieved quickly.    Pain:  2 /10      Objective:     5/30/2024   Palpation: soft tissue restriction and TTP throughout R shoulder complex; infraspinatus, teres minor, supraspinatus, upper trapezius, and rhomboids     7/2/2024   AROM: (* denotes performed with pain)   EOS: end of session  Shoulder  Elbow   Flexion: R 75* (127 EOS)   Abduction: R 40* (90 EOS)  ER (elbow neutral): R 35  IR (elbow Neutral): R 85  Flexion: R 140  Extension: R 0       Shoulder PROM: (* denotes performed with pain)  7/2/2024  Evaluation     Flexion: R 150  Abduction: R 140  ER (30 deg abd):R 70    IR (30 deg abd): R 65  Flexion: R 40*   Scapation: R 65  ER: R 15 (discomfort)   IR: R 50      Assessment: A/AAROM continues to improve, most notably with gravility minimized abduction as pt able to perform independently today. Benefits from strengthening to tolerance.     Goals: (To be met in 20 visits)   Pt will report improved ability to sleep without waking due to shoulder pain Continued   Pt will improve shoulder flexion AROM to >130 degrees to be able to reach into overhead cabinets without pain or restrictionContinued    Pt will improve shoulder abduction AROM to >130 degrees to improve ability to don deodorant, don/doff shirts, and wash hairContinued   Pt will increase shoulder AROM ER to T1 to reach and fasten seatbelt Continued   Pt will increase shoulder AROM IR to T12 to be able to reach in back pocket, tuck in shirt, and turn steering wheel without painContinued   Pt will improve shoulder strength throughout to 4/5 to improve function with ADLs including bathing and dressing independently. Continued   Pt will demonstrate increased mid/low trap strength to 4/5 to promote improved shoulder mechanics and stabilization with ADL such as lifting and reaching Continued   Pt will be independent and compliant with comprehensive HEP to maintain progress achieved in PT Continued      QuickDASH Outcome Score  Score: 84.09 % (5/21/2024  3:40 PM)    Post QuickDASH Outcome Score  Post Score: 63.64 % (7/2/2024  2:43 PM)    20.45 % improvement    Plan: Continue skilled Physical Therapy 2 x/week or a total of 20 visits over a 90 day period. Treatment will include: manual therapy, therapeutic exercise, therapeutic activity, neuromuscular re-education, HEP instruction and patient education.     Future Session: rows/extension with band, S/L serratus punch, wall clock    Date: 6/11/2024             TX#: 5/20 Date: 6/13/2024   Tx#: 6/20 Date: 6/18/2024   Tx: 7/20 Date: 6/20/2024   Tx: 8/20 Date: 6/27/24  Tx#: 9/20 Date: 7/2/2024   Tx: 10/20 Date: 7/5/2024   Tx: 11/20 Date: 7/8/2024   Tx: 12/20     MT: 8'   STM effleurage on R upper trapezius  Shoulder harmonics MT: 12'   Gr I inferior GH glide  Shoulder harmonics MT: 10'   Gr I inferior GH glide  Shoulder harmonics MT: 8'   Gr I inferior GH glide  Shoulder harmonics MT: 10'   Gr I inferior GH glide  Shoulder harmonics MT: 8'   Gr I inferior GH glide  Shoulder harmonics MT: 8'   Gr II inferior GH glide  Shoulder harmonics MT: 10'   Gr II inferior, posterior GH glide   TE: 23'  PROM within protocol-  flexion, scaption, IR/ER   AROM, elbow flexion/extension x15 ea  Seated ER with cane, 10x10s  Seated table slide, PROM, flexion 2x8  TE: 26'   Pendulum, 2x1'   PROM as tolerated- flexion, scaption, abduction, IR, ER  Elbow flexion/extension supine x20  Standing pulley, flexion PROM x10  Supine chin tuck x10, 5s hold      TE: 23'  PROM as tolerated- flexion, scaption, abduction, IR, ER  Supine flexion (BUE), AAROM 2x10  Elbow flexion/extension supine x20  Standing pulley, flexion, grossly 90 deg x20  Scaption, SB on table AAROM x15   TE: 25'  Seated pulleys, flexion, 2'   PROM as tolerated- flexion, scaption, abduction, IR, ER  Supine flexion (BUE), AAROM 2x10  Chin tuck with rotation, supine x15ea  Abduction, SB on table AAROM 2x10   TE: 23'  PROM as tolerated- flexion, scaption, abduction, IR, ER  Supine flexion (BUE), AAROM with cane 2x10  Standing scaption AAROM with cane (left arm assist R) x10  Standing flexion AAROM with cane (left arm assist R) x10    TE: 32'  Reassessment  Seated pulleys, flexion, 2'  Finger Ladder- flexion x10  Chest Press (B) 2# x10  S/L ER, AROM 2x10 (fatiguing)   S/L abduction, to 90 AAROM (from PT) x15    Wall slide, flexion x5        TE: 25'  Seated pulleys, flexion x20  Seated pulleys, scaption, x20  Finger Ladder, flexion, x10  Standing bicep curl, GTB 2x10  Standing tricep extension RTB 2x8 (fatiguing)   Supine IR/ER, 40deg abd x10ea   S/L abduction, to 90 AAROM (from PT) x15    S/L ER, AROM 3x5  TE: 23'  Seated pulleys, flexion x20  Seated pulleys, abduction, x20  Finger Ladder, scaption x15  2# cane flexion, x20 AAROM  2#   S/L abduction, to 90 x15    S/L ER, AROM 3x5      NMR: 10'   S/L scapular clock, x8 ea   S/L scapular retraction, x10   NMR: 12'  Seated scapular retraction, 2x10  Isometric, flexion/abduction (5/10 pain), extension  x10, 5s hold NMR: 12'  Prone scapular retraction, 2x10, 5s hold  Isometric, flexion/ abduction (10% contraction), extension  x10, 5s hold NMR:  10'  Scap reaction with YTB (no rowing) x10   Isometric, flexion/ abduction (10% contraction), extension  x10, 10s hold; IR, ER x10 5s    NMR: 12'  Unilateral row, YTB 2x10  Shoulder extension, unilateral YTB 2x8  IR isometric walkout, YTB 2x10 NMR: 12'  Prone shoulder row, 2x8  Prone shoulder extension, 2x8  Supine, IR/ER (60deg abduction) AROM x10 ea               HEP:   Access Code: [email or web address]  URL: [email or web address]  Date: 05/21/2024  Prepared by: Elizabeth Chong     Exercises  - Thumb Opposition  - 5 x daily - 7 x weekly - 10 reps  - Seated Wrist Flexion Stretch  - 5 x daily - 7 x weekly - 5 reps - 10 second hold  - Seated Wrist Extension Stretch  - 5 x daily - 7 x weekly - 5 reps - 10 second hold    Pendulum, forward/lateral     Access Code: [email or web address]  Date: 06/20/2024  Prepared by: Elizabeth Chong  - Seated Shoulder Flexion AAROM with Pulley Behind  - 1 x daily - 7 x weekly - 3 sets - 10 reps  - Supine Shoulder Flexion AAROM with Hands Clasped  - 1 x daily - 7 x weekly - 2 sets - 10 reps  - Isometric Shoulder Flexion at Wall  - 1 x daily - 7 x weekly - 10 reps - 5 second hold  - Isometric Shoulder Extension at Wall  - 1 x daily - 7 x weekly - 10 reps - 5 second hold  - Isometric Shoulder Abduction at Wall  - 1 x daily - 7 x weekly - 10 reps - 5 second hold    Charges: 1 Manual, 2 TE, 1 NMR   Total Timed Treatment: 45'  min  Total Treatment Time: 45'  min

## 2024-07-11 ENCOUNTER — OFFICE VISIT (OUTPATIENT)
Dept: PHYSICAL THERAPY | Facility: HOSPITAL | Age: 69
End: 2024-07-11
Attending: INTERNAL MEDICINE
Payer: COMMERCIAL

## 2024-07-11 PROCEDURE — 97110 THERAPEUTIC EXERCISES: CPT

## 2024-07-11 PROCEDURE — 97112 NEUROMUSCULAR REEDUCATION: CPT

## 2024-07-11 PROCEDURE — 97140 MANUAL THERAPY 1/> REGIONS: CPT

## 2024-07-11 NOTE — PROGRESS NOTES
Diagnosis:   S/P rotator cuff repair (Z98.890)    right Shoulder Arthroscopic rotator cuff repair, subacromial decompression, distal clavicle excision    Referring Provider: Skyler Ponce   Surgeon: Dr. Jose Gutierres Date of Evaluation:    5/21/2024    Precautions:  adhere to the \"large repair\" protocol, sling 4 weeks.     Visual Impairment, HTN, breast cancer    No AAROM until 6 week, no AROM until 8 week no resisted RC strengthening 12 week,     PROM to tolerance, avoid adduction   Next MD Visit:   October 2024     Date of Surgery: 5/6/2024    Insurance Primary/Secondary: [employer] [place] PPO / N/A     # Auth Visits: no auth, 20 per POC (PN at 10v)           7/2/2024: 9 week, 3 day s/p large rotator cuff repair    Subjective: Is sore today, feels like she rolls onto the R shoulder when sleeping and states the pain wakes her up then. [place] fine after last session.    Pain:  4 /10      Objective:     5/30/2024   Palpation: soft tissue restriction and TTP throughout R shoulder complex; infraspinatus, teres minor, supraspinatus, upper trapezius, and rhomboids     7/2/2024   AROM: (* denotes performed with pain)   EOS: end of session  Shoulder  Elbow   Flexion: R 75* (127 EOS)   Abduction: R 40* (90 EOS)  ER (elbow neutral): R 35  IR (elbow Neutral): R 85  Flexion: R 140  Extension: R 0       Shoulder PROM: (* denotes performed with pain)  7/2/2024  Evaluation     Flexion: R 150  Abduction: R 140  ER (30 deg abd):R 70    IR (30 deg abd): R 65  Flexion: R 40*   Scapation: R 65  ER: R 15 (discomfort)   IR: R 50      Assessment: Increased pain and stiffness in session as pt reporting doing more over the last few days and rolling over onto R shoulder, which is contributing to pain. Sx relief with manual therapy and Serratus punch off elevated table. Suggesting pt use increased pillow on R when sleeping to avoid rolling onto that side.        Goals: (To be met in 20 visits)   Pt will report improved ability to sleep without waking  due to shoulder pain Continued   Pt will improve shoulder flexion AROM to >130 degrees to be able to reach into overhead cabinets without pain or restrictionContinued   Pt will improve shoulder abduction AROM to >130 degrees to improve ability to don deodorant, don/doff shirts, and wash hairContinued   Pt will increase shoulder AROM ER to T1 to reach and fasten seatbelt Continued   Pt will increase shoulder AROM IR to T12 to be able to reach in back pocket, tuck in shirt, and turn steering wheel without painContinued   Pt will improve shoulder strength throughout to 4/5 to improve function with ADLs including bathing and dressing independently. Continued   Pt will demonstrate increased mid/low trap strength to 4/5 to promote improved shoulder mechanics and stabilization with ADL such as lifting and reaching Continued   Pt will be independent and compliant with comprehensive HEP to maintain progress achieved in PT Continued      QuickDASH Outcome Score  Score: 84.09 % (5/21/2024  3:40 PM)    Post QuickDASH Outcome Score  Post Score: 63.64 % (7/2/2024  2:43 PM)    20.45 % improvement    Plan: Continue skilled Physical Therapy 2 x/week or a total of 20 visits over a 90 day period. Treatment will include: manual therapy, therapeutic exercise, therapeutic activity, neuromuscular re-education, HEP instruction and patient education.     Future Session: rows/extension with band, S/L serratus punch, wall clock  Date: 6/13/2024   Tx#: 6/20 Date: 6/18/2024   Tx: 7/20 Date: 6/20/2024   Tx: 8/20 Date: 6/27/24  Tx#: 9/20 Date: 7/2/2024   Tx: 10/20 Date: 7/5/2024   Tx: 11/20 Date: 7/8/2024   Tx: 12/20   Date: 7/11/2024   Tx: 13/20    MT: 12'   Gr I inferior GH glide  Shoulder harmonics MT: 10'   Gr I inferior GH glide  Shoulder harmonics MT: 8'   Gr I inferior GH glide  Shoulder harmonics MT: 10'   Gr I inferior GH glide  Shoulder harmonics MT: 8'   Gr I inferior GH glide  Shoulder harmonics MT: 8'   Gr II inferior GH  glide  Shoulder harmonics MT: 10'   Gr II inferior, posterior GH glide MT: 13'   Gr II inferior, posterior GH glide  R shoulder harmonics   Gr II GH distraction, pain relief   TE: 26'   Pendulum, 2x1'   PROM as tolerated- flexion, scaption, abduction, IR, ER  Elbow flexion/extension supine x20  Standing pulley, flexion PROM x10  Supine chin tuck x10, 5s hold      TE: 23'  PROM as tolerated- flexion, scaption, abduction, IR, ER  Supine flexion (BUE), AAROM 2x10  Elbow flexion/extension supine x20  Standing pulley, flexion, grossly 90 deg x20  Scaption, SB on table AAROM x15   TE: 25'  Seated pulleys, flexion, 2'   PROM as tolerated- flexion, scaption, abduction, IR, ER  Supine flexion (BUE), AAROM 2x10  Chin tuck with rotation, supine x15ea  Abduction, SB on table AAROM 2x10   TE: 23'  PROM as tolerated- flexion, scaption, abduction, IR, ER  Supine flexion (BUE), AAROM with cane 2x10  Standing scaption AAROM with cane (left arm assist R) x10  Standing flexion AAROM with cane (left arm assist R) x10    TE: 32'  Reassessment  Seated pulleys, flexion, 2'  Finger Ladder- flexion x10  Chest Press (B) 2# x10  S/L ER, AROM 2x10 (fatiguing)   S/L abduction, to 90 AAROM (from PT) x15    Wall slide, flexion x5        TE: 25'  Seated pulleys, flexion x20  Seated pulleys, scaption, x20  Finger Ladder, flexion, x10  Standing bicep curl, GTB 2x10  Standing tricep extension RTB 2x8 (fatiguing)   Supine IR/ER, 40deg abd x10ea   S/L abduction, to 90 AAROM (from PT) x15    S/L ER, AROM 3x5  TE: 23'  Seated pulleys, flexion x20  Seated pulleys, abduction, x20  Finger Ladder, scaption x15  2# cane flexion, x20 AAROM  2#   S/L abduction, to 90 x15    S/L ER, AROM 3x5    TE: 23'   Seated pulleys, flexion x20  Seated pulleys, abduction, x20  Scaption wall slide, 3x6   Supine PROM- abduction, IR, ER   Supine cane flexion 1# 2x8  Serratus punch on elevated mat table 2x8 (sx improvement)      NMR: 12'  Seated scapular retraction,  2x10  Isometric, flexion/abduction (5/10 pain), extension  x10, 5s hold NMR: 12'  Prone scapular retraction, 2x10, 5s hold  Isometric, flexion/ abduction (10% contraction), extension  x10, 5s hold NMR: 10'  Scap reaction with YTB (no rowing) x10   Isometric, flexion/ abduction (10% contraction), extension  x10, 10s hold; IR, ER x10 5s    NMR: 12'  Unilateral row, YTB 2x10  Shoulder extension, unilateral YTB 2x8  IR isometric walkout, YTB 2x10 NMR: 12'  Prone shoulder row, 2x8  Prone shoulder extension, 2x8  Supine, IR/ER (60deg abduction) AROM x10 ea   NMR: 8'  IR isometric walkout, YTB 2x10  ER isometric walkout, YTB x8             HEP:   Access Code: [email or web address]  URL: [email or web address]  Date: 05/21/2024  Prepared by: Elizabeth Chong     Exercises  - Thumb Opposition  - 5 x daily - 7 x weekly - 10 reps  - Seated Wrist Flexion Stretch  - 5 x daily - 7 x weekly - 5 reps - 10 second hold  - Seated Wrist Extension Stretch  - 5 x daily - 7 x weekly - 5 reps - 10 second hold    Pendulum, forward/lateral     Access Code: [email or web address]  Date: 06/20/2024  Prepared by: Elizabeth Chong  - Seated Shoulder Flexion AAROM with Pulley Behind  - 1 x daily - 7 x weekly - 3 sets - 10 reps  - Supine Shoulder Flexion AAROM with Hands Clasped  - 1 x daily - 7 x weekly - 2 sets - 10 reps  - Isometric Shoulder Flexion at Wall  - 1 x daily - 7 x weekly - 10 reps - 5 second hold  - Isometric Shoulder Extension at Wall  - 1 x daily - 7 x weekly - 10 reps - 5 second hold  - Isometric Shoulder Abduction at Wall  - 1 x daily - 7 x weekly - 10 reps - 5 second hold    Charges: 1 Manual, 2 TE, 1 NMR   Total Timed Treatment: 44'  min  Total Treatment Time: 44'  min

## 2024-07-15 ENCOUNTER — APPOINTMENT (OUTPATIENT)
Dept: PHYSICAL THERAPY | Facility: HOSPITAL | Age: 69
End: 2024-07-15
Attending: INTERNAL MEDICINE
Payer: COMMERCIAL

## 2024-07-16 ENCOUNTER — TELEPHONE (OUTPATIENT)
Dept: PHYSICAL THERAPY | Facility: HOSPITAL | Age: 69
End: 2024-07-16

## 2024-07-18 ENCOUNTER — OFFICE VISIT (OUTPATIENT)
Dept: PHYSICAL THERAPY | Facility: HOSPITAL | Age: 69
End: 2024-07-18
Attending: INTERNAL MEDICINE
Payer: COMMERCIAL

## 2024-07-18 PROCEDURE — 97140 MANUAL THERAPY 1/> REGIONS: CPT

## 2024-07-18 PROCEDURE — 97110 THERAPEUTIC EXERCISES: CPT

## 2024-07-18 PROCEDURE — 97112 NEUROMUSCULAR REEDUCATION: CPT

## 2024-07-18 NOTE — PROGRESS NOTES
Diagnosis:   S/P rotator cuff repair (Z98.890)    right Shoulder Arthroscopic rotator cuff repair, subacromial decompression, distal clavicle excision    Referring Provider: Skyler Ponce   Surgeon: Dr. Jose Gutierres Date of Evaluation:    5/21/2024    Precautions:  adhere to the \"large repair\" protocol, sling 4 weeks.     Visual Impairment, HTN, breast cancer    No AAROM until 6 week, no AROM until 8 week no resisted RC strengthening 12 week,     PROM to tolerance, avoid adduction   Next MD Visit:   October 2024     Date of Surgery: 5/6/2024    Insurance Primary/Secondary: [employer] [place] PPO / N/A     # Auth Visits: no auth, 20 per POC (PN at 10v)           7/18/2024 : 10 week, 3 day s/p large rotator cuff repair    Subjective: Was at a concert over last weekend and went to sit in a [employer] chair and states it fell over and she landed on her R knee. States her shoulder was okay because she guarded it. Has been sore and achy.   Pain: 3 /10      Objective:     5/30/2024   Palpation: soft tissue restriction and TTP throughout R shoulder complex; infraspinatus, teres minor, supraspinatus, upper trapezius, and rhomboids     7/2/2024   AROM: (* denotes performed with pain)   EOS: end of session  Shoulder  Elbow   Flexion: R 75* (127 EOS)   Abduction: R 40* (90 EOS)  ER (elbow neutral): R 35  IR (elbow Neutral): R 85  Flexion: R 140  Extension: R 0       Shoulder PROM: (* denotes performed with pain)  7/2/2024  Evaluation     Flexion: R 150  Abduction: R 140  ER (30 deg abd):R 70    IR (30 deg abd): R 65  Flexion: R 40*   Scapation: R 65  ER: R 15 (discomfort)   IR: R 50      Assessment: Improving AROM in session, remains limited at end range in all directions but without pain. Tolerates all scapular stability activity with good form, no pain.       Goals: (To be met in 20 visits)   Pt will report improved ability to sleep without waking due to shoulder pain Continued   Pt will improve shoulder flexion AROM to >130 degrees to be  able to reach into overhead cabinets without pain or restrictionContinued   Pt will improve shoulder abduction AROM to >130 degrees to improve ability to don deodorant, don/doff shirts, and wash hairContinued   Pt will increase shoulder AROM ER to T1 to reach and fasten seatbelt Continued   Pt will increase shoulder AROM IR to T12 to be able to reach in back pocket, tuck in shirt, and turn steering wheel without painContinued   Pt will improve shoulder strength throughout to 4/5 to improve function with ADLs including bathing and dressing independently. Continued   Pt will demonstrate increased mid/low trap strength to 4/5 to promote improved shoulder mechanics and stabilization with ADL such as lifting and reaching Continued   Pt will be independent and compliant with comprehensive HEP to maintain progress achieved in PT Continued      QuickDASH Outcome Score  Score: 84.09 % (5/21/2024  3:40 PM)    Post QuickDASH Outcome Score  Post Score: 63.64 % (7/2/2024  2:43 PM)    20.45 % improvement    Plan: Continue skilled Physical Therapy 2 x/week or a total of 20 visits over a 90 day period. Treatment will include: manual therapy, therapeutic exercise, therapeutic activity, neuromuscular re-education, HEP instruction and patient education.     Future Session: Wall clock, PROM OP all direction  Date: 6/20/2024   Tx: 8/20 Date: 6/27/24  Tx#: 9/20 Date: 7/2/2024   Tx: 10/20 Date: 7/5/2024   Tx: 11/20 Date: 7/8/2024   Tx: 12/20   Date: 7/11/2024   Tx: 13/20  Date: 7/18/2024   Tx: 14/20      MT: 8'   Gr I inferior GH glide  Shoulder harmonics MT: 10'   Gr I inferior GH glide  Shoulder harmonics MT: 8'   Gr I inferior GH glide  Shoulder harmonics MT: 8'   Gr II inferior GH glide  Shoulder harmonics MT: 10'   Gr II inferior, posterior GH glide MT: 13'   Gr II inferior, posterior GH glide  R shoulder harmonics   Gr II GH distraction, pain relief MT:10'  Gr II-III inferior, posterior GH glide  Gr II GH distraction, pain relief    TE: 25'  Seated pulleys, flexion, 2'   PROM as tolerated- flexion, scaption, abduction, IR, ER  Supine flexion (BUE), AAROM 2x10  Chin tuck with rotation, supine x15ea  Abduction, SB on table AAROM 2x10   TE: 23'  PROM as tolerated- flexion, scaption, abduction, IR, ER  Supine flexion (BUE), AAROM with cane 2x10  Standing scaption AAROM with cane (left arm assist R) x10  Standing flexion AAROM with cane (left arm assist R) x10    TE: 32'  Reassessment  Seated pulleys, flexion, 2'  Finger Ladder- flexion x10  Chest Press (B) 2# x10  S/L ER, AROM 2x10 (fatiguing)   S/L abduction, to 90 AAROM (from PT) x15    Wall slide, flexion x5        TE: 25'  Seated pulleys, flexion x20  Seated pulleys, scaption, x20  Finger Ladder, flexion, x10  Standing bicep curl, GTB 2x10  Standing tricep extension RTB 2x8 (fatiguing)   Supine IR/ER, 40deg abd x10ea   S/L abduction, to 90 AAROM (from PT) x15    S/L ER, AROM 3x5  TE: 23'  Seated pulleys, flexion x20  Seated pulleys, abduction, x20  Finger Ladder, scaption x15  2# cane flexion, x20 AAROM  2#   S/L abduction, to 90 x15    S/L ER, AROM 3x5    TE: 23'   Seated pulleys, flexion x20  Seated pulleys, abduction, x20  Scaption wall slide, 3x6   Supine PROM- abduction, IR, ER   Supine cane flexion 1# 2x8  Serratus punch on elevated mat table 2x8 (sx improvement)   TE: 25'  Seated pulleys, flexion x20  Seated pulleys, abduction, x20  Scaption finger ladder x10  Supine PROM- abduction, IR, ER   Supine cane flexion, 1# 2x10  Supine chest press \"Plus\" 1# x15   S/L abduction, to 90 x20  S/L ER, AROM 4x5    NMR: 12'  Prone scapular retraction, 2x10, 5s hold  Isometric, flexion/ abduction (10% contraction), extension  x10, 5s hold NMR: 10'  Scap reaction with YTB (no rowing) x10   Isometric, flexion/ abduction (10% contraction), extension  x10, 10s hold; IR, ER x10 5s    NMR: 12'  Unilateral row, YTB 2x10  Shoulder extension, unilateral YTB 2x8  IR isometric walkout, YTB 2x10 NMR: 12'  Prone  shoulder row, 2x8  Prone shoulder extension, 2x8  Supine, IR/ER (60deg abduction) AROM x10 ea   NMR: 8'  IR isometric walkout, YTB 2x10  ER isometric walkout, YTB x8 NMR: 8'   Standing row, RTB 2x10  Standing extension RTB 2x10            HEP:   Access Code: [email or web address]  URL: [email or web address]  Date: 05/21/2024  Prepared by: Elizabeth Chong     Exercises  - Thumb Opposition  - 5 x daily - 7 x weekly - 10 reps  - Seated Wrist Flexion Stretch  - 5 x daily - 7 x weekly - 5 reps - 10 second hold  - Seated Wrist Extension Stretch  - 5 x daily - 7 x weekly - 5 reps - 10 second hold    Pendulum, forward/lateral     Access Code: [email or web address]  Date: 06/20/2024  Prepared by: Elizabeth Chong  - Seated Shoulder Flexion AAROM with Pulley Behind  - 1 x daily - 7 x weekly - 3 sets - 10 reps  - Supine Shoulder Flexion AAROM with Hands Clasped  - 1 x daily - 7 x weekly - 2 sets - 10 reps  - Isometric Shoulder Flexion at Wall  - 1 x daily - 7 x weekly - 10 reps - 5 second hold  - Isometric Shoulder Extension at Wall  - 1 x daily - 7 x weekly - 10 reps - 5 second hold  - Isometric Shoulder Abduction at Wall  - 1 x daily - 7 x weekly - 10 reps - 5 second hold    Charges: 1 Manual, 2 TE, 1 NMR   Total Timed Treatment: 43'  min  Total Treatment Time: 43'  min

## 2024-07-22 ENCOUNTER — LAB ENCOUNTER (OUTPATIENT)
Dept: LAB | Facility: HOSPITAL | Age: 69
End: 2024-07-22
Attending: INTERNAL MEDICINE
Payer: COMMERCIAL

## 2024-07-22 DIAGNOSIS — I70.0 ATHEROSCLEROSIS OF AORTA (HCC): Primary | ICD-10-CM

## 2024-07-22 DIAGNOSIS — I10 HYPERTENSION: ICD-10-CM

## 2024-07-22 LAB
ALBUMIN SERPL-MCNC: 4.9 G/DL (ref 3.2–4.8)
ALBUMIN/GLOB SERPL: 2 {RATIO} (ref 1–2)
ALP LIVER SERPL-CCNC: 73 U/L
ALT SERPL-CCNC: 20 U/L
ANION GAP SERPL CALC-SCNC: 7 MMOL/L (ref 0–18)
AST SERPL-CCNC: 16 U/L (ref ?–34)
BILIRUB SERPL-MCNC: 1 MG/DL (ref 0.2–1.1)
BUN BLD-MCNC: 12 MG/DL (ref 9–23)
BUN/CREAT SERPL: 17.9 (ref 10–20)
CALCIUM BLD-MCNC: 10.1 MG/DL (ref 8.7–10.4)
CHLORIDE SERPL-SCNC: 106 MMOL/L (ref 98–112)
CHOLEST SERPL-MCNC: 174 MG/DL (ref ?–200)
CO2 SERPL-SCNC: 28 MMOL/L (ref 21–32)
CREAT BLD-MCNC: 0.67 MG/DL
EGFRCR SERPLBLD CKD-EPI 2021: 95 ML/MIN/1.73M2 (ref 60–?)
FASTING PATIENT LIPID ANSWER: YES
FASTING STATUS PATIENT QL REPORTED: YES
GLOBULIN PLAS-MCNC: 2.4 G/DL (ref 2–3.5)
GLUCOSE BLD-MCNC: 98 MG/DL (ref 70–99)
HDLC SERPL-MCNC: 84 MG/DL (ref 40–59)
LDLC SERPL CALC-MCNC: 73 MG/DL (ref ?–100)
NONHDLC SERPL-MCNC: 90 MG/DL (ref ?–130)
OSMOLALITY SERPL CALC.SUM OF ELEC: 292 MOSM/KG (ref 275–295)
POTASSIUM SERPL-SCNC: 3.9 MMOL/L (ref 3.5–5.1)
PROT SERPL-MCNC: 7.3 G/DL (ref 5.7–8.2)
SODIUM SERPL-SCNC: 141 MMOL/L (ref 136–145)
TRIGL SERPL-MCNC: 98 MG/DL (ref 30–149)
VLDLC SERPL CALC-MCNC: 15 MG/DL (ref 0–30)

## 2024-07-22 PROCEDURE — 36415 COLL VENOUS BLD VENIPUNCTURE: CPT

## 2024-07-22 PROCEDURE — 80061 LIPID PANEL: CPT

## 2024-07-22 PROCEDURE — 80053 COMPREHEN METABOLIC PANEL: CPT

## 2024-07-23 ENCOUNTER — OFFICE VISIT (OUTPATIENT)
Dept: PHYSICAL THERAPY | Facility: HOSPITAL | Age: 69
End: 2024-07-23
Attending: INTERNAL MEDICINE
Payer: COMMERCIAL

## 2024-07-23 PROCEDURE — 97112 NEUROMUSCULAR REEDUCATION: CPT

## 2024-07-23 PROCEDURE — 97140 MANUAL THERAPY 1/> REGIONS: CPT

## 2024-07-23 PROCEDURE — 97110 THERAPEUTIC EXERCISES: CPT

## 2024-07-23 NOTE — PROGRESS NOTES
Diagnosis:   S/P rotator cuff repair (Z98.890)    right Shoulder Arthroscopic rotator cuff repair, subacromial decompression, distal clavicle excision    Referring Provider: Skyler Ponce   Surgeon: Dr. Jose Gutierres Date of Evaluation:    5/21/2024    Precautions:  adhere to the \"large repair\" protocol, sling 4 weeks.     Visual Impairment, HTN, breast cancer    No AAROM until 6 week, no AROM until 8 week no resisted RC strengthening 12 week,     PROM to tolerance, avoid adduction   Next MD Visit:   October 2024     Date of Surgery: 5/6/2024    Insurance Primary/Secondary: [employer] [place] PPO / N/A     # Auth Visits: no auth, 20 per POC (PN at 10v)           7/18/2024 : 10 week, 3 day s/p large rotator cuff repair    Subjective: \"much better\", was a little sore yesterday but better today.    Pain: 2 /10      Objective:     5/30/2024   Palpation: soft tissue restriction and TTP throughout R shoulder complex; infraspinatus, teres minor, supraspinatus, upper trapezius, and rhomboids     7/2/2024   Shoulder AROM: (* denotes performed with pain)   EOS: end of session  7/23/2024 start  7/2/2024    Flexion: R 132  Abduction: R 131  ER (elbow neutral): R 59  IR (elbow neutral): R 85 Flexion: R 75* (127 EOS)   Abduction: R 40* (90 EOS)  ER (elbow neutral): R 35  IR (elbow Neutral): R 85      Shoulder PROM: (* denotes performed with pain)  7/2/2024  Evaluation     Flexion: R 150  Abduction: R 140  ER (30 deg abd):R 70    IR (30 deg abd): R 65  Flexion: R 40*   Scapation: R 65  ER: R 15 (discomfort)   IR: R 50      Assessment: Improving AROM noted, tolerates PT assist at scapulae for upward rotation. Continue progressing scapular stability training, specifically at serratus to promote scapular rotation for end range shoulder elevation.       Goals: (To be met in 20 visits)   Pt will report improved ability to sleep without waking due to shoulder pain Continued   Pt will improve shoulder flexion AROM to >130 degrees to be able to  reach into overhead cabinets without pain or restrictionContinued   Pt will improve shoulder abduction AROM to >130 degrees to improve ability to don deodorant, don/doff shirts, and wash hairContinued   Pt will increase shoulder AROM ER to T1 to reach and fasten seatbelt Continued   Pt will increase shoulder AROM IR to T12 to be able to reach in back pocket, tuck in shirt, and turn steering wheel without painContinued   Pt will improve shoulder strength throughout to 4/5 to improve function with ADLs including bathing and dressing independently. Continued   Pt will demonstrate increased mid/low trap strength to 4/5 to promote improved shoulder mechanics and stabilization with ADL such as lifting and reaching Continued   Pt will be independent and compliant with comprehensive HEP to maintain progress achieved in PT Continued      QuickDASH Outcome Score  Score: 84.09 % (5/21/2024  3:40 PM)    Post QuickDASH Outcome Score  Post Score: 63.64 % (7/2/2024  2:43 PM)    20.45 % improvement    Plan: Continue skilled Physical Therapy 2 x/week or a total of 20 visits over a 90 day period. Treatment will include: manual therapy, therapeutic exercise, therapeutic activity, neuromuscular re-education, HEP instruction and patient education.     Future Session: scapular strengthening, AROM IR/ER, wall slide abduction    Date: 6/20/2024   Tx: 8/20 Date: 6/27/24  Tx#: 9/20 Date: 7/2/2024   Tx: 10/20 Date: 7/5/2024   Tx: 11/20 Date: 7/8/2024   Tx: 12/20   Date: 7/11/2024   Tx: 13/20  Date: 7/18/2024   Tx: 14/20    Date:7/23/2024   Tx: 15/20   MT: 8'   Gr I inferior GH glide  Shoulder harmonics MT: 10'   Gr I inferior GH glide  Shoulder harmonics MT: 8'   Gr I inferior GH glide  Shoulder harmonics MT: 8'   Gr II inferior GH glide  Shoulder harmonics MT: 10'   Gr II inferior, posterior GH glide MT: 13'   Gr II inferior, posterior GH glide  R shoulder harmonics   Gr II GH distraction, pain relief MT:10'  Gr II-III inferior, posterior GH  glide  Gr II GH distraction, pain relief MT: 8'   Gr II-III inferior, posterior GH glide     TE: 25'  Seated pulleys, flexion, 2'   PROM as tolerated- flexion, scaption, abduction, IR, ER  Supine flexion (BUE), AAROM 2x10  Chin tuck with rotation, supine x15ea  Abduction, SB on table AAROM 2x10   TE: 23'  PROM as tolerated- flexion, scaption, abduction, IR, ER  Supine flexion (BUE), AAROM with cane 2x10  Standing scaption AAROM with cane (left arm assist R) x10  Standing flexion AAROM with cane (left arm assist R) x10    TE: 32'  Reassessment  Seated pulleys, flexion, 2'  Finger Ladder- flexion x10  Chest Press (B) 2# x10  S/L ER, AROM 2x10 (fatiguing)   S/L abduction, to 90 AAROM (from PT) x15    Wall slide, flexion x5        TE: 25'  Seated pulleys, flexion x20  Seated pulleys, scaption, x20  Finger Ladder, flexion, x10  Standing bicep curl, GTB 2x10  Standing tricep extension RTB 2x8 (fatiguing)   Supine IR/ER, 40deg abd x10ea   S/L abduction, to 90 AAROM (from PT) x15    S/L ER, AROM 3x5  TE: 23'  Seated pulleys, flexion x20  Seated pulleys, abduction, x20  Finger Ladder, scaption x15  2# cane flexion, x20 AAROM  2#   S/L abduction, to 90 x15    S/L ER, AROM 3x5    TE: 23'   Seated pulleys, flexion x20  Seated pulleys, abduction, x20  Scaption wall slide, 3x6   Supine PROM- abduction, IR, ER   Supine cane flexion 1# 2x8  Serratus punch on elevated mat table 2x8 (sx improvement)   TE: 25'  Seated pulleys, flexion x20  Seated pulleys, abduction, x20  Scaption finger ladder x10  Supine PROM- abduction, IR, ER   Supine cane flexion, 1# 2x10  Supine chest press \"Plus\" 1# x15   S/L abduction, to 90 x20  S/L ER, AROM 4x5  TE: 22'   Pulleys, flexion/abduction 2'ea  PROM with rose marie OP- flexion, abd, ER/IR  Wall slide, flexion 2x10  S/L abduction, to tolerance (PT assist at scapulae) x10  S/L ER, AROM 4x5   Triceps extension, GTB x15      NMR: 12'  Prone scapular retraction, 2x10, 5s hold  Isometric, flexion/ abduction  (10% contraction), extension  x10, 5s hold NMR: 10'  Scap reaction with YTB (no rowing) x10   Isometric, flexion/ abduction (10% contraction), extension  x10, 10s hold; IR, ER x10 5s    NMR: 12'  Unilateral row, YTB 2x10  Shoulder extension, unilateral YTB 2x8  IR isometric walkout, YTB 2x10 NMR: 12'  Prone shoulder row, 2x8  Prone shoulder extension, 2x8  Supine, IR/ER (60deg abduction) AROM x10 ea   NMR: 8'  IR isometric walkout, YTB 2x10  ER isometric walkout, YTB x8 NMR: 8'   Standing row, RTB 2x10  Standing extension RTB 2x10 NMR: 15'   Lower trapezius liftoff at wall 2x6 (PT at scapulae)  Supine horizontal abduction YTB 2x10  Supine Serratus punch, 1# 2x10             HEP:   Access Code: [email or web address]  URL: [email or web address]  Date: 05/21/2024  Prepared by: Elizabeth Chong     Exercises  - Thumb Opposition  - 5 x daily - 7 x weekly - 10 reps  - Seated Wrist Flexion Stretch  - 5 x daily - 7 x weekly - 5 reps - 10 second hold  - Seated Wrist Extension Stretch  - 5 x daily - 7 x weekly - 5 reps - 10 second hold    Pendulum, forward/lateral     Access Code: [email or web address]  Date: 06/20/2024  Prepared by: Elizabeth Chong  - Seated Shoulder Flexion AAROM with Pulley Behind  - 1 x daily - 7 x weekly - 3 sets - 10 reps  - Supine Shoulder Flexion AAROM with Hands Clasped  - 1 x daily - 7 x weekly - 2 sets - 10 reps  - Isometric Shoulder Flexion at Wall  - 1 x daily - 7 x weekly - 10 reps - 5 second hold  - Isometric Shoulder Extension at Wall  - 1 x daily - 7 x weekly - 10 reps - 5 second hold  - Isometric Shoulder Abduction at Wall  - 1 x daily - 7 x weekly - 10 reps - 5 second hold    Charges: 1 Manual, 2 TE, 1 NMR   Total Timed Treatment: 45'  min  Total Treatment Time: 45'  min

## 2024-07-25 ENCOUNTER — APPOINTMENT (OUTPATIENT)
Dept: PHYSICAL THERAPY | Facility: HOSPITAL | Age: 69
End: 2024-07-25
Attending: INTERNAL MEDICINE
Payer: COMMERCIAL

## 2024-07-30 ENCOUNTER — OFFICE VISIT (OUTPATIENT)
Dept: PHYSICAL THERAPY | Facility: HOSPITAL | Age: 69
End: 2024-07-30
Attending: INTERNAL MEDICINE
Payer: COMMERCIAL

## 2024-07-30 PROCEDURE — 97112 NEUROMUSCULAR REEDUCATION: CPT

## 2024-07-30 PROCEDURE — 97140 MANUAL THERAPY 1/> REGIONS: CPT

## 2024-07-30 PROCEDURE — 97110 THERAPEUTIC EXERCISES: CPT

## 2024-07-30 NOTE — PROGRESS NOTES
Diagnosis:   S/P rotator cuff repair (Z98.890)    right Shoulder Arthroscopic rotator cuff repair, subacromial decompression, distal clavicle excision    Referring Provider: Skyler Ponce   Surgeon: Dr. Jose Gutierres Date of Evaluation:    5/21/2024    Precautions:  adhere to the \"large repair\" protocol, sling 4 weeks.     Visual Impairment, HTN, breast cancer    No AAROM until 6 week, no AROM until 8 week no resisted RC strengthening 12 week,     PROM to tolerance, avoid adduction   Next MD Visit:   October 2024     Date of Surgery: 5/6/2024    Insurance Primary/Secondary: [employer] [place] PPO / N/A     # Auth Visits: no auth, 20 per POC (PN at 10v)           7/18/2024 : 10 week, 3 day s/p large rotator cuff repair    Subjective: Has been out of town for the last week, overall feels like the shoulder is doing well but continues to have pain at top of the shoulder.   Pain:  2-3/10      Objective:     5/30/2024   Palpation: soft tissue restriction and TTP throughout R shoulder complex; infraspinatus, teres minor, supraspinatus, upper trapezius, and rhomboids     7/2/2024   Shoulder AROM: (* denotes performed with pain)   EOS: end of session  7/23/2024 start  7/2/2024    Flexion: R 132  Abduction: R 131  ER (elbow neutral): R 59  IR (elbow neutral): R 85 Flexion: R 75* (127 EOS)   Abduction: R 40* (90 EOS)  ER (elbow neutral): R 35  IR (elbow Neutral): R 85      Shoulder PROM: (* denotes performed with pain)  7/2/2024  Evaluation     Flexion: R 150  Abduction: R 140  ER (30 deg abd):R 70    IR (30 deg abd): R 65  Flexion: R 40*   Scapation: R 65  ER: R 15 (discomfort)   IR: R 50      Assessment: Remaining AROM limitations seen with functional ER and IR in session; focus future session on stretching and strengthening to allow full motion. Sx improvement and pain levels by end of session.       Goals: (To be met in 20 visits)   Pt will report improved ability to sleep without waking due to shoulder pain Continued   Pt will  improve shoulder flexion AROM to >130 degrees to be able to reach into overhead cabinets without pain or restrictionContinued   Pt will improve shoulder abduction AROM to >130 degrees to improve ability to don deodorant, don/doff shirts, and wash hairContinued   Pt will increase shoulder AROM ER to T1 to reach and fasten seatbelt Continued   Pt will increase shoulder AROM IR to T12 to be able to reach in back pocket, tuck in shirt, and turn steering wheel without painContinued   Pt will improve shoulder strength throughout to 4/5 to improve function with ADLs including bathing and dressing independently. Continued   Pt will demonstrate increased mid/low trap strength to 4/5 to promote improved shoulder mechanics and stabilization with ADL such as lifting and reaching Continued   Pt will be independent and compliant with comprehensive HEP to maintain progress achieved in PT Continued      QuickDASH Outcome Score  Score: 84.09 % (5/21/2024  3:40 PM)    Post QuickDASH Outcome Score  Post Score: 63.64 % (7/2/2024  2:43 PM)    20.45 % improvement    Plan: Continue skilled Physical Therapy 2 x/week or a total of 20 visits over a 90 day period. Treatment will include: manual therapy, therapeutic exercise, therapeutic activity, neuromuscular re-education, HEP instruction and patient education.     Future Session: scapular strengthening, wall slide abduction, ER isometric walk out, resisted shoulder flexion (supine), contract/relax stretch, IR stretch  Date: 6/27/24  Tx#: 9/20 Date: 7/2/2024   Tx: 10/20 Date: 7/5/2024   Tx: 11/20 Date: 7/8/2024   Tx: 12/20   Date: 7/11/2024   Tx: 13/20  Date: 7/18/2024   Tx: 14/20    Date:7/23/2024   Tx: 15/20 Date: 7/30/2024   Tx: 16/20   MT: 10'   Gr I inferior GH glide  Shoulder harmonics MT: 8'   Gr I inferior GH glide  Shoulder harmonics MT: 8'   Gr II inferior GH glide  Shoulder harmonics MT: 10'   Gr II inferior, posterior GH glide MT: 13'   Gr II inferior, posterior GH glide  R  shoulder harmonics   Gr II GH distraction, pain relief MT:10'  Gr II-III inferior, posterior GH glide  Gr II GH distraction, pain relief MT: 8'   Gr II-III inferior, posterior GH glide   MT: 8'   Gr II-III inferior, posterior GH glide   TE: 23'  PROM as tolerated- flexion, scaption, abduction, IR, ER  Supine flexion (BUE), AAROM with cane 2x10  Standing scaption AAROM with cane (left arm assist R) x10  Standing flexion AAROM with cane (left arm assist R) x10    TE: 32'  Reassessment  Seated pulleys, flexion, 2'  Finger Ladder- flexion x10  Chest Press (B) 2# x10  S/L ER, AROM 2x10 (fatiguing)   S/L abduction, to 90 AAROM (from PT) x15    Wall slide, flexion x5        TE: 25'  Seated pulleys, flexion x20  Seated pulleys, scaption, x20  Finger Ladder, flexion, x10  Standing bicep curl, GTB 2x10  Standing tricep extension RTB 2x8 (fatiguing)   Supine IR/ER, 40deg abd x10ea   S/L abduction, to 90 AAROM (from PT) x15    S/L ER, AROM 3x5  TE: 23'  Seated pulleys, flexion x20  Seated pulleys, abduction, x20  Finger Ladder, scaption x15  2# cane flexion, x20 AAROM  2#   S/L abduction, to 90 x15    S/L ER, AROM 3x5    TE: 23'   Seated pulleys, flexion x20  Seated pulleys, abduction, x20  Scaption wall slide, 3x6   Supine PROM- abduction, IR, ER   Supine cane flexion 1# 2x8  Serratus punch on elevated mat table 2x8 (sx improvement)   TE: 25'  Seated pulleys, flexion x20  Seated pulleys, abduction, x20  Scaption finger ladder x10  Supine PROM- abduction, IR, ER   Supine cane flexion, 1# 2x10  Supine chest press \"Plus\" 1# x15   S/L abduction, to 90 x20  S/L ER, AROM 4x5  TE: 22'   Pulleys, flexion/abduction 2'ea  PROM with rose marie OP- flexion, abd, ER/IR  Wall slide, flexion 2x10  S/L abduction, to tolerance (PT assist at scapulae) x10  S/L ER, AROM 4x5   Triceps extension, GTB x15    TE: 23'  Pulleys, flexion 2'   Finger ladder, abduction x12   Wall Slide, flexion x15   S/L abduction, to tolerance (PT assist at scapulae)  x15  S/L ER, AROM 4x5  Supine 90-90 IR/ER x10 ea      NMR: 10'  Scap reaction with YTB (no rowing) x10   Isometric, flexion/ abduction (10% contraction), extension  x10, 10s hold; IR, ER x10 5s    NMR: 12'  Unilateral row, YTB 2x10  Shoulder extension, unilateral YTB 2x8  IR isometric walkout, YTB 2x10 NMR: 12'  Prone shoulder row, 2x8  Prone shoulder extension, 2x8  Supine, IR/ER (60deg abduction) AROM x10 ea   NMR: 8'  IR isometric walkout, YTB 2x10  ER isometric walkout, YTB x8 NMR: 8'   Standing row, RTB 2x10  Standing extension RTB 2x10 NMR: 15'   Lower trapezius liftoff at wall 2x6 (PT at scapulae)  Supine horizontal abduction YTB 2x10  Supine Serratus punch, 1# 2x10 NMR: 13'  Rows, GTB unilateral x20  Shoulder extension, RTB x20  IR isometric walkout, YTB 2x10             HEP:   Access Code: [email or web address]  URL: [email or web address]  Date: 05/21/2024  Prepared by: Elizabeth Chong     Exercises  - Thumb Opposition  - 5 x daily - 7 x weekly - 10 reps  - Seated Wrist Flexion Stretch  - 5 x daily - 7 x weekly - 5 reps - 10 second hold  - Seated Wrist Extension Stretch  - 5 x daily - 7 x weekly - 5 reps - 10 second hold    Pendulum, forward/lateral     Access Code: [email or web address]  Date: 06/20/2024  Prepared by: Elizabeth Chong  - Seated Shoulder Flexion AAROM with Pulley Behind  - 1 x daily - 7 x weekly - 3 sets - 10 reps  - Supine Shoulder Flexion AAROM with Hands Clasped  - 1 x daily - 7 x weekly - 2 sets - 10 reps  - Isometric Shoulder Flexion at Wall  - 1 x daily - 7 x weekly - 10 reps - 5 second hold  - Isometric Shoulder Extension at Wall  - 1 x daily - 7 x weekly - 10 reps - 5 second hold  - Isometric Shoulder Abduction at Wall  - 1 x daily - 7 x weekly - 10 reps - 5 second hold    Charges: 1 Manual, 2 TE, 1 NMR   Total Timed Treatment: 44'  min  Total Treatment Time: 44'  min

## 2024-08-01 ENCOUNTER — OFFICE VISIT (OUTPATIENT)
Dept: PHYSICAL THERAPY | Facility: HOSPITAL | Age: 69
End: 2024-08-01
Attending: INTERNAL MEDICINE
Payer: COMMERCIAL

## 2024-08-01 PROCEDURE — 97112 NEUROMUSCULAR REEDUCATION: CPT

## 2024-08-01 PROCEDURE — 97110 THERAPEUTIC EXERCISES: CPT

## 2024-08-01 PROCEDURE — 97140 MANUAL THERAPY 1/> REGIONS: CPT

## 2024-08-01 NOTE — PROGRESS NOTES
Diagnosis:   S/P rotator cuff repair (Z98.890)    right Shoulder Arthroscopic rotator cuff repair, subacromial decompression, distal clavicle excision    Referring Provider: Skyler Ponce   Surgeon: Dr. Jose Gutierres Date of Evaluation:    5/21/2024    Precautions:  adhere to the \"large repair\" protocol, sling 4 weeks.     Visual Impairment, HTN, breast cancer    No AAROM until 6 week, no AROM until 8 week no resisted RC strengthening 12 week,     PROM to tolerance, avoid adduction   Next MD Visit:   October 2024     Date of Surgery: 5/6/2024    Insurance Primary/Secondary: [employer] [place] PPO / N/A     # Auth Visits: no auth, 20 per POC (PN at 10v)           7/18/2024 : 10 week, 3 day s/p large rotator cuff repair    Subjective: Felt better after last therapy session, continued through today.   Pain:  0/10, 5/10 worst      Objective:     5/30/2024   Palpation: soft tissue restriction and TTP throughout R shoulder complex; infraspinatus, teres minor, supraspinatus, upper trapezius, and rhomboids     7/2/2024   Shoulder AROM: (* denotes performed with pain)   EOS: end of session  7/23/2024 start  7/2/2024    Flexion: R 132  Abduction: R 131  ER (elbow neutral): R 59  IR (elbow neutral): R 85 Flexion: R 75* (127 EOS)   Abduction: R 40* (90 EOS)  ER (elbow neutral): R 35  IR (elbow Neutral): R 85      Shoulder PROM: (* denotes performed with pain)  7/2/2024  Evaluation     Flexion: R 150  Abduction: R 140  ER (30 deg abd):R 70    IR (30 deg abd): R 65  Flexion: R 40*   Scapation: R 65  ER: R 15 (discomfort)   IR: R 50      Assessment: Pain with AROM noted at humeral head, suggesting at home massage with ice for relief. Isometric demonstrates increased difficulty with ER, improved with increased reps and no pain.       Goals: (To be met in 20 visits)   Pt will report improved ability to sleep without waking due to shoulder pain Continued   Pt will improve shoulder flexion AROM to >130 degrees to be able to reach into overhead  cabinets without pain or restrictionContinued   Pt will improve shoulder abduction AROM to >130 degrees to improve ability to don deodorant, don/doff shirts, and wash hairContinued   Pt will increase shoulder AROM ER to T1 to reach and fasten seatbelt Continued   Pt will increase shoulder AROM IR to T12 to be able to reach in back pocket, tuck in shirt, and turn steering wheel without painContinued   Pt will improve shoulder strength throughout to 4/5 to improve function with ADLs including bathing and dressing independently. Continued   Pt will demonstrate increased mid/low trap strength to 4/5 to promote improved shoulder mechanics and stabilization with ADL such as lifting and reaching Continued   Pt will be independent and compliant with comprehensive HEP to maintain progress achieved in PT Continued      QuickDASH Outcome Score  Score: 84.09 % (5/21/2024  3:40 PM)    Post QuickDASH Outcome Score  Post Score: 63.64 % (7/2/2024  2:43 PM)    20.45 % improvement    Plan: Continue skilled Physical Therapy 2 x/week or a total of 20 visits over a 90 day period. Treatment will include: manual therapy, therapeutic exercise, therapeutic activity, neuromuscular re-education, HEP instruction and patient education.     Future Session: scapular strengthening, wall slide abduction, ER isometric walk out, resisted shoulder flexion (supine), contract/relax stretch, IR stretch  Date: 7/2/2024   Tx: 10/20 Date: 7/5/2024   Tx: 11/20 Date: 7/8/2024   Tx: 12/20   Date: 7/11/2024   Tx: 13/20  Date: 7/18/2024   Tx: 14/20    Date:7/23/2024   Tx: 15/20 Date: 7/30/2024   Tx: 16/20 Date: 8/1/2024   Tx: 17/20     MT: 8'   Gr I inferior GH glide  Shoulder harmonics MT: 8'   Gr II inferior GH glide  Shoulder harmonics MT: 10'   Gr II inferior, posterior GH glide MT: 13'   Gr II inferior, posterior GH glide  R shoulder harmonics   Gr II GH distraction, pain relief MT:10'  Gr II-III inferior, posterior GH glide  Gr II GH distraction, pain  relief MT: 8'   Gr II-III inferior, posterior GH glide   MT: 8'   Gr II-III inferior, posterior GH glide MT: 8'  Gr II-III inferior, posterior GH glide   TE: 32'  Reassessment  Seated pulleys, flexion, 2'  Finger Ladder- flexion x10  Chest Press (B) 2# x10  S/L ER, AROM 2x10 (fatiguing)   S/L abduction, to 90 AAROM (from PT) x15    Wall slide, flexion x5        TE: 25'  Seated pulleys, flexion x20  Seated pulleys, scaption, x20  Finger Ladder, flexion, x10  Standing bicep curl, GTB 2x10  Standing tricep extension RTB 2x8 (fatiguing)   Supine IR/ER, 40deg abd x10ea   S/L abduction, to 90 AAROM (from PT) x15    S/L ER, AROM 3x5  TE: 23'  Seated pulleys, flexion x20  Seated pulleys, abduction, x20  Finger Ladder, scaption x15  2# cane flexion, x20 AAROM  2#   S/L abduction, to 90 x15    S/L ER, AROM 3x5    TE: 23'   Seated pulleys, flexion x20  Seated pulleys, abduction, x20  Scaption wall slide, 3x6   Supine PROM- abduction, IR, ER   Supine cane flexion 1# 2x8  Serratus punch on elevated mat table 2x8 (sx improvement)   TE: 25'  Seated pulleys, flexion x20  Seated pulleys, abduction, x20  Scaption finger ladder x10  Supine PROM- abduction, IR, ER   Supine cane flexion, 1# 2x10  Supine chest press \"Plus\" 1# x15   S/L abduction, to 90 x20  S/L ER, AROM 4x5  TE: 22'   Pulleys, flexion/abduction 2'ea  PROM with rose marie OP- flexion, abd, ER/IR  Wall slide, flexion 2x10  S/L abduction, to tolerance (PT assist at scapulae) x10  S/L ER, AROM 4x5   Triceps extension, GTB x15    TE: 23'  Pulleys, flexion 2'   Finger ladder, abduction x12   Wall Slide, flexion x15   S/L abduction, to tolerance (PT assist at scapulae) x15  S/L ER, AROM 4x5  Supine 90-90 IR/ER x10 ea    TE: 23'  Pulleys, flexion 2'  Pulleys, scapation, 2'  PROM with rose marie OP- flexion, abd, ER/IR  Supine cane flexion, 3# 2x10  S/L ER, AROM 4x5  Scaption wall slide 2x10        NMR: 12'  Unilateral row, YTB 2x10  Shoulder extension, unilateral YTB 2x8  IR isometric  walkout, YTB 2x10 NMR: 12'  Prone shoulder row, 2x8  Prone shoulder extension, 2x8  Supine, IR/ER (60deg abduction) AROM x10 ea   NMR: 8'  IR isometric walkout, YTB 2x10  ER isometric walkout, YTB x8 NMR: 8'   Standing row, RTB 2x10  Standing extension RTB 2x10 NMR: 15'   Lower trapezius liftoff at wall 2x6 (PT at scapulae)  Supine horizontal abduction YTB 2x10  Supine Serratus punch, 1# 2x10 NMR: 13'  Rows, GTB unilateral x20  Shoulder extension, RTB x20  IR isometric walkout, YTB 2x10  NMR: 10'  Supine IR/ER isometric, varied degree x10ea 5s hold  Prone Row, 2# 2x10  Prone extension, 2# 2x10  SA push at wall with ball 2x10             HEP:   Access Code: [email or web address]  URL: [email or web address]  Date: 05/21/2024  Prepared by: Elizabeth Chong     Exercises  - Thumb Opposition  - 5 x daily - 7 x weekly - 10 reps  - Seated Wrist Flexion Stretch  - 5 x daily - 7 x weekly - 5 reps - 10 second hold  - Seated Wrist Extension Stretch  - 5 x daily - 7 x weekly - 5 reps - 10 second hold    Pendulum, forward/lateral     Access Code: [email or web address]  Date: 06/20/2024  Prepared by: Elizabeth Chong  - Seated Shoulder Flexion AAROM with Pulley Behind  - 1 x daily - 7 x weekly - 3 sets - 10 reps  - Supine Shoulder Flexion AAROM with Hands Clasped  - 1 x daily - 7 x weekly - 2 sets - 10 reps  - Isometric Shoulder Flexion at Wall  - 1 x daily - 7 x weekly - 10 reps - 5 second hold  - Isometric Shoulder Extension at Wall  - 1 x daily - 7 x weekly - 10 reps - 5 second hold  - Isometric Shoulder Abduction at Wall  - 1 x daily - 7 x weekly - 10 reps - 5 second hold    Charges: 1 Manual, 2 TE, 1 NMR   Total Timed Treatment: 41'  min  Total Treatment Time: 41'  min

## 2024-08-06 ENCOUNTER — OFFICE VISIT (OUTPATIENT)
Dept: PHYSICAL THERAPY | Facility: HOSPITAL | Age: 69
End: 2024-08-06
Attending: INTERNAL MEDICINE
Payer: COMMERCIAL

## 2024-08-06 PROCEDURE — 97110 THERAPEUTIC EXERCISES: CPT

## 2024-08-06 PROCEDURE — 97112 NEUROMUSCULAR REEDUCATION: CPT

## 2024-08-06 PROCEDURE — 97140 MANUAL THERAPY 1/> REGIONS: CPT

## 2024-08-06 NOTE — PROGRESS NOTES
Diagnosis:   S/P rotator cuff repair (Z98.890)    right Shoulder Arthroscopic rotator cuff repair, subacromial decompression, distal clavicle excision    Referring Provider: Skyler Ponce   Surgeon: Dr. Jose Gutierres Date of Evaluation:    5/21/2024    Precautions:  adhere to the \"large repair\" protocol, sling 4 weeks.     Visual Impairment, HTN, breast cancer    No AAROM until 6 week, no AROM until 8 week no resisted RC strengthening 12 week,     PROM to tolerance, avoid adduction   Next MD Visit:   October 2024     Date of Surgery: 5/6/2024    Insurance Primary/Secondary: [employer] [place] PPO / N/A     # Auth Visits: no auth, 20 per POC (PN at 10v)           8/6/2024: 13 week, 1 day  s/p large rotator cuff repair    Subjective: Was trying to do the abduction pulleys yesterday but was doing them facing the pulleys and states she was \" at an awkward angle\" and felt a weird pull at R shoulder. Was sore the rest of the day, resolved by this morning.   Pain: 3/10      Objective:     5/30/2024   Palpation: soft tissue restriction and TTP throughout R shoulder complex; infraspinatus, teres minor, supraspinatus, upper trapezius, and rhomboids     7/2/2024   Shoulder AROM: (* denotes performed with pain)   EOS: end of session  7/23/2024 start  7/2/2024    Flexion: R 132  Abduction: R 131  ER (elbow neutral): R 59  IR (elbow neutral): R 85 Flexion: R 75* (127 EOS)   Abduction: R 40* (90 EOS)  ER (elbow neutral): R 35  IR (elbow Neutral): R 85      Shoulder PROM: (* denotes performed with pain)  7/2/2024  Evaluation     Flexion: R 150  Abduction: R 140  ER (30 deg abd):R 70    IR (30 deg abd): R 65  Flexion: R 40*   Scapation: R 65  ER: R 15 (discomfort)   IR: R 50      Assessment: Attempting contract relax at shoulder ER for mobility improvement, pt tolerates well with some ROM improvement after following with isometric walkout for muscle contraction. Pt demoing increased pain with shoulder elevation in session, tolerates PROM well.         Goals: (To be met in 20 visits)   Pt will report improved ability to sleep without waking due to shoulder pain Continued   Pt will improve shoulder flexion AROM to >130 degrees to be able to reach into overhead cabinets without pain or restrictionContinued   Pt will improve shoulder abduction AROM to >130 degrees to improve ability to don deodorant, don/doff shirts, and wash hairContinued   Pt will increase shoulder AROM ER to T1 to reach and fasten seatbelt Continued   Pt will increase shoulder AROM IR to T12 to be able to reach in back pocket, tuck in shirt, and turn steering wheel without painContinued   Pt will improve shoulder strength throughout to 4/5 to improve function with ADLs including bathing and dressing independently. Continued   Pt will demonstrate increased mid/low trap strength to 4/5 to promote improved shoulder mechanics and stabilization with ADL such as lifting and reaching Continued   Pt will be independent and compliant with comprehensive HEP to maintain progress achieved in PT Continued      QuickDASH Outcome Score  Score: 84.09 % (5/21/2024  3:40 PM)    Post QuickDASH Outcome Score  Post Score: 63.64 % (7/2/2024  2:43 PM)    20.45 % improvement      Plan: Continue skilled Physical Therapy 2 x/week or a total of 20 visits over a 90 day period. Treatment will include: manual therapy, therapeutic exercise, therapeutic activity, neuromuscular re-education, HEP instruction and patient education.     Future Session: scapular strengthening, wall slide abduction, ER YTB, IR YTB, IR stretch, SL abduction   Date: 7/5/2024   Tx: 11/20 Date: 7/8/2024   Tx: 12/20   Date: 7/11/2024   Tx: 13/20  Date: 7/18/2024   Tx: 14/20    Date:7/23/2024   Tx: 15/20 Date: 7/30/2024   Tx: 16/20 Date: 8/1/2024   Tx: 17/20   Date: 8/6/2024   Tx: 18/20   MT: 8'   Gr II inferior GH glide  Shoulder harmonics MT: 10'   Gr II inferior, posterior GH glide MT: 13'   Gr II inferior, posterior GH glide  R shoulder harmonics    Gr II GH distraction, pain relief MT:10'  Gr II-III inferior, posterior GH glide  Gr II GH distraction, pain relief MT: 8'   Gr II-III inferior, posterior GH glide   MT: 8'   Gr II-III inferior, posterior GH glide MT: 8'  Gr II-III inferior, posterior GH glide MT: 12'  Contract relax into ER (improving mobility)  Gr II-III inferior, posterior GH glide     TE: 25'  Seated pulleys, flexion x20  Seated pulleys, scaption, x20  Finger Ladder, flexion, x10  Standing bicep curl, GTB 2x10  Standing tricep extension RTB 2x8 (fatiguing)   Supine IR/ER, 40deg abd x10ea   S/L abduction, to 90 AAROM (from PT) x15    S/L ER, AROM 3x5  TE: 23'  Seated pulleys, flexion x20  Seated pulleys, abduction, x20  Finger Ladder, scaption x15  2# cane flexion, x20 AAROM  2#   S/L abduction, to 90 x15    S/L ER, AROM 3x5    TE: 23'   Seated pulleys, flexion x20  Seated pulleys, abduction, x20  Scaption wall slide, 3x6   Supine PROM- abduction, IR, ER   Supine cane flexion 1# 2x8  Serratus punch on elevated mat table 2x8 (sx improvement)   TE: 25'  Seated pulleys, flexion x20  Seated pulleys, abduction, x20  Scaption finger ladder x10  Supine PROM- abduction, IR, ER   Supine cane flexion, 1# 2x10  Supine chest press \"Plus\" 1# x15   S/L abduction, to 90 x20  S/L ER, AROM 4x5  TE: 22'   Pulleys, flexion/abduction 2'ea  PROM with rose marie OP- flexion, abd, ER/IR  Wall slide, flexion 2x10  S/L abduction, to tolerance (PT assist at scapulae) x10  S/L ER, AROM 4x5   Triceps extension, GTB x15    TE: 23'  Pulleys, flexion 2'   Finger ladder, abduction x12   Wall Slide, flexion x15   S/L abduction, to tolerance (PT assist at scapulae) x15  S/L ER, AROM 4x5  Supine 90-90 IR/ER x10 ea    TE: 23'  Pulleys, flexion 2'  Pulleys, scapation, 2'  PROM with rose marie OP- flexion, abd, ER/IR  Supine cane flexion, 3# 2x10  S/L ER, AROM 4x5  Scaption wall slide 2x10   TE: 23'  Pulleys, flexion 2'  Pulleys, scapation, 2'  PROM with rose marie OP- flexion, abd,  ER/IR  Standing Row, GTB x20  Standing bicep curls GTB x20  Standing triceps extension GTB x20   Supine horizontal abduction, YTB x15  Supine shoulder flexion, YTB (difficult) x8    NMR: 12'  Unilateral row, YTB 2x10  Shoulder extension, unilateral YTB 2x8  IR isometric walkout, YTB 2x10 NMR: 12'  Prone shoulder row, 2x8  Prone shoulder extension, 2x8  Supine, IR/ER (60deg abduction) AROM x10 ea   NMR: 8'  IR isometric walkout, YTB 2x10  ER isometric walkout, YTB x8 NMR: 8'   Standing row, RTB 2x10  Standing extension RTB 2x10 NMR: 15'   Lower trapezius liftoff at wall 2x6 (PT at scapulae)  Supine horizontal abduction YTB 2x10  Supine Serratus punch, 1# 2x10 NMR: 13'  Rows, GTB unilateral x20  Shoulder extension, RTB x20  IR isometric walkout, YTB 2x10  NMR: 10'  Supine IR/ER isometric, varied degree x10ea 5s hold  Prone Row, 2# 2x10  Prone extension, 2# 2x10  SA push at wall with ball 2x10 NMR: 10'  Rhythmic stabilzation YTB 4x15s  IR walkout (x2) YTB x15   ER walkout (x2) YTB x15                HEP:   Access Code: [email or web address]  URL: [email or web address]  Date: 05/21/2024  Prepared by: Elizabeth Chong     Exercises  - Thumb Opposition  - 5 x daily - 7 x weekly - 10 reps  - Seated Wrist Flexion Stretch  - 5 x daily - 7 x weekly - 5 reps - 10 second hold  - Seated Wrist Extension Stretch  - 5 x daily - 7 x weekly - 5 reps - 10 second hold    Pendulum, forward/lateral     Access Code: [email or web address]  Date: 06/20/2024  Prepared by: Elizabeth Chong  - Seated Shoulder Flexion AAROM with Pulley Behind  - 1 x daily - 7 x weekly - 3 sets - 10 reps  - Supine Shoulder Flexion AAROM with Hands Clasped  - 1 x daily - 7 x weekly - 2 sets - 10 reps  - Isometric Shoulder Flexion at Wall  - 1 x daily - 7 x weekly - 10 reps - 5 second hold  - Isometric Shoulder Extension at Wall  - 1 x daily - 7 x weekly - 10 reps - 5 second hold  - Isometric Shoulder Abduction at Wall  - 1 x daily - 7 x weekly - 10 reps - 5 second  hold    Charges: 1 Manual, 2 TE, 1 NMR   Total Timed Treatment: 45'  min  Total Treatment Time: 45'  min

## 2024-08-08 ENCOUNTER — OFFICE VISIT (OUTPATIENT)
Dept: PHYSICAL THERAPY | Facility: HOSPITAL | Age: 69
End: 2024-08-08
Attending: INTERNAL MEDICINE
Payer: COMMERCIAL

## 2024-08-08 PROCEDURE — 97140 MANUAL THERAPY 1/> REGIONS: CPT

## 2024-08-08 PROCEDURE — 97110 THERAPEUTIC EXERCISES: CPT

## 2024-08-08 PROCEDURE — 97112 NEUROMUSCULAR REEDUCATION: CPT

## 2024-08-08 NOTE — PROGRESS NOTES
Diagnosis:   S/P rotator cuff repair (Z98.890)    right Shoulder Arthroscopic rotator cuff repair, subacromial decompression, distal clavicle excision    Referring Provider: Skyler Ponce   Surgeon: Dr. Jose Gutierres Date of Evaluation:    5/21/2024    Precautions:  adhere to the \"large repair\" protocol, sling 4 weeks.     Visual Impairment, HTN, breast cancer    No AAROM until 6 week, no AROM until 8 week no resisted RC strengthening 12 week,     PROM to tolerance, avoid adduction   Next MD Visit:   October 2024     Date of Surgery: 5/6/2024    Insurance Primary/Secondary: [employer] [place] PPO / N/A     # Auth Visits: no auth, 20 per POC (PN at 10v)           8/6/2024: 13 week, 1 day  s/p large rotator cuff repair    Subjective: Is \"about the same\" was sore after last session, is wondering if she can walk with 1# weights for exercise.    Pain: 3/10      Objective:     5/30/2024   Palpation: soft tissue restriction and TTP throughout R shoulder complex; infraspinatus, teres minor, supraspinatus, upper trapezius, and rhomboids     7/2/2024   Shoulder AROM: (* denotes performed with pain)   EOS: end of session  7/23/2024 start  7/2/2024    Flexion: R 132  Abduction: R 131  ER (elbow neutral): R 59  IR (elbow neutral): R 85 Flexion: R 75* (127 EOS)   Abduction: R 40* (90 EOS)  ER (elbow neutral): R 35  IR (elbow Neutral): R 85      Shoulder PROM: (* denotes performed with pain)  7/2/2024  Evaluation     Flexion: R 150  Abduction: R 140  ER (30 deg abd):R 70    IR (30 deg abd): R 65  Flexion: R 40*   Scapation: R 65  ER: R 15 (discomfort)   IR: R 50        Assessment: Tolerating low resistance strength training at rotator cuff in session, improving pain levels with increased reps, suggesting patient ice after session if she is sore, she is agreeable. Pt will benefit from rotational strength training and stretching for continued progression.      Goals: (To be met in 20 visits)   Pt will report improved ability to sleep without  waking due to shoulder pain Continued   Pt will improve shoulder flexion AROM to >130 degrees to be able to reach into overhead cabinets without pain or restrictionContinued   Pt will improve shoulder abduction AROM to >130 degrees to improve ability to don deodorant, don/doff shirts, and wash hairContinued   Pt will increase shoulder AROM ER to T1 to reach and fasten seatbelt Continued   Pt will increase shoulder AROM IR to T12 to be able to reach in back pocket, tuck in shirt, and turn steering wheel without painContinued   Pt will improve shoulder strength throughout to 4/5 to improve function with ADLs including bathing and dressing independently. Continued   Pt will demonstrate increased mid/low trap strength to 4/5 to promote improved shoulder mechanics and stabilization with ADL such as lifting and reaching Continued   Pt will be independent and compliant with comprehensive HEP to maintain progress achieved in PT Continued      QuickDASH Outcome Score  Score: 84.09 % (5/21/2024  3:40 PM)    Post QuickDASH Outcome Score  Post Score: 63.64 % (7/2/2024  2:43 PM)    20.45 % improvement      Plan: Continue skilled Physical Therapy 2 x/week or a total of 20 visits over a 90 day period. Treatment will include: manual therapy, therapeutic exercise, therapeutic activity, neuromuscular re-education, HEP instruction and patient education.     Future Session: PN, IR stretch, prone horizontal abduction, lower trap setting  Date: 7/8/2024   Tx: 12/20   Date: 7/11/2024   Tx: 13/20  Date: 7/18/2024   Tx: 14/20    Date:7/23/2024   Tx: 15/20 Date: 7/30/2024   Tx: 16/20 Date: 8/1/2024   Tx: 17/20   Date: 8/6/2024   Tx: 18/20 Date: 8/8/2024   Tx: 19/20   MT: 10'   Gr II inferior, posterior GH glide MT: 13'   Gr II inferior, posterior GH glide  R shoulder harmonics   Gr II GH distraction, pain relief MT:10'  Gr II-III inferior, posterior GH glide  Gr II GH distraction, pain relief MT: 8'   Gr II-III inferior, posterior GH glide    MT: 8'   Gr II-III inferior, posterior GH glide MT: 8'  Gr II-III inferior, posterior GH glide MT: 12'  Contract relax into ER (improving mobility)  Gr II-III inferior, posterior GH glide   MT: 12'  Contract relax into ER (improving mobility)  Gr II-III inferior, posterior GH glide   TE: 23'  Seated pulleys, flexion x20  Seated pulleys, abduction, x20  Finger Ladder, scaption x15  2# cane flexion, x20 AAROM  2#   S/L abduction, to 90 x15    S/L ER, AROM 3x5    TE: 23'   Seated pulleys, flexion x20  Seated pulleys, abduction, x20  Scaption wall slide, 3x6   Supine PROM- abduction, IR, ER   Supine cane flexion 1# 2x8  Serratus punch on elevated mat table 2x8 (sx improvement)   TE: 25'  Seated pulleys, flexion x20  Seated pulleys, abduction, x20  Scaption finger ladder x10  Supine PROM- abduction, IR, ER   Supine cane flexion, 1# 2x10  Supine chest press \"Plus\" 1# x15   S/L abduction, to 90 x20  S/L ER, AROM 4x5  TE: 22'   Pulleys, flexion/abduction 2'ea  PROM with rose marie OP- flexion, abd, ER/IR  Wall slide, flexion 2x10  S/L abduction, to tolerance (PT assist at scapulae) x10  S/L ER, AROM 4x5   Triceps extension, GTB x15    TE: 23'  Pulleys, flexion 2'   Finger ladder, abduction x12   Wall Slide, flexion x15   S/L abduction, to tolerance (PT assist at scapulae) x15  S/L ER, AROM 4x5  Supine 90-90 IR/ER x10 ea    TE: 23'  Pulleys, flexion 2'  Pulleys, scapation, 2'  PROM with rose marie OP- flexion, abd, ER/IR  Supine cane flexion, 3# 2x10  S/L ER, AROM 4x5  Scaption wall slide 2x10   TE: 23'  Pulleys, flexion 2'  Pulleys, scapation, 2'  PROM with rose marie OP- flexion, abd, ER/IR  Standing Row, GTB x20  Standing bicep curls GTB x20  Standing triceps extension GTB x20   Supine horizontal abduction, YTB x15  Supine shoulder flexion, YTB (difficult) x8  TE: 23'  Pulleys, flexion 2'  Abduction wall slide, x20  Supine shoulder flexion, YTB (difficult) x8   Seated ER, B YTB x20  Standing IR, RTB 2x10   NMR: 12'  Prone shoulder  row, 2x8  Prone shoulder extension, 2x8  Supine, IR/ER (60deg abduction) AROM x10 ea   NMR: 8'  IR isometric walkout, YTB 2x10  ER isometric walkout, YTB x8 NMR: 8'   Standing row, RTB 2x10  Standing extension RTB 2x10 NMR: 15'   Lower trapezius liftoff at wall 2x6 (PT at scapulae)  Supine horizontal abduction YTB 2x10  Supine Serratus punch, 1# 2x10 NMR: 13'  Rows, GTB unilateral x20  Shoulder extension, RTB x20  IR isometric walkout, YTB 2x10  NMR: 10'  Supine IR/ER isometric, varied degree x10ea 5s hold  Prone Row, 2# 2x10  Prone extension, 2# 2x10  SA push at wall with ball 2x10 NMR: 10'  Rhythmic stabilzation YTB 4x15s  IR walkout (x2) YTB x15   ER walkout (x2) YTB x15    NMR: 10'  Rhythmic stabilzation YTB 4x15s  IR 90 deg flexion arm supported on table, x15              HEP:   Access Code: [email or web address]  URL: [email or web address]  Date: 05/21/2024  Prepared by: Elizabeth Chong     Exercises  - Thumb Opposition  - 5 x daily - 7 x weekly - 10 reps  - Seated Wrist Flexion Stretch  - 5 x daily - 7 x weekly - 5 reps - 10 second hold  - Seated Wrist Extension Stretch  - 5 x daily - 7 x weekly - 5 reps - 10 second hold    Pendulum, forward/lateral     Access Code: [email or web address]  Date: 06/20/2024  Prepared by: Elizabeth Chong  - Seated Shoulder Flexion AAROM with Pulley Behind  - 1 x daily - 7 x weekly - 3 sets - 10 reps  - Supine Shoulder Flexion AAROM with Hands Clasped  - 1 x daily - 7 x weekly - 2 sets - 10 reps  - Isometric Shoulder Flexion at Wall  - 1 x daily - 7 x weekly - 10 reps - 5 second hold  - Isometric Shoulder Extension at Wall  - 1 x daily - 7 x weekly - 10 reps - 5 second hold  - Isometric Shoulder Abduction at Wall  - 1 x daily - 7 x weekly - 10 reps - 5 second hold    Charges: 1 Manual, 2 TE, 1 NMR   Total Timed Treatment: 45'  min  Total Treatment Time: 45'  min

## 2024-08-13 ENCOUNTER — APPOINTMENT (OUTPATIENT)
Dept: PHYSICAL THERAPY | Facility: HOSPITAL | Age: 69
End: 2024-08-13
Attending: INTERNAL MEDICINE
Payer: COMMERCIAL

## 2024-08-15 ENCOUNTER — OFFICE VISIT (OUTPATIENT)
Dept: PHYSICAL THERAPY | Facility: HOSPITAL | Age: 69
End: 2024-08-15
Attending: INTERNAL MEDICINE
Payer: COMMERCIAL

## 2024-08-15 PROCEDURE — 97110 THERAPEUTIC EXERCISES: CPT

## 2024-08-15 PROCEDURE — 97140 MANUAL THERAPY 1/> REGIONS: CPT

## 2024-08-15 PROCEDURE — 97112 NEUROMUSCULAR REEDUCATION: CPT

## 2024-08-15 NOTE — PROGRESS NOTES
Progress Summary  Pt has attended 20 visits in Physical Therapy.     Diagnosis:   S/P rotator cuff repair (Z98.890)    right Shoulder Arthroscopic rotator cuff repair, subacromial decompression, distal clavicle excision    Referring Provider: Skyler Ponce   Surgeon: Dr. Jose Santos Date of Evaluation:    5/21/2024    Precautions:  adhere to the \"large repair\" protocol, sling 4 weeks.     Visual Impairment, HTN, breast cancer    No AAROM until 6 week, no AROM until 8 week no resisted RC strengthening 12 week,     PROM to tolerance, avoid adduction   Next MD Visit:   October 2024     Date of Surgery: 5/6/2024    Insurance Primary/Secondary: [employer] [place] PPO / N/A     # Auth Visits: no auth, 20 per POC (PN at 10v)           8/15/2024 : 14 week, 4 day  s/p large rotator cuff repair    Subjective: Was very sore after last therapy session for a few days, had to take tylenol but feeling better today.   Pain: 2-3/10      Assessment:  Patient has attended 20 visits in outpatient physical therapy s/p  large rotator cuff repair on 5/6/2024. At this time patient demonstrates significant improvement in AROM all direction, remains most limited with internal and external rotation. Within session improves IR significantly, continue stretching and strengthening to maintain functional improvements. Strength testing in session suggests weakness in shoulder complex and parascapular musculature, would benefit from strength training for postural improvement and proper scapular rotation. Patient will continue to benefit from skilled physical therapy to address remaining limitations, decrease risk of future injury and return to PLOF.       Objective:     Shoulder AROM: (* denotes performed with pain)   EOS: end of session  8/15/2024  7/23/2024 start  7/2/2024    Flexion: R 141  Abduction: R 146  ER (functional): R occiput  IR (functional): R SI joint (L3 EOS)  Flexion: R 132  Abduction: R 131  ER (elbow neutral): R 59  IR (elbow neutral): R 85  Flexion: R 75* (127 EOS)   Abduction: R 40* (90 EOS)  ER (elbow neutral): R 35  IR (elbow Neutral): R 85        Shoulder PROM: (* denotes performed with pain)  8/15/2024  7/2/2024  Evaluation     Flexion: 160  Abduction: 160  ER (90-90): 80  IR (90-90): 55  Flexion: R 150  Abduction: R 140  ER (30 deg abd):R 70    IR (30 deg abd): R 65  Flexion: R 40*   Scapation: R 65  ER: R 15 (discomfort)   IR: R 50      8/15/2024   Strength/MMT: (* denotes performed with pain)  Shoulder Elbow Scapular   Flexion: R 3+/5; L 5/5  Abduction: R 3+/5; L 5/5  ER: R 4-*/5; L 5/5  IR: R 4/5; L 5/5 Flexion: R 4+/5; L 5/5  Extension: R 4/5; L 5/5 Rhomboids: R 3+* /5, L 4+/5  Mid trap: R 4/5; L 5/5   Lats: R 4-*/5, L 5/5  Low trap: R 3*/5; L 4/5         Goals: (To be met in 20 visits)   Pt will report improved ability to sleep without waking due to shoulder pain Continued, improving  Pt will improve shoulder flexion AROM to >130 degrees to be able to reach into overhead cabinets without pain or restriction Met, but Continued   Pt will improve shoulder abduction AROM to >130 degrees to improve ability to don deodorant, don/doff shirts, and wash hair Met, but Continued   Pt will increase shoulder AROM ER to T1 to reach and fasten seatbelt Continued   Pt will increase shoulder AROM IR to T12 to be able to reach in back pocket, tuck in shirt, and turn steering wheel without pain Continued   Pt will improve shoulder strength throughout to 4/5 to improve function with ADLs including bathing and dressing independently. Continued   Pt will demonstrate increased mid/low trap strength to 4/5 to promote improved shoulder mechanics and stabilization with ADL such as lifting and reaching Continued   Pt will be independent and compliant with comprehensive HEP to maintain progress achieved in PT Continued      QuickDASH Outcome Score  Score: 84.09 % (5/21/2024  3:40 PM)    Post QuickDASH Outcome Score  Post Score: 36.36 % (8/15/2024 12:36 PM)    47.73 %  improvement    Plan: Continue skilled Physical Therapy 1-2 x/week or a total of 35 visits over a 90 day period. Treatment will include: manual therapy, therapeutic exercise, therapeutic activity, neuromuscular re-education, HEP instruction, and self care home management.        Patient/Family/Caregiver was advised of these findings, precautions, and treatment options and has agreed to actively participate in planning and for this course of care.    Thank you for your referral. If you have any questions, please contact me at Dept: [phone].    Sincerely,  Electronically signed by therapist: Elizabeth Chong PT     Physician's certification required:  Yes  Please co-sign or sign and return this letter via fax as soon as possible to [phone].   I certify the need for these services furnished under this plan of treatment and while under my care.    X___________________________________________________ Date____________________    Certification From: 8/15/2024  To:11/13/2024     Date: 7/11/2024   Tx: 13/20  Date: 7/18/2024   Tx: 14/20    Date:7/23/2024   Tx: 15/20 Date: 7/30/2024   Tx: 16/20 Date: 8/1/2024   Tx: 17/20   Date: 8/6/2024   Tx: 18/20 Date: 8/8/2024   Tx: 19/20 Date: 8/15/2024   Tx: 20/20   MT: 13'   Gr II inferior, posterior GH glide  R shoulder harmonics   Gr II GH distraction, pain relief MT:10'  Gr II-III inferior, posterior GH glide  Gr II GH distraction, pain relief MT: 8'   Gr II-III inferior, posterior GH glide   MT: 8'   Gr II-III inferior, posterior GH glide MT: 8'  Gr II-III inferior, posterior GH glide MT: 12'  Contract relax into ER (improving mobility)  Gr II-III inferior, posterior GH glide   MT: 12'  Contract relax into ER (improving mobility)  Gr II-III inferior, posterior GH glide MT: 12'  Gr II-III inferior, posterior GH glide  IR/ER OP    TE: 23'   Seated pulleys, flexion x20  Seated pulleys, abduction, x20  Scaption wall slide, 3x6   Supine PROM- abduction, IR, ER   Supine cane  flexion 1# 2x8  Serratus punch on elevated mat table 2x8 (sx improvement)   TE: 25'  Seated pulleys, flexion x20  Seated pulleys, abduction, x20  Scaption finger ladder x10  Supine PROM- abduction, IR, ER   Supine cane flexion, 1# 2x10  Supine chest press \"Plus\" 1# x15   S/L abduction, to 90 x20  S/L ER, AROM 4x5  TE: 22'   Pulleys, flexion/abduction 2'ea  PROM with rose marie OP- flexion, abd, ER/IR  Wall slide, flexion 2x10  S/L abduction, to tolerance (PT assist at scapulae) x10  S/L ER, AROM 4x5   Triceps extension, GTB x15    TE: 23'  Pulleys, flexion 2'   Finger ladder, abduction x12   Wall Slide, flexion x15   S/L abduction, to tolerance (PT assist at scapulae) x15  S/L ER, AROM 4x5  Supine 90-90 IR/ER x10 ea    TE: 23'  Pulleys, flexion 2'  Pulleys, scapation, 2'  PROM with rose marie OP- flexion, abd, ER/IR  Supine cane flexion, 3# 2x10  S/L ER, AROM 4x5  Scaption wall slide 2x10   TE: 23'  Pulleys, flexion 2'  Pulleys, scapation, 2'  PROM with rose marie OP- flexion, abd, ER/IR  Standing Row, GTB x20  Standing bicep curls GTB x20  Standing triceps extension GTB x20   Supine horizontal abduction, YTB x15  Supine shoulder flexion, YTB (difficult) x8  TE: 23'  Pulleys, flexion 2'  Abduction wall slide, x20  Supine shoulder flexion, YTB (difficult) x8   Seated ER, B YTB x20  Standing IR, RTB 2x10 TE: 23'   Reassessment  IR stretch with cane behind the back 10x10s   Standing bicep curls, GTB 2x10   Standing tricep extension, GTB 2x10    NMR: 8'  IR isometric walkout, YTB 2x10  ER isometric walkout, YTB x8 NMR: 8'   Standing row, RTB 2x10  Standing extension RTB 2x10 NMR: 15'   Lower trapezius liftoff at wall 2x6 (PT at scapulae)  Supine horizontal abduction YTB 2x10  Supine Serratus punch, 1# 2x10 NMR: 13'  Rows, GTB unilateral x20  Shoulder extension, RTB x20  IR isometric walkout, YTB 2x10  NMR: 10'  Supine IR/ER isometric, varied degree x10ea 5s hold  Prone Row, 2# 2x10  Prone extension, 2# 2x10  SA push at wall with  ball 2x10 NMR: 10'  Rhythmic stabilzation YTB 4x15s  IR walkout (x2) YTB x15   ER walkout (x2) YTB x15    NMR: 10'  Rhythmic stabilzation YTB 4x15s  IR 90 deg flexion arm supported on table, x15  NMR : 8'   Supine AROM shoulder flexion 2x10 (full range)  IR 90 deg flexion arm supported on table, 2x10             HEP:   Access Code: [email or web address]  URL: [email or web address]  Date: 05/21/2024  Prepared by: Elizabeth Chong     Exercises  - Thumb Opposition  - 5 x daily - 7 x weekly - 10 reps  - Seated Wrist Flexion Stretch  - 5 x daily - 7 x weekly - 5 reps - 10 second hold  - Seated Wrist Extension Stretch  - 5 x daily - 7 x weekly - 5 reps - 10 second hold    Pendulum, forward/lateral     Access Code: [email or web address]  Date: 06/20/2024  Prepared by: Elizabeth Chong  - Seated Shoulder Flexion AAROM with Pulley Behind  - 1 x daily - 7 x weekly - 3 sets - 10 reps  - Supine Shoulder Flexion AAROM with Hands Clasped  - 1 x daily - 7 x weekly - 2 sets - 10 reps  - Isometric Shoulder Flexion at Wall  - 1 x daily - 7 x weekly - 10 reps - 5 second hold  - Isometric Shoulder Extension at Wall  - 1 x daily - 7 x weekly - 10 reps - 5 second hold  - Isometric Shoulder Abduction at Wall  - 1 x daily - 7 x weekly - 10 reps - 5 second hold    Charges: 1 Manual, 2 TE, 1 NMR   Total Timed Treatment: 43'  min  Total Treatment Time: 43'  min

## 2024-08-20 ENCOUNTER — OFFICE VISIT (OUTPATIENT)
Dept: PHYSICAL THERAPY | Facility: HOSPITAL | Age: 69
End: 2024-08-20
Attending: INTERNAL MEDICINE
Payer: COMMERCIAL

## 2024-08-20 PROCEDURE — 97140 MANUAL THERAPY 1/> REGIONS: CPT

## 2024-08-20 PROCEDURE — 97112 NEUROMUSCULAR REEDUCATION: CPT

## 2024-08-20 PROCEDURE — 97110 THERAPEUTIC EXERCISES: CPT

## 2024-08-20 NOTE — PROGRESS NOTES
Diagnosis:   S/P rotator cuff repair (Z98.890)    right Shoulder Arthroscopic rotator cuff repair, subacromial decompression, distal clavicle excision    Referring Provider: Skyler Ponce   Surgeon: Dr. Jose Santos Date of Evaluation:    5/21/2024    Precautions:  adhere to the \"large repair\" protocol, sling 4 weeks.     Visual Impairment, HTN, breast cancer    No AAROM until 6 week, no AROM until 8 week no resisted RC strengthening 12 week,     PROM to tolerance, avoid adduction   Next MD Visit:   October 2024     Date of Surgery: 5/6/2024    Insurance Primary/Secondary: [employer] [place] PPO / N/A     # Auth Visits: no auth, 20 per POC (PN at 10v)           8/15/2024 : 14 week, 4 day  s/p large rotator cuff repair    Subjective: Had follow up with referring physician, per patient, it went well and surgeon thinks it'll take her 6 months to get back to normal.  Patient was able to put a glass away overhead today with her L hand, stating she hadn't been able to do that before.   Pain: 2-3/10      Objective:     Shoulder AROM: (* denotes performed with pain)   EOS: end of session  8/15/2024  7/23/2024 start  7/2/2024    Flexion: R 141  Abduction: R 146  ER (functional): R occiput  IR (functional): R SI joint (L3 EOS)  Flexion: R 132  Abduction: R 131  ER (elbow neutral): R 59  IR (elbow neutral): R 85 Flexion: R 75* (127 EOS)   Abduction: R 40* (90 EOS)  ER (elbow neutral): R 35  IR (elbow Neutral): R 85        Shoulder PROM: (* denotes performed with pain)  8/15/2024  7/2/2024  Evaluation     Flexion: 160  Abduction: 160  ER (90-90): 80  IR (90-90): 55  Flexion: R 150  Abduction: R 140  ER (30 deg abd):R 70    IR (30 deg abd): R 65  Flexion: R 40*   Scapation: R 65  ER: R 15 (discomfort)   IR: R 50      8/15/2024   Strength/MMT: (* denotes performed with pain)  Shoulder Elbow Scapular   Flexion: R 3+/5; L 5/5  Abduction: R 3+/5; L 5/5  ER: R 4-*/5; L 5/5  IR: R 4/5; L 5/5 Flexion: R 4+/5; L 5/5  Extension: R 4/5; L 5/5  Rhomboids: R 3+* /5, L 4+/5  Mid trap: R 4/5; L 5/5   Lats: R 4-*/5, L 5/5  Low trap: R 3*/5; L 4/5       Assessment: Improving tolerance to exercise noted with minimal discomfort, continues to demonstrate weakness throughout scapular complex leading to difficulty with scapular elevation. No adverse effects of session.      Goals: (To be met in 20 visits)   Pt will report improved ability to sleep without waking due to shoulder pain Continued, improving  Pt will improve shoulder flexion AROM to >130 degrees to be able to reach into overhead cabinets without pain or restriction Met, but Continued   Pt will improve shoulder abduction AROM to >130 degrees to improve ability to don deodorant, don/doff shirts, and wash hair Met, but Continued   Pt will increase shoulder AROM ER to T1 to reach and fasten seatbelt Continued   Pt will increase shoulder AROM IR to T12 to be able to reach in back pocket, tuck in shirt, and turn steering wheel without pain Continued   Pt will improve shoulder strength throughout to 4/5 to improve function with ADLs including bathing and dressing independently. Continued   Pt will demonstrate increased mid/low trap strength to 4/5 to promote improved shoulder mechanics and stabilization with ADL such as lifting and reaching Continued   Pt will be independent and compliant with comprehensive HEP to maintain progress achieved in PT Continued      QuickDASH Outcome Score  Score: 84.09 % (5/21/2024  3:40 PM)    Post QuickDASH Outcome Score  Post Score: 36.36 % (8/15/2024 12:36 PM)    47.73 % improvement    Plan: Continue skilled Physical Therapy 1-2 x/week or a total of 35 visits over a 90 day period. Treatment will include: manual therapy, therapeutic exercise, therapeutic activity, neuromuscular re-education, HEP instruction, and self care home management.        Certification From: 8/15/2024  To:11/13/2024     Future Session: progress prone horizontal abduction, YTB abduction, 90 deg  IR/ER  Date: 7/18/2024   Tx: 14/20    Date:7/23/2024   Tx: 15/20 Date: 7/30/2024   Tx: 16/20 Date: 8/1/2024   Tx: 17/20   Date: 8/6/2024   Tx: 18/20 Date: 8/8/2024   Tx: 19/20 Date: 8/15/2024   Tx: 20/20 Date: 8/20/2024   Tx: 21/35   MT:10'  Gr II-III inferior, posterior GH glide  Gr II GH distraction, pain relief MT: 8'   Gr II-III inferior, posterior GH glide   MT: 8'   Gr II-III inferior, posterior GH glide MT: 8'  Gr II-III inferior, posterior GH glide MT: 12'  Contract relax into ER (improving mobility)  Gr II-III inferior, posterior GH glide   MT: 12'  Contract relax into ER (improving mobility)  Gr II-III inferior, posterior GH glide MT: 12'  Gr II-III inferior, posterior GH glide  IR/ER OP  MT: 10'  Gr II-III inferior, posterior GH glide  IR/ER OP      TE: 25'  Seated pulleys, flexion x20  Seated pulleys, abduction, x20  Scaption finger ladder x10  Supine PROM- abduction, IR, ER   Supine cane flexion, 1# 2x10  Supine chest press \"Plus\" 1# x15   S/L abduction, to 90 x20  S/L ER, AROM 4x5  TE: 22'   Pulleys, flexion/abduction 2'ea  PROM with rose marie OP- flexion, abd, ER/IR  Wall slide, flexion 2x10  S/L abduction, to tolerance (PT assist at scapulae) x10  S/L ER, AROM 4x5   Triceps extension, GTB x15    TE: 23'  Pulleys, flexion 2'   Finger ladder, abduction x12   Wall Slide, flexion x15   S/L abduction, to tolerance (PT assist at scapulae) x15  S/L ER, AROM 4x5  Supine 90-90 IR/ER x10 ea    TE: 23'  Pulleys, flexion 2'  Pulleys, scapation, 2'  PROM with rose marie OP- flexion, abd, ER/IR  Supine cane flexion, 3# 2x10  S/L ER, AROM 4x5  Scaption wall slide 2x10   TE: 23'  Pulleys, flexion 2'  Pulleys, scapation, 2'  PROM with rose marie OP- flexion, abd, ER/IR  Standing Row, GTB x20  Standing bicep curls GTB x20  Standing triceps extension GTB x20   Supine horizontal abduction, YTB x15  Supine shoulder flexion, YTB (difficult) x8  TE: 23'  Pulleys, flexion 2'  Abduction wall slide, x20  Supine shoulder flexion, YTB  (difficult) x8   Seated ER, B YTB x20  Standing IR, RTB 2x10 TE: 23'   Reassessment  IR stretch with cane behind the back 10x10s   Standing bicep curls, GTB 2x10   Standing tricep extension, GTB 2x10  TE: 23'  SB flexion roll, x20  S/L shoulder ER, 1# 2x10  IR stretch with cane behind the back 10x10s   IR RTB 2x12  Standing shoulder extension, GTB 2x10  Standing rows, unilateral Blue TB 2x12    NMR: 8'   Standing row, RTB 2x10  Standing extension RTB 2x10 NMR: 15'   Lower trapezius liftoff at wall 2x6 (PT at scapulae)  Supine horizontal abduction YTB 2x10  Supine Serratus punch, 1# 2x10 NMR: 13'  Rows, GTB unilateral x20  Shoulder extension, RTB x20  IR isometric walkout, YTB 2x10  NMR: 10'  Supine IR/ER isometric, varied degree x10ea 5s hold  Prone Row, 2# 2x10  Prone extension, 2# 2x10  SA push at wall with ball 2x10 NMR: 10'  Rhythmic stabilzation YTB 4x15s  IR walkout (x2) YTB x15   ER walkout (x2) YTB x15    NMR: 10'  Rhythmic stabilzation YTB 4x15s  IR 90 deg flexion arm supported on table, x15  NMR : 8'   Supine AROM shoulder flexion 2x10 (full range)  IR 90 deg flexion arm supported on table, 2x10 NMR: 12'  Rhythmic stabilzation YTB 4x15s  Prone horizontal abduction, elbow bent x15 (PT assist)   Lower trapezius setting at wall 2x5               HEP:   Access Code: [email or web address]  URL: [email or web address]  Date: 05/21/2024  Prepared by: Elizabeth Chong     Exercises  - Thumb Opposition  - 5 x daily - 7 x weekly - 10 reps  - Seated Wrist Flexion Stretch  - 5 x daily - 7 x weekly - 5 reps - 10 second hold  - Seated Wrist Extension Stretch  - 5 x daily - 7 x weekly - 5 reps - 10 second hold    Pendulum, forward/lateral     Access Code: [email or web address]  Date: 06/20/2024  Prepared by: Elizabeth Chong  - Seated Shoulder Flexion AAROM with Pulley Behind  - 1 x daily - 7 x weekly - 3 sets - 10 reps  - Supine Shoulder Flexion AAROM with Hands Clasped  - 1 x daily - 7 x weekly - 2 sets - 10 reps  - Isometric  Shoulder Flexion at Wall  - 1 x daily - 7 x weekly - 10 reps - 5 second hold  - Isometric Shoulder Extension at Wall  - 1 x daily - 7 x weekly - 10 reps - 5 second hold  - Isometric Shoulder Abduction at Wall  - 1 x daily - 7 x weekly - 10 reps - 5 second hold    Charges: 1 Manual, 2 TE, 1 NMR   Total Timed Treatment: 45'  min  Total Treatment Time: 45'  min

## 2024-08-22 ENCOUNTER — OFFICE VISIT (OUTPATIENT)
Dept: PHYSICAL THERAPY | Facility: HOSPITAL | Age: 69
End: 2024-08-22
Attending: INTERNAL MEDICINE
Payer: COMMERCIAL

## 2024-08-22 PROCEDURE — 97140 MANUAL THERAPY 1/> REGIONS: CPT

## 2024-08-22 PROCEDURE — 97110 THERAPEUTIC EXERCISES: CPT

## 2024-08-22 PROCEDURE — 97112 NEUROMUSCULAR REEDUCATION: CPT

## 2024-08-22 NOTE — PROGRESS NOTES
Diagnosis:   S/P rotator cuff repair (Z98.890)    right Shoulder Arthroscopic rotator cuff repair, subacromial decompression, distal clavicle excision    Referring Provider: Skyler Ponce   Surgeon: Dr. Jose Santos Date of Evaluation:    5/21/2024    Precautions:  adhere to the \"large repair\" protocol, sling 4 weeks.     Visual Impairment, HTN, breast cancer    No AAROM until 6 week, no AROM until 8 week no resisted RC strengthening 12 week,     PROM to tolerance, avoid adduction   Next MD Visit:   October 2024     Date of Surgery: 5/6/2024    Insurance Primary/Secondary: [employer] [place] P[place] / N/A     # Auth Visits: no auth, 20 per POC (PN at 10v)           8/15/2024 : 14 week, 4 day  s/p large rotator cuff repair    Subjective: Was a little sore after last session but not too bad.   Pain: 3/10      Objective:       8/22/2024: Flexibility    Biceps: R short   Pectoralis: R short    Shoulder AROM: (* denotes performed with pain)   EOS: end of session  8/15/2024  7/23/2024 start  7/2/2024    Flexion: R 141  Abduction: R 146  ER (functional): R occiput  IR (functional): R SI joint (L3 EOS)  Flexion: R 132  Abduction: R 131  ER (elbow neutral): R 59  IR (elbow neutral): R 85 Flexion: R 75* (127 EOS)   Abduction: R 40* (90 EOS)  ER (elbow neutral): R 35  IR (elbow Neutral): R 85        Shoulder PROM: (* denotes performed with pain)  8/15/2024  7/2/2024  Evaluation     Flexion: 160  Abduction: 160  ER (90-90): 80  IR (90-90): 55  Flexion: R 150  Abduction: R 140  ER (30 deg abd):R 70    IR (30 deg abd): R 65  Flexion: R 40*   Scapation: R 65  ER: R 15 (discomfort)   IR: R 50      8/15/2024   Strength/MMT: (* denotes performed with pain)  Shoulder Elbow Scapular   Flexion: R 3+/5; L 5/5  Abduction: R 3+/5; L 5/5  ER: R 4-*/5; L 5/5  IR: R 4/5; L 5/5 Flexion: R 4+/5; L 5/5  Extension: R 4/5; L 5/5 Rhomboids: R 3+* /5, L 4+/5  Mid trap: R 4/5; L 5/5   Lats: R 4-*/5, L 5/5  Low trap: R 3*/5; L 4/5       Assessment: Biceps and  pectoralis demonstrate decreased flexibility, improves with stretching in session leading to improved posturing. HEP updated to maintain functional gains, pt performs well without pain and with proper form.     Goals: (To be met in 20 visits)   Pt will report improved ability to sleep without waking due to shoulder pain Continued, improving  Pt will improve shoulder flexion AROM to >130 degrees to be able to reach into overhead cabinets without pain or restriction Met, but Continued   Pt will improve shoulder abduction AROM to >130 degrees to improve ability to don deodorant, don/doff shirts, and wash hair Met, but Continued   Pt will increase shoulder AROM ER to T1 to reach and fasten seatbelt Continued   Pt will increase shoulder AROM IR to T12 to be able to reach in back pocket, tuck in shirt, and turn steering wheel without pain Continued   Pt will improve shoulder strength throughout to 4/5 to improve function with ADLs including bathing and dressing independently. Continued   Pt will demonstrate increased mid/low trap strength to 4/5 to promote improved shoulder mechanics and stabilization with ADL such as lifting and reaching Continued   Pt will be independent and compliant with comprehensive HEP to maintain progress achieved in PT Continued      QuickDASH Outcome Score  Score: 84.09 % (5/21/2024  3:40 PM)    Post QuickDASH Outcome Score  Post Score: 36.36 % (8/15/2024 12:36 PM)    47.73 % improvement    Plan: Continue skilled Physical Therapy 1-2 x/week or a total of 35 visits over a 90 day period. Treatment will include: manual therapy, therapeutic exercise, therapeutic activity, neuromuscular re-education, HEP instruction, and self care home management.        Certification From: 8/15/2024  To:11/13/2024     Future Session: progress prone horizontal abduction, YTB abduction, 90 deg IR/ER  Date:7/23/2024   Tx: 15/20 Date: 7/30/2024   Tx: 16/20 Date: 8/1/2024   Tx: 17/20   Date: 8/6/2024   Tx: 18/20 Date:  8/8/2024   Tx: 19/20 Date: 8/15/2024   Tx: 20/20 Date: 8/20/2024   Tx: 21/35 Date: 8/22/2024   Tx: 22/35   MT: 8'   Gr II-III inferior, posterior GH glide   MT: 8'   Gr II-III inferior, posterior GH glide MT: 8'  Gr II-III inferior, posterior GH glide MT: 12'  Contract relax into ER (improving mobility)  Gr II-III inferior, posterior GH glide   MT: 12'  Contract relax into ER (improving mobility)  Gr II-III inferior, posterior GH glide MT: 12'  Gr II-III inferior, posterior GH glide  IR/ER OP  MT: 10'  Gr II-III inferior, posterior GH glide  IR/ER OP    MT: 10'  Gr II-III inferior, posterior GH glide  IR/ER OP   Passive biceps stretch, elbow    TE: 22'   Pulleys, flexion/abduction 2'ea  PROM with rose marie OP- flexion, abd, ER/IR  Wall slide, flexion 2x10  S/L abduction, to tolerance (PT assist at scapulae) x10  S/L ER, AROM 4x5   Triceps extension, GTB x15    TE: 23'  Pulleys, flexion 2'   Finger ladder, abduction x12   Wall Slide, flexion x15   S/L abduction, to tolerance (PT assist at scapulae) x15  S/L ER, AROM 4x5  Supine 90-90 IR/ER x10 ea    TE: 23'  Pulleys, flexion 2'  Pulleys, scapation, 2'  PROM with rose marie OP- flexion, abd, ER/IR  Supine cane flexion, 3# 2x10  S/L ER, AROM 4x5  Scaption wall slide 2x10   TE: 23'  Pulleys, flexion 2'  Pulleys, scapation, 2'  PROM with rose marie OP- flexion, abd, ER/IR  Standing Row, GTB x20  Standing bicep curls GTB x20  Standing triceps extension GTB x20   Supine horizontal abduction, YTB x15  Supine shoulder flexion, YTB (difficult) x8  TE: 23'  Pulleys, flexion 2'  Abduction wall slide, x20  Supine shoulder flexion, YTB (difficult) x8   Seated ER, B YTB x20  Standing IR, RTB 2x10 TE: 23'   Reassessment  IR stretch with cane behind the back 10x10s   Standing bicep curls, GTB 2x10   Standing tricep extension, GTB 2x10  TE: 23'  SB flexion roll, x20  S/L shoulder ER, 1# 2x10  IR stretch with cane behind the back 10x10s   IR RTB 2x12  Standing shoulder extension, GTB  2x10  Standing rows, unilateral Blue TB 2x12  TE: 25'  Pulleys, flexion 2'   Pulley, scaption 2'  Doorway pectoralis stretch, \"W\" 10x10s  Tricep extension, GTB 2x10   IR stretch with cane behind the back 10x10s   IR RTB 2x12  ER RTB 2x10 (elbow neutral)  HEP update   NMR: 15'   Lower trapezius liftoff at wall 2x6 (PT at scapulae)  Supine horizontal abduction YTB 2x10  Supine Serratus punch, 1# 2x10 NMR: 13'  Rows, GTB unilateral x20  Shoulder extension, RTB x20  IR isometric walkout, YTB 2x10  NMR: 10'  Supine IR/ER isometric, varied degree x10ea 5s hold  Prone Row, 2# 2x10  Prone extension, 2# 2x10  SA push at wall with ball 2x10 NMR: 10'  Rhythmic stabilzation YTB 4x15s  IR walkout (x2) YTB x15   ER walkout (x2) YTB x15    NMR: 10'  Rhythmic stabilzation YTB 4x15s  IR 90 deg flexion arm supported on table, x15  NMR : 8'   Supine AROM shoulder flexion 2x10 (full range)  IR 90 deg flexion arm supported on table, 2x10 NMR: 12'  Rhythmic stabilzation YTB 4x15s  Prone horizontal abduction, elbow bent x15 (PT assist)   Lower trapezius setting at wall 2x5   NMR: 12'  Open book at wall R/L 2x5 ea   Horizontal abduction RTB facing wall 2x5 ea   Lower trapezius setting at wall 3x5              HEP:   Access Code: [email or web address]  URL: [email or web address]  Date: 05/21/2024  Prepared by: Elizabeth Chong     Exercises  - Thumb Opposition  - 5 x daily - 7 x weekly - 10 reps  - Seated Wrist Flexion Stretch  - 5 x daily - 7 x weekly - 5 reps - 10 second hold  - Seated Wrist Extension Stretch  - 5 x daily - 7 x weekly - 5 reps - 10 second hold    Pendulum, forward/lateral     Access Code: [email or web address]  Date: 06/20/2024  Prepared by: Elizabeth Chong  - Seated Shoulder Flexion AAROM with Pulley Behind  - 1 x daily - 7 x weekly - 3 sets - 10 reps  - Supine Shoulder Flexion AAROM with Hands Clasped  - 1 x daily - 7 x weekly - 2 sets - 10 reps  - Isometric Shoulder Flexion at Wall  - 1 x daily - 7 x weekly - 10 reps - 5  second hold  - Isometric Shoulder Extension at Wall  - 1 x daily - 7 x weekly - 10 reps - 5 second hold  - Isometric Shoulder Abduction at Wall  - 1 x daily - 7 x weekly - 10 reps - 5 second hold    Charges: 1 Manual, 2 TE, 1 NMR   Total Timed Treatment: 47'  min  Total Treatment Time: 47'  min

## 2024-08-27 ENCOUNTER — OFFICE VISIT (OUTPATIENT)
Dept: PHYSICAL THERAPY | Facility: HOSPITAL | Age: 69
End: 2024-08-27
Attending: INTERNAL MEDICINE
Payer: COMMERCIAL

## 2024-08-27 PROCEDURE — 97140 MANUAL THERAPY 1/> REGIONS: CPT

## 2024-08-27 PROCEDURE — 97110 THERAPEUTIC EXERCISES: CPT

## 2024-08-27 PROCEDURE — 97112 NEUROMUSCULAR REEDUCATION: CPT

## 2024-08-27 NOTE — PROGRESS NOTES
Diagnosis:   S/P rotator cuff repair (Z98.890)    right Shoulder Arthroscopic rotator cuff repair, subacromial decompression, distal clavicle excision    Referring Provider: Skyler Ponce   Surgeon: Dr. Jose Santos Date of Evaluation:    5/21/2024    Precautions:  adhere to the \"large repair\" protocol, sling 4 weeks.     Visual Impairment, HTN, breast cancer    No AAROM until 6 week, no AROM until 8 week no resisted RC strengthening 12 week,     PROM to tolerance, avoid adduction   Next MD Visit:   October 2024     Date of Surgery: 5/6/2024    Insurance Primary/Secondary: [employer] [place] PPO / N/A     # Auth Visits: no auth, 20 per POC (PN at 10v)           8/15/2024 : 14 week, 4 day  s/p large rotator cuff repair    Subjective: Has been using the right arm to carry more things like groceries, twisted the shoulder a little earlier today and felt a little pain but not bad and subsided quickly.     Pain: 0/10      Objective:       8/22/2024: Flexibility    Biceps: R short   Pectoralis: R short    Shoulder AROM: (* denotes performed with pain)   EOS: end of session  8/15/2024  7/23/2024 start  7/2/2024    Flexion: R 141  Abduction: R 146  ER (functional): R occiput  IR (functional): R SI joint (L3 EOS)  Flexion: R 132  Abduction: R 131  ER (elbow neutral): R 59  IR (elbow neutral): R 85 Flexion: R 75* (127 EOS)   Abduction: R 40* (90 EOS)  ER (elbow neutral): R 35  IR (elbow Neutral): R 85        Shoulder PROM: (* denotes performed with pain)  8/15/2024  7/2/2024  Evaluation     Flexion: 160  Abduction: 160  ER (90-90): 80  IR (90-90): 55  Flexion: R 150  Abduction: R 140  ER (30 deg abd):R 70    IR (30 deg abd): R 65  Flexion: R 40*   Scapation: R 65  ER: R 15 (discomfort)   IR: R 50      8/15/2024   Strength/MMT: (* denotes performed with pain)  Shoulder Elbow Scapular   Flexion: R 3+/5; L 5/5  Abduction: R 3+/5; L 5/5  ER: R 4-*/5; L 5/5  IR: R 4/5; L 5/5 Flexion: R 4+/5; L 5/5  Extension: R 4/5; L 5/5 Rhomboids: R 3+* /5,  L 4+/5  Mid trap: R 4/5; L 5/5   Lats: R 4-*/5, L 5/5  Low trap: R 3*/5; L 4/5       Assessment: Remains most limited with internal rotation, participates in stretching and strengthening in session within session noted. Will give as HEP in future session if tolerated well by patient.     Goals: (To be met in 20 visits)   Pt will report improved ability to sleep without waking due to shoulder pain Continued, improving  Pt will improve shoulder flexion AROM to >130 degrees to be able to reach into overhead cabinets without pain or restriction Met, but Continued   Pt will improve shoulder abduction AROM to >130 degrees to improve ability to don deodorant, don/doff shirts, and wash hair Met, but Continued   Pt will increase shoulder AROM ER to T1 to reach and fasten seatbelt Continued   Pt will increase shoulder AROM IR to T12 to be able to reach in back pocket, tuck in shirt, and turn steering wheel without pain Continued   Pt will improve shoulder strength throughout to 4/5 to improve function with ADLs including bathing and dressing independently. Continued   Pt will demonstrate increased mid/low trap strength to 4/5 to promote improved shoulder mechanics and stabilization with ADL such as lifting and reaching Continued   Pt will be independent and compliant with comprehensive HEP to maintain progress achieved in PT Continued      QuickDASH Outcome Score  Score: 84.09 % (5/21/2024  3:40 PM)    Post QuickDASH Outcome Score  Post Score: 36.36 % (8/15/2024 12:36 PM)    47.73 % improvement    Plan: Continue skilled Physical Therapy 1-2 x/week or a total of 35 visits over a 90 day period. Treatment will include: manual therapy, therapeutic exercise, therapeutic activity, neuromuscular re-education, HEP instruction, and self care home management.        Certification From: 8/15/2024  To:11/13/2024     Future Session: 90-90 IR/ER on table, IR stretch with strap as HEP    Date: 7/30/2024   Tx: 16/20 Date: 8/1/2024   Tx:  17/20   Date: 8/6/2024   Tx: 18/20 Date: 8/8/2024   Tx: 19/20 Date: 8/15/2024   Tx: 20/20 Date: 8/20/2024   Tx: 21/35 Date: 8/22/2024   Tx: 22/35 Date: 8/27/2024   Tx: 23/35     MT: 8'   Gr II-III inferior, posterior GH glide MT: 8'  Gr II-III inferior, posterior GH glide MT: 12'  Contract relax into ER (improving mobility)  Gr II-III inferior, posterior GH glide   MT: 12'  Contract relax into ER (improving mobility)  Gr II-III inferior, posterior GH glide MT: 12'  Gr II-III inferior, posterior GH glide  IR/ER OP  MT: 10'  Gr II-III inferior, posterior GH glide  IR/ER OP    MT: 10'  Gr II-III inferior, posterior GH glide  IR/ER OP   Passive biceps stretch, elbow  MT: 8'  Gr III inferior, posterior GH glide  Contract-relax IR x10   TE: 23'  Pulleys, flexion 2'   Finger ladder, abduction x12   Wall Slide, flexion x15   S/L abduction, to tolerance (PT assist at scapulae) x15  S/L ER, AROM 4x5  Supine 90-90 IR/ER x10 ea    TE: 23'  Pulleys, flexion 2'  Pulleys, scapation, 2'  PROM with rose marie OP- flexion, abd, ER/IR  Supine cane flexion, 3# 2x10  S/L ER, AROM 4x5  Scaption wall slide 2x10   TE: 23'  Pulleys, flexion 2'  Pulleys, scapation, 2'  PROM with rose marie OP- flexion, abd, ER/IR  Standing Row, GTB x20  Standing bicep curls GTB x20  Standing triceps extension GTB x20   Supine horizontal abduction, YTB x15  Supine shoulder flexion, YTB (difficult) x8  TE: 23'  Pulleys, flexion 2'  Abduction wall slide, x20  Supine shoulder flexion, YTB (difficult) x8   Seated ER, B YTB x20  Standing IR, RTB 2x10 TE: 23'   Reassessment  IR stretch with cane behind the back 10x10s   Standing bicep curls, GTB 2x10   Standing tricep extension, GTB 2x10  TE: 23'  SB flexion roll, x20  S/L shoulder ER, 1# 2x10  IR stretch with cane behind the back 10x10s   IR RTB 2x12  Standing shoulder extension, GTB 2x10  Standing rows, unilateral Blue TB 2x12  TE: 25'  Pulleys, flexion 2'   Pulley, scaption 2'  Doorway pectoralis stretch, \"W\"  10x10s  Tricep extension, GTB 2x10   IR stretch with cane behind the back 10x10s   IR RTB 2x12  ER RTB 2x10 (elbow neutral)  HEP update TE: 10'   Pulleys, flexion 2'   Pulley, scaption 2'  Doorway pectoralis stretch, \"W\" 10x10s  IR stretch with strap x15, 5s hold    NMR: 13'  Rows, GTB unilateral x20  Shoulder extension, RTB x20  IR isometric walkout, YTB 2x10  NMR: 10'  Supine IR/ER isometric, varied degree x10ea 5s hold  Prone Row, 2# 2x10  Prone extension, 2# 2x10  SA push at wall with ball 2x10 NMR: 10'  Rhythmic stabilzation YTB 4x15s  IR walkout (x2) YTB x15   ER walkout (x2) YTB x15    NMR: 10'  Rhythmic stabilzation YTB 4x15s  IR 90 deg flexion arm supported on table, x15  NMR : 8'   Supine AROM shoulder flexion 2x10 (full range)  IR 90 deg flexion arm supported on table, 2x10 NMR: 12'  Rhythmic stabilzation YTB 4x15s  Prone horizontal abduction, elbow bent x15 (PT assist)   Lower trapezius setting at wall 2x5   NMR: 12'  Open book at wall R/L 2x5 ea   Horizontal abduction RTB facing wall 2x5 ea   Lower trapezius setting at wall 3x5  NMR: 23'  Horizontal abduction RTB facing wall 2x10 ea   Horizontal abd RTB, supine 2x10  Supine PNF D2, RTB \"X\" x10ea  Lower trapezius setting at wall 3x5   90-90 IR on table RTB x20              HEP:   Access Code: [email or web address]  URL: [email or web address]  Date: 05/21/2024  Prepared by: Elizabeth Chong     Exercises  - Thumb Opposition  - 5 x daily - 7 x weekly - 10 reps  - Seated Wrist Flexion Stretch  - 5 x daily - 7 x weekly - 5 reps - 10 second hold  - Seated Wrist Extension Stretch  - 5 x daily - 7 x weekly - 5 reps - 10 second hold    Pendulum, forward/lateral     Access Code: [email or web address]  Date: 06/20/2024  Prepared by: Elizabeth Chong  - Seated Shoulder Flexion AAROM with Pulley Behind  - 1 x daily - 7 x weekly - 3 sets - 10 reps  - Supine Shoulder Flexion AAROM with Hands Clasped  - 1 x daily - 7 x weekly - 2 sets - 10 reps  - Isometric Shoulder Flexion  at Wall  - 1 x daily - 7 x weekly - 10 reps - 5 second hold  - Isometric Shoulder Extension at Wall  - 1 x daily - 7 x weekly - 10 reps - 5 second hold  - Isometric Shoulder Abduction at Wall  - 1 x daily - 7 x weekly - 10 reps - 5 second hold    Charges: 1 Manual, 1 TE, 2 NMR   Total Timed Treatment: 41'  min  Total Treatment Time: 41'  min

## 2024-08-29 ENCOUNTER — OFFICE VISIT (OUTPATIENT)
Dept: PHYSICAL THERAPY | Facility: HOSPITAL | Age: 69
End: 2024-08-29
Attending: INTERNAL MEDICINE
Payer: COMMERCIAL

## 2024-08-29 PROCEDURE — 97112 NEUROMUSCULAR REEDUCATION: CPT

## 2024-08-29 PROCEDURE — 97140 MANUAL THERAPY 1/> REGIONS: CPT

## 2024-08-29 PROCEDURE — 97110 THERAPEUTIC EXERCISES: CPT

## 2024-08-29 NOTE — PROGRESS NOTES
Diagnosis:   S/P rotator cuff repair (Z98.890)    right Shoulder Arthroscopic rotator cuff repair, subacromial decompression, distal clavicle excision    Referring Provider: Skyler Ponce   Surgeon: Dr. Jose Santos Date of Evaluation:    5/21/2024    Precautions:  adhere to the \"large repair\" protocol, sling 4 weeks.     Visual Impairment, HTN, breast cancer    No AAROM until 6 week, no AROM until 8 week no resisted RC strengthening 12 week,     PROM to tolerance, avoid adduction   Next MD Visit:   October 2024     Date of Surgery: 5/6/2024    Insurance Primary/Secondary: [employer] [place] PPO / N/A     # Auth Visits: no auth, 20 per POC (PN at 10v)           8/15/2024 : 14 week, 4 day  s/p large rotator cuff repair    Subjective: Was sore after last session, took tylenol and was fine. Describes as aching/sore.     Pain: 2/10      Objective:       8/22/2024: Flexibility    Biceps: R short   Pectoralis: R short    Shoulder AROM: (* denotes performed with pain)   EOS: end of session  8/15/2024  7/23/2024 start  7/2/2024    Flexion: R 141  Abduction: R 146  ER (functional): R occiput  IR (functional): R SI joint (L3 EOS)  Flexion: R 132  Abduction: R 131  ER (elbow neutral): R 59  IR (elbow neutral): R 85 Flexion: R 75* (127 EOS)   Abduction: R 40* (90 EOS)  ER (elbow neutral): R 35  IR (elbow Neutral): R 85        Shoulder PROM: (* denotes performed with pain)  8/15/2024  7/2/2024  Evaluation     Flexion: 160  Abduction: 160  ER (90-90): 80  IR (90-90): 55  Flexion: R 150  Abduction: R 140  ER (30 deg abd):R 70    IR (30 deg abd): R 65  Flexion: R 40*   Scapation: R 65  ER: R 15 (discomfort)   IR: R 50      8/15/2024   Strength/MMT: (* denotes performed with pain)  Shoulder Elbow Scapular   Flexion: R 3+/5; L 5/5  Abduction: R 3+/5; L 5/5  ER: R 4-*/5; L 5/5  IR: R 4/5; L 5/5 Flexion: R 4+/5; L 5/5  Extension: R 4/5; L 5/5 Rhomboids: R 3+* /5, L 4+/5  Mid trap: R 4/5; L 5/5   Lats: R 4-*/5, L 5/5  Low trap: R 3*/5; L 4/5        Assessment: ROM improves, pt remains most limited by decreased strength, has most difficulty with resisted shoulder flexion.      Goals: (To be met in 20 visits)   Pt will report improved ability to sleep without waking due to shoulder pain Continued, improving  Pt will improve shoulder flexion AROM to >130 degrees to be able to reach into overhead cabinets without pain or restriction Met, but Continued   Pt will improve shoulder abduction AROM to >130 degrees to improve ability to don deodorant, don/doff shirts, and wash hair Met, but Continued   Pt will increase shoulder AROM ER to T1 to reach and fasten seatbelt Continued   Pt will increase shoulder AROM IR to T12 to be able to reach in back pocket, tuck in shirt, and turn steering wheel without pain Continued   Pt will improve shoulder strength throughout to 4/5 to improve function with ADLs including bathing and dressing independently. Continued   Pt will demonstrate increased mid/low trap strength to 4/5 to promote improved shoulder mechanics and stabilization with ADL such as lifting and reaching Continued   Pt will be independent and compliant with comprehensive HEP to maintain progress achieved in PT Continued      QuickDASH Outcome Score  Score: 84.09 % (5/21/2024  3:40 PM)    Post QuickDASH Outcome Score  Post Score: 36.36 % (8/15/2024 12:36 PM)    47.73 % improvement    Plan: Continue skilled Physical Therapy 1-2 x/week or a total of 35 visits over a 90 day period. Treatment will include: manual therapy, therapeutic exercise, therapeutic activity, neuromuscular re-education, HEP instruction, and self care home management.        Certification From: 8/15/2024  To:11/13/2024     Future Session: scapular strengthening, prone row/extension    Date: 8/1/2024   Tx: 17/20   Date: 8/6/2024   Tx: 18/20 Date: 8/8/2024   Tx: 19/20 Date: 8/15/2024   Tx: 20/20 Date: 8/20/2024   Tx: 21/35 Date: 8/22/2024   Tx: 22/35 Date: 8/27/2024   Tx: 23/35   Date: 8/29/2024    Tx: 24/35   MT: 8'  Gr II-III inferior, posterior GH glide MT: 12'  Contract relax into ER (improving mobility)  Gr II-III inferior, posterior GH glide   MT: 12'  Contract relax into ER (improving mobility)  Gr II-III inferior, posterior GH glide MT: 12'  Gr II-III inferior, posterior GH glide  IR/ER OP  MT: 10'  Gr II-III inferior, posterior GH glide  IR/ER OP    MT: 10'  Gr II-III inferior, posterior GH glide  IR/ER OP   Passive biceps stretch, elbow  MT: 8'  Gr III inferior, posterior GH glide  Contract-relax IR x10 MT: 10'  Gr III inferior, posterior GH glide  Contract-relax IR x10   TE: 23'  Pulleys, flexion 2'  Pulleys, scapation, 2'  PROM with rose marie OP- flexion, abd, ER/IR  Supine cane flexion, 3# 2x10  S/L ER, AROM 4x5  Scaption wall slide 2x10   TE: 23'  Pulleys, flexion 2'  Pulleys, scapation, 2'  PROM with rose marie OP- flexion, abd, ER/IR  Standing Row, GTB x20  Standing bicep curls GTB x20  Standing triceps extension GTB x20   Supine horizontal abduction, YTB x15  Supine shoulder flexion, YTB (difficult) x8  TE: 23'  Pulleys, flexion 2'  Abduction wall slide, x20  Supine shoulder flexion, YTB (difficult) x8   Seated ER, B YTB x20  Standing IR, RTB 2x10 TE: 23'   Reassessment  IR stretch with cane behind the back 10x10s   Standing bicep curls, GTB 2x10   Standing tricep extension, GTB 2x10  TE: 23'  SB flexion roll, x20  S/L shoulder ER, 1# 2x10  IR stretch with cane behind the back 10x10s   IR RTB 2x12  Standing shoulder extension, GTB 2x10  Standing rows, unilateral Blue TB 2x12  TE: 25'  Pulleys, flexion 2'   Pulley, scaption 2'  Doorway pectoralis stretch, \"W\" 10x10s  Tricep extension, GTB 2x10   IR stretch with cane behind the back 10x10s   IR RTB 2x12  ER RTB 2x10 (elbow neutral)  HEP update TE: 10'   Pulleys, flexion 2'   Pulley, scaption 2'  Doorway pectoralis stretch, \"W\" 10x10s  IR stretch with strap x15, 5s hold  TE: 12'   Pulleys, flexion 2'   Pulley, scaption 2'  Finger Ladder, abduction  x10  IR stretch with strap x15, 5s hold     NMR: 10'  Supine IR/ER isometric, varied degree x10ea 5s hold  Prone Row, 2# 2x10  Prone extension, 2# 2x10  SA push at wall with ball 2x10 NMR: 10'  Rhythmic stabilzation YTB 4x15s  IR walkout (x2) YTB x15   ER walkout (x2) YTB x15    NMR: 10'  Rhythmic stabilzation YTB 4x15s  IR 90 deg flexion arm supported on table, x15  NMR : 8'   Supine AROM shoulder flexion 2x10 (full range)  IR 90 deg flexion arm supported on table, 2x10 NMR: 12'  Rhythmic stabilzation YTB 4x15s  Prone horizontal abduction, elbow bent x15 (PT assist)   Lower trapezius setting at wall 2x5   NMR: 12'  Open book at wall R/L 2x5 ea   Horizontal abduction RTB facing wall 2x5 ea   Lower trapezius setting at wall 3x5  NMR: 23'  Horizontal abduction RTB facing wall 2x10 ea   Horizontal abd RTB, supine 2x10  Supine PNF D2, RTB \"X\" x10ea  Lower trapezius setting at wall 3x5   90-90 IR on table RTB x20  NMR: 23'  Supine shoulder flexion YTB x15 (difficult)   S/L shoulder abduction, YTB x20  Supported 90-90 (abduction) IR YTB x20  Supponrted 90-90 (abduction) ER 1# x20   Supine \"goal post\" 90 deg ER x10  Shoulder extension, GTB B x15                HEP:   Access Code: [email or web address]  URL: [email or web address]  Date: 05/21/2024  Prepared by: Elizabeth Chong     Exercises  - Thumb Opposition  - 5 x daily - 7 x weekly - 10 reps  - Seated Wrist Flexion Stretch  - 5 x daily - 7 x weekly - 5 reps - 10 second hold  - Seated Wrist Extension Stretch  - 5 x daily - 7 x weekly - 5 reps - 10 second hold    Pendulum, forward/lateral     Access Code: [email or web address]  Date: 06/20/2024  Prepared by: Elizabeth Chong  - Seated Shoulder Flexion AAROM with Pulley Behind  - 1 x daily - 7 x weekly - 3 sets - 10 reps  - Supine Shoulder Flexion AAROM with Hands Clasped  - 1 x daily - 7 x weekly - 2 sets - 10 reps  - Isometric Shoulder Flexion at Wall  - 1 x daily - 7 x weekly - 10 reps - 5 second hold  - Isometric Shoulder  Extension at Wall  - 1 x daily - 7 x weekly - 10 reps - 5 second hold  - Isometric Shoulder Abduction at Wall  - 1 x daily - 7 x weekly - 10 reps - 5 second hold    Charges: 1 Manual, 1 TE, 2 NMR   Total Timed Treatment: 45'  min  Total Treatment Time: 45'  min

## 2024-09-04 ENCOUNTER — APPOINTMENT (OUTPATIENT)
Dept: PHYSICAL THERAPY | Facility: HOSPITAL | Age: 69
End: 2024-09-04
Payer: COMMERCIAL

## 2024-09-04 ENCOUNTER — PATIENT MESSAGE (OUTPATIENT)
Dept: PULMONOLOGY | Facility: CLINIC | Age: 69
End: 2024-09-04

## 2024-09-05 ENCOUNTER — TELEPHONE (OUTPATIENT)
Dept: PULMONOLOGY | Facility: CLINIC | Age: 69
End: 2024-09-05

## 2024-09-05 ENCOUNTER — OFFICE VISIT (OUTPATIENT)
Dept: PHYSICAL THERAPY | Facility: HOSPITAL | Age: 69
End: 2024-09-05
Attending: INTERNAL MEDICINE
Payer: COMMERCIAL

## 2024-09-05 PROCEDURE — 97140 MANUAL THERAPY 1/> REGIONS: CPT

## 2024-09-05 PROCEDURE — 97112 NEUROMUSCULAR REEDUCATION: CPT

## 2024-09-05 PROCEDURE — 97110 THERAPEUTIC EXERCISES: CPT

## 2024-09-05 RX ORDER — FLUTICASONE FUROATE, UMECLIDINIUM BROMIDE AND VILANTEROL TRIFENATATE 200; 62.5; 25 UG/1; UG/1; UG/1
1 POWDER RESPIRATORY (INHALATION) DAILY
Qty: 1 EACH | Refills: 5 | Status: SHIPPED | OUTPATIENT
Start: 2024-09-05 | End: 2024-09-09

## 2024-09-05 NOTE — TELEPHONE ENCOUNTER
----- Message from [employer]  sent at 9/4/2024  7:30 PM CDT -----  Regarding: Trelegy  Contact: [phone]  Hi Dr. Green,    A few weeks after you changed my Trelegy from 200 to 100 I started back with my chronic cough again. This is why my (now retired) allergist put me on the 200. I am sure it's because of the change in Trelegy. Could you please call in a 3 month supply of Trelegy 200 to [facility] on [address]? Also, can you recommend an ENT MD for me?    Thank you!    Sandrine Oleary

## 2024-09-05 NOTE — TELEPHONE ENCOUNTER
RN, I signed the order for the Trelegy.  You can refer the patient to any of our ear nose and throat physicians.  They are all excellent including Norma Campbell, Malik Cooper and Chirag.

## 2024-09-05 NOTE — TELEPHONE ENCOUNTER
From: Sandrine Oleary  To: Shantanu Green  Sent: 9/4/2024 7:30 PM CDT  Subject: Trelegy    Hi Dr. Green,    A few weeks after you changed my Trelegy from 200 to 100 I started back with my chronic cough again. This is why my (now retired) allergist put me on the 200. I am sure it's because of the change in Trelegy. Could you please call in a 3 month supply of Trelegy 200 to [facility] on [address]? Also, can you recommend an ENT MD for me?    Thank you!    Sandrine Oleary

## 2024-09-05 NOTE — PROGRESS NOTES
Diagnosis:   S/P rotator cuff repair (Z98.890)    right Shoulder Arthroscopic rotator cuff repair, subacromial decompression, distal clavicle excision    Referring Provider: No ref. provider found   Surgeon: Dr. Jose Santos Date of Evaluation:    5/21/2024    Precautions:  adhere to the \"large repair\" protocol, sling 4 weeks.     Visual Impairment, HTN, breast cancer    No AAROM until 6 week, no AROM until 8 week no resisted RC strengthening 12 week,     PROM to tolerance, avoid adduction   Next MD Visit:   October 2024     Date of Surgery: 5/6/2024    Insurance Primary/Secondary: [employer] [place] PPO / N/A     # Auth Visits: no auth, 20 per POC (PN at 10v)           8/15/2024 : 14 week, 4 day  s/p large rotator cuff repair    Subjective: Continues to have most trouble with functional IR, didn't do stretch because she worried she would hurt herself. Taking Tylenol 3x/daily.     Pain: 2-3/10      Objective:       8/22/2024: Flexibility    Biceps: R short   Pectoralis: R short    Shoulder AROM: (* denotes performed with pain)   EOS: end of session  8/15/2024  7/23/2024 start  7/2/2024    Flexion: R 141  Abduction: R 146  ER (functional): R occiput  IR (functional): R SI joint (L3 EOS)  Flexion: R 132  Abduction: R 131  ER (elbow neutral): R 59  IR (elbow neutral): R 85 Flexion: R 75* (127 EOS)   Abduction: R 40* (90 EOS)  ER (elbow neutral): R 35  IR (elbow Neutral): R 85        Shoulder PROM: (* denotes performed with pain)  8/15/2024  7/2/2024  Evaluation     Flexion: 160  Abduction: 160  ER (90-90): 80  IR (90-90): 55  Flexion: R 150  Abduction: R 140  ER (30 deg abd):R 70    IR (30 deg abd): R 65  Flexion: R 40*   Scapation: R 65  ER: R 15 (discomfort)   IR: R 50      8/15/2024   Strength/MMT: (* denotes performed with pain)  Shoulder Elbow Scapular   Flexion: R 3+/5; L 5/5  Abduction: R 3+/5; L 5/5  ER: R 4-*/5; L 5/5  IR: R 4/5; L 5/5 Flexion: R 4+/5; L 5/5  Extension: R 4/5; L 5/5 Rhomboids: R 3+* /5, L 4+/5  Mid trap:  R 4/5; L 5/5   Lats: R 4-*/5, L 5/5  Low trap: R 3*/5; L 4/5       Assessment: Functional IR improves within session but remains most limited direction at this time; Pt able to perform shoulder flexion overhead with small weight for multiple reps, tolerates well.     Goals: (To be met in 20 visits)   Pt will report improved ability to sleep without waking due to shoulder pain Continued, improving  Pt will improve shoulder flexion AROM to >130 degrees to be able to reach into overhead cabinets without pain or restriction Met, but Continued   Pt will improve shoulder abduction AROM to >130 degrees to improve ability to don deodorant, don/doff shirts, and wash hair Met, but Continued   Pt will increase shoulder AROM ER to T1 to reach and fasten seatbelt Continued   Pt will increase shoulder AROM IR to T12 to be able to reach in back pocket, tuck in shirt, and turn steering wheel without pain Continued   Pt will improve shoulder strength throughout to 4/5 to improve function with ADLs including bathing and dressing independently. Continued   Pt will demonstrate increased mid/low trap strength to 4/5 to promote improved shoulder mechanics and stabilization with ADL such as lifting and reaching Continued   Pt will be independent and compliant with comprehensive HEP to maintain progress achieved in PT Continued      QuickDASH Outcome Score  Score: 84.09 % (5/21/2024  3:40 PM)    Post QuickDASH Outcome Score  Post Score: 36.36 % (8/15/2024 12:36 PM)    47.73 % improvement    Plan: Continue skilled Physical Therapy 1-2 x/week or a total of 35 visits over a 90 day period. Treatment will include: manual therapy, therapeutic exercise, therapeutic activity, neuromuscular re-education, HEP instruction, and self care home management.        Certification From: 8/15/2024  To:11/13/2024     Future Session: IR stretching/strengthening, prone shoulder ext/ horizontal abd    Date: 8/6/2024   Tx: 18/20 Date: 8/8/2024   Tx: 19/20 Date:  8/15/2024   Tx: 20/20 Date: 8/20/2024   Tx: 21/35 Date: 8/22/2024   Tx: 22/35 Date: 8/27/2024   Tx: 23/35   Date: 8/29/2024   Tx: 24/35 Date: 9/5/2024   Tx: 25/35   MT: 12'  Contract relax into ER (improving mobility)  Gr II-III inferior, posterior GH glide   MT: 12'  Contract relax into ER (improving mobility)  Gr II-III inferior, posterior GH glide MT: 12'  Gr II-III inferior, posterior GH glide  IR/ER OP  MT: 10'  Gr II-III inferior, posterior GH glide  IR/ER OP    MT: 10'  Gr II-III inferior, posterior GH glide  IR/ER OP   Passive biceps stretch, elbow  MT: 8'  Gr III inferior, posterior GH glide  Contract-relax IR x10 MT: 10'  Gr III inferior, posterior GH glide  Contract-relax IR x10 MT : 8'  Gr III inferior, posterior GH glide  IR overpressure stretch      TE: 23'  Pulleys, flexion 2'  Pulleys, scapation, 2'  PROM with rose marie OP- flexion, abd, ER/IR  Standing Row, GTB x20  Standing bicep curls GTB x20  Standing triceps extension GTB x20   Supine horizontal abduction, YTB x15  Supine shoulder flexion, YTB (difficult) x8  TE: 23'  Pulleys, flexion 2'  Abduction wall slide, x20  Supine shoulder flexion, YTB (difficult) x8   Seated ER, B YTB x20  Standing IR, RTB 2x10 TE: 23'   Reassessment  IR stretch with cane behind the back 10x10s   Standing bicep curls, GTB 2x10   Standing tricep extension, GTB 2x10  TE: 23'  SB flexion roll, x20  S/L shoulder ER, 1# 2x10  IR stretch with cane behind the back 10x10s   IR RTB 2x12  Standing shoulder extension, GTB 2x10  Standing rows, unilateral Blue TB 2x12  TE: 25'  Pulleys, flexion 2'   Pulley, scaption 2'  Doorway pectoralis stretch, \"W\" 10x10s  Tricep extension, GTB 2x10   IR stretch with cane behind the back 10x10s   IR RTB 2x12  ER RTB 2x10 (elbow neutral)  HEP update TE: 10'   Pulleys, flexion 2'   Pulley, scaption 2'  Doorway pectoralis stretch, \"W\" 10x10s  IR stretch with strap x15, 5s hold  TE: 12'   Pulleys, flexion 2'   Pulley, scaption 2'  Finger Ladder,  abduction x10  IR stretch with strap x15, 5s hold  TE: 15'  Pulley, scaption 2'  Finger Ladder, abduction x15  Shoulder flexion, to cabinet x20 (1#), x10 (2#)   IR stretch with strap x10, 10s hold      NMR: 10'  Rhythmic stabilzation YTB 4x15s  IR walkout (x2) YTB x15   ER walkout (x2) YTB x15    NMR: 10'  Rhythmic stabilzation YTB 4x15s  IR 90 deg flexion arm supported on table, x15  NMR : 8'   Supine AROM shoulder flexion 2x10 (full range)  IR 90 deg flexion arm supported on table, 2x10 NMR: 12'  Rhythmic stabilzation YTB 4x15s  Prone horizontal abduction, elbow bent x15 (PT assist)   Lower trapezius setting at wall 2x5   NMR: 12'  Open book at wall R/L 2x5 ea   Horizontal abduction RTB facing wall 2x5 ea   Lower trapezius setting at wall 3x5  NMR: 23'  Horizontal abduction RTB facing wall 2x10 ea   Horizontal abd RTB, supine 2x10  Supine PNF D2, RTB \"X\" x10ea  Lower trapezius setting at wall 3x5   90-90 IR on table RTB x20  NMR: 23'  Supine shoulder flexion YTB x15 (difficult)   S/L shoulder abduction, YTB x20  Supported 90-90 (abduction) IR YTB x20  Supponrted 90-90 (abduction) ER 1# x20   Supine \"goal post\" 90 deg ER x10  Shoulder extension, GTB B x15    NMR: 15'  Supine 90-90 IR/ER 1# x25 ea  S/L ER 1# 2x10  S/L shoulder abduction, 2# 2x10  Prone row 2# x30              HEP:   Access Code: [email or web address]  URL: [email or web address]  Date: 05/21/2024  Prepared by: Elizabeth Chong     Exercises  - Thumb Opposition  - 5 x daily - 7 x weekly - 10 reps  - Seated Wrist Flexion Stretch  - 5 x daily - 7 x weekly - 5 reps - 10 second hold  - Seated Wrist Extension Stretch  - 5 x daily - 7 x weekly - 5 reps - 10 second hold    Pendulum, forward/lateral     Access Code: [email or web address]  Date: 06/20/2024  Prepared by: Elizabeth Chong  - Seated Shoulder Flexion AAROM with Pulley Behind  - 1 x daily - 7 x weekly - 3 sets - 10 reps  - Supine Shoulder Flexion AAROM with Hands Clasped  - 1 x daily - 7 x weekly - 2 sets  - 10 reps  - Isometric Shoulder Flexion at Wall  - 1 x daily - 7 x weekly - 10 reps - 5 second hold  - Isometric Shoulder Extension at Wall  - 1 x daily - 7 x weekly - 10 reps - 5 second hold  - Isometric Shoulder Abduction at Wall  - 1 x daily - 7 x weekly - 10 reps - 5 second hold    Charges: 1 Manual, 1 TE, 1 NMR   Total Timed Treatment: 38'  min  Total Treatment Time: 38'  min

## 2024-09-09 RX ORDER — FLUTICASONE FUROATE, UMECLIDINIUM BROMIDE AND VILANTEROL TRIFENATATE 200; 62.5; 25 UG/1; UG/1; UG/1
1 POWDER RESPIRATORY (INHALATION) DAILY
Qty: 3 EACH | Refills: 1 | Status: SHIPPED | OUTPATIENT
Start: 2024-09-09

## 2024-09-13 ENCOUNTER — OFFICE VISIT (OUTPATIENT)
Dept: PHYSICAL THERAPY | Facility: HOSPITAL | Age: 69
End: 2024-09-13
Attending: ORTHOPAEDIC SURGERY
Payer: COMMERCIAL

## 2024-09-13 PROCEDURE — 97112 NEUROMUSCULAR REEDUCATION: CPT

## 2024-09-13 PROCEDURE — 97140 MANUAL THERAPY 1/> REGIONS: CPT

## 2024-09-13 NOTE — PROGRESS NOTES
Diagnosis:   S/P rotator cuff repair (Z98.890)    right Shoulder Arthroscopic rotator cuff repair, subacromial decompression, distal clavicle excision    Referring Provider: No ref. provider found   Surgeon: Dr. Jose Santos Date of Evaluation:    5/21/2024    Precautions:  adhere to the \"large repair\" protocol, sling 4 weeks.     Visual Impairment, HTN, breast cancer    No AAROM until 6 week, no AROM until 8 week no resisted RC strengthening 12 week,     PROM to tolerance, avoid adduction   Next MD Visit:   October 2024     Date of Surgery: 5/6/2024    Insurance Primary/Secondary: [employer] [place] PPO / N/A     # Auth Visits: no auth, 20 per POC (PN at 10v)           8/15/2024 : 14 week, 4 day  s/p large rotator cuff repair    Subjective: Had very active weekend and week with cleaning and sanding outside, continues to have most trouble with functional IR.     Pain: 3/10      Objective:       8/22/2024: Flexibility    Biceps: R short   Pectoralis: R short    Shoulder AROM: (* denotes performed with pain)   EOS: end of session  8/15/2024  7/23/2024 start  7/2/2024    Flexion: R 141  Abduction: R 146  ER (functional): R occiput  IR (functional): R SI joint (L3 EOS)  Flexion: R 132  Abduction: R 131  ER (elbow neutral): R 59  IR (elbow neutral): R 85 Flexion: R 75* (127 EOS)   Abduction: R 40* (90 EOS)  ER (elbow neutral): R 35  IR (elbow Neutral): R 85        Shoulder PROM: (* denotes performed with pain)  8/15/2024  7/2/2024  Evaluation     Flexion: 160  Abduction: 160  ER (90-90): 80  IR (90-90): 55  Flexion: R 150  Abduction: R 140  ER (30 deg abd):R 70    IR (30 deg abd): R 65  Flexion: R 40*   Scapation: R 65  ER: R 15 (discomfort)   IR: R 50      8/15/2024   Strength/MMT: (* denotes performed with pain)  Shoulder Elbow Scapular   Flexion: R 3+/5; L 5/5  Abduction: R 3+/5; L 5/5  ER: R 4-*/5; L 5/5  IR: R 4/5; L 5/5 Flexion: R 4+/5; L 5/5  Extension: R 4/5; L 5/5 Rhomboids: R 3+* /5, L 4+/5  Mid trap: R 4/5; L 5/5   Lats:  R 4-*/5, L 5/5  Low trap: R 3*/5; L 4/5       Assessment: PROM at R IR near full suggesting remaining functional IR limitations due to weakness. Progressing strength training at scapular muscles with good tolerance, rest breaks given as needed.     Goals: (To be met in 20 visits)   Pt will report improved ability to sleep without waking due to shoulder pain Continued, improving  Pt will improve shoulder flexion AROM to >130 degrees to be able to reach into overhead cabinets without pain or restriction Met, but Continued   Pt will improve shoulder abduction AROM to >130 degrees to improve ability to don deodorant, don/doff shirts, and wash hair Met, but Continued   Pt will increase shoulder AROM ER to T1 to reach and fasten seatbelt Continued   Pt will increase shoulder AROM IR to T12 to be able to reach in back pocket, tuck in shirt, and turn steering wheel without pain Continued   Pt will improve shoulder strength throughout to 4/5 to improve function with ADLs including bathing and dressing independently. Continued   Pt will demonstrate increased mid/low trap strength to 4/5 to promote improved shoulder mechanics and stabilization with ADL such as lifting and reaching Continued   Pt will be independent and compliant with comprehensive HEP to maintain progress achieved in PT Continued      QuickDASH Outcome Score  Score: 84.09 % (5/21/2024  3:40 PM)    Post QuickDASH Outcome Score  Post Score: 36.36 % (8/15/2024 12:36 PM)    47.73 % improvement    Plan: Continue skilled Physical Therapy 1-2 x/week or a total of 35 visits over a 90 day period. Treatment will include: manual therapy, therapeutic exercise, therapeutic activity, neuromuscular re-education, HEP instruction, and self care home management.        Certification From: 8/15/2024  To:11/13/2024     Future Session: 90-90 IR/ER, thoracic mobility, scapular strengthening     Date: 8/8/2024   Tx: 19/20 Date: 8/15/2024   Tx: 20/20 Date: 8/20/2024   Tx: 21/35 Date:  8/22/2024   Tx: 22/35 Date: 8/27/2024   Tx: 23/35   Date: 8/29/2024   Tx: 24/35 Date: 9/5/2024   Tx: 25/35 Date: 9/13/2024    MT: 12'  Contract relax into ER (improving mobility)  Gr II-III inferior, posterior GH glide MT: 12'  Gr II-III inferior, posterior GH glide  IR/ER OP  MT: 10'  Gr II-III inferior, posterior GH glide  IR/ER OP    MT: 10'  Gr II-III inferior, posterior GH glide  IR/ER OP   Passive biceps stretch, elbow  MT: 8'  Gr III inferior, posterior GH glide  Contract-relax IR x10 MT: 10'  Gr III inferior, posterior GH glide  Contract-relax IR x10 MT : 8'  Gr III inferior, posterior GH glide  IR overpressure stretch    MT : 10'   Gr III inferior, posterior GH glide  IR overpressure stretch    TE: 23'  Pulleys, flexion 2'  Abduction wall slide, x20  Supine shoulder flexion, YTB (difficult) x8   Seated ER, B YTB x20  Standing IR, RTB 2x10 TE: 23'   Reassessment  IR stretch with cane behind the back 10x10s   Standing bicep curls, GTB 2x10   Standing tricep extension, GTB 2x10  TE: 23'  SB flexion roll, x20  S/L shoulder ER, 1# 2x10  IR stretch with cane behind the back 10x10s   IR RTB 2x12  Standing shoulder extension, GTB 2x10  Standing rows, unilateral Blue TB 2x12  TE: 25'  Pulleys, flexion 2'   Pulley, scaption 2'  Doorway pectoralis stretch, \"W\" 10x10s  Tricep extension, GTB 2x10   IR stretch with cane behind the back 10x10s   IR RTB 2x12  ER RTB 2x10 (elbow neutral)  HEP update TE: 10'   Pulleys, flexion 2'   Pulley, scaption 2'  Doorway pectoralis stretch, \"W\" 10x10s  IR stretch with strap x15, 5s hold  TE: 12'   Pulleys, flexion 2'   Pulley, scaption 2'  Finger Ladder, abduction x10  IR stretch with strap x15, 5s hold  TE: 15'  Pulley, scaption 2'  Finger Ladder, abduction x15  Shoulder flexion, to cabinet x20 (1#), x10 (2#)   IR stretch with strap x10, 10s hold    TE: 5'  Finger Ladder, abduction x10  Standing biceps curl 3# 3x10     NMR: 10'  Rhythmic stabilzation YTB 4x15s  IR 90 deg flexion arm  supported on table, x15  NMR : 8'   Supine AROM shoulder flexion 2x10 (full range)  IR 90 deg flexion arm supported on table, 2x10 NMR: 12'  Rhythmic stabilzation YTB 4x15s  Prone horizontal abduction, elbow bent x15 (PT assist)   Lower trapezius setting at wall 2x5   NMR: 12'  Open book at wall R/L 2x5 ea   Horizontal abduction RTB facing wall 2x5 ea   Lower trapezius setting at wall 3x5  NMR: 23'  Horizontal abduction RTB facing wall 2x10 ea   Horizontal abd RTB, supine 2x10  Supine PNF D2, RTB \"X\" x10ea  Lower trapezius setting at wall 3x5   90-90 IR on table RTB x20  NMR: 23'  Supine shoulder flexion YTB x15 (difficult)   S/L shoulder abduction, YTB x20  Supported 90-90 (abduction) IR YTB x20  Supponrted 90-90 (abduction) ER 1# x20   Supine \"goal post\" 90 deg ER x10  Shoulder extension, GTB B x15    NMR: 15'  Supine 90-90 IR/ER 1# x25 ea  S/L ER 1# 2x10  S/L shoulder abduction, 2# 2x10  Prone row 2# x30  NMR: 25'   Prone horizontal abd (bent elbow) 2x8  Prone shoulder ext x10, x10 w/ 2#   Prone row 2# x20  Supine Serratus Anterior 3# 3x10  Standing shoulder IR (from 90 deg flexion) YTB 3x5  Standing shoulder ER (from 90 deg flexion) YTB 2x10             HEP:   Access Code: [email or web address]  URL: [email or web address]  Date: 05/21/2024  Prepared by: Elizabeth Chong     Exercises  - Thumb Opposition  - 5 x daily - 7 x weekly - 10 reps  - Seated Wrist Flexion Stretch  - 5 x daily - 7 x weekly - 5 reps - 10 second hold  - Seated Wrist Extension Stretch  - 5 x daily - 7 x weekly - 5 reps - 10 second hold    Pendulum, forward/lateral     Access Code: [email or web address]  Date: 06/20/2024  Prepared by: Elizabeth Chong  - Seated Shoulder Flexion AAROM with Pulley Behind  - 1 x daily - 7 x weekly - 3 sets - 10 reps  - Supine Shoulder Flexion AAROM with Hands Clasped  - 1 x daily - 7 x weekly - 2 sets - 10 reps  - Isometric Shoulder Flexion at Wall  - 1 x daily - 7 x weekly - 10 reps - 5 second hold  - Isometric  Shoulder Extension at Wall  - 1 x daily - 7 x weekly - 10 reps - 5 second hold  - Isometric Shoulder Abduction at Wall  - 1 x daily - 7 x weekly - 10 reps - 5 second hold    Charges: 1 Manual, 2 NMR   Total Timed Treatment: 40'  min  Total Treatment Time: 40'  min

## 2024-09-17 ENCOUNTER — OFFICE VISIT (OUTPATIENT)
Dept: PHYSICAL THERAPY | Facility: HOSPITAL | Age: 69
End: 2024-09-17
Attending: ORTHOPAEDIC SURGERY
Payer: COMMERCIAL

## 2024-09-17 PROCEDURE — 97112 NEUROMUSCULAR REEDUCATION: CPT

## 2024-09-17 PROCEDURE — 97140 MANUAL THERAPY 1/> REGIONS: CPT

## 2024-09-17 PROCEDURE — 97110 THERAPEUTIC EXERCISES: CPT

## 2024-09-17 NOTE — PROGRESS NOTES
Diagnosis:   S/P rotator cuff repair (Z98.890)    right Shoulder Arthroscopic rotator cuff repair, subacromial decompression, distal clavicle excision    Referring Provider: No ref. provider found   Surgeon: Dr. Jose Santos Date of Evaluation:    5/21/2024    Precautions:  adhere to the \"large repair\" protocol, sling 4 weeks.     Visual Impairment, HTN, breast cancer    No AAROM until 6 week, no AROM until 8 week no resisted RC strengthening 12 week,     PROM to tolerance, avoid adduction   Next MD Visit:   October 2024     Date of Surgery: 5/6/2024    Insurance Primary/Secondary: [employer] [place] PPO / N/A     # Auth Visits: no auth, 20 per POC (PN at 10v)           8/15/2024 : 14 week, 4 day  s/p large rotator cuff repair    Subjective: overall feeling good, has been working on functional IR. Feels that staying active and doing activity around her house has helped with getting the shoulder better.     Pain: 2/10      Objective:       8/22/2024: Flexibility    Biceps: R short   Pectoralis: R short    Shoulder AROM: (* denotes performed with pain)   EOS: end of session  8/15/2024  7/23/2024 start  7/2/2024    Flexion: R 141  Abduction: R 146  ER (functional): R occiput  IR (functional): R SI joint (L3 EOS)  Flexion: R 132  Abduction: R 131  ER (elbow neutral): R 59  IR (elbow neutral): R 85 Flexion: R 75* (127 EOS)   Abduction: R 40* (90 EOS)  ER (elbow neutral): R 35  IR (elbow Neutral): R 85        Shoulder PROM: (* denotes performed with pain)  8/15/2024  7/2/2024  Evaluation     Flexion: 160  Abduction: 160  ER (90-90): 80  IR (90-90): 55  Flexion: R 150  Abduction: R 140  ER (30 deg abd):R 70    IR (30 deg abd): R 65  Flexion: R 40*   Scapation: R 65  ER: R 15 (discomfort)   IR: R 50      8/15/2024   Strength/MMT: (* denotes performed with pain)  Shoulder Elbow Scapular   Flexion: R 3+/5; L 5/5  Abduction: R 3+/5; L 5/5  ER: R 4-*/5; L 5/5  IR: R 4/5; L 5/5 Flexion: R 4+/5; L 5/5  Extension: R 4/5; L 5/5 Rhomboids: R  3+* /5, L 4+/5  Mid trap: R 4/5; L 5/5   Lats: R 4-*/5, L 5/5  Low trap: R 3*/5; L 4/5       Assessment: Most trouble noted with resisted shoulder flexion in session, continuing to increase strengthening at scapular stabilizing muscle and rotator cuff to allow for full return to function.  Tolerating thoracic mobility well in session to allow for full mobility with scapular upward rotation.     Goals: (To be met in 20 visits)   Pt will report improved ability to sleep without waking due to shoulder pain Continued, improving  Pt will improve shoulder flexion AROM to >130 degrees to be able to reach into overhead cabinets without pain or restriction Met, but Continued   Pt will improve shoulder abduction AROM to >130 degrees to improve ability to don deodorant, don/doff shirts, and wash hair Met, but Continued   Pt will increase shoulder AROM ER to T1 to reach and fasten seatbelt Continued   Pt will increase shoulder AROM IR to T12 to be able to reach in back pocket, tuck in shirt, and turn steering wheel without pain Continued   Pt will improve shoulder strength throughout to 4/5 to improve function with ADLs including bathing and dressing independently. Continued   Pt will demonstrate increased mid/low trap strength to 4/5 to promote improved shoulder mechanics and stabilization with ADL such as lifting and reaching Continued   Pt will be independent and compliant with comprehensive HEP to maintain progress achieved in PT Continued      QuickDASH Outcome Score  Score: 84.09 % (5/21/2024  3:40 PM)    Post QuickDASH Outcome Score  Post Score: 36.36 % (8/15/2024 12:36 PM)    47.73 % improvement    Plan: Continue skilled Physical Therapy 1-2 x/week or a total of 35 visits over a 90 day period. Treatment will include: manual therapy, therapeutic exercise, therapeutic activity, neuromuscular re-education, HEP instruction, and self care home management.        Certification From: 8/15/2024  To:11/13/2024     Future  Session: Update HEP, prone lower trapezius    Date: 8/15/2024   Tx: 20/20 Date: 8/20/2024   Tx: 21/35 Date: 8/22/2024   Tx: 22/35 Date: 8/27/2024   Tx: 23/35   Date: 8/29/2024   Tx: 24/35 Date: 9/5/2024   Tx: 25/35 Date: 9/13/2024  Date: 9/17/2024   Tx: 27/35    MT: 12'  Gr II-III inferior, posterior GH glide  IR/ER OP  MT: 10'  Gr II-III inferior, posterior GH glide  IR/ER OP    MT: 10'  Gr II-III inferior, posterior GH glide  IR/ER OP   Passive biceps stretch, elbow  MT: 8'  Gr III inferior, posterior GH glide  Contract-relax IR x10 MT: 10'  Gr III inferior, posterior GH glide  Contract-relax IR x10 MT : 8'  Gr III inferior, posterior GH glide  IR overpressure stretch    MT : 10'   Gr III inferior, posterior GH glide  IR overpressure stretch  MT : 10'   Gr III inferior, posterior GH glide  IR overpressure stretch    TE: 23'   Reassessment  IR stretch with cane behind the back 10x10s   Standing bicep curls, GTB 2x10   Standing tricep extension, GTB 2x10  TE: 23'  SB flexion roll, x20  S/L shoulder ER, 1# 2x10  IR stretch with cane behind the back 10x10s   IR RTB 2x12  Standing shoulder extension, GTB 2x10  Standing rows, unilateral Blue TB 2x12  TE: 25'  Pulleys, flexion 2'   Pulley, scaption 2'  Doorway pectoralis stretch, \"W\" 10x10s  Tricep extension, GTB 2x10   IR stretch with cane behind the back 10x10s   IR RTB 2x12  ER RTB 2x10 (elbow neutral)  HEP update TE: 10'   Pulleys, flexion 2'   Pulley, scaption 2'  Doorway pectoralis stretch, \"W\" 10x10s  IR stretch with strap x15, 5s hold  TE: 12'   Pulleys, flexion 2'   Pulley, scaption 2'  Finger Ladder, abduction x10  IR stretch with strap x15, 5s hold  TE: 15'  Pulley, scaption 2'  Finger Ladder, abduction x15  Shoulder flexion, to cabinet x20 (1#), x10 (2#)   IR stretch with strap x10, 10s hold    TE: 5'  Finger Ladder, abduction x10  Standing biceps curl 3# 3x10   TE: 10'    Open book, R x15   Thread the needle, elevated plinth R/L x10ea  Standing biceps curl  3# 3x10     NMR : 8'   Supine AROM shoulder flexion 2x10 (full range)  IR 90 deg flexion arm supported on table, 2x10 NMR: 12'  Rhythmic stabilzation YTB 4x15s  Prone horizontal abduction, elbow bent x15 (PT assist)   Lower trapezius setting at wall 2x5   NMR: 12'  Open book at wall R/L 2x5 ea   Horizontal abduction RTB facing wall 2x5 ea   Lower trapezius setting at wall 3x5  NMR: 23'  Horizontal abduction RTB facing wall 2x10 ea   Horizontal abd RTB, supine 2x10  Supine PNF D2, RTB \"X\" x10ea  Lower trapezius setting at wall 3x5   90-90 IR on table RTB x20  NMR: 23'  Supine shoulder flexion YTB x15 (difficult)   S/L shoulder abduction, YTB x20  Supported 90-90 (abduction) IR YTB x20  Supponrted 90-90 (abduction) ER 1# x20   Supine \"goal post\" 90 deg ER x10  Shoulder extension, GTB B x15    NMR: 15'  Supine 90-90 IR/ER 1# x25 ea  S/L ER 1# 2x10  S/L shoulder abduction, 2# 2x10  Prone row 2# x30  NMR: 25'   Prone horizontal abd (bent elbow) 2x8  Prone shoulder ext x10, x10 w/ 2#   Prone row 2# x20  Supine Serratus Anterior 3# 3x10  Standing shoulder IR (from 90 deg flexion) YTB 3x5  Standing shoulder ER (from 90 deg flexion) YTB 2x10 NMR: 25'   Supine 90-90 IR RTB x20  Supine 90-90 ER RTB x20  Supine shoulder flexion, RTB (difficult) x10  Supine horizontal abduction, RTB x20  Standing rows, 9# 2x10 RUE  Standing \"I/Y\" 2x10ea, 1# B  Lower trapezius setting at wall, 2x10  \"Goal Post\" standing at 1/2 foam at wall 2x8               HEP:   Access Code: [email or web address]  URL: [email or web address]  Date: 05/21/2024  Prepared by: Elizabeth Chong     Exercises  - Thumb Opposition  - 5 x daily - 7 x weekly - 10 reps  - Seated Wrist Flexion Stretch  - 5 x daily - 7 x weekly - 5 reps - 10 second hold  - Seated Wrist Extension Stretch  - 5 x daily - 7 x weekly - 5 reps - 10 second hold    Pendulum, forward/lateral     Access Code: [email or web address]  Date: 06/20/2024  Prepared by: Elizabeth Chong  - Seated Shoulder Flexion  AAROM with Pulley Behind  - 1 x daily - 7 x weekly - 3 sets - 10 reps  - Supine Shoulder Flexion AAROM with Hands Clasped  - 1 x daily - 7 x weekly - 2 sets - 10 reps  - Isometric Shoulder Flexion at Wall  - 1 x daily - 7 x weekly - 10 reps - 5 second hold  - Isometric Shoulder Extension at Wall  - 1 x daily - 7 x weekly - 10 reps - 5 second hold  - Isometric Shoulder Abduction at Wall  - 1 x daily - 7 x weekly - 10 reps - 5 second hold    Charges: 1 Manual, 1 TE,  2 NMR   Total Timed Treatment: 45'  min  Total Treatment Time: 45'  min

## 2024-09-20 ENCOUNTER — OFFICE VISIT (OUTPATIENT)
Dept: PHYSICAL THERAPY | Facility: HOSPITAL | Age: 69
End: 2024-09-20
Attending: ORTHOPAEDIC SURGERY
Payer: COMMERCIAL

## 2024-09-20 PROCEDURE — 97140 MANUAL THERAPY 1/> REGIONS: CPT

## 2024-09-20 PROCEDURE — 97110 THERAPEUTIC EXERCISES: CPT

## 2024-09-20 PROCEDURE — 97112 NEUROMUSCULAR REEDUCATION: CPT

## 2024-09-20 NOTE — PROGRESS NOTES
Diagnosis:   S/P rotator cuff repair (Z98.890)    right Shoulder Arthroscopic rotator cuff repair, subacromial decompression, distal clavicle excision    Referring Provider: No ref. provider found   Surgeon: Dr. Jose Santos Date of Evaluation:    5/21/2024    Precautions:  adhere to the \"large repair\" protocol, sling 4 weeks.     Visual Impairment, HTN, breast cancer    No AAROM until 6 week, no AROM until 8 week no resisted RC strengthening 12 week,     PROM to tolerance, avoid adduction   Next MD Visit:   October 2024     Date of Surgery: 5/6/2024    Insurance Primary/Secondary: [employer] [place] PPO / N/A     # Auth Visits: no auth, 20 per POC (PN at 10v)           8/15/2024 : 14 week, 4 day  s/p large rotator cuff repair    Subjective: Has been feeling good overall, has less pain than usual.     Pain: 1 /10      Objective:       8/22/2024: Flexibility    Biceps: R short   Pectoralis: R short    Shoulder AROM: (* denotes performed with pain)   EOS: end of session  8/15/2024  7/23/2024 start  7/2/2024    Flexion: R 141  Abduction: R 146  ER (functional): R occiput  IR (functional): R SI joint (L3 EOS)  Flexion: R 132  Abduction: R 131  ER (elbow neutral): R 59  IR (elbow neutral): R 85 Flexion: R 75* (127 EOS)   Abduction: R 40* (90 EOS)  ER (elbow neutral): R 35  IR (elbow Neutral): R 85        Shoulder PROM: (* denotes performed with pain)  8/15/2024  7/2/2024  Evaluation     Flexion: 160  Abduction: 160  ER (90-90): 80  IR (90-90): 55  Flexion: R 150  Abduction: R 140  ER (30 deg abd):R 70    IR (30 deg abd): R 65  Flexion: R 40*   Scapation: R 65  ER: R 15 (discomfort)   IR: R 50      8/15/2024   Strength/MMT: (* denotes performed with pain)  Shoulder Elbow Scapular   Flexion: R 3+/5; L 5/5  Abduction: R 3+/5; L 5/5  ER: R 4-*/5; L 5/5  IR: R 4/5; L 5/5 Flexion: R 4+/5; L 5/5  Extension: R 4/5; L 5/5 Rhomboids: R 3+* /5, L 4+/5  Mid trap: R 4/5; L 5/5   Lats: R 4-*/5, L 5/5  Low trap: R 3*/5; L 4/5       Assessment:  Tolerating increased exercise prescription in session with decreased pain levels; continues to demo decreased flexibility at R pectoralis most likely due to anterior rotation and forward flexed posturing, improving within session.     Goals: (To be met in 20 visits)   Pt will report improved ability to sleep without waking due to shoulder pain Continued, improving  Pt will improve shoulder flexion AROM to >130 degrees to be able to reach into overhead cabinets without pain or restriction Met, but Continued   Pt will improve shoulder abduction AROM to >130 degrees to improve ability to don deodorant, don/doff shirts, and wash hair Met, but Continued   Pt will increase shoulder AROM ER to T1 to reach and fasten seatbelt Continued   Pt will increase shoulder AROM IR to T12 to be able to reach in back pocket, tuck in shirt, and turn steering wheel without pain Continued   Pt will improve shoulder strength throughout to 4/5 to improve function with ADLs including bathing and dressing independently. Continued   Pt will demonstrate increased mid/low trap strength to 4/5 to promote improved shoulder mechanics and stabilization with ADL such as lifting and reaching Continued   Pt will be independent and compliant with comprehensive HEP to maintain progress achieved in PT Continued      QuickDASH Outcome Score  Score: 84.09 % (5/21/2024  3:40 PM)    Post QuickDASH Outcome Score  Post Score: 36.36 % (8/15/2024 12:36 PM)    47.73 % improvement    Plan: Continue skilled Physical Therapy 1-2 x/week or a total of 35 visits over a 90 day period. Treatment will include: manual therapy, therapeutic exercise, therapeutic activity, neuromuscular re-education, HEP instruction, and self care home management.        Certification From: 8/15/2024  To:11/13/2024     Future Session: DB to overhead cabinet    Date: 8/20/2024   Tx: 21/35 Date: 8/22/2024   Tx: 22/35 Date: 8/27/2024   Tx: 23/35   Date: 8/29/2024   Tx: 24/35 Date: 9/5/2024   Tx:  25/35 Date: 9/13/2024  Date: 9/17/2024   Tx: 27/35  Date: 9/20/2024   Tx: 28/35   MT: 10'  Gr II-III inferior, posterior GH glide  IR/ER OP    MT: 10'  Gr II-III inferior, posterior GH glide  IR/ER OP   Passive biceps stretch, elbow  MT: 8'  Gr III inferior, posterior GH glide  Contract-relax IR x10 MT: 10'  Gr III inferior, posterior GH glide  Contract-relax IR x10 MT : 8'  Gr III inferior, posterior GH glide  IR overpressure stretch    MT : 10'   Gr III inferior, posterior GH glide  IR overpressure stretch  MT : 10'   Gr III inferior, posterior GH glide  IR overpressure stretch  MT: 12'  Passive pectoralis stretch 2'   Gr III posterior GH glide  IR overpressure stretch      TE: 23'  SB flexion roll, x20  S/L shoulder ER, 1# 2x10  IR stretch with cane behind the back 10x10s   IR RTB 2x12  Standing shoulder extension, GTB 2x10  Standing rows, unilateral Blue TB 2x12  TE: 25'  Pulleys, flexion 2'   Pulley, scaption 2'  Doorway pectoralis stretch, \"W\" 10x10s  Tricep extension, GTB 2x10   IR stretch with cane behind the back 10x10s   IR RTB 2x12  ER RTB 2x10 (elbow neutral)  HEP update TE: 10'   Pulleys, flexion 2'   Pulley, scaption 2'  Doorway pectoralis stretch, \"W\" 10x10s  IR stretch with strap x15, 5s hold  TE: 12'   Pulleys, flexion 2'   Pulley, scaption 2'  Finger Ladder, abduction x10  IR stretch with strap x15, 5s hold  TE: 15'  Pulley, scaption 2'  Finger Ladder, abduction x15  Shoulder flexion, to cabinet x20 (1#), x10 (2#)   IR stretch with strap x10, 10s hold    TE: 5'  Finger Ladder, abduction x10  Standing biceps curl 3# 3x10   TE: 10'    Open book, R x15   Thread the needle, elevated plinth R/L x10ea  Standing biceps curl 3# 3x10   TE: 15'  HEP update  Supine, shoulder flexion, 3# x15   S/L ER 3# 3x6   Standing biceps curl 3# 3x10  Doorway pectoralis stretch 5x20s      NMR: 12'  Rhythmic stabilzation YTB 4x15s  Prone horizontal abduction, elbow bent x15 (PT assist)   Lower trapezius setting at wall 2x5    NMR: 12'  Open book at wall R/L 2x5 ea   Horizontal abduction RTB facing wall 2x5 ea   Lower trapezius setting at wall 3x5  NMR: 23'  Horizontal abduction RTB facing wall 2x10 ea   Horizontal abd RTB, supine 2x10  Supine PNF D2, RTB \"X\" x10ea  Lower trapezius setting at wall 3x5   90-90 IR on table RTB x20  NMR: 23'  Supine shoulder flexion YTB x15 (difficult)   S/L shoulder abduction, YTB x20  Supported 90-90 (abduction) IR YTB x20  Supponrted 90-90 (abduction) ER 1# x20   Supine \"goal post\" 90 deg ER x10  Shoulder extension, GTB B x15    NMR: 15'  Supine 90-90 IR/ER 1# x25 ea  S/L ER 1# 2x10  S/L shoulder abduction, 2# 2x10  Prone row 2# x30  NMR: 25'   Prone horizontal abd (bent elbow) 2x8  Prone shoulder ext x10, x10 w/ 2#   Prone row 2# x20  Supine Serratus Anterior 3# 3x10  Standing shoulder IR (from 90 deg flexion) YTB 3x5  Standing shoulder ER (from 90 deg flexion) YTB 2x10 NMR: 25'   Supine 90-90 IR RTB x20  Supine 90-90 ER RTB x20  Supine shoulder flexion, RTB (difficult) x10  Supine horizontal abduction, RTB x20  Standing rows, 9# 2x10 RUE  Standing \"I/Y\" 2x10ea, 1# B  Lower trapezius setting at wall, 2x10  \"Goal Post\" standing at 1/2 foam at wall 2x8   NMR: 15'  Supine serratus punch, 3# 3x10  Prone Lower Trapezius \"Y\" 3x6    Standing rows, 9# 2x10 RUE  Standing 90-90 (flexion) IR RTB 2x10              HEP:   Access Code: [email or web address]  Date: 06/20/2024  Prepared by: Elizabeth Chong  - Seated Shoulder Flexion AAROM with Pulley Behind  - 1 x daily - 7 x weekly - 3 sets - 10 reps  - Supine Shoulder Flexion AAROM with Hands Clasped  - 1 x daily - 7 x weekly - 2 sets - 10 reps  - Isometric Shoulder Flexion at Wall  - 1 x daily - 7 x weekly - 10 reps - 5 second hold  - Isometric Shoulder Extension at Wall  - 1 x daily - 7 x weekly - 10 reps - 5 second hold  - Isometric Shoulder Abduction at Wall  - 1 x daily - 7 x weekly - 10 reps - 5 second hold    Access Code: [email or web address]  URL:  [email or web address]  Date: 09/20/2024  - Sidelying Shoulder ER with Towel and Dumbbell  - 1 x daily - 7 x weekly - 3 sets - 10 reps  - Prone Single Arm Shoulder Y  - 1 x daily - 7 x weekly - 3 sets - 6-8 reps  - Doorway Pec Stretch at 90 Degrees Abduction  - 1 x daily - 7 x weekly - 3 sets - 20-30 second  hold    Charges: 1 Manual, 1 TE,  1 NMR   Total Timed Treatment: 42'  min  Total Treatment Time: 42'  min

## 2024-09-23 ENCOUNTER — OFFICE VISIT (OUTPATIENT)
Dept: PHYSICAL THERAPY | Facility: HOSPITAL | Age: 69
End: 2024-09-23
Attending: ORTHOPAEDIC SURGERY
Payer: COMMERCIAL

## 2024-09-23 PROCEDURE — 97110 THERAPEUTIC EXERCISES: CPT

## 2024-09-23 PROCEDURE — 97112 NEUROMUSCULAR REEDUCATION: CPT

## 2024-09-23 PROCEDURE — 97140 MANUAL THERAPY 1/> REGIONS: CPT

## 2024-09-23 NOTE — PROGRESS NOTES
Diagnosis:   S/P rotator cuff repair (Z98.890)    right Shoulder Arthroscopic rotator cuff repair, subacromial decompression, distal clavicle excision    Referring Provider: No ref. provider found   Surgeon: Dr. Jose Santos Date of Evaluation:    5/21/2024    Precautions:  adhere to the \"large repair\" protocol, sling 4 weeks.     Visual Impairment, HTN, breast cancer    No AAROM until 6 week, no AROM until 8 week no resisted RC strengthening 12 week,     PROM to tolerance, avoid adduction   Next MD Visit:   October 2024     Date of Surgery: 5/6/2024    Insurance Primary/Secondary: [employer] [place] PPO / N/A     # Auth Visits: no auth, 20 per POC (PN at 10v)           8/15/2024 : 14 week, 4 day  s/p large rotator cuff repair    Subjective:  Wasn't too sore after last session, overall feeling good.     Pain: 1 /10      Objective:       8/22/2024: Flexibility    Biceps: R short   Pectoralis: R short    Shoulder AROM: (* denotes performed with pain)   EOS: end of session  8/15/2024  7/23/2024 start  7/2/2024    Flexion: R 141  Abduction: R 146  ER (functional): R occiput  IR (functional): R SI joint (L3 EOS)  Flexion: R 132  Abduction: R 131  ER (elbow neutral): R 59  IR (elbow neutral): R 85 Flexion: R 75* (127 EOS)   Abduction: R 40* (90 EOS)  ER (elbow neutral): R 35  IR (elbow Neutral): R 85        Shoulder PROM: (* denotes performed with pain)  8/15/2024  7/2/2024  Evaluation     Flexion: 160  Abduction: 160  ER (90-90): 80  IR (90-90): 55  Flexion: R 150  Abduction: R 140  ER (30 deg abd):R 70    IR (30 deg abd): R 65  Flexion: R 40*   Scapation: R 65  ER: R 15 (discomfort)   IR: R 50      8/15/2024   Strength/MMT: (* denotes performed with pain)  Shoulder Elbow Scapular   Flexion: R 3+/5; L 5/5  Abduction: R 3+/5; L 5/5  ER: R 4-*/5; L 5/5  IR: R 4/5; L 5/5 Flexion: R 4+/5; L 5/5  Extension: R 4/5; L 5/5 Rhomboids: R 3+* /5, L 4+/5  Mid trap: R 4/5; L 5/5   Lats: R 4-*/5, L 5/5  Low trap: R 3*/5; L 4/5       Assessment:  Remains most limited with functional IR, updating HEP for capsular and muscle stretching to address remaining limitations. Tolerating all scapular strengthening well without pain or fatigue.      Goals: (To be met in 20 visits)   Pt will report improved ability to sleep without waking due to shoulder pain Continued, improving  Pt will improve shoulder flexion AROM to >130 degrees to be able to reach into overhead cabinets without pain or restriction Met, but Continued   Pt will improve shoulder abduction AROM to >130 degrees to improve ability to don deodorant, don/doff shirts, and wash hair Met, but Continued   Pt will increase shoulder AROM ER to T1 to reach and fasten seatbelt Continued   Pt will increase shoulder AROM IR to T12 to be able to reach in back pocket, tuck in shirt, and turn steering wheel without pain Continued   Pt will improve shoulder strength throughout to 4/5 to improve function with ADLs including bathing and dressing independently. Continued   Pt will demonstrate increased mid/low trap strength to 4/5 to promote improved shoulder mechanics and stabilization with ADL such as lifting and reaching Continued   Pt will be independent and compliant with comprehensive HEP to maintain progress achieved in PT Continued      QuickDASH Outcome Score  Score: 84.09 % (5/21/2024  3:40 PM)    Post QuickDASH Outcome Score  Post Score: 36.36 % (8/15/2024 12:36 PM)    47.73 % improvement    Plan: Continue skilled Physical Therapy 1-2 x/week or a total of 35 visits over a 90 day period. Treatment will include: manual therapy, therapeutic exercise, therapeutic activity, neuromuscular re-education, HEP instruction, and self care home management.        Certification From: 8/15/2024  To:11/13/2024     Future Session: progress magdy stretch, triceps extension    Date: 8/22/2024   Tx: 22/35 Date: 8/27/2024   Tx: 23/35   Date: 8/29/2024   Tx: 24/35 Date: 9/5/2024   Tx: 25/35 Date: 9/13/2024  Date: 9/17/2024   Tx:  27/35  Date: 9/20/2024   Tx: 28/35 Date: 9/23/2024   Tx: 28/35   MT: 10'  Gr II-III inferior, posterior GH glide  IR/ER OP   Passive biceps stretch, elbow  MT: 8'  Gr III inferior, posterior GH glide  Contract-relax IR x10 MT: 10'  Gr III inferior, posterior GH glide  Contract-relax IR x10 MT : 8'  Gr III inferior, posterior GH glide  IR overpressure stretch    MT : 10'   Gr III inferior, posterior GH glide  IR overpressure stretch  MT : 10'   Gr III inferior, posterior GH glide  IR overpressure stretch  MT: 12'  Passive pectoralis stretch 2'   Gr III posterior GH glide  IR overpressure stretch    MT: 8'   Passive pectoralis stretch 2'   Gr III posterior GH glide  IR overpressure stretch    TE: 25'  Pulleys, flexion 2'   Pulley, scaption 2'  Doorway pectoralis stretch, \"W\" 10x10s  Tricep extension, GTB 2x10   IR stretch with cane behind the back 10x10s   IR RTB 2x12  ER RTB 2x10 (elbow neutral)  HEP update TE: 10'   Pulleys, flexion 2'   Pulley, scaption 2'  Doorway pectoralis stretch, \"W\" 10x10s  IR stretch with strap x15, 5s hold  TE: 12'   Pulleys, flexion 2'   Pulley, scaption 2'  Finger Ladder, abduction x10  IR stretch with strap x15, 5s hold  TE: 15'  Pulley, scaption 2'  Finger Ladder, abduction x15  Shoulder flexion, to cabinet x20 (1#), x10 (2#)   IR stretch with strap x10, 10s hold    TE: 5'  Finger Ladder, abduction x10  Standing biceps curl 3# 3x10   TE: 10'    Open book, R x15   Thread the needle, elevated plinth R/L x10ea  Standing biceps curl 3# 3x10   TE: 15'  HEP update  Supine, shoulder flexion, 3# x15   S/L ER 3# 3x6   Standing biceps curl 3# 3x10  Doorway pectoralis stretch 5x20s    TE: 23'  Sleeper stretch on wall, 2x6, 10s hold (attempted s/l)  Standing 3# flexion DB cabinet reach 2x10  Standing 3# abduction DB cabinet reach 2x10  Standing ER, GTB 3x10  IR stretch with strap, x15, 5s hold  HEP update    NMR: 12'  Open book at wall R/L 2x5 ea   Horizontal abduction RTB facing wall 2x5 ea    Lower trapezius setting at wall 3x5  NMR: 23'  Horizontal abduction RTB facing wall 2x10 ea   Horizontal abd RTB, supine 2x10  Supine PNF D2, RTB \"X\" x10ea  Lower trapezius setting at wall 3x5   90-90 IR on table RTB x20  NMR: 23'  Supine shoulder flexion YTB x15 (difficult)   S/L shoulder abduction, YTB x20  Supported 90-90 (abduction) IR YTB x20  Supponrted 90-90 (abduction) ER 1# x20   Supine \"goal post\" 90 deg ER x10  Shoulder extension, GTB B x15    NMR: 15'  Supine 90-90 IR/ER 1# x25 ea  S/L ER 1# 2x10  S/L shoulder abduction, 2# 2x10  Prone row 2# x30  NMR: 25'   Prone horizontal abd (bent elbow) 2x8  Prone shoulder ext x10, x10 w/ 2#   Prone row 2# x20  Supine Serratus Anterior 3# 3x10  Standing shoulder IR (from 90 deg flexion) YTB 3x5  Standing shoulder ER (from 90 deg flexion) YTB 2x10 NMR: 25'   Supine 90-90 IR RTB x20  Supine 90-90 ER RTB x20  Supine shoulder flexion, RTB (difficult) x10  Supine horizontal abduction, RTB x20  Standing rows, 9# 2x10 RUE  Standing \"I/Y\" 2x10ea, 1# B  Lower trapezius setting at wall, 2x10  \"Goal Post\" standing at 1/2 foam at wall 2x8   NMR: 15'  Supine serratus punch, 3# 3x10  Prone Lower Trapezius \"Y\" 3x6    Standing rows, 9# 2x10 RUE  Standing 90-90 (flexion) IR RTB 2x10  NMR: 14'  Prone Lower Trapezius \"Y\" 3x6    Prone 90-90 ER lift, B 3x6  Prone shoulder extension, 3# 2x10  Standing D2 flexion, YTB 2x10  Standing horizontal abduction, palm up, YTB 2x10                     HEP:   Access Code: [email or web address]  Date: 06/20/2024  Prepared by: Elizabeth Chong  - Seated Shoulder Flexion AAROM with Pulley Behind  - 1 x daily - 7 x weekly - 3 sets - 10 reps  - Supine Shoulder Flexion AAROM with Hands Clasped  - 1 x daily - 7 x weekly - 2 sets - 10 reps  - Isometric Shoulder Flexion at Wall  - 1 x daily - 7 x weekly - 10 reps - 5 second hold  - Isometric Shoulder Extension at Wall  - 1 x daily - 7 x weekly - 10 reps - 5 second hold  - Isometric Shoulder Abduction at Wall  - 1 x  daily - 7 x weekly - 10 reps - 5 second hold    Access Code: [email or web address]  Date: 09/23/2024  - Sidelying Shoulder ER with Towel and Dumbbell  - 1 x daily - 7 x weekly - 3 sets - 10 reps  - Prone Single Arm Shoulder Y  - 1 x daily - 7 x weekly - 3 sets - 6-8 reps  - Doorway Pec Stretch at 90 Degrees Abduction  - 1 x daily - 7 x weekly - 3 sets - 20-30 second  hold  - Standing Sleeper Stretch at Wall  - 1 x daily - 7 x weekly - 2 sets - 6 reps - 10 second hold  - Standing Shoulder Single Arm PNF D2 Flexion with Resistance  - 1 x daily - 7 x weekly - 2-3 sets - 10 reps  - Standing Shoulder Horizontal Abduction with Resistance  - 1 x daily - 7 x weekly - 3 sets - 10 reps    Charges: 1 Manual, 2 TE,  1 NMR   Total Timed Treatment: 45'  min  Total Treatment Time: 45'  min

## 2024-09-27 ENCOUNTER — OFFICE VISIT (OUTPATIENT)
Dept: PHYSICAL THERAPY | Facility: HOSPITAL | Age: 69
End: 2024-09-27
Attending: ORTHOPAEDIC SURGERY
Payer: COMMERCIAL

## 2024-09-27 PROCEDURE — 97110 THERAPEUTIC EXERCISES: CPT

## 2024-09-27 PROCEDURE — 97140 MANUAL THERAPY 1/> REGIONS: CPT

## 2024-09-27 PROCEDURE — 97112 NEUROMUSCULAR REEDUCATION: CPT

## 2024-09-27 NOTE — PROGRESS NOTES
Diagnosis:   S/P rotator cuff repair (Z98.890)    right Shoulder Arthroscopic rotator cuff repair, subacromial decompression, distal clavicle excision    Referring Provider: No ref. provider found   Surgeon: Dr. Jose Santos Date of Evaluation:    5/21/2024    Precautions:  adhere to the \"large repair\" protocol, sling 4 weeks.     Visual Impairment, HTN, breast cancer    No AAROM until 6 week, no AROM until 8 week no resisted RC strengthening 12 week,     PROM to tolerance, avoid adduction   Next MD Visit:   October 2024     Date of Surgery: 5/6/2024    Insurance Primary/Secondary: [employer] [place] PPO / N/A     # Auth Visits: no auth, 20 per POC (PN at 10v)           8/15/2024 : 14 week, 4 day  s/p large rotator cuff repair    Subjective:  is sore today because she did a lot of work around the house, like vacuuming, dusting, and cutting day lilies.      Pain: 2 /10      Objective:       8/22/2024: Flexibility    Biceps: R short   Pectoralis: R short    Shoulder AROM: (* denotes performed with pain)   EOS: end of session  8/15/2024  7/23/2024 start  7/2/2024    Flexion: R 141  Abduction: R 146  ER (functional): R occiput  IR (functional): R SI joint (L3 EOS)  Flexion: R 132  Abduction: R 131  ER (elbow neutral): R 59  IR (elbow neutral): R 85 Flexion: R 75* (127 EOS)   Abduction: R 40* (90 EOS)  ER (elbow neutral): R 35  IR (elbow Neutral): R 85        Shoulder PROM: (* denotes performed with pain)  8/15/2024  7/2/2024  Evaluation     Flexion: 160  Abduction: 160  ER (90-90): 80  IR (90-90): 55  Flexion: R 150  Abduction: R 140  ER (30 deg abd):R 70    IR (30 deg abd): R 65  Flexion: R 40*   Scapation: R 65  ER: R 15 (discomfort)   IR: R 50      8/15/2024   Strength/MMT: (* denotes performed with pain)  Shoulder Elbow Scapular   Flexion: R 3+/5; L 5/5  Abduction: R 3+/5; L 5/5  ER: R 4-*/5; L 5/5  IR: R 4/5; L 5/5 Flexion: R 4+/5; L 5/5  Extension: R 4/5; L 5/5 Rhomboids: R 3+* /5, L 4+/5  Mid trap: R 4/5; L 5/5   Lats: R  4-*/5, L 5/5  Low trap: R 3*/5; L 4/5       Assessment: PROM at IR symmetrical bilaterally suggesting functional IR limited by thoracic mobility and strength. Focusing on strength and stability in R rotator cuff structure with good tolerance, no pain.     Goals: (To be met in 20 visits)   Pt will report improved ability to sleep without waking due to shoulder pain Continued, improving  Pt will improve shoulder flexion AROM to >130 degrees to be able to reach into overhead cabinets without pain or restriction Met, but Continued   Pt will improve shoulder abduction AROM to >130 degrees to improve ability to don deodorant, don/doff shirts, and wash hair Met, but Continued   Pt will increase shoulder AROM ER to T1 to reach and fasten seatbelt Continued   Pt will increase shoulder AROM IR to T12 to be able to reach in back pocket, tuck in shirt, and turn steering wheel without pain Continued   Pt will improve shoulder strength throughout to 4/5 to improve function with ADLs including bathing and dressing independently. Continued   Pt will demonstrate increased mid/low trap strength to 4/5 to promote improved shoulder mechanics and stabilization with ADL such as lifting and reaching Continued   Pt will be independent and compliant with comprehensive HEP to maintain progress achieved in PT Continued      QuickDASH Outcome Score  Score: 84.09 % (5/21/2024  3:40 PM)    Post QuickDASH Outcome Score  Post Score: 36.36 % (8/15/2024 12:36 PM)    47.73 % improvement    Plan: Continue skilled Physical Therapy 1-2 x/week or a total of 35 visits over a 90 day period. Treatment will include: manual therapy, therapeutic exercise, therapeutic activity, neuromuscular re-education, HEP instruction, and self care home management.        Certification From: 8/15/2024  To:11/13/2024     Future Session: Scapular progression, thoracic mobility    Date: 8/27/2024   Tx: 23/35   Date: 8/29/2024   Tx: 24/35 Date: 9/5/2024   Tx: 25/35 Date:  9/13/2024  Date: 9/17/2024   Tx: 27/35  Date: 9/20/2024   Tx: 28/35 Date: 9/23/2024   Tx: 29/35 Date: 9/27/2024   Tx: 30/35   MT: 8'  Gr III inferior, posterior GH glide  Contract-relax IR x10 MT: 10'  Gr III inferior, posterior GH glide  Contract-relax IR x10 MT : 8'  Gr III inferior, posterior GH glide  IR overpressure stretch    MT : 10'   Gr III inferior, posterior GH glide  IR overpressure stretch  MT : 10'   Gr III inferior, posterior GH glide  IR overpressure stretch  MT: 12'  Passive pectoralis stretch 2'   Gr III posterior GH glide  IR overpressure stretch    MT: 8'   Passive pectoralis stretch 2'   Gr III posterior GH glide  IR overpressure stretch  MT: 8'  Gr III posterior GH glide  IR overpressure stretch    TE: 10'   Pulleys, flexion 2'   Pulley, scaption 2'  Doorway pectoralis stretch, \"W\" 10x10s  IR stretch with strap x15, 5s hold  TE: 12'   Pulleys, flexion 2'   Pulley, scaption 2'  Finger Ladder, abduction x10  IR stretch with strap x15, 5s hold  TE: 15'  Pulley, scaption 2'  Finger Ladder, abduction x15  Shoulder flexion, to cabinet x20 (1#), x10 (2#)   IR stretch with strap x10, 10s hold    TE: 5'  Finger Ladder, abduction x10  Standing biceps curl 3# 3x10   TE: 10'    Open book, R x15   Thread the needle, elevated plinth R/L x10ea  Standing biceps curl 3# 3x10   TE: 15'  HEP update  Supine, shoulder flexion, 3# x15   S/L ER 3# 3x6   Standing biceps curl 3# 3x10  Doorway pectoralis stretch 5x20s    TE: 23'  Sleeper stretch on wall, 2x6, 10s hold (attempted s/l)  Standing 3# flexion DB cabinet reach 2x10  Standing 3# abduction DB cabinet reach 2x10  Standing ER, GTB 3x10  IR stretch with strap, x15, 5s hold  HEP update  TE: 25'  Seated pulleys, flexion/scaption 2'   Sleeper stretch on wall, 2x6, 10s hold  Standing \"I\" \"Y\" \"T\" at wall, B 2# x15 ea  S/L open book, x10  Standing triceps extension GTB 3x10   Standing ER, GTB 3x10  Standing IR , GTB 3x10    NMR: 23'  Horizontal abduction RTB facing wall  2x10 ea   Horizontal abd RTB, supine 2x10  Supine PNF D2, RTB \"X\" x10ea  Lower trapezius setting at wall 3x5   90-90 IR on table RTB x20  NMR: 23'  Supine shoulder flexion YTB x15 (difficult)   S/L shoulder abduction, YTB x20  Supported 90-90 (abduction) IR YTB x20  Supponrted 90-90 (abduction) ER 1# x20   Supine \"goal post\" 90 deg ER x10  Shoulder extension, GTB B x15    NMR: 15'  Supine 90-90 IR/ER 1# x25 ea  S/L ER 1# 2x10  S/L shoulder abduction, 2# 2x10  Prone row 2# x30  NMR: 25'   Prone horizontal abd (bent elbow) 2x8  Prone shoulder ext x10, x10 w/ 2#   Prone row 2# x20  Supine Serratus Anterior 3# 3x10  Standing shoulder IR (from 90 deg flexion) YTB 3x5  Standing shoulder ER (from 90 deg flexion) YTB 2x10 NMR: 25'   Supine 90-90 IR RTB x20  Supine 90-90 ER RTB x20  Supine shoulder flexion, RTB (difficult) x10  Supine horizontal abduction, RTB x20  Standing rows, 9# 2x10 RUE  Standing \"I/Y\" 2x10ea, 1# B  Lower trapezius setting at wall, 2x10  \"Goal Post\" standing at 1/2 foam at wall 2x8   NMR: 15'  Supine serratus punch, 3# 3x10  Prone Lower Trapezius \"Y\" 3x6    Standing rows, 9# 2x10 RUE  Standing 90-90 (flexion) IR RTB 2x10  NMR: 14'  Prone Lower Trapezius \"Y\" 3x6    Prone 90-90 ER lift, B 3x6  Prone shoulder extension, 3# 2x10  Standing D2 flexion, YTB 2x10  Standing horizontal abduction, palm up, YTB 2x10         NMR: 12'   Prone shoulder extension, 2# 2x10 (full range)  Prone Lower Trapezius \"Y\" 3x6   Prone Horizontal abduction, \"T\" 3x5  90-90 IR ball throw, 4# 4x20s                 HEP:   Access Code: [email or web address]  Date: 06/20/2024  Prepared by: Elizabeth Chong  - Seated Shoulder Flexion AAROM with Pulley Behind  - 1 x daily - 7 x weekly - 3 sets - 10 reps  - Supine Shoulder Flexion AAROM with Hands Clasped  - 1 x daily - 7 x weekly - 2 sets - 10 reps  - Isometric Shoulder Flexion at Wall  - 1 x daily - 7 x weekly - 10 reps - 5 second hold  - Isometric Shoulder Extension at Wall  - 1 x daily - 7 x weekly  - 10 reps - 5 second hold  - Isometric Shoulder Abduction at Wall  - 1 x daily - 7 x weekly - 10 reps - 5 second hold    Access Code: [email or web address]  Date: 09/23/2024  - Sidelying Shoulder ER with Towel and Dumbbell  - 1 x daily - 7 x weekly - 3 sets - 10 reps  - Prone Single Arm Shoulder Y  - 1 x daily - 7 x weekly - 3 sets - 6-8 reps  - Doorway Pec Stretch at 90 Degrees Abduction  - 1 x daily - 7 x weekly - 3 sets - 20-30 second  hold  - Standing Sleeper Stretch at Wall  - 1 x daily - 7 x weekly - 2 sets - 6 reps - 10 second hold  - Standing Shoulder Single Arm PNF D2 Flexion with Resistance  - 1 x daily - 7 x weekly - 2-3 sets - 10 reps  - Standing Shoulder Horizontal Abduction with Resistance  - 1 x daily - 7 x weekly - 3 sets - 10 reps    Charges: 1 Manual, 2 TE,  1 NMR   Total Timed Treatment: 45'  min  Total Treatment Time: 45'  min

## 2024-09-30 ENCOUNTER — OFFICE VISIT (OUTPATIENT)
Dept: PHYSICAL THERAPY | Facility: HOSPITAL | Age: 69
End: 2024-09-30
Attending: ORTHOPAEDIC SURGERY
Payer: COMMERCIAL

## 2024-09-30 PROCEDURE — 97140 MANUAL THERAPY 1/> REGIONS: CPT

## 2024-09-30 PROCEDURE — 97112 NEUROMUSCULAR REEDUCATION: CPT

## 2024-09-30 PROCEDURE — 97110 THERAPEUTIC EXERCISES: CPT

## 2024-09-30 NOTE — PROGRESS NOTES
Diagnosis:   S/P rotator cuff repair (Z98.890)    right Shoulder Arthroscopic rotator cuff repair, subacromial decompression, distal clavicle excision    Referring Provider: No ref. provider found   Surgeon: Dr. Jose Santos Date of Evaluation:    5/21/2024    Precautions:  adhere to the \"large repair\" protocol, sling 4 weeks.     Visual Impairment, HTN, breast cancer    No AAROM until 6 week, no AROM until 8 week no resisted RC strengthening 12 week,     PROM to tolerance, avoid adduction   Next MD Visit:   As needed    Date of Surgery: 5/6/2024    Insurance Primary/Secondary: [employer] [place] PPO / N/A     # Auth Visits: no auth, 20 per POC (PN at 10v)           8/15/2024 : 14 week, 4 day  s/p large rotator cuff repair    Subjective:  Overall feeling pretty good, was able to carry granddaughter over the weekend but was very busy babysitting so did not do exercises. Is very sore at superior aspect of the shoulder (points along upper trapezius, levator scapulae)     Pain: 3 /10 over the weekend      Objective:       8/22/2024: Flexibility    Biceps: R short   Pectoralis: R short    Shoulder AROM: (* denotes performed with pain)   EOS: end of session  8/15/2024  7/23/2024 start  7/2/2024    Flexion: R 141  Abduction: R 146  ER (functional): R occiput  IR (functional): R SI joint (L3 EOS)  Flexion: R 132  Abduction: R 131  ER (elbow neutral): R 59  IR (elbow neutral): R 85 Flexion: R 75* (127 EOS)   Abduction: R 40* (90 EOS)  ER (elbow neutral): R 35  IR (elbow Neutral): R 85        Shoulder PROM: (* denotes performed with pain)  8/15/2024  7/2/2024  Evaluation     Flexion: 160  Abduction: 160  ER (90-90): 80  IR (90-90): 55  Flexion: R 150  Abduction: R 140  ER (30 deg abd):R 70    IR (30 deg abd): R 65  Flexion: R 40*   Scapation: R 65  ER: R 15 (discomfort)   IR: R 50      8/15/2024   Strength/MMT: (* denotes performed with pain)  Shoulder Elbow Scapular   Flexion: R 3+/5; L 5/5  Abduction: R 3+/5; L 5/5  ER: R 4-*/5; L  5/5  IR: R 4/5; L 5/5 Flexion: R 4+/5; L 5/5  Extension: R 4/5; L 5/5 Rhomboids: R 3+* /5, L 4+/5  Mid trap: R 4/5; L 5/5   Lats: R 4-*/5, L 5/5  Low trap: R 3*/5; L 4/5       Assessment: Tolerating cervical and thoracic mobility in session to decrease pain and promote generalized mobility at scapula. Remains limited with passive and active IR, progressing within session.      Goals: (To be met in 20 visits)   Pt will report improved ability to sleep without waking due to shoulder pain Continued, improving  Pt will improve shoulder flexion AROM to >130 degrees to be able to reach into overhead cabinets without pain or restriction Met, but Continued   Pt will improve shoulder abduction AROM to >130 degrees to improve ability to don deodorant, don/doff shirts, and wash hair Met, but Continued   Pt will increase shoulder AROM ER to T1 to reach and fasten seatbelt Continued   Pt will increase shoulder AROM IR to T12 to be able to reach in back pocket, tuck in shirt, and turn steering wheel without pain Continued   Pt will improve shoulder strength throughout to 4/5 to improve function with ADLs including bathing and dressing independently. Continued   Pt will demonstrate increased mid/low trap strength to 4/5 to promote improved shoulder mechanics and stabilization with ADL such as lifting and reaching Continued   Pt will be independent and compliant with comprehensive HEP to maintain progress achieved in PT Continued      QuickDASH Outcome Score  Score: 84.09 % (5/21/2024  3:40 PM)    Post QuickDASH Outcome Score  Post Score: 36.36 % (8/15/2024 12:36 PM)    47.73 % improvement    Plan: Continue skilled Physical Therapy 1-2 x/week or a total of 35 visits over a 90 day period. Treatment will include: manual therapy, therapeutic exercise, therapeutic activity, neuromuscular re-education, HEP instruction, and self care home management.        Certification From: 8/15/2024  To:11/13/2024     Future Session: Scapular  progression, thoracic mobility    Date: 8/29/2024   Tx: 24/35 Date: 9/5/2024   Tx: 25/35 Date: 9/13/2024  Date: 9/17/2024   Tx: 27/35  Date: 9/20/2024   Tx: 28/35 Date: 9/23/2024   Tx: 29/35 Date: 9/27/2024   Tx: 30/35 Date: 9/30/2024   Tx: 31/35     MT: 10'  Gr III inferior, posterior GH glide  Contract-relax IR x10 MT : 8'  Gr III inferior, posterior GH glide  IR overpressure stretch    MT : 10'   Gr III inferior, posterior GH glide  IR overpressure stretch  MT : 10'   Gr III inferior, posterior GH glide  IR overpressure stretch  MT: 12'  Passive pectoralis stretch 2'   Gr III posterior GH glide  IR overpressure stretch    MT: 8'   Passive pectoralis stretch 2'   Gr III posterior GH glide  IR overpressure stretch  MT: 8'  Gr III posterior GH glide  IR overpressure stretch  MT: 8'   Gr III posterior GH glide  IR overpressure stretch      TE: 12'   Pulleys, flexion 2'   Pulley, scaption 2'  Finger Ladder, abduction x10  IR stretch with strap x15, 5s hold  TE: 15'  Pulley, scaption 2'  Finger Ladder, abduction x15  Shoulder flexion, to cabinet x20 (1#), x10 (2#)   IR stretch with strap x10, 10s hold    TE: 5'  Finger Ladder, abduction x10  Standing biceps curl 3# 3x10   TE: 10'    Open book, R x15   Thread the needle, elevated plinth R/L x10ea  Standing biceps curl 3# 3x10   TE: 15'  HEP update  Supine, shoulder flexion, 3# x15   S/L ER 3# 3x6   Standing biceps curl 3# 3x10  Doorway pectoralis stretch 5x20s    TE: 23'  Sleeper stretch on wall, 2x6, 10s hold (attempted s/l)  Standing 3# flexion DB cabinet reach 2x10  Standing 3# abduction DB cabinet reach 2x10  Standing ER, GTB 3x10  IR stretch with strap, x15, 5s hold  HEP update  TE: 25'  Seated pulleys, flexion/scaption 2'   Sleeper stretch on wall, 2x6, 10s hold  Standing \"I\" \"Y\" \"T\" at wall, B 2# x15 ea  S/L open book, x10  Standing triceps extension GTB 3x10   Standing ER, GTB 3x10  Standing IR , GTB 3x10  TE: 23'  Pulleys, flexion, scaption, IR 2' ea  Seated  levator scapulae stretch, R 4x20s   Sleeper stretch on wall, 2x6, 10s hold  Seated thoracic ext in chair x10  Standing \"I\" \"Y\" \"T\" at wall, B 2# 2x10 ea  S/L open book, x10  Abduction wall slide, x10       NMR: 23'  Supine shoulder flexion YTB x15 (difficult)   S/L shoulder abduction, YTB x20  Supported 90-90 (abduction) IR YTB x20  Supponrted 90-90 (abduction) ER 1# x20   Supine \"goal post\" 90 deg ER x10  Shoulder extension, GTB B x15    NMR: 15'  Supine 90-90 IR/ER 1# x25 ea  S/L ER 1# 2x10  S/L shoulder abduction, 2# 2x10  Prone row 2# x30  NMR: 25'   Prone horizontal abd (bent elbow) 2x8  Prone shoulder ext x10, x10 w/ 2#   Prone row 2# x20  Supine Serratus Anterior 3# 3x10  Standing shoulder IR (from 90 deg flexion) YTB 3x5  Standing shoulder ER (from 90 deg flexion) YTB 2x10 NMR: 25'   Supine 90-90 IR RTB x20  Supine 90-90 ER RTB x20  Supine shoulder flexion, RTB (difficult) x10  Supine horizontal abduction, RTB x20  Standing rows, 9# 2x10 RUE  Standing \"I/Y\" 2x10ea, 1# B  Lower trapezius setting at wall, 2x10  \"Goal Post\" standing at 1/2 foam at wall 2x8   NMR: 15'  Supine serratus punch, 3# 3x10  Prone Lower Trapezius \"Y\" 3x6    Standing rows, 9# 2x10 RUE  Standing 90-90 (flexion) IR RTB 2x10  NMR: 14'  Prone Lower Trapezius \"Y\" 3x6    Prone 90-90 ER lift, B 3x6  Prone shoulder extension, 3# 2x10  Standing D2 flexion, YTB 2x10  Standing horizontal abduction, palm up, YTB 2x10         NMR: 12'   Prone shoulder extension, 2# 2x10 (full range)  Prone Lower Trapezius \"Y\" 3x6   Prone Horizontal abduction, \"T\" 3x5  90-90 IR ball throw, 4# 4x20s     NMR: 14'  Prone Lower Trapezius \"Y\" 3x6   Prone Horizontal abduction, \"T\" 3x6  90-90 IR ball throw, 2# 3x20s    Overhead 2# med ball wall throw, 3x20s              HEP:   Access Code: [email or web address]  Date: 06/20/2024  Prepared by: Elizabeth Chong  - Seated Shoulder Flexion AAROM with Pulley Behind  - 1 x daily - 7 x weekly - 3 sets - 10 reps  - Supine Shoulder Flexion AAROM  with Hands Clasped  - 1 x daily - 7 x weekly - 2 sets - 10 reps  - Isometric Shoulder Flexion at Wall  - 1 x daily - 7 x weekly - 10 reps - 5 second hold  - Isometric Shoulder Extension at Wall  - 1 x daily - 7 x weekly - 10 reps - 5 second hold  - Isometric Shoulder Abduction at Wall  - 1 x daily - 7 x weekly - 10 reps - 5 second hold    Access Code: [email or web address]  Date: 09/23/2024  - Sidelying Shoulder ER with Towel and Dumbbell  - 1 x daily - 7 x weekly - 3 sets - 10 reps  - Prone Single Arm Shoulder Y  - 1 x daily - 7 x weekly - 3 sets - 6-8 reps  - Doorway Pec Stretch at 90 Degrees Abduction  - 1 x daily - 7 x weekly - 3 sets - 20-30 second  hold  - Standing Sleeper Stretch at Wall  - 1 x daily - 7 x weekly - 2 sets - 6 reps - 10 second hold  - Standing Shoulder Single Arm PNF D2 Flexion with Resistance  - 1 x daily - 7 x weekly - 2-3 sets - 10 reps  - Standing Shoulder Horizontal Abduction with Resistance  - 1 x daily - 7 x weekly - 3 sets - 10 reps    Charges: 1 Manual, 2 TE,  1 NMR   Total Timed Treatment: 45'  min  Total Treatment Time: 45'  min

## 2024-10-08 ENCOUNTER — APPOINTMENT (OUTPATIENT)
Dept: PHYSICAL THERAPY | Facility: HOSPITAL | Age: 69
End: 2024-10-08
Attending: ORTHOPAEDIC SURGERY
Payer: COMMERCIAL

## 2024-10-15 ENCOUNTER — OFFICE VISIT (OUTPATIENT)
Dept: PHYSICAL THERAPY | Facility: HOSPITAL | Age: 69
End: 2024-10-15
Attending: INTERNAL MEDICINE
Payer: COMMERCIAL

## 2024-10-15 PROCEDURE — 97110 THERAPEUTIC EXERCISES: CPT

## 2024-10-15 PROCEDURE — 97112 NEUROMUSCULAR REEDUCATION: CPT

## 2024-10-15 NOTE — PROGRESS NOTES
Diagnosis:   S/P rotator cuff repair (Z98.890)    right Shoulder Arthroscopic rotator cuff repair, subacromial decompression, distal clavicle excision    Referring Provider: No ref. provider found   Surgeon: Dr. Jose Santos Date of Evaluation:    5/21/2024    Precautions:  adhere to the \"large repair\" protocol, sling 4 weeks.     Visual Impairment, HTN, breast cancer    No AAROM until 6 week, no AROM until 8 week no resisted RC strengthening 12 week,     PROM to tolerance, avoid adduction   Next MD Visit:   As needed    Date of Surgery: 5/6/2024    Insurance Primary/Secondary: [employer] [place] PPO / N/A     # Auth Visits: no auth, 20 per POC (PN at 10v)           8/15/2024 : 14 week, 4 day  s/p large rotator cuff repair    Subjective:  Feels like her arm is getting better, has been able to lay on R side but still not for long. Has been improving reaching behind her back.     Pain: 1/10      Objective:       8/22/2024: Flexibility    Biceps: R short   Pectoralis: R short    Shoulder AROM: (* denotes performed with pain)   EOS: end of session  8/15/2024  7/23/2024 start  7/2/2024    Flexion: R 141  Abduction: R 146  ER (functional): R occiput  IR (functional): R SI joint (L3 EOS)  Flexion: R 132  Abduction: R 131  ER (elbow neutral): R 59  IR (elbow neutral): R 85 Flexion: R 75* (127 EOS)   Abduction: R 40* (90 EOS)  ER (elbow neutral): R 35  IR (elbow Neutral): R 85        Shoulder PROM: (* denotes performed with pain)  8/15/2024  7/2/2024  Evaluation     Flexion: 160  Abduction: 160  ER (90-90): 80  IR (90-90): 55  Flexion: R 150  Abduction: R 140  ER (30 deg abd):R 70    IR (30 deg abd): R 65  Flexion: R 40*   Scapation: R 65  ER: R 15 (discomfort)   IR: R 50      8/15/2024   Strength/MMT: (* denotes performed with pain)  Shoulder Elbow Scapular   Flexion: R 3+/5; L 5/5  Abduction: R 3+/5; L 5/5  ER: R 4-*/5; L 5/5  IR: R 4/5; L 5/5 Flexion: R 4+/5; L 5/5  Extension: R 4/5; L 5/5 Rhomboids: R 3+* /5, L 4+/5  Mid trap: R  4/5; L 5/5   Lats: R 4-*/5, L 5/5  Low trap: R 3*/5; L 4/5       Assessment: Increased strength training and weight bearing in session, tolerating well but difficult. Pt progressing well and continues to increase activity levels outside of therapy sessions as well     Goals: (To be met in 20 visits)   Pt will report improved ability to sleep without waking due to shoulder pain Continued, improving  Pt will improve shoulder flexion AROM to >130 degrees to be able to reach into overhead cabinets without pain or restriction Met, but Continued   Pt will improve shoulder abduction AROM to >130 degrees to improve ability to don deodorant, don/doff shirts, and wash hair Met, but Continued   Pt will increase shoulder AROM ER to T1 to reach and fasten seatbelt Continued   Pt will increase shoulder AROM IR to T12 to be able to reach in back pocket, tuck in shirt, and turn steering wheel without pain Continued   Pt will improve shoulder strength throughout to 4/5 to improve function with ADLs including bathing and dressing independently. Continued   Pt will demonstrate increased mid/low trap strength to 4/5 to promote improved shoulder mechanics and stabilization with ADL such as lifting and reaching Continued   Pt will be independent and compliant with comprehensive HEP to maintain progress achieved in PT Continued      QuickDASH Outcome Score  Score: 84.09 % (5/21/2024  3:40 PM)    Post QuickDASH Outcome Score  Post Score: 36.36 % (8/15/2024 12:36 PM)    47.73 % improvement    Plan: Continue skilled Physical Therapy 1-2 x/week or a total of 35 visits over a 90 day period. Treatment will include: manual therapy, therapeutic exercise, therapeutic activity, neuromuscular re-education, HEP instruction, and self care home management.        Certification From: 8/15/2024  To:11/13/2024     Future Session: Scapular progression, thoracic mobility    Date: 9/5/2024   Tx: 25/35 Date: 9/13/2024  Date: 9/17/2024   Tx: 27/35  Date:  9/20/2024   Tx: 28/35 Date: 9/23/2024   Tx: 29/35 Date: 9/27/2024   Tx: 30/35 Date: 9/30/2024   Tx: 31/35   Date: 10/15/2024   Tx: 32/35   MT : 8'  Gr III inferior, posterior GH glide  IR overpressure stretch    MT : 10'   Gr III inferior, posterior GH glide  IR overpressure stretch  MT : 10'   Gr III inferior, posterior GH glide  IR overpressure stretch  MT: 12'  Passive pectoralis stretch 2'   Gr III posterior GH glide  IR overpressure stretch    MT: 8'   Passive pectoralis stretch 2'   Gr III posterior GH glide  IR overpressure stretch  MT: 8'  Gr III posterior GH glide  IR overpressure stretch  MT: 8'   Gr III posterior GH glide  IR overpressure stretch    MT: 5'  Gr III posterior GH glide  IR overpressure stretch    TE: 15'  Pulley, scaption 2'  Finger Ladder, abduction x15  Shoulder flexion, to cabinet x20 (1#), x10 (2#)   IR stretch with strap x10, 10s hold    TE: 5'  Finger Ladder, abduction x10  Standing biceps curl 3# 3x10   TE: 10'    Open book, R x15   Thread the needle, elevated plinth R/L x10ea  Standing biceps curl 3# 3x10   TE: 15'  HEP update  Supine, shoulder flexion, 3# x15   S/L ER 3# 3x6   Standing biceps curl 3# 3x10  Doorway pectoralis stretch 5x20s    TE: 23'  Sleeper stretch on wall, 2x6, 10s hold (attempted s/l)  Standing 3# flexion DB cabinet reach 2x10  Standing 3# abduction DB cabinet reach 2x10  Standing ER, GTB 3x10  IR stretch with strap, x15, 5s hold  HEP update  TE: 25'  Seated pulleys, flexion/scaption 2'   Sleeper stretch on wall, 2x6, 10s hold  Standing \"I\" \"Y\" \"T\" at wall, B 2# x15 ea  S/L open book, x10  Standing triceps extension GTB 3x10   Standing ER, GTB 3x10  Standing IR , GTB 3x10  TE: 23'  Pulleys, flexion, scaption, IR 2' ea  Seated levator scapulae stretch, R 4x20s   Sleeper stretch on wall, 2x6, 10s hold  Seated thoracic ext in chair x10  Standing \"I\" \"Y\" \"T\" at wall, B 2# 2x10 ea  S/L open book, x10  Abduction wall slide, x10     TE: 25'  Pulleys, flexion, scaption,  IR 2' ea  Sleeper stretch on wall, 2x6, 10s hold  S/L open book, x10  Wall push up 2x10  Standing horizontal abduction, RTB 2x10   Standing shoulder flexion, RTB 2x10  Stnading shoulder abduction RTB 2x10   NMR: 15'  Supine 90-90 IR/ER 1# x25 ea  S/L ER 1# 2x10  S/L shoulder abduction, 2# 2x10  Prone row 2# x30  NMR: 25'   Prone horizontal abd (bent elbow) 2x8  Prone shoulder ext x10, x10 w/ 2#   Prone row 2# x20  Supine Serratus Anterior 3# 3x10  Standing shoulder IR (from 90 deg flexion) YTB 3x5  Standing shoulder ER (from 90 deg flexion) YTB 2x10 NMR: 25'   Supine 90-90 IR RTB x20  Supine 90-90 ER RTB x20  Supine shoulder flexion, RTB (difficult) x10  Supine horizontal abduction, RTB x20  Standing rows, 9# 2x10 RUE  Standing \"I/Y\" 2x10ea, 1# B  Lower trapezius setting at wall, 2x10  \"Goal Post\" standing at 1/2 foam at wall 2x8   NMR: 15'  Supine serratus punch, 3# 3x10  Prone Lower Trapezius \"Y\" 3x6    Standing rows, 9# 2x10 RUE  Standing 90-90 (flexion) IR RTB 2x10  NMR: 14'  Prone Lower Trapezius \"Y\" 3x6    Prone 90-90 ER lift, B 3x6  Prone shoulder extension, 3# 2x10  Standing D2 flexion, YTB 2x10  Standing horizontal abduction, palm up, YTB 2x10         NMR: 12'   Prone shoulder extension, 2# 2x10 (full range)  Prone Lower Trapezius \"Y\" 3x6   Prone Horizontal abduction, \"T\" 3x5  90-90 IR ball throw, 4# 4x20s     NMR: 14'  Prone Lower Trapezius \"Y\" 3x6   Prone Horizontal abduction, \"T\" 3x6  90-90 IR ball throw, 2# 3x20s    Overhead 2# med ball wall throw, 3x20s  NMR: 10'  PNF D2, \"X\", x15ea  Standing \"W\" ER with RTB 2x10  Standing elevated row to 90-90 ER x25 RTB              HEP:   Access Code: [email or web address]  Date: 06/20/2024  Prepared by: Elizabeth Chong  - Seated Shoulder Flexion AAROM with Pulley Behind  - 1 x daily - 7 x weekly - 3 sets - 10 reps  - Supine Shoulder Flexion AAROM with Hands Clasped  - 1 x daily - 7 x weekly - 2 sets - 10 reps  - Isometric Shoulder Flexion at Wall  - 1 x daily - 7 x weekly - 10  reps - 5 second hold  - Isometric Shoulder Extension at Wall  - 1 x daily - 7 x weekly - 10 reps - 5 second hold  - Isometric Shoulder Abduction at Wall  - 1 x daily - 7 x weekly - 10 reps - 5 second hold    Access Code: [email or web address]  Date: 09/23/2024  - Sidelying Shoulder ER with Towel and Dumbbell  - 1 x daily - 7 x weekly - 3 sets - 10 reps  - Prone Single Arm Shoulder Y  - 1 x daily - 7 x weekly - 3 sets - 6-8 reps  - Doorway Pec Stretch at 90 Degrees Abduction  - 1 x daily - 7 x weekly - 3 sets - 20-30 second  hold  - Standing Sleeper Stretch at Wall  - 1 x daily - 7 x weekly - 2 sets - 6 reps - 10 second hold  - Standing Shoulder Single Arm PNF D2 Flexion with Resistance  - 1 x daily - 7 x weekly - 2-3 sets - 10 reps  - Standing Shoulder Horizontal Abduction with Resistance  - 1 x daily - 7 x weekly - 3 sets - 10 reps    Charges: 2 TE,  1 NMR   Total Timed Treatment: 40'  min  Total Treatment Time: 40'  min

## 2024-10-22 ENCOUNTER — OFFICE VISIT (OUTPATIENT)
Dept: PHYSICAL THERAPY | Facility: HOSPITAL | Age: 69
End: 2024-10-22
Attending: INTERNAL MEDICINE
Payer: COMMERCIAL

## 2024-10-22 PROCEDURE — 97112 NEUROMUSCULAR REEDUCATION: CPT

## 2024-10-22 PROCEDURE — 97140 MANUAL THERAPY 1/> REGIONS: CPT

## 2024-10-22 PROCEDURE — 97110 THERAPEUTIC EXERCISES: CPT

## 2024-10-22 NOTE — PROGRESS NOTES
Diagnosis:   S/P rotator cuff repair (Z98.890)    right Shoulder Arthroscopic rotator cuff repair, subacromial decompression, distal clavicle excision    Referring Provider: No ref. provider found   Surgeon: Dr. Jose Santos Date of Evaluation:    5/21/2024    Precautions:  adhere to the \"large repair\" protocol, sling 4 weeks.     Visual Impairment, HTN, breast cancer     Next MD Visit:   As needed    Date of Surgery: 5/6/2024    Insurance Primary/Secondary: [employer] [place] PPO / N/A     # Auth Visits: no auth, 20 per POC (PN at 10v)            Subjective:  States she was doing a lot with her grandson this weekend and feels like she tweaked her L shoulder a little; R shoulder remains most limited with sleeping, feels she cannot lay on her R side for more than 5-10 minutes   Pain: 1-2 /10      Objective:   10/22/2024:    AC joint: hypomobile and symptomatic on R inferior glide    8/22/2024: Flexibility    Biceps: R short   Pectoralis: R short    Shoulder AROM: (* denotes performed with pain)   EOS: end of session  8/15/2024  7/23/2024 start  7/2/2024    Flexion: R 141  Abduction: R 146  ER (functional): R occiput  IR (functional): R SI joint (L3 EOS)  Flexion: R 132  Abduction: R 131  ER (elbow neutral): R 59  IR (elbow neutral): R 85 Flexion: R 75* (127 EOS)   Abduction: R 40* (90 EOS)  ER (elbow neutral): R 35  IR (elbow Neutral): R 85        Shoulder PROM: (* denotes performed with pain)  8/15/2024  7/2/2024  Evaluation     Flexion: 160  Abduction: 160  ER (90-90): 80  IR (90-90): 55  Flexion: R 150  Abduction: R 140  ER (30 deg abd):R 70    IR (30 deg abd): R 65  Flexion: R 40*   Scapation: R 65  ER: R 15 (discomfort)   IR: R 50      8/15/2024   Strength/MMT: (* denotes performed with pain)  Shoulder Elbow Scapular   Flexion: R 3+/5; L 5/5  Abduction: R 3+/5; L 5/5  ER: R 4-*/5; L 5/5  IR: R 4/5; L 5/5 Flexion: R 4+/5; L 5/5  Extension: R 4/5; L 5/5 Rhomboids: R 3+* /5, L 4+/5  Mid trap: R 4/5; L 5/5   Lats: R 4-*/5, L  5/5  Low trap: R 3*/5; L 4/5       Assessment:  limitations at R AC joint possibly contributing to trouble laying on R side; improved sx with manual in session. Given thread the needle for thoracic mobility with good tolerance.      Goals: (To be met in 20 visits)   Pt will report improved ability to sleep without waking due to shoulder pain Continued, improving  Pt will improve shoulder flexion AROM to >130 degrees to be able to reach into overhead cabinets without pain or restriction Met, but Continued   Pt will improve shoulder abduction AROM to >130 degrees to improve ability to don deodorant, don/doff shirts, and wash hair Met, but Continued   Pt will increase shoulder AROM ER to T1 to reach and fasten seatbelt Continued   Pt will increase shoulder AROM IR to T12 to be able to reach in back pocket, tuck in shirt, and turn steering wheel without pain Continued   Pt will improve shoulder strength throughout to 4/5 to improve function with ADLs including bathing and dressing independently. Continued   Pt will demonstrate increased mid/low trap strength to 4/5 to promote improved shoulder mechanics and stabilization with ADL such as lifting and reaching Continued   Pt will be independent and compliant with comprehensive HEP to maintain progress achieved in PT Continued      QuickDASH Outcome Score  Score: 84.09 % (5/21/2024  3:40 PM)    Post QuickDASH Outcome Score  Post Score: 36.36 % (8/15/2024 12:36 PM)    47.73 % improvement    Plan: Continue skilled Physical Therapy 1-2 x/week or a total of 35 visits over a 90 day period. Treatment will include: manual therapy, therapeutic exercise, therapeutic activity, neuromuscular re-education, HEP instruction, and self care home management.        Certification From: 8/15/2024  To:11/13/2024     Future Session: AC joint mobility, isometric, scapular stabiity    Date: 9/13/2024  Date: 9/17/2024   Tx: 27/35  Date: 9/20/2024   Tx: 28/35 Date: 9/23/2024   Tx: 29/35 Date:  9/27/2024   Tx: 30/35 Date: 9/30/2024   Tx: 31/35 Date: 10/15/2024   Tx: 32/35 Date: 10/22/2024   Tx: 33/35    MT : 10'   Gr III inferior, posterior GH glide  IR overpressure stretch  MT : 10'   Gr III inferior, posterior GH glide  IR overpressure stretch  MT: 12'  Passive pectoralis stretch 2'   Gr III posterior GH glide  IR overpressure stretch    MT: 8'   Passive pectoralis stretch 2'   Gr III posterior GH glide  IR overpressure stretch  MT: 8'  Gr III posterior GH glide  IR overpressure stretch  MT: 8'   Gr III posterior GH glide  IR overpressure stretch    MT: 5'  Gr III posterior GH glide  IR overpressure stretch  MT: 10'  GR III inferior AC joint glide R/L   Gr III inferior GH glide, R    TE: 5'  Finger Ladder, abduction x10  Standing biceps curl 3# 3x10   TE: 10'    Open book, R x15   Thread the needle, elevated plinth R/L x10ea  Standing biceps curl 3# 3x10   TE: 15'  HEP update  Supine, shoulder flexion, 3# x15   S/L ER 3# 3x6   Standing biceps curl 3# 3x10  Doorway pectoralis stretch 5x20s    TE: 23'  Sleeper stretch on wall, 2x6, 10s hold (attempted s/l)  Standing 3# flexion DB cabinet reach 2x10  Standing 3# abduction DB cabinet reach 2x10  Standing ER, GTB 3x10  IR stretch with strap, x15, 5s hold  HEP update  TE: 25'  Seated pulleys, flexion/scaption 2'   Sleeper stretch on wall, 2x6, 10s hold  Standing \"I\" \"Y\" \"T\" at wall, B 2# x15 ea  S/L open book, x10  Standing triceps extension GTB 3x10   Standing ER, GTB 3x10  Standing IR , GTB 3x10  TE: 23'  Pulleys, flexion, scaption, IR 2' ea  Seated levator scapulae stretch, R 4x20s   Sleeper stretch on wall, 2x6, 10s hold  Seated thoracic ext in chair x10  Standing \"I\" \"Y\" \"T\" at wall, B 2# 2x10 ea  S/L open book, x10  Abduction wall slide, x10     TE: 25'  Pulleys, flexion, scaption, IR 2' ea  Sleeper stretch on wall, 2x6, 10s hold  S/L open book, x10  Wall push up 2x10  Standing horizontal abduction, RTB 2x10   Standing shoulder flexion, RTB  2x10  Stnading shoulder abduction RTB 2x10 TE: 23'   UBE, L2 5' forward/retro  IR stretch with pulleys, x15   Sleeper stretch on wall, 2x6, 10s hold  Wall push up 2x10  Thread the needle, wall R/L x10ea  S/L sleeper stretch, 10x10s   NMR: 25'   Prone horizontal abd (bent elbow) 2x8  Prone shoulder ext x10, x10 w/ 2#   Prone row 2# x20  Supine Serratus Anterior 3# 3x10  Standing shoulder IR (from 90 deg flexion) YTB 3x5  Standing shoulder ER (from 90 deg flexion) YTB 2x10 NMR: 25'   Supine 90-90 IR RTB x20  Supine 90-90 ER RTB x20  Supine shoulder flexion, RTB (difficult) x10  Supine horizontal abduction, RTB x20  Standing rows, 9# 2x10 RUE  Standing \"I/Y\" 2x10ea, 1# B  Lower trapezius setting at wall, 2x10  \"Goal Post\" standing at 1/2 foam at wall 2x8   NMR: 15'  Supine serratus punch, 3# 3x10  Prone Lower Trapezius \"Y\" 3x6    Standing rows, 9# 2x10 RUE  Standing 90-90 (flexion) IR RTB 2x10  NMR: 14'  Prone Lower Trapezius \"Y\" 3x6    Prone 90-90 ER lift, B 3x6  Prone shoulder extension, 3# 2x10  Standing D2 flexion, YTB 2x10  Standing horizontal abduction, palm up, YTB 2x10         NMR: 12'   Prone shoulder extension, 2# 2x10 (full range)  Prone Lower Trapezius \"Y\" 3x6   Prone Horizontal abduction, \"T\" 3x5  90-90 IR ball throw, 4# 4x20s     NMR: 14'  Prone Lower Trapezius \"Y\" 3x6   Prone Horizontal abduction, \"T\" 3x6  90-90 IR ball throw, 2# 3x20s    Overhead 2# med ball wall throw, 3x20s  NMR: 10'  PNF D2, \"X\", x15ea  Standing \"W\" ER with RTB 2x10  Standing elevated row to 90-90 ER x25 RTB  NMR: 12'  Standing Rows, 9# 2x10  Seated lat pulldown, 4.4# 2x10   Standing SA punch 4.4# 2x10   Abduction isometric to ball 2x5,10s hold             HEP:   Access Code: [email or web address]  Date: 06/20/2024  Prepared by: Elizabeth Chong  - Seated Shoulder Flexion AAROM with Pulley Behind  - 1 x daily - 7 x weekly - 3 sets - 10 reps  - Supine Shoulder Flexion AAROM with Hands Clasped  - 1 x daily - 7 x weekly - 2 sets - 10 reps  -  Isometric Shoulder Flexion at Wall  - 1 x daily - 7 x weekly - 10 reps - 5 second hold  - Isometric Shoulder Extension at Wall  - 1 x daily - 7 x weekly - 10 reps - 5 second hold  - Isometric Shoulder Abduction at Wall  - 1 x daily - 7 x weekly - 10 reps - 5 second hold    Access Code: [email or web address]  URL: [email or web address]  Date: 10/22/2024  Prepared by: Elizabeth Chong    Exercises  - Sidelying Shoulder ER with Towel and Dumbbell  - 1 x daily - 7 x weekly - 3 sets - 10 reps  - Prone Single Arm Shoulder Y  - 1 x daily - 7 x weekly - 3 sets - 6-8 reps  - Doorway Pec Stretch at 90 Degrees Abduction  - 1 x daily - 7 x weekly - 3 sets - 20-30 second  hold  - Standing Sleeper Stretch at Wall  - 1 x daily - 7 x weekly - 2 sets - 6 reps - 10 second hold  - Standing Shoulder Single Arm PNF D2 Flexion with Resistance  - 1 x daily - 7 x weekly - 2-3 sets - 10 reps  - Standing Shoulder Horizontal Abduction with Resistance  - 1 x daily - 7 x weekly - 3 sets - 10 reps  - Plank with Thoracic Rotation on Counter  - 1 x daily - 7 x weekly - 2 sets - 10 reps  - Standing Isometric Shoulder Abduction with Doorway - Arm Bent  - 1 x daily - 7 x weekly - 10 reps - 10 second hold    Charges: 1 Manual,  2 TE,  1 NMR   Total Timed Treatment: 45'  min  Total Treatment Time: 45'  min

## 2024-10-25 ENCOUNTER — APPOINTMENT (OUTPATIENT)
Dept: PHYSICAL THERAPY | Facility: HOSPITAL | Age: 69
End: 2024-10-25
Attending: INTERNAL MEDICINE
Payer: COMMERCIAL

## 2024-11-01 ENCOUNTER — OFFICE VISIT (OUTPATIENT)
Dept: PHYSICAL THERAPY | Facility: HOSPITAL | Age: 69
End: 2024-11-01
Attending: INTERNAL MEDICINE
Payer: COMMERCIAL

## 2024-11-01 PROCEDURE — 97110 THERAPEUTIC EXERCISES: CPT

## 2024-11-01 PROCEDURE — 97140 MANUAL THERAPY 1/> REGIONS: CPT

## 2024-11-01 PROCEDURE — 97112 NEUROMUSCULAR REEDUCATION: CPT

## 2024-11-01 NOTE — PROGRESS NOTES
Diagnosis:   S/P rotator cuff repair (Z98.890)    right Shoulder Arthroscopic rotator cuff repair, subacromial decompression, distal clavicle excision    Referring Provider: No ref. provider found   Surgeon: Dr. Jose Santos   Date of Evaluation:    5/21/2024    Precautions:  adhere to the \"large repair\" protocol, sling 4 weeks.     Visual Impairment, HTN, breast cancer     Next MD Visit:   As needed    Date of Surgery: 5/6/2024    Insurance Primary/Secondary: [employer] [place] / N/A     # Auth Visits: no auth, 20 per POC (PN at 10v)            Subjective:  States the R shoulder is about the same, states all functional tasks have continued to improve, still has some trouble with sleeping but states it's getting a lot better.   Pain: 1/10      Objective:   10/22/2024:    AC joint: hypomobile and symptomatic on R inferior glide    8/22/2024: Flexibility    Biceps: R short   Pectoralis: R short    Shoulder AROM: (* denotes performed with pain)   EOS: end of session  11/1/2024  8/15/2024  7/2/2024    Flexion: R 152   Abduction: R 150   ER (functional): R C7  IR (functional): R R L2 UPA*    Flexion: R 141  Abduction: R 146  ER (functional): R occiput  IR (functional): R SI joint (L3 EOS)  Flexion: R 75* (127 EOS)   Abduction: R 40* (90 EOS)  ER (elbow neutral): R 35  IR (elbow Neutral): R 85      8/15/2024   Strength/MMT: (* denotes performed with pain)  Shoulder Elbow Scapular   Flexion: R 3+/5; L 5/5  Abduction: R 3+/5; L 5/5  ER: R 4-*/5; L 5/5  IR: R 4/5; L 5/5 Flexion: R 4+/5; L 5/5  Extension: R 4/5; L 5/5 Rhomboids: R 3+* /5, L 4+/5  Mid trap: R 4/5; L 5/5   Lats: R 4-*/5, L 5/5  Low trap: R 3*/5; L 4/5       Assessment: Improving AROM all directions with no pain limitations at this time; functional IR improves within session after biceps stretching and subscapularis strength training. Pt progressing very well, reviewed and updated HEP for patient to work independently for 3 weeks with follow up and tentative D/C  pending progress.      Goals: (To be met in 20 visits)   Pt will report improved ability to sleep without waking due to shoulder pain Continued, improving  Pt will improve shoulder flexion AROM to >130 degrees to be able to reach into overhead cabinets without pain or restriction Met, but Continued   Pt will improve shoulder abduction AROM to >130 degrees to improve ability to don deodorant, don/doff shirts, and wash hair Met, but Continued   Pt will increase shoulder AROM ER to T1 to reach and fasten seatbelt Continued   Pt will increase shoulder AROM IR to T12 to be able to reach in back pocket, tuck in shirt, and turn steering wheel without pain Continued   Pt will improve shoulder strength throughout to 4/5 to improve function with ADLs including bathing and dressing independently. Continued   Pt will demonstrate increased mid/low trap strength to 4/5 to promote improved shoulder mechanics and stabilization with ADL such as lifting and reaching Continued   Pt will be independent and compliant with comprehensive HEP to maintain progress achieved in PT Continued      QuickDASH Outcome Score  Score: 84.09 % (5/21/2024  3:40 PM)    Post QuickDASH Outcome Score  Post Score: 36.36 % (8/15/2024 12:36 PM)    47.73 % improvement    Plan: Continue skilled Physical Therapy 1-2 x/week or a total of 35 visits over a 90 day period. Treatment will include: manual therapy, therapeutic exercise, therapeutic activity, neuromuscular re-education, HEP instruction, and self care home management.        Certification From: 8/15/2024  To:11/13/2024     Future Session: Reassess for PN     Date: 9/17/2024   Tx: 27/35  Date: 9/20/2024   Tx: 28/35 Date: 9/23/2024   Tx: 29/35 Date: 9/27/2024   Tx: 30/35 Date: 9/30/2024   Tx: 31/35 Date: 10/15/2024   Tx: 32/35 Date: 10/22/2024   Tx: 33/35  Date: 11/1/2024   Tx: 34/35    MT : 10'   Gr III inferior, posterior GH glide  IR overpressure stretch  MT: 12'  Passive pectoralis stretch 2'   Gr III  posterior GH glide  IR overpressure stretch    MT: 8'   Passive pectoralis stretch 2'   Gr III posterior GH glide  IR overpressure stretch  MT: 8'  Gr III posterior GH glide  IR overpressure stretch  MT: 8'   Gr III posterior GH glide  IR overpressure stretch    MT: 5'  Gr III posterior GH glide  IR overpressure stretch  MT: 10'  GR III inferior AC joint glide R/L   Gr III inferior GH glide, R  MT: 8'   GR III inferior AC joint glide R/L    TE: 10'    Open book, R x15   Thread the needle, elevated plinth R/L x10ea  Standing biceps curl 3# 3x10   TE: 15'  HEP update  Supine, shoulder flexion, 3# x15   S/L ER 3# 3x6   Standing biceps curl 3# 3x10  Doorway pectoralis stretch 5x20s    TE: 23'  Sleeper stretch on wall, 2x6, 10s hold (attempted s/l)  Standing 3# flexion DB cabinet reach 2x10  Standing 3# abduction DB cabinet reach 2x10  Standing ER, GTB 3x10  IR stretch with strap, x15, 5s hold  HEP update  TE: 25'  Seated pulleys, flexion/scaption 2'   Sleeper stretch on wall, 2x6, 10s hold  Standing \"I\" \"Y\" \"T\" at wall, B 2# x15 ea  S/L open book, x10  Standing triceps extension GTB 3x10   Standing ER, GTB 3x10  Standing IR , GTB 3x10  TE: 23'  Pulleys, flexion, scaption, IR 2' ea  Seated levator scapulae stretch, R 4x20s   Sleeper stretch on wall, 2x6, 10s hold  Seated thoracic ext in chair x10  Standing \"I\" \"Y\" \"T\" at wall, B 2# 2x10 ea  S/L open book, x10  Abduction wall slide, x10     TE: 25'  Pulleys, flexion, scaption, IR 2' ea  Sleeper stretch on wall, 2x6, 10s hold  S/L open book, x10  Wall push up 2x10  Standing horizontal abduction, RTB 2x10   Standing shoulder flexion, RTB 2x10  Stnading shoulder abduction RTB 2x10 TE: 23'   UBE, L2 5' forward/retro  IR stretch with pulleys, x15   Sleeper stretch on wall, 2x6, 10s hold  Wall push up 2x10  Thread the needle, wall R/L x10ea  S/L sleeper stretch, 10x10s TE: 25'  Biceps stretch at wall x12, 5s hold (improve IR)  Standing 3# flexion B 3x10  Wall push up  2x10  Standing shoulder abduction YTB 2x10  HEP update  Sam discussion, review of form and HEP    NMR: 25'   Supine 90-90 IR RTB x20  Supine 90-90 ER RTB x20  Supine shoulder flexion, RTB (difficult) x10  Supine horizontal abduction, RTB x20  Standing rows, 9# 2x10 RUE  Standing \"I/Y\" 2x10ea, 1# B  Lower trapezius setting at wall, 2x10  \"Goal Post\" standing at 1/2 foam at wall 2x8   NMR: 15'  Supine serratus punch, 3# 3x10  Prone Lower Trapezius \"Y\" 3x6    Standing rows, 9# 2x10 RUE  Standing 90-90 (flexion) IR RTB 2x10  NMR: 14'  Prone Lower Trapezius \"Y\" 3x6    Prone 90-90 ER lift, B 3x6  Prone shoulder extension, 3# 2x10  Standing D2 flexion, YTB 2x10  Standing horizontal abduction, palm up, YTB 2x10         NMR: 12'   Prone shoulder extension, 2# 2x10 (full range)  Prone Lower Trapezius \"Y\" 3x6   Prone Horizontal abduction, \"T\" 3x5  90-90 IR ball throw, 4# 4x20s     NMR: 14'  Prone Lower Trapezius \"Y\" 3x6   Prone Horizontal abduction, \"T\" 3x6  90-90 IR ball throw, 2# 3x20s    Overhead 2# med ball wall throw, 3x20s  NMR: 10'  PNF D2, \"X\", x15ea  Standing \"W\" ER with RTB 2x10  Standing elevated row to 90-90 ER x25 RTB  NMR: 12'  Standing Rows, 9# 2x10  Seated lat pulldown, 4.4# 2x10   Standing SA punch 4.4# 2x10   Abduction isometric to ball 2x5,10s hold NMR: 10'  Prone 90-90 IR (PT supported)   Standing horizontal abduction, RTB 2x10              HEP:   Access Code: [email or web address]  Date: 06/20/2024  Prepared by: Elizabeth Chong  - Seated Shoulder Flexion AAROM with Pulley Behind  - 1 x daily - 7 x weekly - 3 sets - 10 reps  - Supine Shoulder Flexion AAROM with Hands Clasped  - 1 x daily - 7 x weekly - 2 sets - 10 reps  - Isometric Shoulder Flexion at Wall  - 1 x daily - 7 x weekly - 10 reps - 5 second hold  - Isometric Shoulder Extension at Wall  - 1 x daily - 7 x weekly - 10 reps - 5 second hold  - Isometric Shoulder Abduction at Wall  - 1 x daily - 7 x weekly - 10 reps - 5 second hold    Access Code:  [email or web address]  URL: [email or web address]  Date: 10/22/2024  Prepared by: Elizabeth Chong    Exercises  - Sidelying Shoulder ER with Towel and Dumbbell  - 1 x daily - 7 x weekly - 3 sets - 10 reps  - Prone Single Arm Shoulder Y  - 1 x daily - 7 x weekly - 3 sets - 6-8 reps  - Doorway Pec Stretch at 90 Degrees Abduction  - 1 x daily - 7 x weekly - 3 sets - 20-30 second  hold  - Standing Sleeper Stretch at Wall  - 1 x daily - 7 x weekly - 2 sets - 6 reps - 10 second hold  - Standing Shoulder Single Arm PNF D2 Flexion with Resistance  - 1 x daily - 7 x weekly - 2-3 sets - 10 reps  - Standing Shoulder Horizontal Abduction with Resistance  - 1 x daily - 7 x weekly - 3 sets - 10 reps  - Plank with Thoracic Rotation on Counter  - 1 x daily - 7 x weekly - 2 sets - 10 reps  - Standing Isometric Shoulder Abduction with Doorway - Arm Bent  - 1 x daily - 7 x weekly - 10 reps - 10 second hold    11/1/2024: Biceps stretch at wall, Prone IR at 90-90     Charges: 1 Manual,  2 TE,  1 NMR   Total Timed Treatment: 43'  min  Total Treatment Time: 43'  min

## 2024-11-26 ENCOUNTER — APPOINTMENT (OUTPATIENT)
Dept: PHYSICAL THERAPY | Facility: HOSPITAL | Age: 69
End: 2024-11-26
Attending: INTERNAL MEDICINE
Payer: COMMERCIAL

## 2024-11-27 ENCOUNTER — LAB ENCOUNTER (OUTPATIENT)
Dept: LAB | Facility: HOSPITAL | Age: 69
DRG: 494 | End: 2024-11-27
Attending: INTERNAL MEDICINE
Payer: COMMERCIAL

## 2024-11-27 ENCOUNTER — LAB ENCOUNTER (OUTPATIENT)
Dept: LAB | Facility: HOSPITAL | Age: 69
End: 2024-11-27
Attending: INTERNAL MEDICINE
Payer: COMMERCIAL

## 2024-11-27 DIAGNOSIS — E78.00 HYPERCHOLESTEROLEMIA: ICD-10-CM

## 2024-11-27 DIAGNOSIS — R06.09 DOE (DYSPNEA ON EXERTION): Primary | ICD-10-CM

## 2024-11-27 DIAGNOSIS — I10 ESSENTIAL HYPERTENSION: ICD-10-CM

## 2024-11-27 LAB
ALBUMIN SERPL-MCNC: 4.9 G/DL (ref 3.2–4.8)
ALBUMIN/GLOB SERPL: 2.2 {RATIO} (ref 1–2)
ALP LIVER SERPL-CCNC: 66 U/L
ALT SERPL-CCNC: 24 U/L
ANION GAP SERPL CALC-SCNC: 14 MMOL/L (ref 0–18)
AST SERPL-CCNC: 24 U/L (ref ?–34)
BILIRUB SERPL-MCNC: 0.7 MG/DL (ref 0.2–1.1)
BUN BLD-MCNC: 14 MG/DL (ref 9–23)
BUN/CREAT SERPL: 20.6 (ref 10–20)
CALCIUM BLD-MCNC: 10 MG/DL (ref 8.7–10.4)
CHLORIDE SERPL-SCNC: 107 MMOL/L (ref 98–112)
CHOLEST SERPL-MCNC: 179 MG/DL (ref ?–200)
CO2 SERPL-SCNC: 20 MMOL/L (ref 21–32)
CREAT BLD-MCNC: 0.68 MG/DL
EGFRCR SERPLBLD CKD-EPI 2021: 94 ML/MIN/1.73M2 (ref 60–?)
FASTING PATIENT LIPID ANSWER: YES
FASTING STATUS PATIENT QL REPORTED: YES
GLOBULIN PLAS-MCNC: 2.2 G/DL (ref 2–3.5)
GLUCOSE BLD-MCNC: 94 MG/DL (ref 70–99)
HDLC SERPL-MCNC: 79 MG/DL (ref 40–59)
LDLC SERPL CALC-MCNC: 82 MG/DL (ref ?–100)
NONHDLC SERPL-MCNC: 100 MG/DL (ref ?–130)
OSMOLALITY SERPL CALC.SUM OF ELEC: 292 MOSM/KG (ref 275–295)
POTASSIUM SERPL-SCNC: 4.3 MMOL/L (ref 3.5–5.1)
PROT SERPL-MCNC: 7.1 G/DL (ref 5.7–8.2)
SODIUM SERPL-SCNC: 141 MMOL/L (ref 136–145)
TRIGL SERPL-MCNC: 103 MG/DL (ref 30–149)
VLDLC SERPL CALC-MCNC: 16 MG/DL (ref 0–30)

## 2024-11-27 PROCEDURE — 80061 LIPID PANEL: CPT

## 2024-11-27 PROCEDURE — 80053 COMPREHEN METABOLIC PANEL: CPT

## 2024-11-27 PROCEDURE — 36415 COLL VENOUS BLD VENIPUNCTURE: CPT

## 2024-11-29 ENCOUNTER — APPOINTMENT (OUTPATIENT)
Dept: GENERAL RADIOLOGY | Facility: HOSPITAL | Age: 69
DRG: 494 | End: 2024-11-29
Attending: EMERGENCY MEDICINE
Payer: COMMERCIAL

## 2024-11-29 ENCOUNTER — HOSPITAL ENCOUNTER (INPATIENT)
Facility: HOSPITAL | Age: 69
LOS: 1 days | Discharge: HOME | DRG: 494 | End: 2024-11-30
Attending: EMERGENCY MEDICINE | Admitting: STUDENT IN AN ORGANIZED HEALTH CARE EDUCATION/TRAINING PROGRAM
Payer: COMMERCIAL

## 2024-11-29 ENCOUNTER — APPOINTMENT (OUTPATIENT)
Dept: CT IMAGING | Facility: HOSPITAL | Age: 69
DRG: 494 | End: 2024-11-29
Attending: EMERGENCY MEDICINE
Payer: COMMERCIAL

## 2024-11-29 ENCOUNTER — APPOINTMENT (OUTPATIENT)
Dept: GENERAL RADIOLOGY | Facility: HOSPITAL | Age: 69
DRG: 494 | End: 2024-11-29
Attending: ORTHOPAEDIC SURGERY
Payer: COMMERCIAL

## 2024-11-29 ENCOUNTER — HOSPITAL ENCOUNTER (OUTPATIENT)
Facility: HOSPITAL | Age: 69
Setting detail: OBSERVATION
LOS: 1 days | Discharge: HOME | DRG: 494 | End: 2024-11-30
Attending: EMERGENCY MEDICINE | Admitting: STUDENT IN AN ORGANIZED HEALTH CARE EDUCATION/TRAINING PROGRAM
Payer: COMMERCIAL

## 2024-11-29 ENCOUNTER — ANESTHESIA (OUTPATIENT)
Dept: SURGERY | Facility: HOSPITAL | Age: 69
DRG: 494 | End: 2024-11-29
Payer: COMMERCIAL

## 2024-11-29 ENCOUNTER — ANESTHESIA EVENT (OUTPATIENT)
Dept: SURGERY | Facility: HOSPITAL | Age: 69
DRG: 494 | End: 2024-11-29
Payer: COMMERCIAL

## 2024-11-29 DIAGNOSIS — S42.302D: ICD-10-CM

## 2024-11-29 DIAGNOSIS — S42.322B: Primary | ICD-10-CM

## 2024-11-29 PROBLEM — S42.309A HUMERUS SHAFT FRACTURE: Status: ACTIVE | Noted: 2024-11-29

## 2024-11-29 LAB
ANION GAP SERPL CALC-SCNC: 10 MMOL/L (ref 0–18)
BASOPHILS # BLD AUTO: 0.06 X10(3) UL (ref 0–0.2)
BASOPHILS NFR BLD AUTO: 0.7 %
BUN BLD-MCNC: 17 MG/DL (ref 9–23)
BUN/CREAT SERPL: 22.7 (ref 10–20)
CALCIUM BLD-MCNC: 9.7 MG/DL (ref 8.7–10.4)
CHLORIDE SERPL-SCNC: 106 MMOL/L (ref 98–112)
CO2 SERPL-SCNC: 24 MMOL/L (ref 21–32)
CREAT BLD-MCNC: 0.75 MG/DL
DEPRECATED RDW RBC AUTO: 41.2 FL (ref 35.1–46.3)
EGFRCR SERPLBLD CKD-EPI 2021: 86 ML/MIN/1.73M2 (ref 60–?)
EOSINOPHIL # BLD AUTO: 0.09 X10(3) UL (ref 0–0.7)
EOSINOPHIL NFR BLD AUTO: 1.1 %
ERYTHROCYTE [DISTWIDTH] IN BLOOD BY AUTOMATED COUNT: 12.6 % (ref 11–15)
ETHANOL SERPL-MCNC: 227 MG/DL (ref ?–3)
GLUCOSE BLD-MCNC: 109 MG/DL (ref 70–99)
HCT VFR BLD AUTO: 36.5 %
HGB BLD-MCNC: 12.5 G/DL
IMM GRANULOCYTES # BLD AUTO: 0.05 X10(3) UL (ref 0–1)
IMM GRANULOCYTES NFR BLD: 0.6 %
LYMPHOCYTES # BLD AUTO: 3.1 X10(3) UL (ref 1–4)
LYMPHOCYTES NFR BLD AUTO: 37 %
MCH RBC QN AUTO: 31.2 PG (ref 26–34)
MCHC RBC AUTO-ENTMCNC: 34.2 G/DL (ref 31–37)
MCV RBC AUTO: 91 FL
MONOCYTES # BLD AUTO: 0.59 X10(3) UL (ref 0.1–1)
MONOCYTES NFR BLD AUTO: 7 %
NEUTROPHILS # BLD AUTO: 4.48 X10 (3) UL (ref 1.5–7.7)
NEUTROPHILS # BLD AUTO: 4.48 X10(3) UL (ref 1.5–7.7)
NEUTROPHILS NFR BLD AUTO: 53.6 %
OSMOLALITY SERPL CALC.SUM OF ELEC: 292 MOSM/KG (ref 275–295)
PLATELET # BLD AUTO: 270 10(3)UL (ref 150–450)
POTASSIUM SERPL-SCNC: 3.6 MMOL/L (ref 3.5–5.1)
RBC # BLD AUTO: 4.01 X10(6)UL
SODIUM SERPL-SCNC: 140 MMOL/L (ref 136–145)
WBC # BLD AUTO: 8.4 X10(3) UL (ref 4–11)

## 2024-11-29 PROCEDURE — 73030 X-RAY EXAM OF SHOULDER: CPT | Performed by: EMERGENCY MEDICINE

## 2024-11-29 PROCEDURE — 70450 CT HEAD/BRAIN W/O DYE: CPT | Performed by: EMERGENCY MEDICINE

## 2024-11-29 PROCEDURE — 0JDF0ZZ EXTRACTION OF LEFT UPPER ARM SUBCUTANEOUS TISSUE AND FASCIA, OPEN APPROACH: ICD-10-PCS | Performed by: ORTHOPAEDIC SURGERY

## 2024-11-29 PROCEDURE — 0PSGXZZ REPOSITION LEFT HUMERAL SHAFT, EXTERNAL APPROACH: ICD-10-PCS | Performed by: ORTHOPAEDIC SURGERY

## 2024-11-29 PROCEDURE — 76000 FLUOROSCOPY <1 HR PHYS/QHP: CPT | Performed by: ORTHOPAEDIC SURGERY

## 2024-11-29 PROCEDURE — 99223 1ST HOSP IP/OBS HIGH 75: CPT | Performed by: HOSPITALIST

## 2024-11-29 RX ORDER — ALBUTEROL SULFATE 0.83 MG/ML
2.5 SOLUTION RESPIRATORY (INHALATION) AS NEEDED
Status: DISCONTINUED | OUTPATIENT
Start: 2024-11-29 | End: 2024-11-29 | Stop reason: HOSPADM

## 2024-11-29 RX ORDER — ONDANSETRON 2 MG/ML
4 INJECTION INTRAMUSCULAR; INTRAVENOUS EVERY 6 HOURS PRN
Status: DISCONTINUED | OUTPATIENT
Start: 2024-11-29 | End: 2024-11-30

## 2024-11-29 RX ORDER — ONDANSETRON 2 MG/ML
4 INJECTION INTRAMUSCULAR; INTRAVENOUS EVERY 6 HOURS PRN
Status: DISCONTINUED | OUTPATIENT
Start: 2024-11-29 | End: 2024-11-29 | Stop reason: HOSPADM

## 2024-11-29 RX ORDER — LABETALOL HYDROCHLORIDE 5 MG/ML
5 INJECTION, SOLUTION INTRAVENOUS EVERY 5 MIN PRN
Status: DISCONTINUED | OUTPATIENT
Start: 2024-11-29 | End: 2024-11-29 | Stop reason: HOSPADM

## 2024-11-29 RX ORDER — DOCUSATE SODIUM 100 MG/1
100 CAPSULE, LIQUID FILLED ORAL 2 TIMES DAILY
Status: DISCONTINUED | OUTPATIENT
Start: 2024-11-29 | End: 2024-11-30

## 2024-11-29 RX ORDER — LOSARTAN POTASSIUM 100 MG/1
100 TABLET ORAL DAILY
Status: DISCONTINUED | OUTPATIENT
Start: 2024-11-29 | End: 2024-11-30

## 2024-11-29 RX ORDER — MORPHINE SULFATE 4 MG/ML
4 INJECTION, SOLUTION INTRAMUSCULAR; INTRAVENOUS EVERY 10 MIN PRN
Status: DISCONTINUED | OUTPATIENT
Start: 2024-11-29 | End: 2024-11-29 | Stop reason: HOSPADM

## 2024-11-29 RX ORDER — HYDROCODONE BITARTRATE AND ACETAMINOPHEN 5; 325 MG/1; MG/1
2 TABLET ORAL ONCE AS NEEDED
Status: DISCONTINUED | OUTPATIENT
Start: 2024-11-29 | End: 2024-11-29 | Stop reason: HOSPADM

## 2024-11-29 RX ORDER — POLYETHYLENE GLYCOL 3350 17 G/17G
17 POWDER, FOR SOLUTION ORAL DAILY PRN
Status: DISCONTINUED | OUTPATIENT
Start: 2024-11-29 | End: 2024-11-30

## 2024-11-29 RX ORDER — OXYCODONE HYDROCHLORIDE 5 MG/1
2.5 TABLET ORAL EVERY 4 HOURS PRN
Status: DISCONTINUED | OUTPATIENT
Start: 2024-11-29 | End: 2024-11-30

## 2024-11-29 RX ORDER — BUPIVACAINE HYDROCHLORIDE AND EPINEPHRINE 5; 5 MG/ML; UG/ML
INJECTION, SOLUTION PERINEURAL AS NEEDED
Status: DISCONTINUED | OUTPATIENT
Start: 2024-11-29 | End: 2024-11-29 | Stop reason: HOSPADM

## 2024-11-29 RX ORDER — HYDROMORPHONE HYDROCHLORIDE 1 MG/ML
INJECTION, SOLUTION INTRAMUSCULAR; INTRAVENOUS; SUBCUTANEOUS AS NEEDED
Status: DISCONTINUED | OUTPATIENT
Start: 2024-11-29 | End: 2024-11-29 | Stop reason: SURG

## 2024-11-29 RX ORDER — IPRATROPIUM BROMIDE AND ALBUTEROL SULFATE 2.5; .5 MG/3ML; MG/3ML
3 SOLUTION RESPIRATORY (INHALATION) EVERY 6 HOURS PRN
Status: DISCONTINUED | OUTPATIENT
Start: 2024-11-29 | End: 2024-11-30

## 2024-11-29 RX ORDER — FLUTICASONE PROPIONATE AND SALMETEROL 500; 50 UG/1; UG/1
1 POWDER RESPIRATORY (INHALATION) 2 TIMES DAILY
Status: DISCONTINUED | OUTPATIENT
Start: 2024-11-29 | End: 2024-11-30

## 2024-11-29 RX ORDER — SENNOSIDES 8.6 MG
17.2 TABLET ORAL NIGHTLY
Status: DISCONTINUED | OUTPATIENT
Start: 2024-11-29 | End: 2024-11-30

## 2024-11-29 RX ORDER — IPRATROPIUM BROMIDE AND ALBUTEROL SULFATE 2.5; .5 MG/3ML; MG/3ML
3 SOLUTION RESPIRATORY (INHALATION) ONCE
Status: COMPLETED | OUTPATIENT
Start: 2024-11-29 | End: 2024-11-29

## 2024-11-29 RX ORDER — ACETAMINOPHEN 500 MG
1000 TABLET ORAL EVERY 8 HOURS SCHEDULED
Status: DISCONTINUED | OUTPATIENT
Start: 2024-11-29 | End: 2024-11-30

## 2024-11-29 RX ORDER — SODIUM CHLORIDE, SODIUM LACTATE, POTASSIUM CHLORIDE, CALCIUM CHLORIDE 600; 310; 30; 20 MG/100ML; MG/100ML; MG/100ML; MG/100ML
INJECTION, SOLUTION INTRAVENOUS CONTINUOUS PRN
Status: DISCONTINUED | OUTPATIENT
Start: 2024-11-29 | End: 2024-11-29 | Stop reason: SURG

## 2024-11-29 RX ORDER — ROSUVASTATIN CALCIUM 10 MG/1
10 TABLET, COATED ORAL NIGHTLY
Status: DISCONTINUED | OUTPATIENT
Start: 2024-11-29 | End: 2024-11-30

## 2024-11-29 RX ORDER — DIPHENHYDRAMINE HYDROCHLORIDE 50 MG/ML
12.5 INJECTION INTRAMUSCULAR; INTRAVENOUS AS NEEDED
Status: DISCONTINUED | OUTPATIENT
Start: 2024-11-29 | End: 2024-11-29 | Stop reason: HOSPADM

## 2024-11-29 RX ORDER — HYDRALAZINE HYDROCHLORIDE 20 MG/ML
10 INJECTION INTRAMUSCULAR; INTRAVENOUS EVERY 4 HOURS PRN
Status: DISCONTINUED | OUTPATIENT
Start: 2024-11-29 | End: 2024-11-30

## 2024-11-29 RX ORDER — DEXAMETHASONE SODIUM PHOSPHATE 4 MG/ML
VIAL (ML) INJECTION AS NEEDED
Status: DISCONTINUED | OUTPATIENT
Start: 2024-11-29 | End: 2024-11-29 | Stop reason: SURG

## 2024-11-29 RX ORDER — HYDROMORPHONE HYDROCHLORIDE 1 MG/ML
0.2 INJECTION, SOLUTION INTRAMUSCULAR; INTRAVENOUS; SUBCUTANEOUS EVERY 5 MIN PRN
Status: DISCONTINUED | OUTPATIENT
Start: 2024-11-29 | End: 2024-11-29 | Stop reason: HOSPADM

## 2024-11-29 RX ORDER — ZOLPIDEM TARTRATE 5 MG/1
5 TABLET ORAL NIGHTLY PRN
Status: DISCONTINUED | OUTPATIENT
Start: 2024-11-29 | End: 2024-11-30

## 2024-11-29 RX ORDER — LIDOCAINE HYDROCHLORIDE 10 MG/ML
INJECTION, SOLUTION EPIDURAL; INFILTRATION; INTRACAUDAL; PERINEURAL AS NEEDED
Status: DISCONTINUED | OUTPATIENT
Start: 2024-11-29 | End: 2024-11-29 | Stop reason: SURG

## 2024-11-29 RX ORDER — MORPHINE SULFATE 4 MG/ML
2 INJECTION, SOLUTION INTRAMUSCULAR; INTRAVENOUS EVERY 10 MIN PRN
Status: DISCONTINUED | OUTPATIENT
Start: 2024-11-29 | End: 2024-11-29 | Stop reason: HOSPADM

## 2024-11-29 RX ORDER — MORPHINE SULFATE 4 MG/ML
4 INJECTION, SOLUTION INTRAMUSCULAR; INTRAVENOUS EVERY 2 HOUR PRN
Status: DISCONTINUED | OUTPATIENT
Start: 2024-11-29 | End: 2024-11-29 | Stop reason: ALTCHOICE

## 2024-11-29 RX ORDER — OXYCODONE HYDROCHLORIDE 5 MG/1
5 TABLET ORAL EVERY 4 HOURS PRN
Status: DISCONTINUED | OUTPATIENT
Start: 2024-11-29 | End: 2024-11-30

## 2024-11-29 RX ORDER — SODIUM PHOSPHATE, DIBASIC AND SODIUM PHOSPHATE, MONOBASIC 7; 19 G/230ML; G/230ML
1 ENEMA RECTAL ONCE AS NEEDED
Status: DISCONTINUED | OUTPATIENT
Start: 2024-11-29 | End: 2024-11-30

## 2024-11-29 RX ORDER — ONDANSETRON 2 MG/ML
4 INJECTION INTRAMUSCULAR; INTRAVENOUS EVERY 6 HOURS PRN
Status: DISCONTINUED | OUTPATIENT
Start: 2024-11-29 | End: 2024-11-29 | Stop reason: ALTCHOICE

## 2024-11-29 RX ORDER — ASPIRIN 81 MG/1
81 TABLET ORAL 2 TIMES DAILY
Status: DISCONTINUED | OUTPATIENT
Start: 2024-11-29 | End: 2024-11-30

## 2024-11-29 RX ORDER — MORPHINE SULFATE 10 MG/ML
6 INJECTION, SOLUTION INTRAMUSCULAR; INTRAVENOUS EVERY 10 MIN PRN
Status: DISCONTINUED | OUTPATIENT
Start: 2024-11-29 | End: 2024-11-29 | Stop reason: HOSPADM

## 2024-11-29 RX ORDER — HYDRALAZINE HYDROCHLORIDE 20 MG/ML
5 INJECTION INTRAMUSCULAR; INTRAVENOUS AS NEEDED
Status: DISCONTINUED | OUTPATIENT
Start: 2024-11-29 | End: 2024-11-29 | Stop reason: HOSPADM

## 2024-11-29 RX ORDER — MORPHINE SULFATE 2 MG/ML
2 INJECTION, SOLUTION INTRAMUSCULAR; INTRAVENOUS EVERY 2 HOUR PRN
Status: DISCONTINUED | OUTPATIENT
Start: 2024-11-29 | End: 2024-11-29 | Stop reason: ALTCHOICE

## 2024-11-29 RX ORDER — METOCLOPRAMIDE HYDROCHLORIDE 5 MG/ML
10 INJECTION INTRAMUSCULAR; INTRAVENOUS EVERY 8 HOURS PRN
Status: DISCONTINUED | OUTPATIENT
Start: 2024-11-29 | End: 2024-11-30

## 2024-11-29 RX ORDER — HYDROMORPHONE HYDROCHLORIDE 1 MG/ML
0.6 INJECTION, SOLUTION INTRAMUSCULAR; INTRAVENOUS; SUBCUTANEOUS EVERY 5 MIN PRN
Status: DISCONTINUED | OUTPATIENT
Start: 2024-11-29 | End: 2024-11-29 | Stop reason: HOSPADM

## 2024-11-29 RX ORDER — BISACODYL 10 MG
10 SUPPOSITORY, RECTAL RECTAL
Status: DISCONTINUED | OUTPATIENT
Start: 2024-11-29 | End: 2024-11-30

## 2024-11-29 RX ORDER — MIDAZOLAM HYDROCHLORIDE 1 MG/ML
1 INJECTION INTRAMUSCULAR; INTRAVENOUS EVERY 5 MIN PRN
Status: DISCONTINUED | OUTPATIENT
Start: 2024-11-29 | End: 2024-11-29 | Stop reason: HOSPADM

## 2024-11-29 RX ORDER — HYDROMORPHONE HYDROCHLORIDE 1 MG/ML
0.4 INJECTION, SOLUTION INTRAMUSCULAR; INTRAVENOUS; SUBCUTANEOUS EVERY 5 MIN PRN
Status: DISCONTINUED | OUTPATIENT
Start: 2024-11-29 | End: 2024-11-29 | Stop reason: HOSPADM

## 2024-11-29 RX ORDER — MORPHINE SULFATE 4 MG/ML
4 INJECTION, SOLUTION INTRAMUSCULAR; INTRAVENOUS ONCE
Status: COMPLETED | OUTPATIENT
Start: 2024-11-29 | End: 2024-11-29

## 2024-11-29 RX ORDER — PANTOPRAZOLE SODIUM 40 MG/1
40 TABLET, DELAYED RELEASE ORAL
Status: DISCONTINUED | OUTPATIENT
Start: 2024-11-29 | End: 2024-11-30

## 2024-11-29 RX ORDER — HYDRALAZINE HYDROCHLORIDE 50 MG/1
100 TABLET, FILM COATED ORAL 2 TIMES DAILY
Status: DISCONTINUED | OUTPATIENT
Start: 2024-11-29 | End: 2024-11-30

## 2024-11-29 RX ORDER — HYDROCODONE BITARTRATE AND ACETAMINOPHEN 5; 325 MG/1; MG/1
1 TABLET ORAL ONCE AS NEEDED
Status: DISCONTINUED | OUTPATIENT
Start: 2024-11-29 | End: 2024-11-29 | Stop reason: HOSPADM

## 2024-11-29 RX ORDER — NALOXONE HYDROCHLORIDE 0.4 MG/ML
0.08 INJECTION, SOLUTION INTRAMUSCULAR; INTRAVENOUS; SUBCUTANEOUS AS NEEDED
Status: DISCONTINUED | OUTPATIENT
Start: 2024-11-29 | End: 2024-11-29 | Stop reason: HOSPADM

## 2024-11-29 RX ORDER — HYDROMORPHONE HYDROCHLORIDE 1 MG/ML
0.4 INJECTION, SOLUTION INTRAMUSCULAR; INTRAVENOUS; SUBCUTANEOUS EVERY 2 HOUR PRN
Status: DISCONTINUED | OUTPATIENT
Start: 2024-11-29 | End: 2024-11-30

## 2024-11-29 RX ORDER — ETOMIDATE 2 MG/ML
INJECTION INTRAVENOUS AS NEEDED
Status: DISCONTINUED | OUTPATIENT
Start: 2024-11-29 | End: 2024-11-29 | Stop reason: SURG

## 2024-11-29 RX ORDER — ONDANSETRON 2 MG/ML
INJECTION INTRAMUSCULAR; INTRAVENOUS AS NEEDED
Status: DISCONTINUED | OUTPATIENT
Start: 2024-11-29 | End: 2024-11-29 | Stop reason: SURG

## 2024-11-29 RX ORDER — ACETAMINOPHEN 500 MG
1000 TABLET ORAL ONCE AS NEEDED
Status: DISCONTINUED | OUTPATIENT
Start: 2024-11-29 | End: 2024-11-29 | Stop reason: HOSPADM

## 2024-11-29 RX ORDER — DOXEPIN HYDROCHLORIDE 50 MG/1
1 CAPSULE ORAL DAILY
Status: DISCONTINUED | OUTPATIENT
Start: 2024-11-30 | End: 2024-11-30

## 2024-11-29 RX ORDER — DEXTROSE MONOHYDRATE AND SODIUM CHLORIDE 5; .45 G/100ML; G/100ML
INJECTION, SOLUTION INTRAVENOUS CONTINUOUS
Status: DISCONTINUED | OUTPATIENT
Start: 2024-11-29 | End: 2024-11-30

## 2024-11-29 RX ORDER — SODIUM CHLORIDE, SODIUM LACTATE, POTASSIUM CHLORIDE, CALCIUM CHLORIDE 600; 310; 30; 20 MG/100ML; MG/100ML; MG/100ML; MG/100ML
INJECTION, SOLUTION INTRAVENOUS CONTINUOUS
Status: DISCONTINUED | OUTPATIENT
Start: 2024-11-29 | End: 2024-11-29 | Stop reason: HOSPADM

## 2024-11-29 RX ORDER — MIDAZOLAM HYDROCHLORIDE 1 MG/ML
INJECTION INTRAMUSCULAR; INTRAVENOUS AS NEEDED
Status: DISCONTINUED | OUTPATIENT
Start: 2024-11-29 | End: 2024-11-29 | Stop reason: SURG

## 2024-11-29 RX ORDER — MAGNESIUM HYDROXIDE/ALUMINUM HYDROXICE/SIMETHICONE 120; 1200; 1200 MG/30ML; MG/30ML; MG/30ML
30 SUSPENSION ORAL 4 TIMES DAILY PRN
Status: DISCONTINUED | OUTPATIENT
Start: 2024-11-29 | End: 2024-11-30

## 2024-11-29 RX ORDER — HYDROMORPHONE HYDROCHLORIDE 1 MG/ML
0.2 INJECTION, SOLUTION INTRAMUSCULAR; INTRAVENOUS; SUBCUTANEOUS EVERY 2 HOUR PRN
Status: DISCONTINUED | OUTPATIENT
Start: 2024-11-29 | End: 2024-11-30

## 2024-11-29 RX ADMIN — SODIUM CHLORIDE, SODIUM LACTATE, POTASSIUM CHLORIDE, CALCIUM CHLORIDE: 600; 310; 30; 20 INJECTION, SOLUTION INTRAVENOUS at 16:16:00

## 2024-11-29 RX ADMIN — HYDROMORPHONE HYDROCHLORIDE 0.5 MG: 1 INJECTION, SOLUTION INTRAMUSCULAR; INTRAVENOUS; SUBCUTANEOUS at 15:51:00

## 2024-11-29 RX ADMIN — SODIUM CHLORIDE, SODIUM LACTATE, POTASSIUM CHLORIDE, CALCIUM CHLORIDE: 600; 310; 30; 20 INJECTION, SOLUTION INTRAVENOUS at 15:22:00

## 2024-11-29 RX ADMIN — ETOMIDATE 20 MG: 2 INJECTION INTRAVENOUS at 15:32:00

## 2024-11-29 RX ADMIN — LIDOCAINE HYDROCHLORIDE 50 MG: 10 INJECTION, SOLUTION EPIDURAL; INFILTRATION; INTRACAUDAL; PERINEURAL at 15:32:00

## 2024-11-29 RX ADMIN — ONDANSETRON 4 MG: 2 INJECTION INTRAMUSCULAR; INTRAVENOUS at 16:02:00

## 2024-11-29 RX ADMIN — MIDAZOLAM HYDROCHLORIDE 2 MG: 1 INJECTION INTRAMUSCULAR; INTRAVENOUS at 15:26:00

## 2024-11-29 RX ADMIN — DEXAMETHASONE SODIUM PHOSPHATE 4 MG: 4 MG/ML VIAL (ML) INJECTION at 15:42:00

## 2024-11-29 NOTE — ED PROVIDER NOTES
Patient Seen in: [facility] Emergency Department      History     Chief Complaint   Patient presents with    Arm Injury    Bleeding     Stated Complaint: arm injury    Subjective:   The history is provided by the patient and the spouse.         69 year old female who presents with left arm injury, unwitnessed fall tonight.  Patient states she was enjoying the holiday with her family, admits to drinking alcohol but was at home and remembers going to bed, feeling well.   states he was sleeping in a different room and heard a loud fall, came to find the patient laying on the floor with her left arm bent awkwardly behind her.  She did not remember how she fall, she thinks she got up to go to the bathroom.  She recently had rotator cuff surgery at an outside hospital on the right arm earlier this year.  Patient states she cannot move the left arm without significant pain.  She denies any numbness or tingling.  She denies any neck pain or headache.    Objective:     No pertinent past medical history.            No pertinent past surgical history.              No pertinent social history.                Physical Exam     ED Triage Vitals   BP 11/29/24 0238 128/80   Pulse 11/29/24 0238 68   Resp 11/29/24 0238 22   Temp 11/29/24 0238 98.1 °F (36.7 °C)   Temp src 11/29/24 0238 Temporal   SpO2 11/29/24 0430 92 %   O2 Device 11/29/24 0430 None (Room air)       Current Vitals:   Vital Signs  BP: 130/79  Pulse: 79  Resp: 18  Temp: 98.1 °F (36.7 °C)  Temp src: Temporal  MAP (mmHg): 94    Oxygen Therapy  SpO2: 92 %  O2 Device: None (Room air)        Physical Exam  Vitals and nursing note reviewed.   Constitutional:       General: She is not in acute distress.     Appearance: Normal appearance. She is well-developed. She is not ill-appearing, toxic-appearing or diaphoretic.   HENT:      Head: Normocephalic and atraumatic.      Comments: No obvious head injury    Eyes:      Conjunctiva/sclera: Conjunctivae normal.       Pupils: Pupils are equal, round, and reactive to light.   Cardiovascular:      Rate and Rhythm: Normal rate and regular rhythm.      Pulses: Normal pulses.      Heart sounds: Normal heart sounds. No murmur heard.  Pulmonary:      Effort: Pulmonary effort is normal. No respiratory distress.      Breath sounds: Normal breath sounds. No wheezing.   Abdominal:      General: There is no distension.      Palpations: Abdomen is soft.      Tenderness: There is no abdominal tenderness. There is no guarding.   Musculoskeletal:      Left shoulder: No swelling. Decreased range of motion.      Left upper arm: Swelling, deformity, laceration, tenderness and bony tenderness present.      Left elbow: Normal.      Left wrist: Normal.        Arms:       Cervical back: Normal, full passive range of motion without pain, normal range of motion and neck supple. No rigidity. Normal range of motion.      Right lower leg: No edema.      Left lower leg: No edema.      Comments: No clavicle tenderness or deformity.   Skin:     General: Skin is warm and dry.      Findings: No rash.   Neurological:      Mental Status: She is alert and oriented to person, place, and time.      GCS: GCS eye subscore is 4. GCS verbal subscore is 5. GCS motor subscore is 6.      Sensory: Sensation is intact. No sensory deficit.      Motor: Motor function is intact. No weakness.   Psychiatric:         Attention and Perception: Attention normal.         Mood and Affect: Mood normal.         Behavior: Behavior normal. Behavior is cooperative.           ED Course     Labs Reviewed   BASIC METABOLIC PANEL (8) - Abnormal; Notable for the following components:       Result Value    Glucose 109 (*)     BUN/CREA Ratio 22.7 (*)     All other components within normal limits   ETHYL ALCOHOL - Abnormal; Notable for the following components:    Ethyl Alcohol 227 (*)     All other components within normal limits   CBC WITH DIFFERENTIAL WITH PLATELET   RAINBOW DRAW LAVENDER    RAINBOW DRAW LIGHT GREEN   RAINBOW DRAW BLUE   RAINBOW DRAW GOLD       ED Course as of 11/29/24 0637  ------------------------------------------------------------  Time: 11/29 0419  Value: CT BRAIN OR HEAD (CPT=70450)  Comment: CT HEAD WITHOUT CONTRAST    COMPARISON: None    IMPRESSION:    No acute intracranial hemorrhage. No evidence of acute infarct.  No calvarial fracture.    No mass effect or midline shift.  Ventricles are normal without hydrocephalus.  Visualized paranasal sinuses and mastoids are clear.         Left shoulder complete    COMPARISON: None    IMPRESSION:    Displaced left humeral midshaft fracture with underlying permeation.  This may indicate a pathologic fracture.   Moderate soft tissue swelling, Trace soft tissue gas may indicate an open fracture     The alignment is normal. The joint spaces are normal.    Left breast surgical staples.    Mild left basal atelectasis.       MDM      Pulse Ox: 100%, Normal, room air        Prior radiology reviewed: no    My independent radiology interpretation: X-ray of the left humerus/shoulder -displaced midshaft left humerus fracture, defer to radiology read for final report.      Medications   morphINE PF 4 MG/ML injection 4 mg (4 mg Intravenous Given 11/29/24 0332)   ceFAZolin (Ancef) 2g in 10mL IV syringe premix (0 g Intravenous Stopped 11/29/24 0357)   Tetanus-Diphth-Acell Pertussis (Tdap) (Boostrix) injection 0.5 mL (0.5 mL Intramuscular Given 11/29/24 0352)   morphINE PF 4 MG/ML injection 4 mg (4 mg Intravenous Given 11/29/24 0415)     A long arm splint was applied to the LUE.  After application of the splint I returned and re-examined the patient.  The splint was adequately immobilizing the joint and distal to the splint the patient's circulation and sensation was intact.    Admission disposition: 11/29/2024  4:46 AM         D/w Dr Crawley - will admit  D/w Dr Castle on-call orthopedics -will consult, advises irrigate the open wound, splint the  extremity, update tetanus, agrees with [facility] and will see patient in the morning, keep n.p.o. for possible procedure  Disposition and Plan     Clinical Impression:  1. Open displaced transverse fracture of shaft of left humerus, initial encounter         Disposition:  Admit  11/29/2024  4:46 am    Follow-up:  No follow-up provider specified.  We recommend that you schedule follow up care with a primary care provider within the next three months to obtain basic health screening including reassessment of your blood pressure.      Medications Prescribed:  Current Discharge Medication List              Supplementary Documentation:         Hospital Problems       Present on Admission  Date Reviewed: 7/1/2024            ICD-10-CM Noted POA    Humerus shaft fracture S42.309A 11/29/2024 Unknown

## 2024-11-29 NOTE — BRIEF OP NOTE
Pre-Operative Diagnosis: Humeral shaft fracture [S42.309A]     Post-Operative Diagnosis: Humeral shaft fracture [S42.309A]      Procedure Performed:   INCISION AND DRAINAGE WITH SPLINTING    Surgeons and Role:     * Venkatesh Castle DO - Primary    Assistant(s):  Surgical Assistant.: Ethel Cagle     Surgical Findings: Grade 1 open humeral shaft fracture     Estimated Blood Loss: 10 cc    NWB LUE  Maintain coaptation splint clean and dry  Sling for comfort  Post op abx for 24hrs (3 doses)  Pain control   DVT ppx - may discharge with ASA 81mg BID x 35 days  Ortho stable for discharge after she receives her 3 doses of abx post operatively     Venkatesh Castle DO  11/29/2024  4:18 PM

## 2024-11-29 NOTE — ED INITIAL ASSESSMENT (HPI)
Pt to ED with c/o left arm injury with bleeding post fall. Pt found on floor, unwitnessed fall. Pt states she does not remember the fall but denies head injury. Possible deformity to extremity. Partner states puncture wound to extremity. Pt smells like alcohol. Pt denies use of blood thinners.
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
## 2024-11-29 NOTE — ANESTHESIA PROCEDURE NOTES
Airway  Date/Time: 11/29/2024 3:34 PM  Urgency: Elective      General Information and Staff    Patient location during procedure: OR  Anesthesiologist: Nicolette Regan MD  Resident/CRNA: Emilie Regalado CRNA  Performed: anesthesiologist and CRNA   Performed by: iNcolette Regan MD  Authorized by: Nicolette Regan MD      Indications and Patient Condition  Indications for airway management: anesthesia  Sedation level: deep  Preoxygenated: yes  Patient position: sniffing  Mask difficulty assessment: 1 - vent by mask    Final Airway Details  Final airway type: supraglottic airway      Successful airway: classic (Ambu)  Size 4       Number of attempts at approach: 1  Number of other approaches attempted: 0

## 2024-11-29 NOTE — ANESTHESIA POSTPROCEDURE EVALUATION
Patient: Sandrine Oleary    Procedure Summary       Date: 11/29/24 Room / Location: [facility] MAIN OR  / [facility] MAIN OR    Anesthesia Start: 1524 Anesthesia Stop: 1625    Procedure: INCISION AND DRAINAGE WITH SPLINTING (Left: Upper Arm) Diagnosis:       Humeral shaft fracture      (Humeral shaft fracture [S42.309A])    Surgeons: Venkatesh Castle DO Anesthesiologist: Nicolette Regan MD    Anesthesia Type: general ASA Status: 3            Anesthesia Type: general    Vitals Value Taken Time   /87 11/29/24 1626   Temp 98.7 11/29/24 1626   Pulse 97 11/29/24 1625   Resp 19 11/29/24 1625   SpO2 98 % 11/29/24 1625   Vitals shown include unfiled device data.    [facility] AN Post Evaluation:   Patient Evaluated in PACU  Patient Participation: complete - patient participated  Level of Consciousness: awake and alert  Pain Score: 1  Pain Management: satisfactory to patient  Airway Patency:patent  Yes    Nausea/Vomiting: none  Cardiovascular Status: acceptable and blood pressure returned to baseline  Respiratory Status: acceptable  Postoperative Hydration euvolemic      Nicolette Regan MD  11/29/2024 4:26 PM
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
## 2024-11-29 NOTE — PLAN OF CARE
Patient alert and oriented x4. On 3lt via nasal canula, baseline room air. BRP. IV ancef abx for prophylaxis. Left shoulder I&D with splinting done today with Dr. Castle. Plan is work with PT/OT for eval     Problem: Patient Centered Care  Goal: Patient preferences are identified and integrated in the patient's plan of care  Description: Interventions:  - What would you like us to know as we care for you?  - Provide timely, complete, and accurate information to patient/family  - Incorporate patient and family knowledge, values, beliefs, and cultural backgrounds into the planning and delivery of care  - Encourage patient/family to participate in care and decision-making at the level they choose  - Honor patient and family perspectives and choices  Outcome: Progressing    Problem: PAIN - ADULT  Goal: Verbalizes/displays adequate comfort level or patient's stated pain goal  Description: INTERVENTIONS:  - Encourage pt to monitor pain and request assistance  - Assess pain using appropriate pain scale  - Administer analgesics based on type and severity of pain and evaluate response  - Implement non-pharmacological measures as appropriate and evaluate response  - Consider cultural and social influences on pain and pain management  - Manage/alleviate anxiety  - Utilize distraction and/or relaxation techniques  - Monitor for opioid side effects  - Notify MD/LIP if interventions unsuccessful or patient reports new pain  - Anticipate increased pain with activity and pre-medicate as appropriate  Outcome: Progressing     Problem: RISK FOR INFECTION - ADULT  Goal: Absence of fever/infection during anticipated neutropenic period  Description: INTERVENTIONS  - Monitor WBC  - Administer growth factors as ordered  - Implement neutropenic guidelines  Outcome: Progressing     Problem: SAFETY ADULT - FALL  Goal: Free from fall injury  Description: INTERVENTIONS:  - Assess pt frequently for physical needs  - Identify cognitive and  physical deficits and behaviors that affect risk of falls.  - [place] fall precautions as indicated by assessment.  - Educate pt/family on patient safety including physical limitations  - Instruct pt to call for assistance with activity based on assessment  - Modify environment to reduce risk of injury  - Provide assistive devices as appropriate  - Consider OT/PT consult to assist with strengthening/mobility  - Encourage toileting schedule  Outcome: Progressing     Problem: DISCHARGE PLANNING  Goal: Discharge to home or other facility with appropriate resources  Description: INTERVENTIONS:  - Identify barriers to discharge w/pt and caregiver  - Include patient/family/discharge partner in discharge planning  - Arrange for needed discharge resources and transportation as appropriate  - Identify discharge learning needs (meds, wound care, etc)  - Arrange for interpreters to assist at discharge as needed  - Consider post-discharge preferences of patient/family/discharge partner  - Complete POLST form as appropriate  - Assess patient's ability to be responsible for managing their own health  - Refer to Case Management Department for coordinating discharge planning if the patient needs post-hospital services based on physician/LIP order or complex needs related to functional status, cognitive ability or social support system  Outcome: Progressing

## 2024-11-29 NOTE — ED QUICK NOTES
Orders for admission, patient is aware of plan and ready to go upstairs. Any questions, please call ED GREGORY Escalera  at extension [phone].   Type of COVID test sent: n/a  COVID Suspicion level: Low    Titratable drug(s) infusing:  Rate:    LOC at time of transport:x4    Other pertinent information: Unwitnessed fall at home today with LUE deformity and associated puncture wound in the skin of the upper arm. Ortho on consult would like pt NPO and will see this morning. Long arm posterior mold placed on pt by ERT. Pt tolerated splinting well. +ETOH at a family party this evening.    CIWA score=  NIH score=

## 2024-11-30 VITALS
TEMPERATURE: 99 F | SYSTOLIC BLOOD PRESSURE: 150 MMHG | HEIGHT: 66 IN | BODY MASS INDEX: 24.91 KG/M2 | WEIGHT: 155 LBS | DIASTOLIC BLOOD PRESSURE: 89 MMHG | RESPIRATION RATE: 18 BRPM | HEART RATE: 84 BPM | OXYGEN SATURATION: 96 %

## 2024-11-30 LAB
ANION GAP SERPL CALC-SCNC: 8 MMOL/L (ref 0–18)
BASOPHILS # BLD AUTO: 0.01 X10(3) UL (ref 0–0.2)
BASOPHILS NFR BLD AUTO: 0.1 %
BUN BLD-MCNC: 12 MG/DL (ref 9–23)
BUN/CREAT SERPL: 20.7 (ref 10–20)
CALCIUM BLD-MCNC: 9.6 MG/DL (ref 8.7–10.4)
CHLORIDE SERPL-SCNC: 104 MMOL/L (ref 98–112)
CO2 SERPL-SCNC: 25 MMOL/L (ref 21–32)
CREAT BLD-MCNC: 0.58 MG/DL
DEPRECATED RDW RBC AUTO: 43.4 FL (ref 35.1–46.3)
EGFRCR SERPLBLD CKD-EPI 2021: 98 ML/MIN/1.73M2 (ref 60–?)
EOSINOPHIL # BLD AUTO: 0 X10(3) UL (ref 0–0.7)
EOSINOPHIL NFR BLD AUTO: 0 %
ERYTHROCYTE [DISTWIDTH] IN BLOOD BY AUTOMATED COUNT: 12.6 % (ref 11–15)
GLUCOSE BLD-MCNC: 142 MG/DL (ref 70–99)
HCT VFR BLD AUTO: 34.5 %
HGB BLD-MCNC: 11.5 G/DL
IMM GRANULOCYTES # BLD AUTO: 0.04 X10(3) UL (ref 0–1)
IMM GRANULOCYTES NFR BLD: 0.5 %
LYMPHOCYTES # BLD AUTO: 0.55 X10(3) UL (ref 1–4)
LYMPHOCYTES NFR BLD AUTO: 7.2 %
MCH RBC QN AUTO: 31 PG (ref 26–34)
MCHC RBC AUTO-ENTMCNC: 33.3 G/DL (ref 31–37)
MCV RBC AUTO: 93 FL
MONOCYTES # BLD AUTO: 0.42 X10(3) UL (ref 0.1–1)
MONOCYTES NFR BLD AUTO: 5.5 %
NEUTROPHILS # BLD AUTO: 6.58 X10 (3) UL (ref 1.5–7.7)
NEUTROPHILS # BLD AUTO: 6.58 X10(3) UL (ref 1.5–7.7)
NEUTROPHILS NFR BLD AUTO: 86.7 %
OSMOLALITY SERPL CALC.SUM OF ELEC: 286 MOSM/KG (ref 275–295)
PLATELET # BLD AUTO: 270 10(3)UL (ref 150–450)
POTASSIUM SERPL-SCNC: 4 MMOL/L (ref 3.5–5.1)
RBC # BLD AUTO: 3.71 X10(6)UL
SODIUM SERPL-SCNC: 137 MMOL/L (ref 136–145)
WBC # BLD AUTO: 7.6 X10(3) UL (ref 4–11)

## 2024-11-30 RX ORDER — HYDROCODONE BITARTRATE AND ACETAMINOPHEN 5; 325 MG/1; MG/1
1-2 TABLET ORAL EVERY 6 HOURS PRN
Qty: 25 TABLET | Refills: 0 | Status: SHIPPED | OUTPATIENT
Start: 2024-11-30

## 2024-11-30 RX ORDER — HYDROCODONE BITARTRATE AND ACETAMINOPHEN 5; 325 MG/1; MG/1
1 TABLET ORAL EVERY 6 HOURS PRN
Status: DISCONTINUED | OUTPATIENT
Start: 2024-11-30 | End: 2024-11-30

## 2024-11-30 RX ORDER — ONDANSETRON 4 MG/1
4 TABLET, FILM COATED ORAL EVERY 8 HOURS PRN
Qty: 20 TABLET | Refills: 0 | Status: SHIPPED | OUTPATIENT
Start: 2024-11-30
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
## 2024-11-30 NOTE — DISCHARGE INSTRUCTIONS
Keep dressing to L arm clean, dry and intact until your follow up appointment.  Wear sling to L arm at all times.   Non weight bearing to L arm.   Take all medications as prescribed.   Randy Ahn with any questions or concerns.

## 2024-11-30 NOTE — PROGRESS NOTES
ORTHO Progress Note  Patient seen and examined  Overall doing well  Pain controlled  Aox3  NAD  Dressing/splint clean, dry and intact  Has some swelling of left hand  Motor and sensation intact distally  Compartments soft  No calf tenderness  Good distal pulses  S/p I&D left open humeral shaft fracture 11/29/34  NWLINDA SHAFFER  Maintain coaptation splint clean and dry  Sling for comfort  Post op abx for 24hrs (3 doses)  Pain control   DVT ppx - may discharge with ASA 81mg BID x 35 days  Ortho stable for discharge after she receives her 3 doses of abx post operatively   Follow up as outpatient in 1-2 weeks with my partner Dr. Velázquez with [facility]

## 2024-11-30 NOTE — PLAN OF CARE
Pt is A&Ox4. Pt is on 1L O2 NC. Ambulates x1 assist. Voids in bathroom. PRN pain medication provided as needed. Fall precautions in place- bed alarm on, bed locked in lowest position, call light within reach. Frequent rounding by nursing staff.      Problem: Patient Centered Care  Goal: Patient preferences are identified and integrated in the patient's plan of care  Description: Interventions:  - What would you like us to know as we care for you?   - Provide timely, complete, and accurate information to patient/family  - Incorporate patient and family knowledge, values, beliefs, and cultural backgrounds into the planning and delivery of care  - Encourage patient/family to participate in care and decision-making at the level they choose  - Honor patient and family perspectives and choices  Outcome: Progressing     Problem: Patient/Family Goals  Goal: Patient/Family Long Term Goal  Description: Patient's Long Term Goal: to discharge     Interventions:  - Monitor vital signs   - Monitor appropriate labs    - Pain management   - Administer medications per order   - Follow MD orders   - Diagnostics per order   - Update/inform patient and family on plan of care   - Discharge planning    - See additional Care Plan goals for specific interventions  Outcome: Progressing  Goal: Patient/Family Short Term Goal  Description: Patient's Short Term Goal: To manage pain    Interventions:   - Monitor vital signs   - Monitor appropriate labs   - Pain management   - Administer medications per order   - Follow MD orders   - Diagnostics per order   - Update/inform patient and family on plan of care   - See additional Care Plan goals for specific interventions  Outcome: Progressing     Problem: PAIN - ADULT  Goal: Verbalizes/displays adequate comfort level or patient's stated pain goal  Description: INTERVENTIONS:  - Encourage pt to monitor pain and request assistance  - Assess pain using appropriate pain scale  - Administer analgesics  based on type and severity of pain and evaluate response  - Implement non-pharmacological measures as appropriate and evaluate response  - Consider cultural and social influences on pain and pain management  - Manage/alleviate anxiety  - Utilize distraction and/or relaxation techniques  - Monitor for opioid side effects  - Notify MD/LIP if interventions unsuccessful or patient reports new pain  - Anticipate increased pain with activity and pre-medicate as appropriate  Outcome: Progressing     Problem: RISK FOR INFECTION - ADULT  Goal: Absence of fever/infection during anticipated neutropenic period  Description: INTERVENTIONS  - Monitor WBC  - Administer growth factors as ordered  - Implement neutropenic guidelines  Outcome: Progressing     Problem: SAFETY ADULT - FALL  Goal: Free from fall injury  Description: INTERVENTIONS:  - Assess pt frequently for physical needs  - Identify cognitive and physical deficits and behaviors that affect risk of falls.  - [place] fall precautions as indicated by assessment.  - Educate pt/family on patient safety including physical limitations  - Instruct pt to call for assistance with activity based on assessment  - Modify environment to reduce risk of injury  - Provide assistive devices as appropriate  - Consider OT/PT consult to assist with strengthening/mobility  - Encourage toileting schedule  Outcome: Progressing     Problem: DISCHARGE PLANNING  Goal: Discharge to home or other facility with appropriate resources  Description: INTERVENTIONS:  - Identify barriers to discharge w/pt and caregiver  - Include patient/family/discharge partner in discharge planning  - Arrange for needed discharge resources and transportation as appropriate  - Identify discharge learning needs (meds, wound care, etc)  - Arrange for interpreters to assist at discharge as needed  - Consider post-discharge preferences of patient/family/discharge partner  - Complete POLST form as appropriate  - Assess  patient's ability to be responsible for managing their own health  - Refer to Case Management Department for coordinating discharge planning if the patient needs post-hospital services based on physician/LIP order or complex needs related to functional status, cognitive ability or social support system  Outcome: Progressing

## 2024-11-30 NOTE — OCCUPATIONAL THERAPY NOTE
OCCUPATIONAL THERAPY EVALUATION - INPATIENT     Room Number: 413/413-A  Evaluation Date: 11/30/2024  Type of Evaluation: Initial  Presenting Problem: Left open Gustilo Type 1 humeral shaft fracture - INCISION AND DRAINAGE WITH SPLINTING    Physician Order: IP Consult to Occupational Therapy  Reason for Therapy: ADL/IADL Dysfunction and Discharge Planning    OCCUPATIONAL THERAPY ASSESSMENT   Patient is a 69 year old female admitted 11/29/2024 for Left open Gustilo Type 1 humeral shaft fracture - INCISION AND DRAINAGE WITH SPLINTING.  Prior to admission, patient's baseline is I with ADLs, IADLS, driving.  Patient is currently functioning below baseline with toileting, bathing, upper body dressing, lower body dressing, bed mobility, and transfers.  Patient is requiring stand-by assist and minimal assist as a result of the following impairments: decreased functional strength, decreased functional reach, decreased endurance, pain, impaired dynamic balance, decreased muscular endurance, and medical status. Occupational Therapy will continue to follow for duration of hospitalization.    Patient will benefit from continued skilled OT Services at discharge to promote prior level of function and safety with additional support and return home with home health OT.    PLAN DURING HOSPITALIZATION  OT Device Recommendations: TBD  OT Treatment Plan: Balance activities;Energy conservation/work simplification techniques;ADL training;IADL training;Functional transfer training;Visual perceptual training;Patient/Family education;Patient/Family training     OCCUPATIONAL THERAPY MEDICAL/SOCIAL HISTORY   Problem List   Principal Problem:    Open displaced transverse fracture of shaft of left humerus, initial encounter  Active Problems:    Humerus shaft fracture    HOME SITUATION  Type of Home: House  Home Layout: Multi-level (4 steps to enter, 10-12 steps up and down)  Lives With: Spouse  Toilet and Equipment: Standard height  toilet  Shower/Tub and Equipment: Walk-in shower  Other Equipment: None  Hand Dominance: Right  Drives: Yes  Patient Regularly Uses: None    Stairs in Home: 4 to enter, 10-12 each level  Assistive Device(s) Used: none     Prior Level of [place]: I with ADLs, IADLs, driving    SUBJECTIVE  Pt agreeable to occupational therapy session.     OCCUPATIONAL THERAPY EXAMINATION   OBJECTIVE  Precautions: Limb alert - left; Other (Comment) (sling on at all times)  Fall Risk: Standard fall risk    WEIGHT BEARING RESTRICTION  L Upper Extremity: Non-Weight Bearing    PAIN ASSESSMENT  Ratin  Location: L shoulder  Management Techniques: Activity promotion; Body mechanics      COGNITION  Overall Cognitive Status:  WFL - within functional limits    RANGE OF MOTION   Upper extremity ROM is within functional limits- L side NT    STRENGTH ASSESSMENT  Upper extremity strength is within functional limits - L side NT    ACTIVITIES OF DAILY LIVING ASSESSMENT  AM-PAC ‘6-Clicks’ Inpatient Daily Activity Short Form  How much help from another person does the patient currently need…  -   Putting on and taking off regular lower body clothing?: A Little  -   Bathing (including washing, rinsing, drying)?: A Little  -   Toileting, which includes using toilet, bedpan or urinal? : A Little  -   Putting on and taking off regular upper body clothing?: A Little  -   Taking care of personal grooming such as brushing teeth?: A Little  -   Eating meals?: A Little    AM-PAC Score:  Score: 18  Approx Degree of Impairment: 46.65%  Standardized Score (AM-PAC Scale): 38.66  CMS Modifier (G-Code): CK    FUNCTIONAL TRANSFER ASSESSMENT  Sit to Stand: Chair  Chair: Stand-by Assist    BED MOBILITY     BALANCE ASSESSMENT  Static Sitting: Stand-by Assist  Static Standing: Stand-by Assist    FUNCTIONAL ADL ASSESSMENT  Eating: Stand-by Assist  Grooming Seated: Stand-by Assist  Bathing Seated: Minimal Assist  UB Dressing Seated: Minimal Assist  LB Dressing  Seated: Minimal Assist  Toileting Seated: Minimal Assist    THERAPEUTIC EXERCISE     Skilled Therapy Provided: Pt seen for skilled OT evaluation to address pt's I and safety with functional mobility, t/fs, and ADLs. Pt's RN approved session and pt agreeable. Pt performing functional mobility and t/fs with SBA without AE. Pt edu provided re: role of OT, NWBing precaution, sling positioning and wear, safe t/f techniques, DME/AE for ADLs, fall prevention. Pt demo'd understanding of all concepts covered. End of session, pt up in recliner, all needs met. RN aware of pt status. Continue skilled OT.       EDUCATION PROVIDED  Patient Education : Role of Occupational Therapy; Plan of Care; Functional Transfer Techniques; Fall Prevention; Surgical Precautions; Posture/Positioning; Edema Reduction; Bracing Education Provided; Energy Conservation; Weight Bear Status; Discharge Recommendations  Patient's Response to Education: Verbalized Understanding; Returned Demonstration    The patient's Approx Degree of Impairment: 46.65% has been calculated based on documentation in the [facility] '6 clicks' Inpatient Daily Activity Short Form.  Research supports that patients with this level of impairment may benefit from home with HH.  Final disposition will be made by interdisciplinary medical team.    Patient End of Session: Up in chair;Needs met;Call light within reach;RN aware of session/findings;All patient questions and concerns addressed    OT Goals  Patient self-stated goal is: go home     Patient will complete UE dressing with SBA  Comment:     Patient will complete toilet transfer with SBA  Comment:     Patient will complete self care task at sink level with SBA   Comment:    Patient will independently recall NWBing precaution  Comment:         Goals  on: 24  Frequency:3-5x/week    Patient Evaluation Complexity Level:   Occupational Profile/Medical History MODERATE - Expanded review of history including review of medical  or therapy record   Specific performance deficits impacting engagement in ADL/IADL MODERATE  3 - 5 performance deficits   Client Assessment/Performance Deficits MODERATE - Comorbidities and min to mod modifications of tasks    Clinical Decision Making MODERATE - Analysis of occupational profile, detailed assessments, several treatment options    Overall Complexity MODERATE     OT Session Time:   Self-Care Home Management: 15 minutes  Therapeutic Activity: 15 minutes    Jen Camarena, Occupational Therapist, MS, OTR/L

## 2024-11-30 NOTE — OPERATIVE REPORT
DATE OF SURGERY  11/29/24      PREOPERATIVE DIAGNOSIS  Left open Gustilo Type 1 humeral shaft fracture      POSTOPERATIVE DIAGNOSIS  Left open Gustilo Type 1 humeral shaft fracture      PROCEDURE PERFORMED  Irrigation and debridement of left open Gustilo Type 1 humeral shaft fracture   Open reduction and splinting of left humeral shaft fracture     TYPE OF ANESTHESIA  General     INDICATIONS  The patient is a pleasant 69 year old female that sustained a left open humeral shaft fracture after a fall from standing. We discussed both surgical and nonsurgical options. After the risks, benefits, alternatives, complications and anticipated postoperative course were discussed she elected to proceed with the below mentioned procedure. I discussed the possible medical complications as including, but not limited to heart attack, cardiopulmonary complications, blood clot, pulmonary embolism, adverse reaction to anesthesia, death, and stroke. I discussed the possible surgical complications as including, but not limited to: bleeding, infection, damage to neurovascular structures, seroma, hematoma, continued pain, malunion, nonunion, malrotation, iatrogenic fracture, compartment syndrome, wound healing problems, and the need for future surgeries. The patient understands that during surgery there may be a possibility that other procedures will be indicated based on intraoperative conditions. As in all operations, there is no guarantee of total resolution of symptoms or total lack of complications. No guarantees regarding pain relief or return of function were made. She verbalized a good understanding of the surgery and its associated risks and benefits and elected to proceed with the below mentioned procedure.     SURGEON  Venkatesh Castle DO    ASSISTANT  Ethel Cagle SA    She assisted with every aspect of the operation including, but not limited to, patient positioning, obtaining adequate surgical exposure, manipulation of  surgical instruments, the delicate task of providing suction to the surgical wound, the continual process of hemostasis during the procedure itself, extremity manipulation and wound closure. Her assistance allowed me to perform the most sensitive and technical potions of this operation using 2 hands, thus enhancing patient safety. This would not be possible without the help of a skilled assistant familiar with the procedure and capable of safely performing the aforementioned tasks.      FINDINGS  Open left humeral shaft fracture with approximately 3 mm inside-out puncture wound which was incised, irrigated and debrided without complication.    UNANTICIPATED EVENTS AND COMPLICATIONS  None     DESCRIPTION OF PROCEDURE  The patient was greeted in the preoperative holding area. The informed consent was reviewed and signed.  Their left upper extremity was marked with a marking pen. The patient was brought back to the operating room.  Preoperative antibiotics were administered. The patient was transferred to the operative table and placed in the supine position. All bony prominences were well padded.  The left upper extremity was prepped and draped in usual sterile fashion.  A timeout was performed identifying the correct patient, procedure, site and side.  The anterior lateral midshaft humeral wound was identified.  Measured approximately 2 to 3 mm.  This was extended sharply with a cold knife longitudinally approximately 3 to 4 cm. The underlying soft tissue, muscle and bone were sharply debrided and excised to clean healthy margins with a scalpel.  The wound and bone was copiously irrigated with at least 3 L of saline.  The incision was then closed with 2-0 PDS and 3-0 nylon.  The fracture was reduced and placed in a well-padded coaptation splint.  Post reduction radiographs were obtained and showed fracture in good alignment. The patient tolerated the procedure well.  All counts were correct at the end of the case.  Anesthesia awoke the patient and they were transferred to PACU in apparent satisfactory condition.     IMPLANTS AND DEVICES  None     ESTIMATED BLOOD LOSS  10 mL     PATIENT CONDITION AND DISPOSITION  Stable to PACU

## 2024-11-30 NOTE — PHYSICAL THERAPY NOTE
PHYSICAL THERAPY EVALUATION - INPATIENT     Room Number: 413/413-A  Evaluation Date: 2024  Type of Evaluation: Initial   Physician Order: PT Eval and Treat    Presenting Problem: L shoulder Fx     Reason for Therapy: Mobility Dysfunction and Discharge Planning    PHYSICAL THERAPY ASSESSMENT   Patient is a 69 year old female admitted 2024 for fall and L prox humeral Fx with I&D.  Prior to admission, patient's baseline is indep.  Patient is currently functioning below baseline with bed mobility, transfers, and gait.    PLAN  Patient has been evaluated and presents with no skilled Physical Therapy needs at this time.  Patient will be discharged from Physical Therapy services. Please re-order if a new functional limitation presents during this admission.         PHYSICAL THERAPY MEDICAL/SOCIAL HISTORY   History related to current admission: fall at home and Dx with L humeral Fx.  Had I&D.  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 Lives in 2 level home with spouse. Indep with ADLs    SUBJECTIVE  My arm hurts    PHYSICAL THERAPY EXAMINATION   OBJECTIVE  Precautions: Limb alert - left  Fall Risk: Standard fall risk    WEIGHT BEARING RESTRICTION  L Upper Extremity: Non-Weight Bearing           PAIN ASSESSMENT  Ratin  Location: shoulder  Management Techniques: Activity promotion    COGNITION  Overall Cognitive Status:  WFL - within functional limits    RANGE OF MOTION AND STRENGTH ASSESSMENT  Upper extremity ROM and strength are within functional limits except for the following:  LUE  Lower  extremity ROM is within functional limits   Lower extremity strength is within functional limits     BALANCE  Static Sitting: Good  Dynamic Sitting: Good  Static Standing: Fair +  Dynamic Standing: Fair +    ADDITIONAL TESTS                                    NEUROLOGICAL FINDINGS                      ACTIVITY TOLERANCE                         O2 WALK       AM-PAC '6-Clicks' INPATIENT SHORT FORM - BASIC MOBILITY  How much difficulty does the patient currently have...  Patient Difficulty: Turning over in bed (including adjusting bedclothes, sheets and blankets)?: A Little   Patient Difficulty: Sitting down on and standing up from a chair with arms (e.g., wheelchair, bedside commode, etc.): None   Patient Difficulty: Moving from lying on back to sitting on the side of the bed?: None   How much help from another person does the patient currently need...   Help from Another: Moving to and from a bed to a chair (including a wheelchair)?: None   Help from Another: Need to walk in hospital room?: A Little   Help from Another: Climbing 3-5 steps with a railing?: A Little     AM-PAC Score:  Raw Score: 21   Approx Degree of Impairment: 28.97%   Standardized Score (AM-PAC Scale): 50.25   CMS Modifier (G-Code):     FUNCTIONAL ABILITY STATUS  Functional Mobility/Gait Assessment  Gait Assistance: Supervision  Distance (ft): 2x300  Assistive Device: None  Pattern: Shuffle  Stairs: Stairs  How Many Stairs: 12  Device: 1 Rail  Assist: Contact guard assist  Rolling: modified independent  Supine to Sit: supervision  Sit to Supine: supervision  Sit to Stand: supervision    Exercise/Education Provided:  Bed mobility  Body mechanics  Don/Doff ortho/prosthesis  Energy conservation  Gait training  Posture  ROM  Transfer training    Skilled Therapy Provided: Chart reviewed. RN aware. Patient up in chair.  Discussed home safety, use of sling.  Walked 2x300 ft with sup.  Performed 12 steps with CGA and one rail. Returned to room.  All needs  left within reach. RN aware.    The patient's Approx Degree of Impairment: 28.97% has been calculated based on documentation in the [facility] '6 clicks' Inpatient Basic Mobility Short Form.  Research supports that patients with this level of impairment may benefit from DC to home.  Final disposition will be made by interdisciplinary medical team.    Patient End of Session: Up in chair;With  staff;Needs met;Call light within reach;RN aware of session/findings;All patient questions and concerns addressed    Patient was able to achieve the following ...   Patient able to transfer  Safely and independently    Patient able to ambulate on level surfaces  Safely and independently     Patient Evaluation Complexity Level:  History Moderate - 1 or 2 personal factors and/or co-morbidities   Examination of body systems Moderate - addressing a total of 3 or more elements   Clinical Presentation  Moderate - Evolving   Clinical Decision Making  Moderate Complexity     Gait Trainin minutes  Therapeutic Activity:  25 minutes  Neuromuscular Re-education: 0 minutes  Therapeutic Exercise: 0 minutes  Canalith Repositionin minutes  Manual Therapy: 0 minutes  Can add/delete any of these

## 2024-11-30 NOTE — PLAN OF CARE
Problem: PAIN - ADULT  Goal: Verbalizes/displays adequate comfort level or patient's stated pain goal  Description: INTERVENTIONS:  - Encourage pt to monitor pain and request assistance  - Assess pain using appropriate pain scale  - Administer analgesics based on type and severity of pain and evaluate response  - Implement non-pharmacological measures as appropriate and evaluate response  - Consider cultural and social influences on pain and pain management  - Manage/alleviate anxiety  - Utilize distraction and/or relaxation techniques  - Monitor for opioid side effects  - Notify MD/LIP if interventions unsuccessful or patient reports new pain  - Anticipate increased pain with activity and pre-medicate as appropriate  Outcome: Progressing     Problem: RISK FOR INFECTION - ADULT  Goal: Absence of fever/infection during anticipated neutropenic period  Description: INTERVENTIONS  - Monitor WBC  - Administer growth factors as ordered  - Implement neutropenic guidelines  Outcome: Progressing   Surgical dressing to L arm remains c/d/I. Sling in place at all the time. Patient ambulates with a stand by assist. Pain managed with oxy/tylenol and ice packs. Antibiotics completed.   Plan: home with no needs today or tomorrow.

## 2024-12-02 ENCOUNTER — TELEPHONE (OUTPATIENT)
Dept: ORTHOPEDICS CLINIC | Facility: HOSPITAL | Age: 69
End: 2024-12-02

## 2024-12-02 DIAGNOSIS — S42.322B: Primary | ICD-10-CM

## 2024-12-02 RX ORDER — HYDROCODONE BITARTRATE AND ACETAMINOPHEN 5; 325 MG/1; MG/1
1 TABLET ORAL EVERY 6 HOURS PRN
Qty: 20 TABLET | Refills: 0 | Status: SHIPPED | OUTPATIENT
Start: 2024-12-02

## 2024-12-05 NOTE — PAYOR COMM NOTE
Discharge Notification    Patient Name: SANJAY MONTANO  Payor: [employer]  Subscriber #: [id number]  Authorization Number: [id number]  Admit Date/Time: 11/29/2024 2:39 AM  Discharge Date/Time: 11/30/2024 5:54 PM
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
## 2024-12-27 ENCOUNTER — ORDER TRANSCRIPTION (OUTPATIENT)
Dept: PHYSICAL THERAPY | Facility: HOSPITAL | Age: 69
End: 2024-12-27

## 2024-12-27 DIAGNOSIS — S52.552A OTHER CLOSED EXTRA-ARTICULAR FRACTURE OF DISTAL END OF LEFT RADIUS, INITIAL ENCOUNTER: Primary | ICD-10-CM

## 2024-12-30 ENCOUNTER — TELEPHONE (OUTPATIENT)
Dept: PHYSICAL THERAPY | Facility: HOSPITAL | Age: 69
End: 2024-12-30

## 2024-12-30 ENCOUNTER — OFFICE VISIT (OUTPATIENT)
Dept: PHYSICAL THERAPY | Age: 69
End: 2024-12-30
Attending: ORTHOPAEDIC SURGERY
Payer: COMMERCIAL

## 2024-12-30 DIAGNOSIS — S52.552A OTHER CLOSED EXTRA-ARTICULAR FRACTURE OF DISTAL END OF LEFT RADIUS, INITIAL ENCOUNTER: Primary | ICD-10-CM

## 2024-12-30 PROCEDURE — 97165 OT EVAL LOW COMPLEX 30 MIN: CPT

## 2024-12-30 PROCEDURE — 97110 THERAPEUTIC EXERCISES: CPT

## 2024-12-30 NOTE — PROGRESS NOTES
OCCUPATIONAL THERAPY UPPER EXTREMITY EVALUATION     Diagnosis:   Closed extra-articular fracture of distal end of left radius      Referring Provider:   MARCELL STALLINGS Date of Evaluation:    12/30/2024    Precautions:  None Next MD visit:   none scheduled  Date of Surgery: n/a   Order Date:  12/27/2024  Authorizing Provider:   MARCELL STALLINGS     Procedure:  OCCUPATIONAL THERAPY - INTERNAL [[id number]]         Order #:   [id number]  Qty:  1     Priority:  Routine                   Class:   E[facility] - RFL     Standing Interval:          Standing Occurrences:          Expires on:            Expected by:    Associated DX:  Other closed extra-articular fracture of distal end of left radius, initial encounter (S52.552A)     Order summary:  E[facility] - RFL, Routine, 8 visits  Occupational  Therapy Area of Concentration: Orthopedic  Occupational Therapy Ortho: UE  If the patient can be seen sooner with a PT vs an OT, can we cancel this order and replace with a PT order? No         Comments:  Order transcribed         PATIENT SUMMARY   Pt is a 69 year old female who presents to therapy today with complaints of decreased function  Pt injured on 11/22/24 when she fell out of bed trying to go to bathroom. Pt went to ER and had X-ray indidated fracture of humerus. Pt had pain in wrist but MD only noted fractrue recently.   Pt describes pain level current 7-8/10, at best 7-8/10, at worst 8/10.   Current functional limitations include blow drying hair, dressing, grooming, loading dishawasher, opeing jar, typing, lifting/carrying.  Pt describes prior level of function independent.   Employment: Working full duty, Pt is self-employed  Hand Dominance: right  Living Situation: w/ spouse  Imaging/Tests: X-ray  Pt goals include return to PLOF.  Past medical history was reviewed with pt. Significant findings include (R) rotator cuff surgery x 3, Breast CA '99, OA    ASSESSMENT  Pt presents to occupational therapy evaluation with primary c/o  decreased function. The results of the objective tests and measures show decreased ROM, decreased strength, increased edema and increased subjective c/o pain.  Functional deficits include but are not limited to blow drying hair, dressing, grooming, loading dishawasher, opeing jar, typing, lifting/carrying..  Signs and symptoms are consistent with diagnosis of closed extra-articular fracture of distal end of left radius . Pt and OT discussed evaluation findings, pathology, POC and HEP.  Pt voiced understanding and performs HEP correctly without reported pain. Skilled Occupational Therapy is medically necessary to address the above impairments and reach functional goals.     OBJECTIVE    OBSERVATION: Unremarkable     ORTHOTICS: sling for shoulder      SENSORY: WNL      CIRCUMFERENTIAL EDEMA (cm):  Right Wrist crease: 16.5  Left Wrist crease: 16.9    ROM: (* denotes performed with pain)  Wrist   Flexion: R 75, L 70  Extension: R 60, L 50  Ulnar Deviation: R 30, L 25  Radial Deviation R 15, L 5     AROM/PROM:(Degrees)  LEFT HAND:    Thumb IF MF RF SF   MP 30 70 75 55 75   PIP 60 80 80 80 80   DIP --- 45 50 45 55   KERN --- 195 205 180 210         Strength (lbs) Right Average Left Average   : 25 5   2 pt Pinch: 7 3   3 pt Pinch: 9 4   Lateral Pinch: 10 5       Today’s Treatment and Response:   Treatment provided today in addition to the initial evaluation:   Date 12/30/24         Visit # 1/8         Evaluation Initial  X         Manual                                             Ther ex                                                                                                         HEP issued See table below         Therapeutic Activity                                                          Neuromuscular Re-education                                         X= performed this date as previously outlined    Pt education was provided on exam findings, treatment diagnosis, treatment plan, expectations, and  prognosis. Patient was instructed in and issued a HEP.     HEP Date issued  Date amended Date discharged  Comments (HEP2Go code if used)   Tendon glides 12/30/24      Wrist stretches 12/30/24      [facility] strengthening 12/30/24                                      Charges: OT Eval: Low Complexity, 1 TE      Total Timed Treatment: 45 min     Total Treatment Time: 45 min     Based on clinical rationale and outcome measures, this evaluation involved Low Complexity decision making due to 1-2 personal factors/comorbidities, body structures involved/activity limitations, and unstable symptoms including changing pain levels.  PLAN OF CARE   Goals: (to be met in 8 visits)  Increase (L) KERN of IF by 10-20 degrees to allow pt hold utensils while cooking.  Increase (L) KERN of MF by 10-20 degrees to allow pt perform housekeeping.   Increase (L) KERN of RF by 10-20 degrees to allow pt to wash dishes.    Increase (L) KERN of SF by 10-20 degrees to allow pt to type.      Increase (L) UD by 5-10 degrees to allow pt to open jar.  Increase (L)  strength by 4-5 lbs to allow pt to carry groceries.   Increase (L) 2pt pinch by 2-3 lbs to allow pt to open packages.   Increase (L) 3pt pinch by 2-3 lbs to allow pt open bottles.  Increase  (L) lateral pinch by 2-3 lbs to allow pt to load .  Pt will be independent and compliant with comprehensive HEP to maintain progress achieved in OT    Frequency / Duration: Patient will be seen for 2 x/week or a total of 8 visits over a 90 day period.  Treatment will include: Manual Therapy, Neuromuscular Re-education, Self-Care Home Management, Therapeutic Activities, Therapeutic Exercise, and Home Exercise Program instruction    Education or treatment limitation: None  Rehab Potential:good    Patient/Family/Caregiver was advised of these findings, precautions, and treatment options and has agreed to actively participate in planning and for this course of care.    Thank you for your referral.  Please co-sign or sign and return this letter via fax as soon as possible to [phone]. If you have any questions, please contact me at Dept: [phone]    Sincerely,  Electronically signed by therapist: MERNA Leung  Physician's certification required: Yes  I certify the need for these services furnished under this plan of treatment and while under my care.    X___________________________________________________ Date____________________    Certification From: 12/30/2024  To:03/26/2025

## 2025-01-02 ENCOUNTER — TELEPHONE (OUTPATIENT)
Dept: PHYSICAL THERAPY | Facility: HOSPITAL | Age: 70
End: 2025-01-02

## 2025-01-04 ENCOUNTER — HOSPITAL ENCOUNTER (OUTPATIENT)
Age: 70
Discharge: HOME | End: 2025-01-04
Payer: COMMERCIAL

## 2025-01-04 VITALS
DIASTOLIC BLOOD PRESSURE: 72 MMHG | TEMPERATURE: 99 F | HEART RATE: 82 BPM | SYSTOLIC BLOOD PRESSURE: 146 MMHG | OXYGEN SATURATION: 96 % | RESPIRATION RATE: 20 BRPM

## 2025-01-04 DIAGNOSIS — J42 CHRONIC BRONCHITIS WITH ACUTE EXACERBATION (HCC): Primary | ICD-10-CM

## 2025-01-04 DIAGNOSIS — J20.9 CHRONIC BRONCHITIS WITH ACUTE EXACERBATION (HCC): Primary | ICD-10-CM

## 2025-01-04 PROCEDURE — 99213 OFFICE O/P EST LOW 20 MIN: CPT

## 2025-01-04 RX ORDER — PREDNISONE 20 MG/1
40 TABLET ORAL DAILY
Qty: 10 TABLET | Refills: 0 | Status: SHIPPED | OUTPATIENT
Start: 2025-01-04 | End: 2025-01-09

## 2025-01-04 RX ORDER — FLUTICASONE PROPIONATE 50 MCG
2 SPRAY, SUSPENSION (ML) NASAL DAILY
Qty: 16 G | Refills: 0 | Status: SHIPPED | OUTPATIENT
Start: 2025-01-04 | End: 2025-02-03

## 2025-01-04 RX ORDER — AZITHROMYCIN 250 MG/1
TABLET, FILM COATED ORAL
Qty: 6 TABLET | Refills: 0 | Status: SHIPPED | OUTPATIENT
Start: 2025-01-04 | End: 2025-01-09
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
## 2025-01-04 NOTE — ED INITIAL ASSESSMENT (HPI)
Cough and congested x 24 hours.  States she gets bronchitis every year and this is what it feels like.  Exposed to grandchildren dx with strep recently.

## 2025-01-07 ENCOUNTER — OFFICE VISIT (OUTPATIENT)
Dept: PULMONOLOGY | Facility: CLINIC | Age: 70
End: 2025-01-07

## 2025-01-07 ENCOUNTER — APPOINTMENT (OUTPATIENT)
Dept: PHYSICAL THERAPY | Age: 70
End: 2025-01-07
Attending: ORTHOPAEDIC SURGERY
Payer: COMMERCIAL

## 2025-01-07 VITALS
OXYGEN SATURATION: 96 % | SYSTOLIC BLOOD PRESSURE: 122 MMHG | HEIGHT: 66 IN | WEIGHT: 161.19 LBS | HEART RATE: 80 BPM | BODY MASS INDEX: 25.9 KG/M2 | DIASTOLIC BLOOD PRESSURE: 67 MMHG

## 2025-01-07 DIAGNOSIS — R05.3 CHRONIC COUGH: Primary | ICD-10-CM

## 2025-01-07 PROBLEM — J44.9 COPD (CHRONIC OBSTRUCTIVE PULMONARY DISEASE) (HCC): Status: RESOLVED | Noted: 2023-10-18 | Resolved: 2025-01-07

## 2025-01-07 PROCEDURE — 3008F BODY MASS INDEX DOCD: CPT | Performed by: INTERNAL MEDICINE

## 2025-01-07 PROCEDURE — 3074F SYST BP LT 130 MM HG: CPT | Performed by: INTERNAL MEDICINE

## 2025-01-07 PROCEDURE — 99213 OFFICE O/P EST LOW 20 MIN: CPT | Performed by: INTERNAL MEDICINE

## 2025-01-07 PROCEDURE — 3078F DIAST BP <80 MM HG: CPT | Performed by: INTERNAL MEDICINE

## 2025-01-07 RX ORDER — AZITHROMYCIN 250 MG/1
TABLET, FILM COATED ORAL
Qty: 6 TABLET | Refills: 0 | Status: SHIPPED | OUTPATIENT
Start: 2025-01-07 | End: 2025-01-12

## 2025-01-07 NOTE — PROGRESS NOTES
The patient is a 69-year-old female who I know well from prior evaluation comes in now for follow-up.  In general, she is doing well.  She had bronchitic syndrome over the holidays and required a Z-Carlos and is improved at this juncture.    Review of Systems:  Vision normal. Ear nose and throat normal. Bowel normal. Bladder function normal. No depression. No thyroid disease. No lymphatic system concerns.  No rash. Muscles and joints unremarkable. No weight loss no weight gain.    Physical Examination:  Vital signs normal. HEENT examination is unremarkable with pupils equal round and reactive to light and accommodation. Neck without adenopathy, thyromegaly, JVD nor bruit. Lungs clear to auscultation and percussion. Cardiac regular rate and rhythm no murmur. Abdomen nontender, without hepatosplenomegaly and no mass appreciable. Extremities and Musculoskeletal without clubbing cyanosis nor edema, and mobility acceptable. Neurologic grossly intact with symmetric tone and strength and reflex.    Assessment and plan:  1.  Asthmatic bronchitis-stabilizing clinically now.  Will give another Z-Carlos for patient to just keep on hand as she tends to get bacterial superinfections on mild intrinsic asthma.    Recommendations: Z-Carlos to keep on hand, continue with the Trelegy, see me again at the 1 year interval, contact me promptly if new trouble.  The patient needs to let me know as soon as her book is out and now by the first copy and give her tips on the rollout.

## 2025-01-09 ENCOUNTER — APPOINTMENT (OUTPATIENT)
Dept: PHYSICAL THERAPY | Age: 70
End: 2025-01-09
Attending: ORTHOPAEDIC SURGERY
Payer: COMMERCIAL

## 2025-01-14 ENCOUNTER — OFFICE VISIT (OUTPATIENT)
Dept: PHYSICAL THERAPY | Age: 70
End: 2025-01-14
Attending: ORTHOPAEDIC SURGERY
Payer: COMMERCIAL

## 2025-01-14 PROCEDURE — 97140 MANUAL THERAPY 1/> REGIONS: CPT

## 2025-01-14 PROCEDURE — 97110 THERAPEUTIC EXERCISES: CPT

## 2025-01-14 NOTE — PROGRESS NOTES
Diagnosis:   Closed extra-articular fracture of distal end of left radius      Referring Provider:   MARCELL STALLINGS Date of Evaluation:    12/30/2024    Precautions:  None Next MD visit:   none scheduled  Date of Surgery: n/a   Order Date:  12/27/2024  Authorizing Provider:   MARCELL STALLINGS     Procedure:  OCCUPATIONAL THERAPY - INTERNAL [[id number]]         Order #:   [id number]  Qty:  1     Priority:  Routine                   Class:   EHV - RFL     Standing Interval:          Standing Occurrences:          Expires on:            Expected by:    Associated DX:  Other closed extra-articular fracture of distal end of left radius, initial encounter (S52.552A)     Order summary:  EHV - RFL, Routine, 8 visits  Occupational  Therapy Area of Concentration: Orthopedic  Occupational Therapy Ortho: UE  If the patient can be seen sooner with a PT vs an OT, can we cancel this order and replace with a PT order? No         Comments:  Order transcribed       SUBJECTIVE: Pt states that her wrist is moving much better.       PAIN LEVELS: 3/10        OBJECTIVE: See tx log for details.     ORTHOTICS: sling for shoulder      SENSORY: WNL      CIRCUMFERENTIAL EDEMA (cm):  Right Wrist crease: 16.5  Left Wrist crease: 16.9    ROM: (* denotes performed with pain)  Wrist   Flexion: R 75, L 70  Extension: R 60, L 50  Ulnar Deviation: R 30, L 25  Radial Deviation R 15, L 5     AROM/PROM:(Degrees)  LEFT HAND:    Thumb IF MF RF SF   MP 30 70 75 55 75   PIP 60 80 80 80 80   DIP --- 45 50 45 55   KERN --- 195 205 180 210         Strength (lbs) Right Average Left Average   : 25 5   2 pt Pinch: 7 3   3 pt Pinch: 9 4   Lateral Pinch: 10 5       ASSESSMENT  Reviewed HEP with pt. Pt performing as instructed.  No tightness noted along wrist or forearm with palpation.  Initiated light PRE's for wrist.  Good tolerance to PRE's.  Mild fatigue after gripping reported.  Advised pt to apply heat for stiffness. Pt agreed.     Today’s Treatment and  Response:   Treatment provided today in addition to the initial evaluation:   Date 12/30/24 1/14/25       Visit # 1/8  2/8       Evaluation Initial  X         Manual           STM   x                               Ther ex            AROM with fluidotherapy x 10\"   x        powerweb     wrist stretches x 3 min        sponges    with gripper at first rung        digiflex    red x 3 min        PRE's    wrist (all planes) 1 # x 2 min each                                          HEP issued See table below         Therapeutic Activity                                                          Neuromuscular Re-education                                         X= performed this date as previously outlined    Pt education was provided on exam findings, treatment diagnosis, treatment plan, expectations, and prognosis. Patient was instructed in and issued a HEP.     HEP Date issued  Date amended Date discharged  Comments (HEP2Go code if used)   Tendon glides 12/30/24      Wrist stretches 12/30/24      [facility] strengthening 12/30/24                                        Goals: (to be met in 8 visits)  Increase (L) KERN of IF by 10-20 degrees to allow pt hold utensils while cooking.  Increase (L) KERN of MF by 10-20 degrees to allow pt perform housekeeping.   Increase (L) KERN of RF by 10-20 degrees to allow pt to wash dishes.    Increase (L) KERN of SF by 10-20 degrees to allow pt to type.      Increase (L) UD by 5-10 degrees to allow pt to open jar.  Increase (L)  strength by 4-5 lbs to allow pt to carry groceries.   Increase (L) 2pt pinch by 2-3 lbs to allow pt to open packages.   Increase (L) 3pt pinch by 2-3 lbs to allow pt open bottles.  Increase  (L) lateral pinch by 2-3 lbs to allow pt to load .  Pt will be independent and compliant with comprehensive HEP to maintain progress achieved in OT    PLAN: Patient will be seen for 2 x/week or a total of 8 visits over a 90 day period.  Treatment will include: Manual  Therapy, Neuromuscular Re-education, Self-Care Home Management, Therapeutic Activities, Therapeutic Exercise, and Home Exercise Program instruction      Charges: 1 MT, 2 TE     Total Timed Treatment: 45 min     Total Treatment Time: 45 min         Vijaya Allred OTR/L

## 2025-01-16 ENCOUNTER — APPOINTMENT (OUTPATIENT)
Dept: PHYSICAL THERAPY | Age: 70
End: 2025-01-16
Attending: ORTHOPAEDIC SURGERY
Payer: COMMERCIAL

## 2025-01-16 ENCOUNTER — OFFICE VISIT (OUTPATIENT)
Dept: PHYSICAL THERAPY | Age: 70
End: 2025-01-16
Attending: ORTHOPAEDIC SURGERY
Payer: COMMERCIAL

## 2025-01-16 PROCEDURE — 97110 THERAPEUTIC EXERCISES: CPT

## 2025-01-16 PROCEDURE — 97140 MANUAL THERAPY 1/> REGIONS: CPT

## 2025-01-16 NOTE — PROGRESS NOTES
Diagnosis:   Closed extra-articular fracture of distal end of left radius      Referring Provider:   MARCELL STALLINGS Date of Evaluation:    12/30/2024    Precautions:  None Next MD visit:   none scheduled  Date of Surgery: n/a   Order Date:  12/27/2024  Authorizing Provider:   MARCELL STALLINGS     Procedure:  OCCUPATIONAL THERAPY - INTERNAL [[id number]]         Order #:   [id number]  Qty:  1     Priority:  Routine                   Class:   EHV - RFL     Standing Interval:          Standing Occurrences:          Expires on:            Expected by:    Associated DX:  Other closed extra-articular fracture of distal end of left radius, initial encounter (S52.552A)     Order summary:  E[facility] - RFL, Routine, 8 visits  Occupational  Therapy Area of Concentration: Orthopedic  Occupational Therapy Ortho: UE  If the patient can be seen sooner with a PT vs an OT, can we cancel this order and replace with a PT order? No         Comments:  Order transcribed       SUBJECTIVE: Pt states that her doctor wants her to start physical therapy for her shoulder.        PAIN LEVELS: 3/10        OBJECTIVE: See tx log for details.     ORTHOTICS: sling for shoulder      SENSORY: WNL      CIRCUMFERENTIAL EDEMA (cm):  Right Wrist crease: 16.5  Left Wrist crease: 16.9    ROM: (* denotes performed with pain)  Wrist   Flexion: R 75, L 70  Extension: R 60, L 50  Ulnar Deviation: R 30, L 25  Radial Deviation R 15, L 5     AROM/PROM:(Degrees)  LEFT HAND:    Thumb IF MF RF SF   MP 30 70 75 55 75   PIP 60 80 80 80 80   DIP --- 45 50 45 55   KERN --- 195 205 180 210         Strength (lbs) Right Average Left Average   : 25 5   2 pt Pinch: 7 3   3 pt Pinch: 9 4   Lateral Pinch: 10 5       ASSESSMENT  Pt reporting her MD wants her to start physical therapy for her shoulder. Noted mild difficulty with wrist flexion while using flexbar due to forearm being in supination. Weakness in biceps may be contributing to forearm weakness.      Today’s Treatment  and Response:   Treatment provided today in addition to the initial evaluation:   Date 12/30/24 1/14/25 1/16/25     Visit # 1/8  2/8  3/8     Evaluation Initial  X         Manual           STM   x  x                             Ther ex            AROM with fluidotherapy x 10\"   x  x      powerweb     wrist stretches x 3 min  x      sponges    with gripper at first rung  x      digiflex    red x 3 min  x      PRE's    wrist (all planes) 1 # x 2 min each        flexbar strengthening      red wrist flex/ext x 2  min each                            HEP issued See table below         Therapeutic Activity                                                          Neuromuscular Re-education                                         X= performed this date as previously outlined    Pt education was provided on exam findings, treatment diagnosis, treatment plan, expectations, and prognosis. Patient was instructed in and issued a HEP.     HEP Date issued  Date amended Date discharged  Comments (HEP2Go code if used)   Tendon glides 12/30/24      Wrist stretches 12/30/24      [facility] strengthening 12/30/24                                        Goals: (to be met in 8 visits)  Increase (L) KERN of IF by 10-20 degrees to allow pt hold utensils while cooking.  Increase (L) KERN of MF by 10-20 degrees to allow pt perform housekeeping.   Increase (L) KERN of RF by 10-20 degrees to allow pt to wash dishes.    Increase (L) KERN of SF by 10-20 degrees to allow pt to type.      Increase (L) UD by 5-10 degrees to allow pt to open jar.  Increase (L)  strength by 4-5 lbs to allow pt to carry groceries.   Increase (L) 2pt pinch by 2-3 lbs to allow pt to open packages.   Increase (L) 3pt pinch by 2-3 lbs to allow pt open bottles.  Increase  (L) lateral pinch by 2-3 lbs to allow pt to load .  Pt will be independent and compliant with comprehensive HEP to maintain progress achieved in OT    PLAN: Patient will be seen for 2 x/week or a  total of 8 visits over a 90 day period.  Treatment will include: Manual Therapy, Neuromuscular Re-education, Self-Care Home Management, Therapeutic Activities, Therapeutic Exercise, and Home Exercise Program instruction      Charges: 1 MT, 2 TE     Total Timed Treatment: 45 min     Total Treatment Time: 45 min         Vijaya Allred OTR/L

## 2025-01-21 ENCOUNTER — APPOINTMENT (OUTPATIENT)
Dept: PHYSICAL THERAPY | Age: 70
End: 2025-01-21
Attending: ORTHOPAEDIC SURGERY
Payer: COMMERCIAL

## 2025-01-23 ENCOUNTER — OFFICE VISIT (OUTPATIENT)
Dept: PHYSICAL THERAPY | Age: 70
End: 2025-01-23
Payer: COMMERCIAL

## 2025-01-23 ENCOUNTER — APPOINTMENT (OUTPATIENT)
Dept: PHYSICAL THERAPY | Age: 70
End: 2025-01-23
Attending: ORTHOPAEDIC SURGERY
Payer: COMMERCIAL

## 2025-01-23 PROCEDURE — 97110 THERAPEUTIC EXERCISES: CPT

## 2025-01-23 PROCEDURE — 97140 MANUAL THERAPY 1/> REGIONS: CPT

## 2025-01-23 NOTE — PROGRESS NOTES
Diagnosis:   Closed extra-articular fracture of distal end of left radius      Referring Provider:   MARCELL STALLINGS Date of Evaluation:    12/30/2024    Precautions:  None Next MD visit:   none scheduled  Date of Surgery: n/a   Order Date:  12/27/2024  Authorizing Provider:   MARCELL STALLINGS     Procedure:  OCCUPATIONAL THERAPY - INTERNAL [[id number]]         Order #:   [id number]  Qty:  1     Priority:  Routine                   Class:   EHV - RFL     Standing Interval:          Standing Occurrences:          Expires on:            Expected by:    Associated DX:  Other closed extra-articular fracture of distal end of left radius, initial encounter (S52.552A)     Order summary:  E[facility] - RFL, Routine, 8 visits  Occupational  Therapy Area of Concentration: Orthopedic  Occupational Therapy Ortho: UE  If the patient can be seen sooner with a PT vs an OT, can we cancel this order and replace with a PT order? No         Comments:  Order transcribed       SUBJECTIVE: Pt states that her wrist and hand are getting better.    PAIN LEVELS: 3/10        OBJECTIVE: See tx log for details.     ORTHOTICS: sling for shoulder      SENSORY: WNL      CIRCUMFERENTIAL EDEMA (cm):  Right Wrist crease: 16.5  Left Wrist crease: 16.9    ROM: (* denotes performed with pain)  Wrist   Flexion: R 75, L 70  Extension: R 60, L 50  Ulnar Deviation: R 30, L 25  Radial Deviation R 15, L 5     AROM/PROM:(Degrees)  LEFT HAND:    Thumb IF MF RF SF   MP 30 70 75 55 75   PIP 60 80 80 80 80   DIP --- 45 50 45 55   KERN --- 195 205 180 210         Strength (lbs) Right Average Left Average   : 25 5   2 pt Pinch: 7 3   3 pt Pinch: 9 4   Lateral Pinch: 10 5       ASSESSMENT  Pt having mild tightness in thenar eminence and web space. Weakness in wrist may be contributing to tighter  contributing to tightness.  Advised pt to minimize sustained gripping and to continue with stretches. Pt agreed.     Today’s Treatment and Response:   Treatment provided  today in addition to the initial evaluation:   Date 12/30/24 1/14/25 1/16/25 1/23/25   Visit # 1/8  2/8  3/8  4/8   Evaluation Initial  X         Manual           STM   x  x  x                           Ther ex            AROM with fluidotherapy x 10\"   x  x  x    powerweb     wrist stretches x 3 min  x  x    sponges    with gripper at first rung  x   with lumbrical bar    digiflex    red x 3 min  x  x    PRE's    wrist (all planes) 1 # x 2 min each    x    flexbar strengthening      red wrist flex/ext x 2  min each                            HEP issued See table below         Therapeutic Activity                                                          Neuromuscular Re-education                                         X= performed this date as previously outlined    Pt education was provided on exam findings, treatment diagnosis, treatment plan, expectations, and prognosis. Patient was instructed in and issued a HEP.     HEP Date issued  Date amended Date discharged  Comments (HEP2Go code if used)   Tendon glides 12/30/24      Wrist stretches 12/30/24      [facility] strengthening 12/30/24                                        Goals: (to be met in 8 visits)  Increase (L) KERN of IF by 10-20 degrees to allow pt hold utensils while cooking.  Increase (L) KERN of MF by 10-20 degrees to allow pt perform housekeeping.   Increase (L) KERN of RF by 10-20 degrees to allow pt to wash dishes.    Increase (L) KERN of SF by 10-20 degrees to allow pt to type.      Increase (L) UD by 5-10 degrees to allow pt to open jar.  Increase (L)  strength by 4-5 lbs to allow pt to carry groceries.   Increase (L) 2pt pinch by 2-3 lbs to allow pt to open packages.   Increase (L) 3pt pinch by 2-3 lbs to allow pt open bottles.  Increase  (L) lateral pinch by 2-3 lbs to allow pt to load .  Pt will be independent and compliant with comprehensive HEP to maintain progress achieved in OT    PLAN: Patient will be seen for 2 x/week or a total  of 8 visits over a 90 day period.  Treatment will include: Manual Therapy, Neuromuscular Re-education, Self-Care Home Management, Therapeutic Activities, Therapeutic Exercise, and Home Exercise Program instruction      Charges: 1 MT, 2 TE     Total Timed Treatment: 45 min     Total Treatment Time: 45 min         FARAZ Gallegos/JOCELYN

## 2025-01-28 ENCOUNTER — ORDER TRANSCRIPTION (OUTPATIENT)
Dept: PHYSICAL THERAPY | Facility: HOSPITAL | Age: 70
End: 2025-01-28

## 2025-01-28 ENCOUNTER — APPOINTMENT (OUTPATIENT)
Dept: PHYSICAL THERAPY | Age: 70
End: 2025-01-28
Attending: ORTHOPAEDIC SURGERY
Payer: COMMERCIAL

## 2025-01-28 ENCOUNTER — TELEPHONE (OUTPATIENT)
Dept: PHYSICAL THERAPY | Facility: HOSPITAL | Age: 70
End: 2025-01-28

## 2025-01-28 DIAGNOSIS — S42.302D: Primary | ICD-10-CM

## 2025-01-30 ENCOUNTER — TELEPHONE (OUTPATIENT)
Dept: PHYSICAL THERAPY | Facility: HOSPITAL | Age: 70
End: 2025-01-30

## 2025-01-30 ENCOUNTER — APPOINTMENT (OUTPATIENT)
Dept: PHYSICAL THERAPY | Age: 70
End: 2025-01-30
Attending: ORTHOPAEDIC SURGERY
Payer: COMMERCIAL

## 2025-01-31 ENCOUNTER — OFFICE VISIT (OUTPATIENT)
Dept: PHYSICAL THERAPY | Age: 70
End: 2025-01-31
Attending: ORTHOPAEDIC SURGERY
Payer: COMMERCIAL

## 2025-01-31 DIAGNOSIS — S42.302D: Primary | ICD-10-CM

## 2025-01-31 PROCEDURE — 97110 THERAPEUTIC EXERCISES: CPT

## 2025-01-31 PROCEDURE — 97162 PT EVAL MOD COMPLEX 30 MIN: CPT

## 2025-01-31 NOTE — PROGRESS NOTES
UPPER EXTREMITY EVALUATION:     Diagnosis:   Open fracture of shaft of left humerus with routine healing, subsequent encounter (L79.979H) Patient:  Sandrine Oleary (69 year old, female)        Referring Provider: Misael Oviedo  Today's Date   1/31/2025    Precautions:  Cancer; Other (use comment) (see small RTC repair protocol)   Date of Evaluation: 01/31/25  Next MD visit: No data recorded  Date of Surgery: 12/6/24     PATIENT SUMMARY   Summary of chief complaints: LUE pain and stiffness  History of current condition: Patient reports she fell on 11/29/24 when getting out of bed to go to the bathroom fractured humerus and part of her wrist. Was an open fracture. Had surgery with alexandria placement on 12/6/24. Had a history of L RTC repair 6-8 years ago. hospitals MD found another tear during this most recent surgery and repaired. Was in a sling for about a week post-op. Patient is right handed. Had R RTC repair last year and had finished PT for this just prior to fall in November.   Pain level: current 3 /10, at best 3 /10, at worst 3 /10  Occupation: Consulting for non-profits (computer work)   Leisure activities/Hobbies: active with grandchildren, walking   Prior level of function: Hx of L RTC repair 6-8 years ago but had recovered full function of L shoulder and no issues until fall on 11/29/24. Does have hx of R RTC repair last year which she had just completed PT for prior to fall.  Current limitations: reaching, lifting, donning clothing, sleeping  Pt goals: getting back to normal  Hand Dominance: right   Past medical history was reviewed with Sandrine.  Significant findings include: breast cancer, hypertension, anxiety, COPD, R/L RTC repair  Sandrine  has a past medical history of Alcohol use, Anemia, Anxiety, Arrhythmia, Asthma (HCC), Breast cancer (HCC) (1999), Chronic ulcer of right leg (HCC), Colon polyp (09/21/2020), COPD (chronic obstructive pulmonary disease) ([facility]), Diverticulosis, Dysplastic nevi,  Exposure to medical diagnostic radiation, GERD (gastroesophageal reflux disease), Hemorrhoids (2006), High blood pressure, History of chemotherapy, History of therapeutic radiation, Hyperlipidemia, Hypertension, IBS (irritable bowel syndrome), Klebsiella pneumoniae infection (1980), Pelvic prolapse, Personal history of antineoplastic chemotherapy, and PVC (premature ventricular contraction).  She  has a past surgical history that includes knee surgery (Left, 1990); appendectomy (1970); tonsillectomy (1965); hemorrhoidectomy (02/04/2014); colonoscopy (09/16/2013); egd (N/A, 10/06/2016); laparoscopy,diagnostic (1980); knee arthroscopy (Right, 1990); colonoscopy (N/A, 09/21/2020); Trigger finger release (Left, 2010); Total abdominal hysterectomy (08/19/2019); Breast lumpectomy (Left, 1999); Breast biopsy (Right, 2000); Rotator cuff repair (Left, 12/06/2016); arthroscopy, shoulder, surgical; capsulorrhaphy (Left, 12/17/2012); Cardiac catheterization (06/24/2021); ep vt ablation (09/07/2021); colonoscopy (09/26/2017); biopsy of skin lesion; correct bunion,simple (Bilateral); sent lymph node biopsy (Left, 1999); colonoscopy (N/A, 09/17/2022); ndsc ablation & rcnstj atria lmtd w/o bypass (2022); and Rotator cuff repair (Right, 05/06/2024).    ASSESSMENT  Sandrine presents to physical therapy evaluation with primary c/o LUE pain and stiffness. The results of the objective tests and measures show decreased L shoulder AROM, decreased L shoulder strength, fair posture. Functional deficits include but are not limited to reaching, lifting, donning clothing, sleeping. Signs and symptoms are consistent with diagnosis of Open fracture of shaft of left humerus with routine healing, subsequent encounter (S42.706M). Pt and PT discussed evaluation findings, pathology, POC and HEP.  Pt voiced understanding and performs HEP correctly without reported pain. Skilled Physical Therapy is medically necessary to address the above impairments and  reach functional goals.  OBJECTIVE:   Musculoskeletal  Observation/Posture: shoulder internal rotation; increased thoracic kyphosis       DURGA ROM WNL and Strength (5/5) except below:  (* denotes performed with pain)  Shoulder   ROM MMT (-/5)    R L R L     Flex 165 115 4       Ext             Abd (C5) 145 110 3+       IR 80 90 4+       ER 80 60 4       Low Trap n/a         Mid Trap n/a         SA n/a         ,   Elbow   ROM MMT (-/5)    R L R L     Flex (C6) 145 140         Ext (C7) 0 0         Pronation             Supination                 Neurological:  Sensation: intact     Today's Treatment and Response:   Pt education was provided on exam findings, treatment diagnosis, treatment plan, expectations, and prognosis.  Today's Treatment       1/31/2025   Treatment   Therapeutic Exercise Supine cane flexion 15x  Supine cane abd 15x  Seated scap retraction 15x5\"   Wall slides flexion 15x     Therapeutic Exercise Min 12   Evaluation Min 25   Total of Timed Procedures 12   Total of Service Based 25   Total Treatment Time 37         Patient was instructed in and issued a HEP for: Supine cane flexion, supine cane abduction, seated scap retractions and wall flexion slides    Charges:  PT EVAL: Moderate Complexity, Eval, TE x 1  In agreement with evaluation findings and clinical rationale, this evaluation involved MODERATE COMPLEXITY decision making due to 1-2 personal factors/comorbidities, 3 or more body structures involved/activity limitations, and evolving symptoms as documented in the evaluation.                                                          PLAN OF CARE:    Goals: (to be met in 16 visits)   Goals       Therapy Goals     Patient will be independent with HEP, it's progression, and self-management of their symptoms.  Patient will demo L shoulder AROM at least flex/abd 150, ER to 80  to be able to reach into cabinets and don a jacket without difficulty.  Patient will demo L shoulder strength at least 4/5 to  be able to carry groceries and lift a gallon of milk with LUE.  Patient will demo L scapular strength at least 4/5 to be able to perform reaching and lifting with proper joint mechanics.  Patient will report decrease in symptoms by 50% or better in terms of frequency and intensity to be able to sleep without disruption from pain.                Frequency / Duration: Patient will be seen 2x/week or a total of 16  visits over a 90 day period. Treatment will include: Manual Therapy; Neuromuscular Re-education; Self-Care Home Management; Therapeutic Activities; Therapeutic Exercise; Home Exercise Program instruction    Education or treatment limitation: None   Rehab Potential: good     QuickDASH Outcome Score  Score: (Patient-Rptd) 34.09 % (1/31/2025  9:46 AM)      Patient was advised of these findings, precautions, and treatment options and has agreed to actively participate in planning and for this course of care.    Thank you for your referral. Please co-sign or sign and return this letter via fax as soon as possible to [phone]. If you have any questions, please contact me at Dept: [phone]    Sincerely,  Electronically signed by therapist: Abel Braga, PT  Physician's certification required: Yes  I certify the need for these services furnished under this plan of treatment and while under my care.    X___________________________________________________ Date____________________    Certification From: 1/31/2025  To:5/1/2025

## 2025-02-04 ENCOUNTER — APPOINTMENT (OUTPATIENT)
Dept: PHYSICAL THERAPY | Age: 70
End: 2025-02-04
Attending: ORTHOPAEDIC SURGERY
Payer: COMMERCIAL

## 2025-02-05 ENCOUNTER — OFFICE VISIT (OUTPATIENT)
Dept: PHYSICAL THERAPY | Age: 70
End: 2025-02-05
Attending: ORTHOPAEDIC SURGERY
Payer: COMMERCIAL

## 2025-02-05 PROCEDURE — 97110 THERAPEUTIC EXERCISES: CPT | Performed by: PHYSICAL THERAPIST

## 2025-02-05 PROCEDURE — 97140 MANUAL THERAPY 1/> REGIONS: CPT | Performed by: PHYSICAL THERAPIST

## 2025-02-05 NOTE — PROGRESS NOTES
Patient: Sandrine Oleary (69 year old, female) Referring Provider:  Insurance:   Diagnosis: Open fracture of shaft of left humerus with routine healing, subsequent encounter (M77.212O) Misael Oviedo  [employer] [place]   Date of Surgery: 12/6/24 Next MD visit:  N/A   Precautions:  Cancer; Other (use comment) End of Feb Referral Information:    Date of Evaluation: Req: 0, Auth: 0, Exp:     01/31/25 POC Auth Visits:          Today's Date   2/5/2025    Subjective  Pt feels stiff and sore. Pt sleeps with a heating pain to help with the pain.       Pain: 2/10     Objective  see table below; L shoulder Flex: 140deg, flex 130 deg              Assessment  Pt has slight pain at end range with aarom with cane. Decreased range to accommodate comofort. Upon palpation with STM, patient has a hard feel along the puckered scar area. Pt had a stiffness sensation with aarom cane elbow flexion in standing.    Goals (to be met in 16 visits)   Goals       Therapy Goals     Patient will be independent with HEP, it's progression, and self-management of their symptoms.  Patient will demo L shoulder AROM at least flex/abd 150, ER to 80  to be able to reach into cabinets and don a jacket without difficulty.  Patient will demo L shoulder strength at least 4/5 to be able to carry groceries and lift a gallon of milk with LUE.  Patient will demo L scapular strength at least 4/5 to be able to perform reaching and lifting with proper joint mechanics.  Patient will report decrease in symptoms by 50% or better in terms of frequency and intensity to be able to sleep without disruption from pain.                Plan  cont with poc    Treatment Last 4 Visits       1/31/2025 2/5/2025   PT Treatment   Treatment Day  2   Therapeutic Exercise Supine cane flexion 15x  Supine cane abd 15x  Seated scap retraction 15x5\"   Wall slides flexion 15x   1. Chest Press 2x10 with cane  2.  Shoulder flexion with cane 2x10  3. Standing with cane aarom elbow flexion  2x10  4. Supine ER at 0 deg abd aarom with cane 2x10  5. Standing aarom with cane shoulder abd 2x10 to 90 deg  6. Scap retract 3x10  7. Towel squeeze for  strength 2x10   Manual Therapy  1. STM UE  2. PROM shoulder all planes to protocol restrictions     Therapeutic Exercise Min 12 25   Manual Therapy Min  15   Evaluation Min 25    Total of Timed Procedures 12 40   Total of Service Based 25 0   Total Treatment Time 37 40         HEP  Supine cane flexion, supine cane abduction, seated scap retractions and wall flexion slides    Charges     3 ; 2 Therex, 1 manual

## 2025-02-06 ENCOUNTER — APPOINTMENT (OUTPATIENT)
Dept: PHYSICAL THERAPY | Age: 70
End: 2025-02-06
Attending: ORTHOPAEDIC SURGERY
Payer: COMMERCIAL

## 2025-02-11 ENCOUNTER — APPOINTMENT (OUTPATIENT)
Dept: PHYSICAL THERAPY | Age: 70
End: 2025-02-11
Attending: ORTHOPAEDIC SURGERY
Payer: COMMERCIAL

## 2025-02-12 ENCOUNTER — OFFICE VISIT (OUTPATIENT)
Dept: PHYSICAL THERAPY | Age: 70
End: 2025-02-12
Attending: ORTHOPAEDIC SURGERY
Payer: COMMERCIAL

## 2025-02-12 PROCEDURE — 97140 MANUAL THERAPY 1/> REGIONS: CPT

## 2025-02-12 PROCEDURE — 97110 THERAPEUTIC EXERCISES: CPT

## 2025-02-12 NOTE — PROGRESS NOTES
Patient: Sandrine Oleary (69 year old, female) Referring Provider:  Insurance:   Diagnosis: Open fracture of shaft of left humerus with routine healing, subsequent encounter (J26.975U) Misael [place]   Date of Surgery: 12/6/24 Next MD visit:  N/A   Precautions:  Cancer; Other (use comment) End of Feb Referral Information:    Date of Evaluation: Req: 0, Auth: 0, Exp:     01/31/25 POC Auth Visits:  16       Today's Date   2/12/2025    Subjective  Patient reports her arm is feeling good. Just some soreness.       Pain: 2/10     Objective  see table below              Assessment  Decreased pain at end range and good progress in L shoulder motion with PROM.    Goals (to be met in 16 visits)   Goals       Therapy Goals     Patient will be independent with HEP, it's progression, and self-management of their symptoms.  Patient will demo L shoulder AROM at least flex/abd 150, ER to 80  to be able to reach into cabinets and don a jacket without difficulty.  Patient will demo L shoulder strength at least 4/5 to be able to carry groceries and lift a gallon of milk with LUE.  Patient will demo L scapular strength at least 4/5 to be able to perform reaching and lifting with proper joint mechanics.  Patient will report decrease in symptoms by 50% or better in terms of frequency and intensity to be able to sleep without disruption from pain.                Plan  Continue PT for L shoulder ROM, progress to scap/RTC strengthening per protocol    Treatment Last 4 Visits       1/31/2025 2/5/2025 2/12/2025   PT Treatment   Treatment Day  2 3   Therapeutic Exercise Supine cane flexion 15x  Supine cane abd 15x  Seated scap retraction 15x5\"   Wall slides flexion 15x   1. Chest Press 2x10 with cane  2.  Shoulder flexion with cane 2x10  3. Standing with cane aarom elbow flexion 2x10  4. Supine ER at 0 deg abd aarom with cane 2x10  5. Standing aarom with cane shoulder abd 2x10 to 90 deg  6. Scap retract 3x10  7. Towel  squeeze for  strength 2x10 PROM L shoulder x 15 min  Supine cane flexion 20x  Standing cane abd 20x  Wall wash circles 15x CW/CCW  Narrow row with YTB 20x  Sh ext with YTB 20x      Manual Therapy  1. STM UE  2. PROM shoulder all planes to protocol restrictions   STM L upper arm and shoulder     Therapeutic Exercise Min 12 25 30   Manual Therapy Min  15 8   Evaluation Min 25     Total of Timed Procedures 12 40 38   Total of Service Based 25 0 0   Total Treatment Time 37 40 38         HEP  Reviewed, added wall washes    Charges     TE x 2, MM x 1

## 2025-02-13 ENCOUNTER — APPOINTMENT (OUTPATIENT)
Dept: PHYSICAL THERAPY | Age: 70
End: 2025-02-13
Attending: ORTHOPAEDIC SURGERY
Payer: COMMERCIAL

## 2025-02-14 ENCOUNTER — OFFICE VISIT (OUTPATIENT)
Dept: PHYSICAL THERAPY | Age: 70
End: 2025-02-14
Attending: ORTHOPAEDIC SURGERY
Payer: COMMERCIAL

## 2025-02-14 PROCEDURE — 97140 MANUAL THERAPY 1/> REGIONS: CPT

## 2025-02-14 PROCEDURE — 97110 THERAPEUTIC EXERCISES: CPT

## 2025-02-14 NOTE — PROGRESS NOTES
Patient: Sandrine Oleary (69 year old, female) Referring Provider:  Insurance:   Diagnosis: Open fracture of shaft of left humerus with routine healing, subsequent encounter (P57.140N) Misael Oviedo  [employer] [place]   Date of Surgery: 12/6/24 Next MD visit:  N/A   Precautions:  Cancer; Other (use comment) End of Feb Referral Information:    Date of Evaluation: Req: 0, Auth: 0, Exp:     01/31/25 POC Auth Visits:  16       Today's Date   2/14/2025    Subjective  Patient reports her arm is getting better. Can feel it but not bad.       Pain: 2/10     Objective  see table below            Assessment  Progressed AROM with good tolerance. Pain with end range flexion persists.    Goals (to be met in 16 visits)   Goals       Therapy Goals     Patient will be independent with HEP, it's progression, and self-management of their symptoms.  Patient will demo L shoulder AROM at least flex/abd 150, ER to 80  to be able to reach into cabinets and don a jacket without difficulty.  Patient will demo L shoulder strength at least 4/5 to be able to carry groceries and lift a gallon of milk with LUE.  Patient will demo L scapular strength at least 4/5 to be able to perform reaching and lifting with proper joint mechanics.  Patient will report decrease in symptoms by 50% or better in terms of frequency and intensity to be able to sleep without disruption from pain.                Plan  Continue PT for L shoulder ROM, progress to scap/RTC strengthening per protocol    Treatment Last 4 Visits       1/31/2025 2/5/2025 2/12/2025 2/14/2025   PT Treatment   Treatment Day  2 3 4   Therapeutic Exercise Supine cane flexion 15x  Supine cane abd 15x  Seated scap retraction 15x5\"   Wall slides flexion 15x   1. Chest Press 2x10 with cane  2.  Shoulder flexion with cane 2x10  3. Standing with cane aarom elbow flexion 2x10  4. Supine ER at 0 deg abd aarom with cane 2x10  5. Standing aarom with cane shoulder abd 2x10 to 90 deg  6. Scap retract  3x10  7. Towel squeeze for  strength 2x10 PROM L shoulder x 15 min  Supine cane flexion 20x  Standing cane abd 20x  Wall wash circles 15x CW/CCW  Narrow row with YTB 20x  Sh ext with YTB 20x    Scifit L1.0 2'F/2'B  PROM L shoulder x 15 min  Supine sh flex AROM 20x  S/L abd AROM 20x  S/L ER with towel in axilla 20x  Wall wash figure 8 15x  ER walkouts with RTB 20x  Narrow row with RTB 20x      Manual Therapy  1. STM UE  2. PROM shoulder all planes to protocol restrictions   STM L upper arm and shoulder   STM L upper arm   Therapeutic Exercise Min 12 25 30 32   Manual Therapy Min  15 8 8   Evaluation Min 25      Total of Timed Procedures 12 40 38 40   Total of Service Based 25 0 0 0   Total Treatment Time 37 40 38 40         HEP  Reviewed     Charges     TE x 2, MM x 1

## 2025-02-19 ENCOUNTER — APPOINTMENT (OUTPATIENT)
Dept: PHYSICAL THERAPY | Age: 70
End: 2025-02-19
Attending: ORTHOPAEDIC SURGERY
Payer: COMMERCIAL

## 2025-02-21 ENCOUNTER — OFFICE VISIT (OUTPATIENT)
Dept: PHYSICAL THERAPY | Age: 70
End: 2025-02-21
Attending: ORTHOPAEDIC SURGERY
Payer: COMMERCIAL

## 2025-02-21 PROCEDURE — 97110 THERAPEUTIC EXERCISES: CPT

## 2025-02-21 NOTE — PROGRESS NOTES
Patient: Sandrine Oleary (69 year old, female) Referring Provider:  Insurance:   Diagnosis: Open fracture of shaft of left humerus with routine healing, subsequent encounter (V16.278J) Misael Oviedo  [place]   Date of Surgery: 12/6/24 Next MD visit:  N/A   Precautions:  Cancer; Other (use comment) End of Feb Referral Information:    Date of Evaluation: Req: 0, Auth: 0, Exp:     01/31/25 POC Auth Visits:  16       Today's Date   2/21/2025    Subjective  Patient reports the arm is feeling much better, can reach overhead. Still has trouble sleeping.       Pain: 2/10     Objective  see table below           Assessment  Advanced to light RTC strengthening to improve functional strength.    Goals (to be met in 16 visits)   Goals       Therapy Goals     Patient will be independent with HEP, it's progression, and self-management of their symptoms.  Patient will demo L shoulder AROM at least flex/abd 150, ER to 80  to be able to reach into cabinets and don a jacket without difficulty.  Patient will demo L shoulder strength at least 4/5 to be able to carry groceries and lift a gallon of milk with LUE.  Patient will demo L scapular strength at least 4/5 to be able to perform reaching and lifting with proper joint mechanics.  Patient will report decrease in symptoms by 50% or better in terms of frequency and intensity to be able to sleep without disruption from pain.                Plan  Continue PT for L shoulder ROM, progress to scap/RTC strengthening per protocol    Treatment Last 4 Visits       2/5/2025 2/12/2025 2/14/2025 2/21/2025   PT Treatment   Treatment Day 2 3 4 5   Therapeutic Exercise 1. Chest Press 2x10 with cane  2.  Shoulder flexion with cane 2x10  3. Standing with cane aarom elbow flexion 2x10  4. Supine ER at 0 deg abd aarom with cane 2x10  5. Standing aarom with cane shoulder abd 2x10 to 90 deg  6. Scap retract 3x10  7. Towel squeeze for  strength 2x10 PROM L shoulder x 15 min  Supine cane  flexion 20x  Standing cane abd 20x  Wall wash circles 15x CW/CCW  Narrow row with YTB 20x  Sh ext with YTB 20x    Scifit L1.0 2'F/2'B  PROM L shoulder x 15 min  Supine sh flex AROM 20x  S/L abd AROM 20x  S/L ER with towel in axilla 20x  Wall wash figure 8 15x  ER walkouts with RTB 20x  Narrow row with RTB 20x    Scifit L1.0 2'F/2'B  PROM L shoulder x 10 min  Supine sh flex AROM 20x  S/L abd AROM 20x with 1#  S/L ER with towel in axilla 20x with 1#  Narrow row with GTB 20x  Sh ext with GTB 20x   ER with towel in axilla 20x with YTB  Wall wash figure 8 15x   Manual Therapy 1. STM UE  2. PROM shoulder all planes to protocol restrictions   STM L upper arm and shoulder   STM L upper arm    Therapeutic Exercise Min 25 30 32 38   Manual Therapy Min 15 8 8    Total of Timed Procedures 40 38 40 38   Total of Service Based 0 0 0 0   Total Treatment Time 40 38 40 38         HEP  Reviewed     Charges     TE x 3

## 2025-02-26 ENCOUNTER — OFFICE VISIT (OUTPATIENT)
Dept: PHYSICAL THERAPY | Age: 70
End: 2025-02-26
Attending: ORTHOPAEDIC SURGERY
Payer: COMMERCIAL

## 2025-02-26 PROCEDURE — 97110 THERAPEUTIC EXERCISES: CPT

## 2025-02-26 PROCEDURE — 97140 MANUAL THERAPY 1/> REGIONS: CPT

## 2025-02-26 NOTE — PROGRESS NOTES
Patient: Sandrine Oleary (69 year old, female) Referring Provider:  Insurance:   Diagnosis: Open fracture of shaft of left humerus with routine healing, subsequent encounter (U83.998R) Misael Ov[place]o  [employer]   Date of Surgery: 12/6/24 Next MD visit:  N/A   Precautions:  Cancer; Other (use comment) End of Feb Referral Information:    Date of Evaluation: Req: 0, Auth: 0, Exp:     01/31/25 POC Auth Visits:  16       Today's Date   2/26/2025    Subjective  Patient reports her range of motion is coming along well. Started going to the gym again. Mostly cardio/lower body.       Pain: 0/10     Objective  see table below       L shoulder AROM: flex 150, abd 115 deg    Assessment  Added PNF patterns to promote functional movement. Continued with gentle RTC and scapular strengthening with good tolerance.    Goals (to be met in 16 visits)   Goals       Therapy Goals     Patient will be independent with HEP, it's progression, and self-management of their symptoms.  Patient will demo L shoulder AROM at least flex/abd 150, ER to 80  to be able to reach into cabinets and don a jacket without difficulty.  Patient will demo L shoulder strength at least 4/5 to be able to carry groceries and lift a gallon of milk with LUE.  Patient will demo L scapular strength at least 4/5 to be able to perform reaching and lifting with proper joint mechanics.  Patient will report decrease in symptoms by 50% or better in terms of frequency and intensity to be able to sleep without disruption from pain.                Plan  Continue PT for L shoulder ROM, progress to scap/RTC strengthening per protocol    Treatment Last 4 Visits       2/12/2025 2/14/2025 2/21/2025 2/26/2025   PT Treatment   Treatment Day 3 4 5 6   Therapeutic Exercise PROM L shoulder x 15 min  Supine cane flexion 20x  Standing cane abd 20x  Wall wash circles 15x CW/CCW  Narrow row with YTB 20x  Sh ext with YTB 20x    Scifit L1.0 2'F/2'B  PROM L shoulder x 15 min  Supine  sh flex AROM 20x  S/L abd AROM 20x  S/L ER with towel in axilla 20x  Wall wash figure 8 15x  ER walkouts with RTB 20x  Narrow row with RTB 20x    Scifit L1.0 2'F/2'B  PROM L shoulder x 10 min  Supine sh flex AROM 20x  S/L abd AROM 20x with 1#  S/L ER with towel in axilla 20x with 1#  Narrow row with GTB 20x  Sh ext with GTB 20x   ER with towel in axilla 20x with YTB  Wall wash figure 8 15x Scifit L1.0 2'F/2'B  PROM L shoulder x 8 min  Supine horiz abd with RTB 20x  Supine D1 flexion 2x10  S/L ER with towel in axilla 20x with 2#  Narrow row with GTB 20x  Sh ext with GTB 20x   Wall slide flexion with lift off and hold 10x5\"   Wall slide scaption with lift off and hold 10x5\"   Standing flexion/abd AROM 2x10 ea   Wall wash figure 8 15x     Manual Therapy STM L upper arm and shoulder   STM L upper arm  STM L upper arm with cross friction   Therapeutic Exercise Min 30 32 38 30   Manual Therapy Min 8 8  8   Total of Timed Procedures 38 40 38 38   Total of Service Based 0 0 0 0   Total Treatment Time 38 40 38 38         HEP  Reviewed     Charges     TE x 2, MM x 1

## 2025-02-28 ENCOUNTER — OFFICE VISIT (OUTPATIENT)
Dept: PHYSICAL THERAPY | Age: 70
End: 2025-02-28
Attending: ORTHOPAEDIC SURGERY
Payer: COMMERCIAL

## 2025-02-28 PROCEDURE — 97140 MANUAL THERAPY 1/> REGIONS: CPT

## 2025-02-28 PROCEDURE — 97110 THERAPEUTIC EXERCISES: CPT

## 2025-02-28 NOTE — PROGRESS NOTES
Patient: Sandrine Oleary (69 year old, female) Referring Provider:  Insurance:   Diagnosis: Open fracture of shaft of left humerus with routine healing, subsequent encounter (H78.061H) Misael Oviedo  [employer]   Date of Surgery: 12/6/24 Next MD visit:  N/A   Precautions:  Cancer; Other (use comment) none scheduled Referral Information:    Date of Evaluation: Req: 0, Auth: 0, Exp:     01/31/25 POC Auth Visits:  16       Today's Date   2/28/2025    Subjective  Patient reports she saw surgeon yesterday, was pleased with her progress. Went to a class at the gym with upper body weights, avoiding overhead lifting and felt fine.       Pain: 0/10     Objective  see table below           Assessment  Progressed RTC/scap strengthening to improve functional strength. No increased pain reported post session.    Goals (to be met in 16 visits)   Goals       Therapy Goals     Patient will be independent with HEP, it's progression, and self-management of their symptoms.  Patient will demo L shoulder AROM at least flex/abd 150, ER to 80  to be able to reach into cabinets and don a jacket without difficulty.  Patient will demo L shoulder strength at least 4/5 to be able to carry groceries and lift a gallon of milk with LUE.  Patient will demo L scapular strength at least 4/5 to be able to perform reaching and lifting with proper joint mechanics.  Patient will report decrease in symptoms by 50% or better in terms of frequency and intensity to be able to sleep without disruption from pain.                Plan  Continue PT for L shoulder ROM, progress to scap/RTC strengthening per protocol. Anticipate discharge after 1 more visit.    Treatment Last 4 Visits       2/14/2025 2/21/2025 2/26/2025 2/28/2025   PT Treatment   Treatment Day 4 5 6 7   Therapeutic Exercise Scifit L1.0 2'F/2'B  PROM L shoulder x 15 min  Supine sh flex AROM 20x  S/L abd AROM 20x  S/L ER with towel in axilla 20x  Wall wash figure 8 15x  ER walkouts with  RTB 20x  Narrow row with RTB 20x    Scifit L1.0 2'F/2'B  PROM L shoulder x 10 min  Supine sh flex AROM 20x  S/L abd AROM 20x with 1#  S/L ER with towel in axilla 20x with 1#  Narrow row with GTB 20x  Sh ext with GTB 20x   ER with towel in axilla 20x with YTB  Wall wash figure 8 15x Scifit L1.0 2'F/2'B  PROM L shoulder x 8 min  Supine horiz abd with RTB 20x  Supine D1 flexion 2x10  S/L ER with towel in axilla 20x with 2#  Narrow row with GTB 20x  Sh ext with GTB 20x   Wall slide flexion with lift off and hold 10x5\"   Wall slide scaption with lift off and hold 10x5\"   Standing flexion/abd AROM 2x10 ea   Wall wash figure 8 15x   PROM L shoulder x 8 min  Supine flexion 2x10 with 2#  Supine D1 flexion 2x10 with 1#  Supine horiz abd with RTB 20x   Standing ER/IR with GTB 20x ea  Wall slide flexion with lift off and hold 10x5\"   Wall slide scaption with lift off and hold 10x5\"   Standing flexion/abd AROM 2x10 ea   Wall wash figure 8 15x   Manual Therapy STM L upper arm  STM L upper arm with cross friction STM L upper arm    Therapeutic Exercise Min 32 38 30 30   Manual Therapy Min 8  8 8   Total of Timed Procedures 40 38 38 38   Total of Service Based 0 0 0 0   Total Treatment Time 40 38 38 38         HEP  Reviewed     Charges     TE x 2, MM x 1

## 2025-03-05 ENCOUNTER — OFFICE VISIT (OUTPATIENT)
Dept: PHYSICAL THERAPY | Age: 70
End: 2025-03-05
Attending: ORTHOPAEDIC SURGERY
Payer: COMMERCIAL

## 2025-03-05 PROCEDURE — 97110 THERAPEUTIC EXERCISES: CPT

## 2025-03-05 PROCEDURE — 97140 MANUAL THERAPY 1/> REGIONS: CPT

## 2025-03-05 NOTE — PROGRESS NOTES
Patient: Sandrine Oleary (69 year old, female) Referring Provider:  Insurance:   Diagnosis: Open fracture of shaft of left humerus with routine healing, subsequent encounter (S73.612V) Misael Oviedo  [employer]   Date of Surgery: 12/6/24 Next MD visit:  N/A   Precautions:  Cancer; Other (use comment) none scheduled Referral Information:    Date of Evaluation: Req: 0, Auth: 0, Exp:     01/31/25 POC Auth Visits:  16       Today's Date   3/5/2025    Subjective  Patient reports her arm is doing well, still notices stiffness when she reaches way overhead. Arm is a little sore but \"not horrible\". Patient reports no pain at rest, up to 2/10 at worst in the past week.       Pain: 0/10     Objective  see table below (L shoulder AROM: flex 130, abd 115; L shoulder MMT: flex 4+/5, abd 4/5, IR 4+/5, ER 4/5)         Assessment  Patient has attended 8 visits with signficant improvement in L shoulder AROM and strength allowing for return to ADLs and light UE exercises at the gym. Patient has met majority of goals and will be discharged from PT at this time.    Goals (to be met in 16 visits)   Patient will be independent with HEP, it's progression, and self-management of their symptoms.  Patient will demo L shoulder AROM at least flex/abd 150, ER to 80  to be able to reach into cabinets and don a jacket without difficulty.  Patient will demo L shoulder strength at least 4/5 to be able to carry groceries and lift a gallon of milk with LUE.  Patient will demo L scapular strength at least 4/5 to be able to perform reaching and lifting with proper joint mechanics.  Patient will report decrease in symptoms by 50% or better in terms of frequency and intensity to be able to sleep without disruption from pain.     Plan  Discharge PT    Treatment Last 4 Visits       2/21/2025 2/26/2025 2/28/2025 3/5/2025   PT Treatment   Treatment Day 5 6 7 8   Therapeutic Exercise Scifit L1.0 2'F/2'B  PROM L shoulder x 10 min  Supine sh flex AROM  20x  S/L abd AROM 20x with 1#  S/L ER with towel in axilla 20x with 1#  Narrow row with GTB 20x  Sh ext with GTB 20x   ER with towel in axilla 20x with YTB  Wall wash figure 8 15x Scifit L1.0 2'F/2'B  PROM L shoulder x 8 min  Supine horiz abd with RTB 20x  Supine D1 flexion 2x10  S/L ER with towel in axilla 20x with 2#  Narrow row with GTB 20x  Sh ext with GTB 20x   Wall slide flexion with lift off and hold 10x5\"   Wall slide scaption with lift off and hold 10x5\"   Standing flexion/abd AROM 2x10 ea   Wall wash figure 8 15x   PROM L shoulder x 8 min  Supine flexion 2x10 with 2#  Supine D1 flexion 2x10 with 1#  Supine horiz abd with RTB 20x   Standing ER/IR with GTB 20x ea  Wall slide flexion with lift off and hold 10x5\"   Wall slide scaption with lift off and hold 10x5\"   Standing flexion/abd AROM 2x10 ea   Wall wash figure 8 15x    Manual Therapy  STM L upper arm with cross friction STM L upper arm     Therapeutic Exercise Min 38 30 30    Manual Therapy Min  8 8    Total of Timed Procedures 38 38 38    Total of Service Based 0 0 0    Total Treatment Time 38 38 38           2/21/2025 2/26/2025 2/28/2025 3/5/2025   LE Treatment   Treatment Day 5 6 7 8   Therapeutic Exercise    Scifit L1.0 2'F/2'B  PROM L shoulder x 8 min  Supine SA punch 20x with 3#  Supine flexion 2x10 with 2#  Supine chest press 20x with 2#  Standing ER/IR with GTB 20x ea  Standing horiz abd 20x with RTB  Wall slide flexion with lift off and hold 10x5\"   Wall slide scaption with lift off and hold 10x5\"   Standing flexion/abd AROM 2x10 ea with 1#   Manual Therapy    STM L upper arm    Therapeutic Exercise Minutes    32   Manual Therapy Minutes    8   Total Time Of Timed Procedures    40   Total Time Of Service-Based Procedures    0   Total Treatment Time    40   HEP    Reviewed         HEP  Reviewed     Charges     TE x 2, MM x 1

## 2025-03-07 ENCOUNTER — APPOINTMENT (OUTPATIENT)
Dept: PHYSICAL THERAPY | Age: 70
End: 2025-03-07
Attending: ORTHOPAEDIC SURGERY
Payer: COMMERCIAL

## 2025-06-09 ENCOUNTER — PATIENT MESSAGE (OUTPATIENT)
Dept: PULMONOLOGY | Facility: CLINIC | Age: 70
End: 2025-06-09

## 2025-06-09 ENCOUNTER — LAB ENCOUNTER (OUTPATIENT)
Dept: LAB | Facility: HOSPITAL | Age: 70
End: 2025-06-09
Attending: INTERNAL MEDICINE
Payer: COMMERCIAL

## 2025-06-09 DIAGNOSIS — E78.00 HYPERCHOLESTEROLEMIA: ICD-10-CM

## 2025-06-09 DIAGNOSIS — I70.0 ATHEROSCLEROSIS OF AORTA: Primary | ICD-10-CM

## 2025-06-09 DIAGNOSIS — I10 HTN (HYPERTENSION): ICD-10-CM

## 2025-06-09 LAB
ALBUMIN SERPL-MCNC: 4.9 G/DL (ref 3.2–4.8)
ALBUMIN/GLOB SERPL: 2.5 {RATIO} (ref 1–2)
ALP LIVER SERPL-CCNC: 66 U/L (ref 55–142)
ALT SERPL-CCNC: 16 U/L (ref 10–49)
ANION GAP SERPL CALC-SCNC: 10 MMOL/L (ref 0–18)
AST SERPL-CCNC: 14 U/L (ref ?–34)
BILIRUB SERPL-MCNC: 0.5 MG/DL (ref 0.2–1.1)
BUN BLD-MCNC: 16 MG/DL (ref 9–23)
BUN/CREAT SERPL: 24.2 (ref 10–20)
CALCIUM BLD-MCNC: 9.8 MG/DL (ref 8.7–10.4)
CHLORIDE SERPL-SCNC: 104 MMOL/L (ref 98–112)
CHOLEST SERPL-MCNC: 172 MG/DL (ref ?–200)
CO2 SERPL-SCNC: 27 MMOL/L (ref 21–32)
CREAT BLD-MCNC: 0.66 MG/DL (ref 0.55–1.02)
EGFRCR SERPLBLD CKD-EPI 2021: 95 ML/MIN/1.73M2 (ref 60–?)
FASTING PATIENT LIPID ANSWER: YES
FASTING STATUS PATIENT QL REPORTED: YES
GLOBULIN PLAS-MCNC: 2 G/DL (ref 2–3.5)
GLUCOSE BLD-MCNC: 105 MG/DL (ref 70–99)
HDLC SERPL-MCNC: 80 MG/DL (ref 40–59)
LDLC SERPL CALC-MCNC: 77 MG/DL (ref ?–100)
NONHDLC SERPL-MCNC: 92 MG/DL (ref ?–130)
OSMOLALITY SERPL CALC.SUM OF ELEC: 294 MOSM/KG (ref 275–295)
POTASSIUM SERPL-SCNC: 4.1 MMOL/L (ref 3.5–5.1)
PROT SERPL-MCNC: 6.9 G/DL (ref 5.7–8.2)
SODIUM SERPL-SCNC: 141 MMOL/L (ref 136–145)
TRIGL SERPL-MCNC: 82 MG/DL (ref 30–149)
VLDLC SERPL CALC-MCNC: 13 MG/DL (ref 0–30)

## 2025-06-09 PROCEDURE — 80053 COMPREHEN METABOLIC PANEL: CPT

## 2025-06-09 PROCEDURE — 80061 LIPID PANEL: CPT

## 2025-06-09 PROCEDURE — 36415 COLL VENOUS BLD VENIPUNCTURE: CPT

## 2025-06-09 NOTE — TELEPHONE ENCOUNTER
The patient called to get some medications sent to the [facility] in [place].

## 2025-06-10 ENCOUNTER — TELEPHONE (OUTPATIENT)
Dept: PULMONOLOGY | Facility: CLINIC | Age: 70
End: 2025-06-10

## 2025-06-10 RX ORDER — AZITHROMYCIN 250 MG/1
TABLET, FILM COATED ORAL
Qty: 6 TABLET | Refills: 0 | Status: SHIPPED | OUTPATIENT
Start: 2025-06-10

## 2025-06-10 RX ORDER — PREDNISONE 20 MG/1
TABLET ORAL
Qty: 10 TABLET | Refills: 0 | Status: SHIPPED | OUTPATIENT
Start: 2025-06-10

## 2025-06-10 RX ORDER — AZITHROMYCIN 250 MG/1
TABLET, FILM COATED ORAL
Qty: 6 TABLET | Refills: 0 | OUTPATIENT
Start: 2025-06-10

## 2025-06-10 NOTE — TELEPHONE ENCOUNTER
Spoke with patient regarding MyChart message below. Patient requesting z-megan and short course Prednisone.     Dr. Green: Please review/sign pended order if agreeable.

## 2025-06-10 NOTE — TELEPHONE ENCOUNTER
Patient is calling again for an Antibiotic.  Patient complains of a productive cough.   Please call

## 2025-06-10 NOTE — TELEPHONE ENCOUNTER
Spoke with patient and informed of Dr. Green's orders below. Confirmed pharmacy. Patient verbalized understanding.

## 2025-06-10 NOTE — TELEPHONE ENCOUNTER
Sandrine DONOVAN  [facility] Clinical Staff (supporting Shantanu Green MD)Yesterday (7:18 AM)     SR  Noman Green,  I have been coughing for several days --it started with the terrible air quality and now appears to be my chronic bronchitis with a lot of nose and throat dripping.  The last time we spoke you said you would be good enough to order meds when I need them because I have had asthma and chronic bronchitis my entire life. I am out of steroids which I use on occasion that help, and do not have a z-pack on hand. Could you call both into [facility]"[employer] in [place] [phone]. My chest hurts from days of this.  I am happy to see you for a follow up l next week after the 16. I would be very grateful.     My cell [phone]     Thank you!   Sandrine Oleary (SALLY)  1955

## 2025-06-18 ENCOUNTER — TELEPHONE (OUTPATIENT)
Dept: PULMONOLOGY | Facility: CLINIC | Age: 70
End: 2025-06-18

## 2025-06-18 RX ORDER — BENZONATATE 200 MG/1
200 CAPSULE ORAL 2 TIMES DAILY PRN
Qty: 60 CAPSULE | Refills: 5 | Status: SHIPPED | OUTPATIENT
Start: 2025-06-18

## (undated) DIAGNOSIS — R19.7 DIARRHEA, UNSPECIFIED TYPE: Primary | ICD-10-CM

## (undated) DIAGNOSIS — A04.72 C. DIFFICILE COLITIS: Primary | ICD-10-CM

## (undated) DIAGNOSIS — R10.9 ABDOMINAL PAIN, UNSPECIFIED ABDOMINAL LOCATION: ICD-10-CM

## (undated) DEVICE — 35 ML SYRINGE REGULAR TIP: Brand: MONOJECT

## (undated) DEVICE — ABDOMINAL BINDER: Brand: DEROYAL

## (undated) DEVICE — GOWN SURG L DISP LEV 3 AERO BLU RAGLAN SL ST

## (undated) DEVICE — SUT ETHLN 3-0 30IN FS-1 NABSRB BLK 24MM 3/8 C

## (undated) DEVICE — ADHESIVE SKIN TOP FOR WND CLSR DERMBND ADV

## (undated) DEVICE — PACK CDS UPPER EXTREMITY

## (undated) DEVICE — ENDOSCOPIC VALVE WITH ADAPTER.: Brand: SURSEAL® II

## (undated) DEVICE — C-ARM: Brand: UNBRANDED

## (undated) DEVICE — Device: Brand: CUSTOM PROCEDURE KIT

## (undated) DEVICE — Device: Brand: DEFENDO AIR/WATER/SUCTION AND BIOPSY VALVE

## (undated) DEVICE — SPONGE LAP 18X18IN WHT COT 4 PLY FLD STRUNG

## (undated) DEVICE — DISPOSABLE GRASPER CARTRIDGE: Brand: DIRECT DRIVE REPOSABLE GRASPERS

## (undated) DEVICE — DRAPE,UNDRBUT,WHT GRAD PCH,CAPPORT,20/CS: Brand: MEDLINE

## (undated) DEVICE — VIOLET BRAIDED (POLYGLACTIN 910), SYNTHETIC ABSORBABLE SUTURE: Brand: COATED VICRYL

## (undated) DEVICE — SOLUTION IRRIG 3000ML 0.9% NACL FLX CONT

## (undated) DEVICE — DRAPE SHEET LAPCHOLE 124X100X7

## (undated) DEVICE — PAD POS 36IN DISP SURGYPAD

## (undated) DEVICE — ELECTRODE ES L2.75IN XLN STD BLDE MOD E-Z CLN

## (undated) DEVICE — [HIGH FLOW INSUFFLATOR,  DO NOT USE IF PACKAGE IS DAMAGED,  KEEP DRY,  KEEP AWAY FROM SUNLIGHT,  PROTECT FROM HEAT AND RADIOACTIVE SOURCES.]: Brand: PNEUMOSURE

## (undated) DEVICE — TROCAR: Brand: KII FIOS FIRST ENTRY

## (undated) DEVICE — SOLUTION IRRIG 1000ML 0.9% NACL USP BTL

## (undated) DEVICE — EVACUATOR SUR 100CC SIL BLB WND

## (undated) DEVICE — SLING ARM L L20IN D7IN DK BLU COT POLY WIDE

## (undated) DEVICE — LAPAROSCOPIC ACCESS SYSTEM: Brand: ALEXIS LAPAROSCOPIC SYSTEM

## (undated) DEVICE — SPLINT ONESTEP 4X30IN FBRGLS MOLD

## (undated) DEVICE — BNDG,ELSTC,MATRIX,STRL,4"X5YD,LF,HOOK&LP: Brand: MEDLINE

## (undated) DEVICE — [URETERAL KIT: 2 - URETERAL CATHETERS, 6 FR OUTER DIAMETER, 70 CM LENGTH,  2 - EMITTING FIBER, 0.75 OUTER DIAMETER, 370 CM LENGTH,  DO NOT USE IF PACKAGE IS DAMAGED]: Brand: IRIS

## (undated) DEVICE — SLEEVE SUR 1 SZ L6X23.5IN 4 SMS LO LINT EVOL

## (undated) DEVICE — SUT COAT VCRL 0 27IN CP-1 ABSRB UD 36MM 1/2

## (undated) DEVICE — DRESSING ALG 3.5X10IN TAN CARBOXYMETHYL CELOS

## (undated) DEVICE — SUTURE ETHLN SZ 3-0 L30IN NONABSORB BLK

## (undated) DEVICE — SUT SLK 0 30IN MULTPK BK

## (undated) DEVICE — SPONGE 4X4 10PK

## (undated) DEVICE — SUTURE PDS II SZ 3-0 L27IN ABSRB VLT L26MM SH

## (undated) DEVICE — KIT ENDO ORCAPOD 160/180/190

## (undated) DEVICE — FORCEP RADIAL JAW 4

## (undated) DEVICE — SET IRRIG L96IN POST OP W/ NVENT 2ND PIERCING

## (undated) DEVICE — GAMMEX® NON-LATEX PI ORTHO SIZE 7, STERILE POLYISOPRENE POWDER-FREE SURGICAL GLOVE: Brand: GAMMEX

## (undated) DEVICE — SUCTION CANISTER, 3000CC,SAFELINER: Brand: DEROYAL

## (undated) DEVICE — DRAIN SUR W10MMXL20CM SIL FULL PERF HUBLESS

## (undated) DEVICE — PURSE STRING DEVICE: Brand: PURSTRING

## (undated) DEVICE — SUTURE PDS II SZ 0 L60IN ABSRB VLT L48MM CTX

## (undated) DEVICE — DRAPE URO 112X63X131IN POLYPR FEN W/ PCH

## (undated) DEVICE — SOLUTION PREP 26ML 0.7% POVACRYLEX 74% ISO

## (undated) DEVICE — TROCAR: Brand: KII® SLEEVE

## (undated) DEVICE — LEGGINGS SURG W31XL48IN SMS FAB SFT

## (undated) DEVICE — SOLUTION ANTIFOG W/ ADH BK FOAM SPNG RADPQ

## (undated) DEVICE — MEDI-VAC NON-CONDUCTIVE SUCTION TUBING 6MM X 1.8M (6FT.) L: Brand: CARDINAL HEALTH

## (undated) DEVICE — DRAPE SHEET LG

## (undated) DEVICE — ENSEAL X1 TISSUE SEALER, CURVED JAW, 37 CM SHAFT LENGTH: Brand: ENSEAL

## (undated) DEVICE — CIRCULAR MECH XL SEAL 29MM

## (undated) DEVICE — INSUFFLATION NEEDLE TO ESTABLISH PNEUMOPERITONEUM.: Brand: INSUFFLATION NEEDLE

## (undated) DEVICE — UNDYED BRAIDED (POLYGLACTIN 910), SYNTHETIC ABSORBABLE SUTURE: Brand: COATED VICRYL

## (undated) DEVICE — COTTON UNDERCAST PADDING,REGULAR FINISH: Brand: WEBRIL

## (undated) DEVICE — GAMMEX® PI HYBRID SIZE 7.5, STERILE POWDER-FREE SURGICAL GLOVE, POLYISOPRENE AND NEOPRENE BLEND: Brand: GAMMEX

## (undated) DEVICE — 3M™ TEGADERM™ TRANSPARENT FILM DRESSING, 1626W, 4 IN X 4-3/4 IN (10 CM X 12 CM), 50 EACH/CARTON, 4 CARTON/CASE: Brand: 3M™ TEGADERM™

## (undated) DEVICE — KIT CLEAN ENDOKIT 1.1OZ GOWNX2

## (undated) DEVICE — JELLY LUBRICANT E-Z 4OZ TUBE

## (undated) DEVICE — ECHELON FLEX POWERED PLUS ARTICULATING ENDOSCOPIC LINEAR CUTTER , 60MM: Brand: ECHELON FLEX

## (undated) DEVICE — SUTURE PDS II SZ 4-0 L27IN ABSRB VLT SH L26MM

## (undated) DEVICE — INTENDED FOR TISSUE SEPARATION, AND OTHER PROCEDURES THAT REQUIRE A SHARP SURGICAL BLADE TO PUNCTURE OR CUT.: Brand: BARD-PARKER ® STAINLESS STEEL BLADES

## (undated) DEVICE — A P RESECTION: Brand: MEDLINE INDUSTRIES, INC.

## (undated) DEVICE — E-Z CLEAN, NON-STICK, PTFE COATED, ELECTROSURGICAL BLADE ELECTRODE, MODIFIED EXTENDED INSULATION, 6.5 INCH (16.5 CM): Brand: MEGADYNE

## (undated) DEVICE — SUT MCRYL 3-0 18IN PS-2 ABSRB UD 19MM 3/8 CIR

## (undated) DEVICE — 9534HP TRANSPARENT DRSG W/FRAME: Brand: 3M™ TEGADERM™

## (undated) DEVICE — GUIDEWIRE ENDOSCP L150CM DIA0.035IN TIP 3CM

## (undated) DEVICE — PROVIDES A STERILE INTERFACE BETWEEN THE OPERATING ROOM SURGICAL LAMPS (NON-STERILE) AND THE SURGEON OR NURSE (STERILE).: Brand: STERION®CLAMP COVER FABRIC

## (undated) DEVICE — LINE MNTR ADLT SET O2 INTMD

## (undated) DEVICE — DISPOSABLE SUCTION/IRRIGATOR TUBE SET: Brand: AHTO

## (undated) DEVICE — SUT PDS II 2-0 27IN CT-1 ABSRB VLT L36MM 1/2

## (undated) DEVICE — ENDOPATH ECHELON ENDOSCOPIC LINEAR CUTTER RELOADS, BLUE, 60MM: Brand: ECHELON ENDOPATH

## (undated) DEVICE — ANTIBACTERIAL UNDYED BRAIDED (POLYGLACTIN 910), SYNTHETIC ABSORBABLE SUTURE: Brand: COATED VICRYL

## (undated) DEVICE — GAMMEX® PI HYBRID SIZE 6.5, STERILE POWDER-FREE SURGICAL GLOVE, POLYISOPRENE AND NEOPRENE BLEND: Brand: GAMMEX

## (undated) NOTE — LETTER
4/15/2022              Sandrine Antonioing        [facility]        [address] [address]         Dear Yoni Humphrey,    This letter is to inform you that our office has made several attempts to reach you by phone without success. We were attempting to contact you by phone to get an update on how you are doing. Please contact our office at the number listed below as soon as you receive this letter to discuss this issue and to make the necessary changes in our system to your contact information. Thank you for your cooperation. Sincerely,    Gael Jones.  MD Lorraine Mcintyre [address]  [address] [address] [address] [address] [phone]

## (undated) NOTE — LETTER
[address] [place] [address]      Authorization for Surgical Operation and Procedure     Date:___________                                                                                                         Time:__________  1. I hereby Gwendel Jeans, MD, my physician and his/her assistants (if applicable), which may include medical students, residents, and/or fellows, to perform the following surgical operation/ procedure and administer such anesthesia as may be determined necessary by my physician:  Operation/Procedure name (s) COLONOSCOPY  on Sandrine Kerr Ruecking   2. I recognize that during the surgical operation/procedure, unforeseen conditions may necessitate additional or different procedures than those listed above. I, therefore, further authorize and request that the above-named surgeon, assistants, or designees perform such procedures as are, in their judgment, necessary and desirable. 3.   My surgeon/physician has discussed prior to my surgery the potential benefits, risks and side effects of this procedure; the likelihood of achieving goals; and potential problems that might occur during recuperation. They also discussed reasonable alternatives to the procedure, including risks, benefits, and side effects related to the alternatives and risks related to not receiving this procedure. I have had all my questions answered and I acknowledge that no guarantee has been made as to the result that may be obtained. 4.   Should the need arise during my operation or immediate post-operative period, I also consent to the administration of blood and/or blood products. Further, I understand that despite careful testing and screening of blood or blood products by collecting agencies, I may still be subject to ill effects as a result of receiving a blood transfusion and/or blood products.   The following are some, but not all, of the potential risks that can occur: fever and allergic reactions, hemolytic reactions, transmission of diseases such as Hepatitis, AIDS and Cytomegalovirus (CMV) and fluid overload. In the event that I wish to have an autologous transfusion of my own blood, or a directed donor transfusion. I will discuss this with my physician. 5.   I authorize the use of any specimen, organs, tissues, body parts or foreign objects that may be removed from my body during the operation/procedure for diagnosis, research or teaching purposes and their subsequent disposal by hospital authorities. I also authorize the release of specimen test results and/or written reports to my treating physician on the hospital medical staff or other referring or consulting physicians involved in my care, at the discretion of the Pathologist or my treating physician. 6.   I consent to the photographing or videotaping of the operations or procedures to be performed, including appropriate portions of my body for medical, scientific, or educational purposes, provided my identity is not revealed by the pictures or by descriptive texts accompanying them. If the procedure has been photographed/videotaped, the surgeon will obtain the original picture, image, videotape or CD. The hospital will not be responsible for storage, release or maintenance of the picture, image, tape or CD.    7.   I consent to the presence of a  or observers in the operating room as deemed necessary by my physician or their designees. 8.   I recognize that in the event my procedure results in extended X-Ray/fluoroscopy time, I may develop a skin reaction. 9. If I have a Do Not Attempt Resuscitation (DNAR) order in place, that status will be suspended while in the operating room, procedural suite, and during the recovery period unless otherwise explicitly stated by me (or a person authorized to consent on my behalf).  The surgeon or my attending physician will determine when the applicable recovery period ends for purposes of reinstating the DNAR order. 10. Patients having a sterilization procedure: I understand that if the procedure is successful the results will be permanent and it will therefore be impossible for me to inseminate, conceive, or bear children. I also understand that the procedure is intended to result in sterility, although the result has not been guaranteed. 11. I acknowledge that my physician has explained sedation/analgesia administration to me including the risk and benefits I consent to the administration of sedation/analgesia as may be necessary or desirable in the judgment of my physician. I CERTIFY THAT I HAVE READ AND FULLY UNDERSTAND THE ABOVE CONSENT TO OPERATION and/or OTHER PROCEDURE.    _________________________________________  __________________________________  Signature of Patient     Signature of Responsible Person         ___________________________________         Printed Name of Responsible Person           _________________________________                  Relationship to Patient  _________________________________________  ______________________________  Signature of Witness          Date  Time    STATEMENT OF PHYSICIAN My signature below affirms that prior to the time of the procedure; I have explained to the patient and/or his/her legal representative, the risks and benefits involved in the proposed treatment and any reasonable alternative to the proposed treatment. I have also explained the risks and benefits involved in refusal of the proposed treatment and alternatives to the proposed treatment and have answered the patient's questions. If I have a significant financial interest in a co-management agreement or a significant financial interest in any product or implant, or other significant relationship used in this procedure/surgery, I have disclosed this and had a discussion with my patient. _______________________________________________________________ _____________________________  Gabriellenoanisa Frame of Physician)                                                                                         (Date)                                   (Time)        Patient Name: Evi Gregory    : 1955   Printed: 2022      Medical Record #: [id number]                                              Page 1 of 1

## (undated) NOTE — LETTER
[address], [place]  Authorization for Invasive Procedures  1.  I hereby authorize Dr. Bonnie Hanna , my physician and whomever may be designated as the doctor's assistant, to perform the following operation and/or procedure: Cardiac ca despite careful testing and screening of blood and blood products, I may still be subject to ill effects as a result of recieving a blood transfusion an/or blood producst. The following are some, but not all, of the potential risks that can occur: fever an 11. I acknowledge that my physician has explained sedation/analgesia administration to me including the risks and benefits. I consent to the administration of sedation/analgesia as may be necessary or desirable in the judgment of my physician.      [address]

## (undated) NOTE — LETTER
22  [address] [facility]  Pre-Operative Clearance Request    Patient Name:   Clinton Conklin             :   1955    Surgeon: Dr. Mireya Kang             Date of Surgery: 8/15/22     Surgical Procedure: RIGHT FOOT FUSION 2ND & 3RD TOES, POSSIBLE IMPLANT/WEIL OSTEOTOMY 2ND METATARSAL. Please Complete:    [x]  History and Physical        [x]  Medical  Clearance                     Testing is ordered by [address] [facility] per anesthesia guidelines                                            **Please fax test results, H&P, and clearance to [phone] and to                                      [facility] at [phone]**

## (undated) NOTE — MR AVS SNAPSHOT
[facility]  [address]  [phone]               Thank you for choosing us for your health care visit with Patria Villa MD.  We are glad to serve you and happy to provide you with this summary Take 3 mL by nebulization. Commonly known as:  DUONEB           RESTASIS 0.05 % Emul   Generic drug:  cycloSPORINE   daily. * Notice: This list has 2 medication(s) that are the same as other medications prescribed for you.  Read the directions

## (undated) NOTE — LETTER
Hospital Discharge Documentation  Please phone to schedule a hospital follow up appointment.     From: [address]  Phone: [phone]    Patient discharged time/date: 6/24/2021  5:19 PM  Patient discharge disposition:  Home or Self and O x  3  CV:   RRR.   No m/g/r, ectopy noted  Pulm:   CTA bilat  Abd:   +bs, soft, NT, ND  LE:  No c/c/e  Neuro:  nonfocal      Reason for Admission:      Palpitations and dyspnea.     HISTORY OF PRESENT ILLNESS:  The patient is a 42-year-old  f COZAAR  Start taking on: June 25, 2021  Replaces: Irbesartan 150 MG Tabs      Take 1 tablet (50 mg total) by mouth daily.    Quantity: 30 tablet  Refills: 2        CONTINUE taking these medications      Instructions Prescription details   ALBUTEROL SULFATE up Visits:     Lucinda Serrato MD.    Specialty: Internal Medicine  Why: As needed  Contact information:  Martin Monahan MD. Schedule an appointment as soon as p

## (undated) NOTE — LETTER
Hospital Discharge Documentation  Please phone to schedule a hospital follow up appointment. From: [address]  Phone: [phone]    Patient discharged time/date: 2023  1:57 PM  Patient discharge disposition:  Home or Self Care       Discharge Summary - D/C Summary        Discharge Summary signed by Tray Oliveira MD at 2023 12:50 PM  Version 1 of 1      Author: Tray Oliveira MD Service: -- Author Type: Physician    Filed: 2023 12:50 PM Date of Service: 2023  9:24 AM Status: Signed    : Tray Oliveira MD (Physician)         [facility]     Discharge Summary    [facility] Patient Status:  Inpatient    1955 MRN [id number]   Location [facility] 3W/SW Attending Tray Oliveira MD   Hosp Day # 2 PCP Simran Hankins MD     Date of Admission: 2023    Date of Discharge: 2023    Admitting Diagnosis: Acute diverticulitis [K57.92]    Discharge Diagnosis: Patient Active Problem List:     Acute pain of left shoulder     Status post hammertoe correction     Cough     Disruption of traumatic injury wound repair     Nonhealing ulcer of right lower leg (HCC)     Leg wound, right, initial encounter     Chronic ulcer of right lower extremity with fat layer exposed ([address]     Leg wound, right, subsequent encounter     Ulcer of right lower extremity with fat layer exposed ([address]     Non-pressure chronic ulcer of right lower leg (HCC)     Wound of right leg, subsequent encounter     Palpitations     Ventricular tachyarrhythmia ([address]     Recurrent Clostridium difficile diarrhea     Capsulitis of metatarsophalangeal (MTP) joint     Acute diverticulitis      Reason for Admission:   Recurrent diverticulitis    Physical Exam:     GENERAL:  Alert. Oriented to time, place, and person. Moderate distress. Anxious. HEENT:  Atraumatic. Oropharynx clear, moist mucous membranes.   Ears, nose normal.  Eyes:  Anicteric sclerae. NECK:  Supple. No lymphadenopathy. Trachea midline. Full range of motion. LUNGS:  Clear to auscultation bilaterally. Normal respiratory effort. HEART:  Regular rate and rhythm. S1, S2 auscultated. No murmur. ABDOMEN:  Soft, nondistended. Tenderness to superficial palpation, lower and left lower quadrant area. No guarding or rebound tenderness. Positive bowel sounds. EXTREMITIES:  No peripheral edema, clubbing, or cyanosis. NEUROLOGIC:  Motor and sensory intact. Hospital Course:     Acute diverticulitis. -recurrent distal descending and proximal sigmoid diverticulitis  -imaging reviewed  -continue IVF  -continue IV abx, IV analgesics, IV antiemetics, PO vancomycin  -appreciate GI consult. COPD  -continue to monitor oxygen sats  -continue inhalers   -will continue to monitor     HTN  -continue to monitor BP  -titrate meds as needed     Rob Velasquez  -continue home meds     VTE ppx: heparin     DISPO: PLAN FOR OUTPATIENT ABX FOR 10 DAYS WITH AUGMENTIN AND OVP      History of Present Illness:   Per admitting attending: The patient is a 71-year-old  female who presented today to the emergency department for evaluation of recurrent abdominal pain for the last 3 days. She has been treated for diverticulitis on and off since last month with rapid recurrence of symptoms after finishing her antibiotic course. In May 2023, she was noted to have mild diverticulitis of the descending colon. Today CT scan of the abdomen done by her gastroenterologist showed intermediate length segment colonic diverticulitis involving junction of the descending and sigmoid colon. The patient was sent to the emergency department for evaluation. CBC and chemistry were unremarkable. Potassium 3.3. Urinalysis unremarkable. Started on IV Zosyn and IV fluids. She will be admitted to the hospital for further management.        Disposition: home    Discharge Condition: Stable    Discharge Medications: Current Discharge Medication List    START taking these medications    HYDROcodone-acetaminophen 5-325 MG Oral Tab  Take 1-2 tablets by mouth every 4 (four) hours as needed for Pain. Qty: 20 tablet Refills: 0      CONTINUE these medications which have CHANGED    amoxicillin clavulanate 875-125 MG Oral Tab  Take 1 tablet by mouth 2 (two) times daily. Take every 12 hours  Qty: 20 tablet Refills: 0    vancomycin 125 MG Oral Cap  Take 1 capsule (125 mg total) by mouth daily. Qty: 10 capsule Refills: 0      CONTINUE these medications which have NOT CHANGED    hydrALAZINE 100 MG Oral Tab  Take 1 tablet (100 mg total) by mouth 2 (two) times daily. Qty: 60 tablet Refills: 0    montelukast 10 MG Oral Tab  Take 1 tablet (10 mg total) by mouth nightly. Esomeprazole Magnesium 20 MG Oral Capsule Delayed Release  Take 1 capsule (20 mg total) by mouth every morning before breakfast.    aspirin 81 MG Oral Tab EC  Take 1 tablet (81 mg total) by mouth daily. Qty: 30 tablet Refills: 2    losartan Potassium 50 MG Oral Tab  Take 1 tablet (50 mg total) by mouth daily. Qty: 30 tablet Refills: 2    Fexofenadine HCl 60 MG Oral Tab  Take 1 tablet (60 mg total) by mouth daily. fluticasone-Umeclidin-Vilant (TRELEGY ELLIPTA) 195-45.6-41 MCG/INH Inhalation Aerosol Powder, Breath Activated  Inhale 1 puff into the lungs daily. ALBUTEROL SULFATE HFA IN  Inhale 2 puffs into the lungs as needed. benzonatate 200 MG Oral Cap  Take 1 capsule (200 mg total) by mouth daily. Triamterene-HCTZ 37.5-25 MG Oral Tab  Take 1 tablet by mouth daily. Acetaminophen-Codeine (TYLENOL WITH CODEINE #3) 300-30 MG Oral Tab  Take 1 tablet by mouth every 4 (four) hours as needed for Pain. Qty: 15 tablet Refills: 0    omeprazole 20 MG Oral Capsule Delayed Release  Take 1 capsule (20 mg total) by mouth every morning. RESTASIS 0.05 % Ophthalmic Emulsion  Place 1 drop into both eyes daily.     Refills: 4    ipratropium-albuterol 0.5-2.5 (3) MG/3ML Inhalation Solution  Take 3 mL by nebulization as needed. STOP taking these medications    doxycycline 100 MG Oral Cap    dicyclomine 10 MG Oral Cap    Azelastine HCl 0.1 % Nasal Solution    Betamethasone Dipropionate 0.05 % External Ointment    ibuprofen (MOTRIN) 400 MG Oral Tab          Total dc time > 30 min    Petros Randhawa MD  7/28/2023  9:24 AM     Hospital Discharge Diagnoses:  acute diverticulitis    Lace+ Score: 37  59-90 High Risk  29-58 Medium Risk  0-28   Low Risk. TCM Follow-Up Recommendation:  LACE > 58:  High Risk of readmission after discharge from the hospital.       Electronically signed by Mounika Richardson MD on 7/28/2023 12:50 PM

## (undated) NOTE — LETTER
11/9/2023          To Whom It May Concern:    Gerhardt Mani is currently under my medical care and is still having some discomfort. Please allow Vanessa Dyer to have the option to work from home during the remainder of the recovery process. If you require additional information please contact our office.         Sincerely,        Michelle Cam MD          Document generated by:  West Lin RN

## (undated) NOTE — ED AVS SNAPSHOT
Larry Browne   MRN: [id number]    Department:  [facility] Emergency Department   Date of Visit:  12/25/2017           Disclosure     Insurance plans vary and the physician(s) referred by the ER may not be covered by your plan.  Please contac CARE PHYSICIAN AT ONCE OR RETURN IMMEDIATELY TO THE EMERGENCY DEPARTMENT. If you have been prescribed any medication(s), please fill your prescription right away and begin taking the medication(s) as directed.   If you believe that any of the medications

## (undated) NOTE — LETTER
Patient Name: Sandrine Oleary  YOB: 1955          MRN :  [id number]  Date:  7/2/2024  Referring Physician:  Skyler Ponce    Progress Summary  Pt has attended 10 visits in Physical Therapy.     Diagnosis:   S/P rotator cuff repair (Z98.890)    right Shoulder Arthroscopic rotator cuff repair, subacromial decompression, distal clavicle excision    Referring Provider: Skyler Ponce   Surgeon: Dr. Jose Gutierres Date of Evaluation:    5/21/2024    Precautions:  adhere to the \"large repair\" protocol, sling 4 weeks.     Visual Impairment, HTN, breast cancer    No AAROM until 6 week, no AROM until 8 week no resisted RC strengthening 12 week, PROM to tolerance, avoid adduction   Next MD Visit:   October 2024     Date of Surgery: 5/6/2024    Insurance Primary/Secondary: [employer] [place] / N/A     # Auth Visits: no auth, 20 per POC (PN at 10v)           7/2/2024: 8 week, 1 day s/p large rotator cuff repair    Subjective: Feels like she's most limited by pain with sleeping, seems like she can't get comfortable. Feels that the mobility has improved.   Pain: 3 /10      Objective:     5/30/2024   Palpation: soft tissue restriction and TTP throughout R shoulder complex; infraspinatus, teres minor, supraspinatus, upper trapezius, and rhomboids     7/2/2024   AROM: (* denotes performed with pain)   EOS: end of session  Shoulder  Elbow   Flexion: R 75* (127 EOS)   Abduction: R 40* (90 EOS)  ER (elbow neutral): R 35  IR (elbow Neutral): R 85  Flexion: R 140  Extension: R 0       Shoulder PROM: (* denotes performed with pain)  7/2/2024  Evaluation     Flexion: R 150  Abduction: R 140  ER (30 deg abd):R 70    IR (30 deg abd): R 65  Flexion: R 40*   Scapation: R 65  ER: R 15 (discomfort)   IR: R 50      Assessment: Patient has attended 10 visits s/p large rotator cuff repair 5/6/2024. At this time patient demonstrates near full PROM in all directions without pain, most limited into IR and end range abduction. AROM  progresses within session as noted above suggesting improvement in muscle activation and recruitment. Reiterating to patient the importance of slow and controlled movement, pt agreeable, progressing well per protocol at this time. Would benefit from continued skilled physical therapy to achieve full ROM, improve strength, and return to all functional activity without risk of re-injury.      Goals: (To be met in 20 visits)   Pt will report improved ability to sleep without waking due to shoulder pain Continued   Pt will improve shoulder flexion AROM to >130 degrees to be able to reach into overhead cabinets without pain or restrictionContinued   Pt will improve shoulder abduction AROM to >130 degrees to improve ability to don deodorant, don/doff shirts, and wash hairContinued   Pt will increase shoulder AROM ER to T1 to reach and fasten seatbelt Continued   Pt will increase shoulder AROM IR to T12 to be able to reach in back pocket, tuck in shirt, and turn steering wheel without painContinued   Pt will improve shoulder strength throughout to 4/5 to improve function with ADLs including bathing and dressing independently. Continued   Pt will demonstrate increased mid/low trap strength to 4/5 to promote improved shoulder mechanics and stabilization with ADL such as lifting and reaching Continued   Pt will be independent and compliant with comprehensive HEP to maintain progress achieved in PT Continued      QuickDASH Outcome Score  Score: 84.09 % (5/21/2024  3:40 PM)    Post QuickDASH Outcome Score  Post Score: 63.64 % (7/2/2024  2:43 PM)    20.45 % improvement    Plan: Continue skilled Physical Therapy 2 x/week or a total of 20 visits over a 90 day period. Treatment will include: manual therapy, therapeutic exercise, therapeutic activity, neuromuscular re-education, HEP instruction and patient education.       Patient/Family/Caregiver was advised of these findings, precautions, and treatment options and has agreed to  actively participate in planning and for this course of care.    Thank you for your referral. If you have any questions, please contact me at Dept: [phone].    Sincerely,  Electronically signed by therapist: Elizabeth Chong PT     Physician's certification required:  Yes  Please co-sign or sign and return this letter via fax as soon as possible to [phone].   I certify the need for these services furnished under this plan of treatment and while under my care.    X___________________________________________________ Date____________________    Certification From: 7/2/2024  To:9/30/2024 21st Century Cures Act Notice to Patient: Medical documents like this are made available to patients in the interest of transparency. However, be advised this is a medical document and it is intended as lrnt-sf-txic communication between your medical providers. This medical document may contain abbreviations, assessments, medical data, and results or other terms that are unfamiliar. Medical documents are intended to carry relevant information, facts as evident, and the clinical opinion of the practitioner. As such, this medical document may be written in language that appears blunt or direct. You are encouraged to contact your medical provider and/or [facility] Patient Experience if you have any questions about this medical document.

## (undated) NOTE — LETTER
Patient Name: Sandrine Oleary  YOB: 1955          MRN number:  [id number]  Date:  5/24/2024  Referring Physician:  Skyler Ponce         POST-OP SHOULDER EVALUATION:     Diagnosis:   S/P rotator cuff repair (Z98.890)        right Shoulder Arthroscopic rotator cuff repair, subacromial decompression, distal clavicle excision Referring Provider: Skyler Ponce  Date of Evaluation:    5/21/2024    Precautions:  adhere to the \"large repair\" protocol, sling 4 weeks. Visual Impairment, HTN, breast cancer Next MD visit:   TBD  Date of Surgery: 5/6/2024     PATIENT SUMMARY   Sandrine Oleary is a right hand dominant 68 year old female who presents to therapy today s/p right Shoulder Arthroscopic rotator cuff repair, subacromial decompression, distal clavicle excision on 5/6/2024 with complaints of pain and stiffness.    History of Current Condition: Patient underwent aforementioned procedure on 5/6/2024, reporting no complications after surgery. Has been wearing sling 24/7 but took out abduction pillow and states the surgical team was OK with this. Since surgery patient has been having a lot of pain, most notably with sleeping. Prior to surgery patient reports no JANET, believes shoulder injury from overuse, did have RTC repair on L shoulder many years ago with full recovery. Most recently pt followed up with surgical PA on 5/13/2024 who, per patient, told her to come to therapy for the evaluation but not to start moving the arm, even passively until next week. Per office visit note \" Informed patient to continue to wear the sling, especially at night and when around other people until 4 weeks post-op. They may remove the sling when sedentary, ambulating throughout their home, and for desk/computer work\"      N/T: Yes,Distal hand, reporting hx of carpal tunnel.     Pt describes pain level current 7/10, at best 6/10, at worst 9/10.   Relieving: Switching between tylenol/advil. Using ice machine  multiple times a day.   Quality: Aching, burning, occasional sharp pain down the arm.     Current functional limitations include all everyday activities due to surgical precautions; dressing, bathing, blow drying her hair, driving, sleeping, housework, and is currently off work.     Home Set Up: Lives with , receiving help with activity around the house.   Occupation/recreation: Professional , working full time, computer work. Enjoys bowling, golf, riding her bike, gardening but currently unable to do all tasks.     Sandrine describes prior level of function independent, pain limited. Pt goals include returning to all activities without pain.    Past medical history was reviewed with Sandrine. Significant findings include   Past Medical History:    Alcohol use    Anemia    Anxiety    Arrhythmia    PVC    Asthma (HCC)    Breast cancer (HCC)    [place] left    Chronic ulcer of right leg (HCC)    Colon polyp    COPD (chronic obstructive pulmonary disease) (HCC)    d/t radiation for breast cancer    Diverticulosis    Dysplastic nevi    Exposure to medical diagnostic radiation    GERD (gastroesophageal reflux disease)    Hemorrhoids    High blood pressure    History of chemotherapy    History of therapeutic radiation    Hyperlipidemia    Hypertension    IBS (irritable bowel syndrome)    Klebsiella pneumoniae infection    Pelvic prolapse    Personal history of antineoplastic chemotherapy    PVC (premature ventricular contraction)         ASSESSMENT  Sandrine presents to physical therapy evaluation with primary c/o R shoulder pain and stiffness s/p RTC repair 5/6/24. Testing in session today did not look at RUE as pt was told to hold off on movement until next week. LUE presents full ROM and strength without limitation. Functional deficits include but are not limited to all functional tasks including RUE- dressing, bathing, blow drying her hair, driving, sleeping, housework, and is currently off work. .  Signs  and symptoms are consistent with diagnosis of referring diagnosis. Pt and PT discussed evaluation findings, pathology, POC and HEP.  Throughout session pt demos movements with RLE while in sling, extensive education in session on not using RUE at this time at all,  pt agreeable. Readjusted sling in session to ensure supportive posturing without AROM at arm Pt voiced understanding and performs HEP correctly without reported pain. Skilled Physical Therapy is medically necessary to address the above impairments and reach functional goals.     OBJECTIVE:   Observation/Posture: presents with sling in posterior/elevated position, utilizing RUE to doff jacket   Depressed R scapulae    Palpation: TTP along shoulder joint, slight winging at scapulae  Sensation: grossly intact to light touch    AROM: (* denotes performed with pain)  Shoulder  Elbow   Flexion: R NT; L 157  Abduction: R NT; L 147  ER: R NT; L T1  IR: R NT; L T10 Flexion: R 140; L 140  Extension: R -10*; L 0     PROM: (* denotes performed with pain)  Shoulder    Flexion: R NT   Abduction: R NT  ER: R NT  IR: R NT     Accessory motion: Not formally assessed in session    Flexibility: Not tested    Strength/MMT: (* denotes performed with pain)  Shoulder Scapular   Flexion: R NT/5; L 5/5  Abduction: R NT/5; L 5/5  ER: R NT/5; L 5/5  IR: R NT/5; L 5/5 Pt unable to assume testing position       Today’s Treatment and Response:   Pt education was provided on exam findings, treatment diagnosis, treatment plan, expectations, and prognosis. Pt was also provided recommendations for activity modifications, possible soreness after evaluation, modalities as needed [ice/heat], and postural corrections.   Patient was instructed in and issued a HEP for:    Access Code: [email or web address]  URL: [email or web address]  Date: 05/21/2024  Prepared by: Elizabeth Chong    Exercises  - Thumb Opposition  - 5 x daily - 7 x weekly - 10 reps  - Seated Wrist Flexion Stretch  - 5 x  daily - 7 x weekly - 5 reps - 10 second hold  - Seated Wrist Extension Stretch  - 5 x daily - 7 x weekly - 5 reps - 10 second hold      Charges: PT Eval Moderate Complexity, 1 TE      Total Timed Treatment: 10' TE min     Total Treatment Time: 45'  min     Based on clinical rationale and outcome measures, this evaluation involved Moderate Complexity decision making due to 3+ personal factors/comorbidities, 4+ body structures involved/activity limitations, and evolving symptoms including changing pain levels.    PLAN OF CARE:    Goals: (To be met in 20 visits)   Pt will report improved ability to sleep without waking due to shoulder pain  Pt will improve shoulder flexion AROM to >130 degrees to be able to reach into overhead cabinets without pain or restriction  Pt will improve shoulder abduction AROM to >130 degrees to improve ability to don deodorant, don/doff shirts, and wash hair  Pt will increase shoulder AROM ER to T1 to reach and fasten seatbelt   Pt will increase shoulder AROM IR to T12 to be able to reach in back pocket, tuck in shirt, and turn steering wheel without pain  Pt will improve shoulder strength throughout to 4/5 to improve function with ADLs including bathing and dressing independently.   Pt will demonstrate increased mid/low trap strength to 4/5 to promote improved shoulder mechanics and stabilization with ADL such as lifting and reaching   Pt will be independent and compliant with comprehensive HEP to maintain progress achieved in PT     Frequency / Duration: Patient will be seen for 1-2 x/week or a total of 20 visits over a 90 day period.  Treatment will include: Manual Therapy, Neuromuscular Re-education, Self-Care Home Management, Therapeutic Activities, Therapeutic Exercise, and Home Exercise Program instruction    Education or treatment limitation: None  Rehab Potential:good    QuickDASH Outcome Score  Score: 84.09 % (5/21/2024  3:40 PM)      Patient/Family/Caregiver was advised of these  findings, precautions, and treatment options and has agreed to actively participate in planning and for this course of care.    Thank you for your referral. Please co-sign or sign and return this letter via fax as soon as possible to [phone]. If you have any questions, please contact me at Dept: [phone]    Sincerely,  Electronically signed by therapist: Elizabeth Chong PT  Physician's certification required: Yes  I certify the need for these services furnished under this plan of treatment and while under my care.    X___________________________________________________ Date____________________    Certification From: 5/21/2024  To:8/19/2024 21st Century Cures Act Notice to Patient: Medical documents like this are made available to patients in the interest of transparency. However, be advised this is a medical document and it is intended as wzdx-bp-iorf communication between your medical providers. This medical document may contain abbreviations, assessments, medical data, and results or other terms that are unfamiliar. Medical documents are intended to carry relevant information, facts as evident, and the clinical opinion of the practitioner. As such, this medical document may be written in language that appears blunt or direct. You are encouraged to contact your medical provider and/or [facility] Patient Experience if you have any questions about this medical document.

## (undated) NOTE — ED AVS SNAPSHOT
Deana Gallagher   MRN: [id number]    Department:  [facility] Emergency Department   Date of Visit:  4/6/2019           Disclosure     Insurance plans vary and the physician(s) referred by the ER may not be covered by your plan.  Please contact CARE PHYSICIAN AT ONCE OR RETURN IMMEDIATELY TO THE EMERGENCY DEPARTMENT. If you have been prescribed any medication(s), please fill your prescription right away and begin taking the medication(s) as directed.   If you believe that any of the medications

## (undated) NOTE — LETTER
[facility]  [address], [place], [place]    Authorization for Surgical Operation and Procedure                               I hereby authorize Venkatesh Castle DO, my physician and his/her assistants (if applicable), which may include medical students, residents, and/or fellows, to perform the following surgical operation/ procedure and administer such anesthesia as may be determined necessary by my physician: Operation/Procedure name (s) INCISION AND DRAINAGE WITH POSSIBLE OPEN REDUCTION INTERNAL FIXATION OF LEFT HUMERAL SHAFT FRACTURE on Sandrine Oleary   2.   I recognize that during the surgical operation/procedure, unforeseen conditions may necessitate additional or different procedures than those listed above.  I, therefore, further authorize and request that the above-named surgeon, assistants, or designees perform such procedures as are, in their judgment, necessary and desirable.    3.   My surgeon/physician has discussed prior to my surgery the potential benefits, risks and side effects of this procedure; the likelihood of achieving goals; and potential problems that might occur during recuperation.  They also discussed reasonable alternatives to the procedure, including risks, benefits, and side effects related to the alternatives and risks related to not receiving this procedure.  I have had all my questions answered and I acknowledge that no guarantee has been made as to the result that may be obtained.    4.   Should the need arise during my operation/procedure, which includes change of level of care prior to discharge, I also consent to the administration of blood and/or blood products.  Further, I understand that despite careful testing and screening of blood or blood products by collecting agencies, I may still be subject to ill effects as a result of receiving a blood transfusion and/or blood products.  The following are some, but not all, of the potential  risks that can occur: fever and allergic reactions, hemolytic reactions, transmission of diseases such as Hepatitis, AIDS and Cytomegalovirus (CMV) and fluid overload.  In the event that I wish to have an autologous transfusion of my own blood, or a directed donor transfusion, I will discuss this with my physician.  Check only if Refusing Blood or Blood Products  I understand refusal of blood or blood products as deemed necessary by my physician may have serious consequences to my condition to include possible death. I hereby assume responsibility for my refusal and release the hospital, its personnel, and my physicians from any responsibility for the consequences of my refusal.    o  Refuse   5.   I authorize the use of any specimen, organs, tissues, body parts or foreign objects that may be removed from my body during the operation/procedure for diagnosis, research or teaching purposes and their subsequent disposal by hospital authorities.  I also authorize the release of specimen test results and/or written reports to my treating physician on the hospital medical staff or other referring or consulting physicians involved in my care, at the discretion of the Pathologist or my treating physician.    6.   I consent to the photographing or videotaping of the operations or procedures to be performed, including appropriate portions of my body for medical, scientific, or educational purposes, provided my identity is not revealed by the pictures or by descriptive texts accompanying them.  If the procedure has been photographed/videotaped, the surgeon will obtain the original picture, image, videotape or CD.  The hospital will not be responsible for storage, release or maintenance of the picture, image, tape or CD.    7.   I consent to the presence of a  or observers in the operating room as deemed necessary by my physician or their designees.    8.   I recognize that in the event my procedure results in  extended X-Ray/fluoroscopy time, I may develop a skin reaction.    9. If I have a Do Not Attempt Resuscitation (DNAR) order in place, that status will be suspended while in the operating room, procedural suite, and during the recovery period unless otherwise explicitly stated by me (or a person authorized to consent on my behalf). The surgeon or my attending physician will determine when the applicable recovery period ends for purposes of reinstating the DNAR order.  10. Patients having a sterilization procedure: I understand that if the procedure is successful the results will be permanent and it will therefore be impossible for me to inseminate, conceive, or bear children.  I also understand that the procedure is intended to result in sterility, although the result has not been guaranteed.   11. I acknowledge that my physician has explained sedation/analgesia administration to me including the risk and benefits I consent to the administration of sedation/analgesia as may be necessary or desirable in the judgment of my physician.    I CERTIFY THAT I HAVE READ AND FULLY UNDERSTAND THE ABOVE CONSENT TO OPERATION and/or OTHER PROCEDURE.     ____________________________________  _________________________________        ______________________________  Signature of Patient    Signature of Responsible Person                Printed Name of Responsible Person                                      ____________________________________  _____________________________                ________________________________  Signature of Witness        Date  Time         Relationship to Patient    STATEMENT OF PHYSICIAN My signature below affirms that prior to the time of the procedure; I have explained to the patient and/or his/her legal representative, the risks and benefits involved in the proposed treatment and any reasonable alternative to the proposed treatment. I have also explained the risks and benefits involved in refusal of the  proposed treatment and alternatives to the proposed treatment and have answered the patient's questions. If I have a significant financial interest in a co-management agreement or a significant financial interest in any product or implant, or other significant relationship used in this procedure/surgery, I have disclosed this and had a discussion with my patient.     _____________________________________________________              _____________________________  (Signature of Physician)                                                                                         (Date)                                   (Time)  Patient Name: Sandrine Kerr Anirudh      : 1955      Printed: 2024     Medical Record #: [id number]                                      Page 1 of 1

## (undated) NOTE — LETTER
[address] [place] [address]      Authorization for Surgical Operation and Procedure     Date:___________                                                                                                         Time:__________  1. I hereby Edgar Tinajero MD, my physician and his/her assistants (if applicable), which may include medical students, residents, and/or fellows, to perform the following surgical operation/ procedure and administer such anesthesia as may be determined necessary by my physician:  Operation/Procedure name (s) COLONOSCOPY  on Sandrine Kerr Ruecking   2. I recognize that during the surgical operation/procedure, unforeseen conditions may necessitate additional or different procedures than those listed above. I, therefore, further authorize and request that the above-named surgeon, assistants, or designees perform such procedures as are, in their judgment, necessary and desirable. 3.   My surgeon/physician has discussed prior to my surgery the potential benefits, risks and side effects of this procedure; the likelihood of achieving goals; and potential problems that might occur during recuperation. They also discussed reasonable alternatives to the procedure, including risks, benefits, and side effects related to the alternatives and risks related to not receiving this procedure. I have had all my questions answered and I acknowledge that no guarantee has been made as to the result that may be obtained. 4.   Should the need arise during my operation or immediate post-operative period, I also consent to the administration of blood and/or blood products. Further, I understand that despite careful testing and screening of blood or blood products by collecting agencies, I may still be subject to ill effects as a result of receiving a blood transfusion and/or blood products.   The following are some, but not all, of the potential risks that can occur: fever and allergic reactions, hemolytic reactions, transmission of diseases such as Hepatitis, AIDS and Cytomegalovirus (CMV) and fluid overload. In the event that I wish to have an autologous transfusion of my own blood, or a directed donor transfusion. I will discuss this with my physician. 5.   I authorize the use of any specimen, organs, tissues, body parts or foreign objects that may be removed from my body during the operation/procedure for diagnosis, research or teaching purposes and their subsequent disposal by hospital authorities. I also authorize the release of specimen test results and/or written reports to my treating physician on the hospital medical staff or other referring or consulting physicians involved in my care, at the discretion of the Pathologist or my treating physician. 6.   I consent to the photographing or videotaping of the operations or procedures to be performed, including appropriate portions of my body for medical, scientific, or educational purposes, provided my identity is not revealed by the pictures or by descriptive texts accompanying them. If the procedure has been photographed/videotaped, the surgeon will obtain the original picture, image, videotape or CD. The hospital will not be responsible for storage, release or maintenance of the picture, image, tape or CD.    7.   I consent to the presence of a  or observers in the operating room as deemed necessary by my physician or their designees. 8.   I recognize that in the event my procedure results in extended X-Ray/fluoroscopy time, I may develop a skin reaction. 9. If I have a Do Not Attempt Resuscitation (DNAR) order in place, that status will be suspended while in the operating room, procedural suite, and during the recovery period unless otherwise explicitly stated by me (or a person authorized to consent on my behalf).  The surgeon or my attending physician will determine when the applicable recovery period ends for purposes of reinstating the DNAR order. 10. Patients having a sterilization procedure: I understand that if the procedure is successful the results will be permanent and it will therefore be impossible for me to inseminate, conceive, or bear children. I also understand that the procedure is intended to result in sterility, although the result has not been guaranteed. 11. I acknowledge that my physician has explained sedation/analgesia administration to me including the risk and benefits I consent to the administration of sedation/analgesia as may be necessary or desirable in the judgment of my physician. I CERTIFY THAT I HAVE READ AND FULLY UNDERSTAND THE ABOVE CONSENT TO OPERATION and/or OTHER PROCEDURE.    _________________________________________  __________________________________  Signature of Patient     Signature of Responsible Person         ___________________________________         Printed Name of Responsible Person           _________________________________                  Relationship to Patient  _________________________________________  ______________________________  Signature of Witness          Date  Time    STATEMENT OF PHYSICIAN My signature below affirms that prior to the time of the procedure; I have explained to the patient and/or his/her legal representative, the risks and benefits involved in the proposed treatment and any reasonable alternative to the proposed treatment. I have also explained the risks and benefits involved in refusal of the proposed treatment and alternatives to the proposed treatment and have answered the patient's questions. If I have a significant financial interest in a co-management agreement or a significant financial interest in any product or implant, or other significant relationship used in this procedure/surgery, I have disclosed this and had a discussion with my patient. _______________________________________________________________ _____________________________  Saloni Taylor Physician)                                                                                         (Date)                                   (Time)        Patient Name: Mode Gtz    : 1955   Printed: 2022      Medical Record #: [id number]                                              Page 1 of 1

## (undated) NOTE — LETTER
[facility]  Administration of Anesthesia  Sandrine BAILEY agree to be cared for by a physician anesthesiologist alone and/or with a nurse anesthetist, who is specially trained to monitor me and give me medicine to put me to sleep or keep me comfortable during my procedure    I understand that my anesthesiologist and/or anesthetist is not an employee or agent of [facility] or [employer]. He or she works for [facility]    As the patient asking for anesthesia services, I agree to:  Allow the anesthesiologist (anesthesia doctor) to give me medicine and do additional procedures as necessary. Some examples are: Starting or using an “IV” to give me medicine, fluids or blood during my procedure, and having a breathing tube placed to help me breathe when I’m asleep (intubation). In the event that my heart stops working properly, I understand that my anesthesiologist will make every effort to sustain my life, unless otherwise directed by [facility] Do Not Resuscitate documents.  Tell my anesthesia doctor before my procedure:  If I am pregnant.  The last time that I ate or drank.  iii. All of the medicines I take (including prescriptions, herbal supplements, and pills I can buy without a prescription (including street drugs/illegal medications). Failure to inform my anesthesiologist about these medicines may increase my risk of anesthetic complications.  iv.If I am allergic to anything or have had a reaction to anesthesia before.  I understand how the anesthesia medicine will help me (benefits).  I understand that with any type of anesthesia medicine there are risks:  The most common risks are: nausea, vomiting, sore throat, muscle soreness, damage to my eyes, mouth, or teeth (from breathing tube placement).  Rare risks include: remembering what happened during my procedure, allergic reactions to medications, injury to my airway, heart, lungs, vision, nerves,  or muscles and in extremely rare instances death.  My doctor has explained to me other choices available to me for my care (alternatives).  Pregnant Patients (“epidural”):  I understand that the risks of having an epidural (medicine given into my back to help control pain during labor), include itching, low blood pressure, difficulty urinating, headache or slowing of the baby’s heart. Very rare risks include infection, bleeding, seizure, irregular heart rhythms and nerve injury.  Regional Anesthesia (“spinal”, “epidural”, & “nerve blocks”):  I understand that rare but potential complications include headache, bleeding, infection, seizure, irregular heart rhythms, and nerve injury.    _____________________________________________________________________________  Patient (or Representative) Signature/Relationship to Patient  Date   Time    _____________________________________________________________________________   Name (if used)    Language/Organization   Time    _____________________________________________________________________________  Nurse Anesthetist Signature     Date   Time  _____________________________________________________________________________  Anesthesiologist Signature     Date   Time  I have discussed the procedure and information above with the patient (or patient’s representative) and answered their questions. The patient or their representative has agreed to have anesthesia services.    _____________________________________________________________________________  Witness        Date   Time  I have verified that the signature is that of the patient or patient’s representative, and that it was signed before the procedure  Patient Name: Sandrine Oleary     : 1955                 Printed: 2024 at 12:37 PM    Medical Record #: [id number]                                            Page 1 of 1  ----------ANESTHESIA CONSENT----------

## (undated) NOTE — Clinical Note
No referring provider defined for this encounter. 04/18/2017        Patient: Val Echols   YOB: 1955   Date of Visit: 4/18/2017       Dear  Dr. Vamsi Bonilla,      Thank you for referring Val Echols to my practice.   Please fi

## (undated) NOTE — LETTER
08/25/22        Zoie Santana Rulyssaing  [address] [address]      Dear [facility],    [facility] records indicate that you have outstanding lab work and or testing that was ordered for you and has not yet been completed:  COMP METABOLIC PANEL (14) (Order #[id number]) on 6/6/22  CBC WITH DIFFERENTIAL WITH PLATELET (Order #[id number]) on 6/6/22  C-REACTIVE PROTEIN (Order #[id number]) on 6/6/22  To provide you with the best possible care, please complete these orders at your earliest convenience. If you have recently completed these orders please disregard this letter. If you have any questions please call the office at [phone].      Thank you,   Dr Kya Ji MD

## (undated) NOTE — LETTER
Patient: Parish Nguyen   YOB: 1955   Date of Visit: 4/1/2022     Dear  Dr. Ernesto Lujan MD,    Thank you for referring Parish Nguyen to my practice. Please find my assessment and plan below. Llq abdominal pain  (primary encounter diagnosis)    77year old female with chronic irregular LGI sx and intermittent episodes over the years of LLQ abdominal pain. No clear documentation in [address] [place] [address] or care everywhere of significant diverticulitis. I suspect a mostly functional component, and perhaps dietary intolerances. Discussed in detail with patient and options reviewed. Await CT scan next week. No strong indications for surgical intervention currently. Continue close medical and gastroenterology follow up.       Sincerely,   Naomy Harris MD     Document electronically generated by:  Naomy Harris MD